# Patient Record
Sex: MALE | Race: WHITE | Employment: OTHER | URBAN - METROPOLITAN AREA
[De-identification: names, ages, dates, MRNs, and addresses within clinical notes are randomized per-mention and may not be internally consistent; named-entity substitution may affect disease eponyms.]

---

## 2021-01-02 ENCOUNTER — HOSPITAL ENCOUNTER (OUTPATIENT)
Dept: LAB | Age: 86
Discharge: HOME | End: 2021-01-02
Payer: MEDICARE

## 2021-01-02 LAB
ALBUMIN SERPL-MCNC: 4 G/DL (ref 3.5–4.6)
ALP BLD-CCNC: 104 U/L (ref 35–104)
ALT SERPL-CCNC: 11 U/L (ref 0–41)
ANION GAP SERPL CALCULATED.3IONS-SCNC: 10 MEQ/L (ref 9–15)
AST SERPL-CCNC: 19 U/L (ref 0–40)
BILIRUB SERPL-MCNC: 0.5 MG/DL (ref 0.2–0.7)
BUN BLDV-MCNC: 36 MG/DL (ref 8–23)
CALCIUM SERPL-MCNC: 9.5 MG/DL (ref 8.5–9.9)
CHLORIDE BLD-SCNC: 101 MEQ/L (ref 95–107)
CO2: 29 MEQ/L (ref 20–31)
CREAT SERPL-MCNC: 1.62 MG/DL (ref 0.7–1.2)
GFR AFRICAN AMERICAN: 48.4
GFR NON-AFRICAN AMERICAN: 40
GLOBULIN: 3.3 G/DL (ref 2.3–3.5)
GLUCOSE BLD-MCNC: 115 MG/DL (ref 70–99)
HCT VFR BLD CALC: 36.6 % (ref 42–52)
HEMOGLOBIN: 12.4 G/DL (ref 14–18)
MCH RBC QN AUTO: 33.7 PG (ref 27–31.3)
MCHC RBC AUTO-ENTMCNC: 33.9 % (ref 33–37)
MCV RBC AUTO: 99.4 FL (ref 80–100)
PDW BLD-RTO: 13 % (ref 11.5–14.5)
PLATELET # BLD: 281 K/UL (ref 130–400)
POTASSIUM SERPL-SCNC: 4.2 MEQ/L (ref 3.4–4.9)
RBC # BLD: 3.68 M/UL (ref 4.7–6.1)
SODIUM BLD-SCNC: 140 MEQ/L (ref 135–144)
TOTAL PROTEIN: 7.3 G/DL (ref 6.3–8)
WBC # BLD: 6.9 K/UL (ref 4.8–10.8)

## 2021-01-02 PROCEDURE — 85027 COMPLETE CBC AUTOMATED: CPT

## 2021-01-02 PROCEDURE — 36415 COLL VENOUS BLD VENIPUNCTURE: CPT

## 2021-01-02 PROCEDURE — 80053 COMPREHEN METABOLIC PANEL: CPT

## 2021-01-12 ENCOUNTER — HOSPITAL ENCOUNTER (OUTPATIENT)
Dept: PHYSICAL THERAPY | Age: 86
Setting detail: THERAPIES SERIES
Discharge: HOME | End: 2021-01-12
Payer: MEDICARE

## 2021-01-12 PROCEDURE — 97530 THERAPEUTIC ACTIVITIES: CPT

## 2021-01-12 PROCEDURE — 97110 THERAPEUTIC EXERCISES: CPT

## 2021-01-12 PROCEDURE — 97162 PT EVAL MOD COMPLEX 30 MIN: CPT

## 2021-01-12 ASSESSMENT — PAIN DESCRIPTION - FREQUENCY: FREQUENCY: CONTINUOUS

## 2021-01-12 ASSESSMENT — PAIN DESCRIPTION - PAIN TYPE: TYPE: ACUTE PAIN

## 2021-01-12 NOTE — PROGRESS NOTES
Physical Therapy  Initial Assessment  Date: 2021  Patient Name: Tmomy Quan  MRN: [id number]  : 1928     Treatment Diagnosis: Chronic low back pain/debility and unsteady gait/falls         Subjective   General  Chart Reviewed: Yes  Patient assessed for rehabilitation services?: Yes  Additional Pertinent Hx: Pt reports had 3 falls and last one being Oct 2020; Pt reports that his back pain increased after his falls. Pt did not  have any xrays taken.   Family / Caregiver Present: Yes(wife and daughter in law)  Referring Practitioner: Mona Contreras  Referral Date : 21  Diagnosis: Chronic back pain and unstable gait  General Comment  Comments: Pts daughter in law and wife present for eval  PT Visit Information  Onset Date: 10/12/20  Total # of Visits Approved: 12(1-2x per week for 4-6 weeks=12)  Total # of Visits to Date: 1  Plan of Care/Certification Expiration Date: 21  No Show: 0  Canceled Appointment: 0  Subjective  Subjective: Pt reports left sided low back pain as 8-9/10 \"pointed pain\"  Pain Screening  Patient Currently in Pain: Yes  Pain Assessment  Pain Assessment: 0-10  Pain Level: 9  Pain Type: Acute pain  Pain Location: Back  Pain Orientation: Left  Pain Descriptors: (\"Pointed pain\")  Pain Frequency: Continuous  Vital Signs  Patient Currently in Pain: Yes         Objective     Observation/Palpation  Palpation: Pt very tender at his left SI and superior to left SI; Pt had no reports of symptoms down his left leg  Observation: Pt stood with a flexed forward posture and with left shoulder and left scapula lower that the right side    AROM RLE (degrees)  RLE AROM: WNL  AROM LLE (degrees)  LLE AROM : WNL  AROM RUE (degrees)  RUE AROM : WNL  AROM LUE (degrees)  LUE AROM : WNL  Spine  Lumbar: Lumbar flexion=25%  Lumbar ext= <25%   SB R=50%    SB L=50%    Rot R=50%    Rot L=50%    Strength RLE  R Hip Flexion: 4/5  R Hip ABduction: 4/5  R Hip ADduction: 4/5  R Knee Flexion: 4/5  R Knee Extension: 4/5  R Ankle Dorsiflexion: 4/5  R Ankle Plantar flexion: 4/5  Strength LLE  L Hip Flexion: 4/5  L Hip ABduction: 4/5  L Hip ADduction: 4/5  L Knee Flexion: 4/5  L Knee Extension: 4/5  L Ankle Dorsiflexion: 4/5  L Ankle Plantar Flexion: 4/5  Strength RUE  Comment: Grossly 4/5  Strength LUE  Comment: Grossly 4/5  Strength Other  Other: Core balance 4/5           Bed mobility  Comment: PT demostrated how to perform a log roll for in/out of bed          Ambulation  Ambulation?: Yes  Ambulation 1  Surface: level tile;carpet  Device: Single point cane  Assistance: Stand by assistance  Quality of Gait: Pt ambulated with a flexed forward posture and decreased step length B LE and a shuffling type gait at time with slow speed. Gait Deviations: Slow Gaby; Shuffles;Decreased step length  Distance: 40' x 1     Exercises  Exercise 1: *Seated low back stretch x 3 H20  Exercise 2: *Seated HS stretch x 3 H20                      Assessment   Conditions Requiring Skilled Therapeutic Intervention  Body structures, Functions, Activity limitations: Decreased functional mobility ; Increased pain;Decreased balance;Decreased strength;Decreased endurance  Assessment: Pt is a 79 yo male who had been dealing with chronic low back pain \"for years\" but pt has had 3 falls in the recent past with the last one occurring in Oct 2020. Pt now has increased left LB pain since his las fall. Pts wife and daughter in law were also present during the eval. PT completed evaluation and found pt to demonstrate increased tenderness at his left SI joint and superior to left SI. Pt has no reports of S/S down his left leg. Pt and family was instructed in seated ther ex and given copy of HEP. PT also reviewed how to perform a log roll for in/out of bed as pt reports increased back pain with this transfer. Pts family also video taping PT performing log roll with PT permission so that they can practice with pt at home.  Disucssed POC and family has concerns regarding Minutes  60 min                  Tawnya Red, TF#2020

## 2021-01-25 NOTE — PROGRESS NOTES
Physical Therapy  Daily Treatment Note  Date: 2021  Patient Name: Mariana Freeman  MRN: [id number]     :   1928    Subjective:   General  Chart Reviewed: Yes  Additional Pertinent Hx: Pt reports had 3 falls and last one being Oct 2020; Pt reports that his back pain increased after his falls. Pt did not  have any xrays taken. Family / Caregiver Present: No  Referring Practitioner: Yordan Iqbal  PT Visit Information  Onset Date: 10/12/20  Total # of Visits Approved: 12  Total # of Visits to Date: 1  Plan of Care/Certification Expiration Date: 21  No Show: 0  Canceled Appointment: 1  Subjective  Subjective: Pt telephoned 21 to cx 21 appt. Pt reports that he is feeling better and would like to check back with therapy in one month. Pt request next PT visit be on 21.   General Comment  Comments: PT manager agreed to hold PT for one month and have a PT recheck per pt request.        Therapy Time   Individual Concurrent Group Co-treatment   Time In           Time Out           Minutes [address] [facility] [place] [facility] [place], [address]

## 2021-01-26 ENCOUNTER — HOSPITAL ENCOUNTER (OUTPATIENT)
Dept: PHYSICAL THERAPY | Age: 86
Setting detail: THERAPIES SERIES
Discharge: HOME | End: 2021-01-26
Payer: MEDICARE

## 2021-02-19 ENCOUNTER — HOSPITAL ENCOUNTER (EMERGENCY)
Age: 86
Discharge: ANOTHER HOSPITAL | End: 2021-02-20
Attending: EMERGENCY MEDICINE
Payer: MEDICARE

## 2021-02-19 ENCOUNTER — APPOINTMENT (OUTPATIENT)
Dept: GENERAL RADIOLOGY | Age: 86
End: 2021-02-19
Payer: MEDICARE

## 2021-02-19 ENCOUNTER — APPOINTMENT (OUTPATIENT)
Dept: CT IMAGING | Age: 86
End: 2021-02-19
Payer: MEDICARE
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
## 2021-02-19 DIAGNOSIS — R41.82 ALTERED MENTAL STATUS, UNSPECIFIED ALTERED MENTAL STATUS TYPE: Primary | ICD-10-CM

## 2021-02-19 LAB
ALBUMIN SERPL-MCNC: 4.1 G/DL (ref 3.5–4.6)
ALP BLD-CCNC: 100 U/L (ref 35–104)
ALT SERPL-CCNC: 19 U/L (ref 0–41)
ANION GAP SERPL CALCULATED.3IONS-SCNC: 10 MEQ/L (ref 9–15)
AST SERPL-CCNC: 29 U/L (ref 0–40)
BACTERIA: NEGATIVE /HPF
BASOPHILS ABSOLUTE: 0 K/UL (ref 0–0.2)
BASOPHILS RELATIVE PERCENT: 0.3 %
BILIRUB SERPL-MCNC: 0.7 MG/DL (ref 0.2–0.7)
BILIRUBIN URINE: NEGATIVE
BLOOD, URINE: ABNORMAL
BUN BLDV-MCNC: 22 MG/DL (ref 8–23)
CALCIUM SERPL-MCNC: 9.6 MG/DL (ref 8.5–9.9)
CHLORIDE BLD-SCNC: 102 MEQ/L (ref 95–107)
CLARITY: CLEAR
CO2: 27 MEQ/L (ref 20–31)
COLOR: YELLOW
CREAT SERPL-MCNC: 1.31 MG/DL (ref 0.7–1.2)
EKG ATRIAL RATE: 108 BPM
EKG P AXIS: 48 DEGREES
EKG P-R INTERVAL: 202 MS
EKG Q-T INTERVAL: 330 MS
EKG QRS DURATION: 90 MS
EKG QTC CALCULATION (BAZETT): 442 MS
EKG R AXIS: -43 DEGREES
EKG T AXIS: 74 DEGREES
EKG VENTRICULAR RATE: 108 BPM
EOSINOPHILS ABSOLUTE: 0 K/UL (ref 0–0.7)
EOSINOPHILS RELATIVE PERCENT: 0.1 %
EPITHELIAL CELLS, UA: ABNORMAL /HPF
GFR AFRICAN AMERICAN: >60
GFR NON-AFRICAN AMERICAN: 51.1
GLOBULIN: 2.8 G/DL (ref 2.3–3.5)
GLUCOSE BLD-MCNC: 122 MG/DL (ref 70–99)
GLUCOSE URINE: NEGATIVE MG/DL
HCT VFR BLD CALC: 35.6 % (ref 42–52)
HEMOGLOBIN: 12.3 G/DL (ref 14–18)
KETONES, URINE: NEGATIVE MG/DL
LEUKOCYTE ESTERASE, URINE: NEGATIVE
LYMPHOCYTES ABSOLUTE: 0.9 K/UL (ref 1–4.8)
LYMPHOCYTES RELATIVE PERCENT: 6.7 %
MAGNESIUM: 1.9 MG/DL (ref 1.7–2.4)
MCH RBC QN AUTO: 33.6 PG (ref 27–31.3)
MCHC RBC AUTO-ENTMCNC: 34.6 % (ref 33–37)
MCV RBC AUTO: 97.2 FL (ref 80–100)
MONOCYTES ABSOLUTE: 1 K/UL (ref 0.2–0.8)
MONOCYTES RELATIVE PERCENT: 7.3 %
NEUTROPHILS ABSOLUTE: 11.3 K/UL (ref 1.4–6.5)
NEUTROPHILS RELATIVE PERCENT: 85.6 %
NITRITE, URINE: NEGATIVE
PDW BLD-RTO: 13.4 % (ref 11.5–14.5)
PH UA: 7 (ref 5–9)
PLATELET # BLD: 307 K/UL (ref 130–400)
POTASSIUM SERPL-SCNC: 3.7 MEQ/L (ref 3.4–4.9)
PROTEIN UA: 30 MG/DL
RBC # BLD: 3.66 M/UL (ref 4.7–6.1)
RBC UA: ABNORMAL /HPF (ref 0–2)
SARS-COV-2, PCR: NOT DETECTED
SODIUM BLD-SCNC: 139 MEQ/L (ref 135–144)
SPECIFIC GRAVITY UA: 1.02 (ref 1–1.03)
TOTAL CK: 562 U/L (ref 0–190)
TOTAL PROTEIN: 6.9 G/DL (ref 6.3–8)
URINE REFLEX TO CULTURE: ABNORMAL
UROBILINOGEN, URINE: 0.2 E.U./DL
WBC # BLD: 13.2 K/UL (ref 4.8–10.8)
WBC UA: ABNORMAL /HPF (ref 0–5)

## 2021-02-19 PROCEDURE — 96374 THER/PROPH/DIAG INJ IV PUSH: CPT

## 2021-02-19 PROCEDURE — 83735 ASSAY OF MAGNESIUM: CPT

## 2021-02-19 PROCEDURE — 36415 COLL VENOUS BLD VENIPUNCTURE: CPT

## 2021-02-19 PROCEDURE — 71045 X-RAY EXAM CHEST 1 VIEW: CPT

## 2021-02-19 PROCEDURE — 93005 ELECTROCARDIOGRAM TRACING: CPT

## 2021-02-19 PROCEDURE — 6370000000 HC RX 637 (ALT 250 FOR IP): Performed by: EMERGENCY MEDICINE

## 2021-02-19 PROCEDURE — 87635 SARS-COV-2 COVID-19 AMP PRB: CPT

## 2021-02-19 PROCEDURE — 82550 ASSAY OF CK (CPK): CPT

## 2021-02-19 PROCEDURE — 80053 COMPREHEN METABOLIC PANEL: CPT

## 2021-02-19 PROCEDURE — 6360000002 HC RX W HCPCS: Performed by: EMERGENCY MEDICINE

## 2021-02-19 PROCEDURE — 85025 COMPLETE CBC W/AUTO DIFF WBC: CPT

## 2021-02-19 PROCEDURE — 81001 URINALYSIS AUTO W/SCOPE: CPT

## 2021-02-19 PROCEDURE — 2580000003 HC RX 258: Performed by: EMERGENCY MEDICINE

## 2021-02-19 PROCEDURE — 70450 CT HEAD/BRAIN W/O DYE: CPT

## 2021-02-19 PROCEDURE — 99284 EMERGENCY DEPT VISIT MOD MDM: CPT

## 2021-02-19 RX ORDER — 0.9 % SODIUM CHLORIDE 0.9 %
500 INTRAVENOUS SOLUTION INTRAVENOUS ONCE
Status: COMPLETED | OUTPATIENT
Start: 2021-02-19 | End: 2021-02-19

## 2021-02-19 RX ORDER — CYCLOBENZAPRINE HCL 10 MG
10 TABLET ORAL EVERY EVENING
COMMUNITY

## 2021-02-19 RX ORDER — ASPIRIN 81 MG/1
324 TABLET, CHEWABLE ORAL ONCE
Status: COMPLETED | OUTPATIENT
Start: 2021-02-19 | End: 2021-02-19

## 2021-02-19 RX ORDER — DUTASTERIDE 0.5 MG/1
0.5 CAPSULE, LIQUID FILLED ORAL DAILY
Status: ON HOLD | COMMUNITY
End: 2021-02-23 | Stop reason: HOSPADM

## 2021-02-19 RX ORDER — LORAZEPAM 2 MG/ML
1 INJECTION INTRAMUSCULAR ONCE
Status: COMPLETED | OUTPATIENT
Start: 2021-02-19 | End: 2021-02-19

## 2021-02-19 RX ORDER — GABAPENTIN 600 MG/1
300 TABLET ORAL NIGHTLY
COMMUNITY

## 2021-02-19 RX ADMIN — SODIUM CHLORIDE 500 ML: 9 INJECTION, SOLUTION INTRAVENOUS at 23:28

## 2021-02-19 RX ADMIN — ASPIRIN 324 MG: 81 TABLET, CHEWABLE ORAL at 23:29

## 2021-02-19 RX ADMIN — LORAZEPAM 1 MG: 2 INJECTION INTRAMUSCULAR; INTRAVENOUS at 23:02

## 2021-02-20 ENCOUNTER — APPOINTMENT (OUTPATIENT)
Dept: GENERAL RADIOLOGY | Age: 86
DRG: 088 | End: 2021-02-20
Attending: INTERNAL MEDICINE
Payer: MEDICARE

## 2021-02-20 ENCOUNTER — HOSPITAL ENCOUNTER (INPATIENT)
Age: 86
LOS: 2 days | Discharge: ANOTHER HOSPITAL | DRG: 088 | End: 2021-02-23
Attending: INTERNAL MEDICINE | Admitting: INTERNAL MEDICINE
Payer: MEDICARE

## 2021-02-20 ENCOUNTER — APPOINTMENT (OUTPATIENT)
Dept: MRI IMAGING | Age: 86
DRG: 088 | End: 2021-02-20
Attending: INTERNAL MEDICINE
Payer: MEDICARE

## 2021-02-20 ENCOUNTER — APPOINTMENT (OUTPATIENT)
Dept: ULTRASOUND IMAGING | Age: 86
DRG: 088 | End: 2021-02-20
Attending: INTERNAL MEDICINE
Payer: MEDICARE

## 2021-02-20 VITALS
TEMPERATURE: 97.6 F | RESPIRATION RATE: 18 BRPM | HEART RATE: 110 BPM | OXYGEN SATURATION: 98 % | SYSTOLIC BLOOD PRESSURE: 133 MMHG | DIASTOLIC BLOOD PRESSURE: 80 MMHG
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
## 2021-02-20 PROBLEM — R41.82 AMS (ALTERED MENTAL STATUS): Status: ACTIVE | Noted: 2021-02-20

## 2021-02-20 PROBLEM — N18.9 CKD (CHRONIC KIDNEY DISEASE): Status: ACTIVE | Noted: 2021-02-20

## 2021-02-20 PROBLEM — N40.0 BPH (BENIGN PROSTATIC HYPERPLASIA): Status: ACTIVE | Noted: 2021-02-20

## 2021-02-20 PROBLEM — D72.829 LEUKOCYTOSIS: Status: ACTIVE | Noted: 2021-02-20

## 2021-02-20 LAB
AMMONIA: 23 UMOL/L (ref 16–60)
AMPHETAMINE SCREEN, URINE: NORMAL
ANION GAP SERPL CALCULATED.3IONS-SCNC: 10 MEQ/L (ref 9–15)
BARBITURATE SCREEN URINE: NORMAL
BASE EXCESS VENOUS: 4 (ref -3–3)
BENZODIAZEPINE SCREEN, URINE: NORMAL
BUN BLDV-MCNC: 20 MG/DL (ref 8–23)
CALCIUM SERPL-MCNC: 9.4 MG/DL (ref 8.5–9.9)
CANNABINOID SCREEN URINE: NORMAL
CHLORIDE BLD-SCNC: 105 MEQ/L (ref 95–107)
CO2: 26 MEQ/L (ref 20–31)
COCAINE METABOLITE SCREEN URINE: NORMAL
CREAT SERPL-MCNC: 1.21 MG/DL (ref 0.7–1.2)
GFR AFRICAN AMERICAN: >60
GFR NON-AFRICAN AMERICAN: 56
GLUCOSE BLD-MCNC: 104 MG/DL (ref 70–99)
HCO3 VENOUS: 27.8 MMOL/L (ref 23–29)
Lab: NORMAL
O2 SAT, VEN: 26 %
OPIATE SCREEN URINE: NORMAL
PCO2, VEN: 40.4 MM HG (ref 40–50)
PERFORMED ON: ABNORMAL
PH VENOUS: 7.45 (ref 7.35–7.45)
PHENCYCLIDINE SCREEN URINE: NORMAL
PO2, VEN: 17 MM HG
POC FIO2: 21
POC SAMPLE TYPE: ABNORMAL
POTASSIUM REFLEX MAGNESIUM: 3.9 MEQ/L (ref 3.4–4.9)
SODIUM BLD-SCNC: 141 MEQ/L (ref 135–144)
TCO2 CALC VENOUS: 29 MMOL/L
TROPONIN: 0.04 NG/ML (ref 0–0.01)
TROPONIN: 0.04 NG/ML (ref 0–0.01)

## 2021-02-20 PROCEDURE — 6370000000 HC RX 637 (ALT 250 FOR IP): Performed by: INTERNAL MEDICINE

## 2021-02-20 PROCEDURE — 96375 TX/PRO/DX INJ NEW DRUG ADDON: CPT

## 2021-02-20 PROCEDURE — 51702 INSERT TEMP BLADDER CATH: CPT

## 2021-02-20 PROCEDURE — 92610 EVALUATE SWALLOWING FUNCTION: CPT

## 2021-02-20 PROCEDURE — 82803 BLOOD GASES ANY COMBINATION: CPT

## 2021-02-20 PROCEDURE — 2580000003 HC RX 258: Performed by: INTERNAL MEDICINE

## 2021-02-20 PROCEDURE — 6360000002 HC RX W HCPCS: Performed by: INTERNAL MEDICINE

## 2021-02-20 PROCEDURE — 70551 MRI BRAIN STEM W/O DYE: CPT

## 2021-02-20 PROCEDURE — 6360000002 HC RX W HCPCS: Performed by: EMERGENCY MEDICINE

## 2021-02-20 PROCEDURE — G0379 DIRECT REFER HOSPITAL OBSERV: HCPCS

## 2021-02-20 PROCEDURE — 73502 X-RAY EXAM HIP UNI 2-3 VIEWS: CPT

## 2021-02-20 PROCEDURE — 82140 ASSAY OF AMMONIA: CPT

## 2021-02-20 PROCEDURE — G0378 HOSPITAL OBSERVATION PER HR: HCPCS

## 2021-02-20 PROCEDURE — 96374 THER/PROPH/DIAG INJ IV PUSH: CPT

## 2021-02-20 PROCEDURE — 99222 1ST HOSP IP/OBS MODERATE 55: CPT | Performed by: PSYCHIATRY & NEUROLOGY

## 2021-02-20 PROCEDURE — 96372 THER/PROPH/DIAG INJ SC/IM: CPT

## 2021-02-20 PROCEDURE — 84484 ASSAY OF TROPONIN QUANT: CPT

## 2021-02-20 PROCEDURE — 36415 COLL VENOUS BLD VENIPUNCTURE: CPT

## 2021-02-20 PROCEDURE — 36600 WITHDRAWAL OF ARTERIAL BLOOD: CPT

## 2021-02-20 PROCEDURE — 93880 EXTRACRANIAL BILAT STUDY: CPT

## 2021-02-20 PROCEDURE — 6370000000 HC RX 637 (ALT 250 FOR IP): Performed by: NURSE PRACTITIONER

## 2021-02-20 PROCEDURE — 80306 DRUG TEST PRSMV INSTRMNT: CPT

## 2021-02-20 PROCEDURE — 80048 BASIC METABOLIC PNL TOTAL CA: CPT

## 2021-02-20 RX ORDER — ROSUVASTATIN CALCIUM 40 MG/1
40 TABLET, COATED ORAL NIGHTLY
Status: DISCONTINUED | OUTPATIENT
Start: 2021-02-20 | End: 2021-02-23 | Stop reason: HOSPADM

## 2021-02-20 RX ORDER — FINASTERIDE 5 MG/1
5 TABLET, FILM COATED ORAL DAILY
Status: DISCONTINUED | OUTPATIENT
Start: 2021-02-20 | End: 2021-02-23 | Stop reason: HOSPADM

## 2021-02-20 RX ORDER — ONDANSETRON 2 MG/ML
4 INJECTION INTRAMUSCULAR; INTRAVENOUS EVERY 6 HOURS PRN
Status: CANCELLED | OUTPATIENT
Start: 2021-02-20

## 2021-02-20 RX ORDER — SODIUM CHLORIDE 0.9 % (FLUSH) 0.9 %
10 SYRINGE (ML) INJECTION EVERY 12 HOURS SCHEDULED
Status: DISCONTINUED | OUTPATIENT
Start: 2021-02-20 | End: 2021-02-23 | Stop reason: HOSPADM

## 2021-02-20 RX ORDER — ASPIRIN 300 MG/1
300 SUPPOSITORY RECTAL DAILY
Status: CANCELLED | OUTPATIENT
Start: 2021-02-20

## 2021-02-20 RX ORDER — ACETAMINOPHEN 325 MG/1
650 TABLET ORAL EVERY 6 HOURS PRN
Status: ON HOLD | COMMUNITY
End: 2021-03-18

## 2021-02-20 RX ORDER — LORAZEPAM 2 MG/ML
0.5 INJECTION INTRAMUSCULAR ONCE
Status: COMPLETED | OUTPATIENT
Start: 2021-02-20 | End: 2021-02-20

## 2021-02-20 RX ORDER — SODIUM CHLORIDE 0.9 % (FLUSH) 0.9 %
10 SYRINGE (ML) INJECTION PRN
Status: CANCELLED | OUTPATIENT
Start: 2021-02-20

## 2021-02-20 RX ORDER — LABETALOL HYDROCHLORIDE 5 MG/ML
10 INJECTION, SOLUTION INTRAVENOUS EVERY 10 MIN PRN
Status: CANCELLED | OUTPATIENT
Start: 2021-02-20

## 2021-02-20 RX ORDER — PROMETHAZINE HYDROCHLORIDE 25 MG/1
12.5 TABLET ORAL EVERY 6 HOURS PRN
Status: CANCELLED | OUTPATIENT
Start: 2021-02-20

## 2021-02-20 RX ORDER — ACETAMINOPHEN 325 MG/1
650 TABLET ORAL EVERY 8 HOURS PRN
Status: DISCONTINUED | OUTPATIENT
Start: 2021-02-20 | End: 2021-02-23 | Stop reason: HOSPADM

## 2021-02-20 RX ORDER — SODIUM CHLORIDE 0.9 % (FLUSH) 0.9 %
10 SYRINGE (ML) INJECTION PRN
Status: DISCONTINUED | OUTPATIENT
Start: 2021-02-20 | End: 2021-02-23 | Stop reason: HOSPADM

## 2021-02-20 RX ORDER — TAMSULOSIN HYDROCHLORIDE 0.4 MG/1
0.4 CAPSULE ORAL DAILY
Status: ON HOLD | COMMUNITY
End: 2021-02-23 | Stop reason: HOSPADM

## 2021-02-20 RX ORDER — DIPHENHYDRAMINE HYDROCHLORIDE 50 MG/ML
25 INJECTION INTRAMUSCULAR; INTRAVENOUS ONCE
Status: COMPLETED | OUTPATIENT
Start: 2021-02-20 | End: 2021-02-20

## 2021-02-20 RX ORDER — ONDANSETRON 2 MG/ML
4 INJECTION INTRAMUSCULAR; INTRAVENOUS EVERY 6 HOURS PRN
Status: DISCONTINUED | OUTPATIENT
Start: 2021-02-20 | End: 2021-02-23 | Stop reason: HOSPADM

## 2021-02-20 RX ORDER — ASPIRIN 300 MG/1
300 SUPPOSITORY RECTAL DAILY
Status: DISCONTINUED | OUTPATIENT
Start: 2021-02-20 | End: 2021-02-21

## 2021-02-20 RX ORDER — ROSUVASTATIN CALCIUM 20 MG/1
40 TABLET, COATED ORAL NIGHTLY
Status: CANCELLED | OUTPATIENT
Start: 2021-02-20

## 2021-02-20 RX ORDER — PROMETHAZINE HYDROCHLORIDE 12.5 MG/1
12.5 TABLET ORAL EVERY 6 HOURS PRN
Status: DISCONTINUED | OUTPATIENT
Start: 2021-02-20 | End: 2021-02-23 | Stop reason: HOSPADM

## 2021-02-20 RX ORDER — TAMSULOSIN HYDROCHLORIDE 0.4 MG/1
0.4 CAPSULE ORAL DAILY
Status: DISCONTINUED | OUTPATIENT
Start: 2021-02-20 | End: 2021-02-23 | Stop reason: HOSPADM

## 2021-02-20 RX ORDER — ASPIRIN 81 MG/1
81 TABLET ORAL DAILY
Status: CANCELLED | OUTPATIENT
Start: 2021-02-20

## 2021-02-20 RX ORDER — MULTIVIT-MIN/IRON/FOLIC ACID/K 18-600-40
CAPSULE ORAL DAILY
COMMUNITY

## 2021-02-20 RX ORDER — CYCLOBENZAPRINE HCL 10 MG
10 TABLET ORAL NIGHTLY PRN
Status: DISCONTINUED | OUTPATIENT
Start: 2021-02-20 | End: 2021-02-23 | Stop reason: HOSPADM

## 2021-02-20 RX ORDER — LABETALOL HYDROCHLORIDE 5 MG/ML
10 INJECTION, SOLUTION INTRAVENOUS EVERY 10 MIN PRN
Status: DISCONTINUED | OUTPATIENT
Start: 2021-02-20 | End: 2021-02-23 | Stop reason: HOSPADM

## 2021-02-20 RX ORDER — SODIUM CHLORIDE 0.9 % (FLUSH) 0.9 %
10 SYRINGE (ML) INJECTION EVERY 12 HOURS SCHEDULED
Status: CANCELLED | OUTPATIENT
Start: 2021-02-20

## 2021-02-20 RX ORDER — ASPIRIN 81 MG/1
81 TABLET ORAL DAILY
Status: DISCONTINUED | OUTPATIENT
Start: 2021-02-20 | End: 2021-02-21

## 2021-02-20 RX ADMIN — ACETAMINOPHEN 650 MG: 325 TABLET ORAL at 08:35

## 2021-02-20 RX ADMIN — SODIUM CHLORIDE, PRESERVATIVE FREE 10 ML: 5 INJECTION INTRAVENOUS at 08:35

## 2021-02-20 RX ADMIN — ACETAMINOPHEN 650 MG: 325 TABLET ORAL at 17:45

## 2021-02-20 RX ADMIN — FINASTERIDE 5 MG: 5 TABLET, FILM COATED ORAL at 08:35

## 2021-02-20 RX ADMIN — TAMSULOSIN HYDROCHLORIDE 0.4 MG: 0.4 CAPSULE ORAL at 17:45

## 2021-02-20 RX ADMIN — ASPIRIN 81 MG: 81 TABLET, COATED ORAL at 08:35

## 2021-02-20 RX ADMIN — ROSUVASTATIN CALCIUM 40 MG: 40 TABLET, FILM COATED ORAL at 21:53

## 2021-02-20 RX ADMIN — SODIUM CHLORIDE, PRESERVATIVE FREE 10 ML: 5 INJECTION INTRAVENOUS at 21:56

## 2021-02-20 RX ADMIN — DIPHENHYDRAMINE HYDROCHLORIDE 25 MG: 50 INJECTION INTRAMUSCULAR; INTRAVENOUS at 00:13

## 2021-02-20 RX ADMIN — ENOXAPARIN SODIUM 30 MG: 30 INJECTION SUBCUTANEOUS at 21:53

## 2021-02-20 RX ADMIN — LORAZEPAM 0.5 MG: 2 INJECTION INTRAMUSCULAR; INTRAVENOUS at 11:30

## 2021-02-20 ASSESSMENT — PAIN SCALES - GENERAL
PAINLEVEL_OUTOF10: 0
PAINLEVEL_OUTOF10: 7
PAINLEVEL_OUTOF10: 7
PAINLEVEL_OUTOF10: 9
PAINLEVEL_OUTOF10: 0
PAINLEVEL_OUTOF10: 7

## 2021-02-20 ASSESSMENT — ENCOUNTER SYMPTOMS
COLOR CHANGE: 0
SHORTNESS OF BREATH: 0
BACK PAIN: 0
NAUSEA: 0
VOMITING: 0
PHOTOPHOBIA: 0
TROUBLE SWALLOWING: 0
CHOKING: 0

## 2021-02-20 ASSESSMENT — PAIN DESCRIPTION - LOCATION: LOCATION: FLANK;RIB CAGE

## 2021-02-20 NOTE — CONSULTS
Subjective:      Patient ID: uLcia Lamb is a 80 y.o. male who presents today for fall at home with encephalopathy. HPI 42-year-old right-hand gentleman who lives at home and had a fall. The exact nature of the fall and what occurred at home is not clear to us that he has a contusion on his left knee and some on his forehead. Patient became quite agitated after this and had mental status changes which are reported to be new I did call the family and he is usually functional reason is today. He does have some memory issues with age-related cognitive changes though he does well at home. Walks with a walker, this morning he awoke and with the nursing staff he actually was able to comprehend and exam.  He is deaf and therefore it takes him a while to process. He still somewhat lethargic and drowsy. On reviewing the history from the patient's family it did appear that he was not talking prior to the fall. This is not documented anywhere. Patient was seen this morning and still appears to be somewhat lethargic. We were able to send him up from the chair and walk him to the bathroom but he requires more help and is leaning to the left side. Review of Systems   Constitutional: Negative for fever. HENT: Negative for ear pain, tinnitus and trouble swallowing. Eyes: Negative for photophobia and visual disturbance. Respiratory: Negative for choking and shortness of breath. Cardiovascular: Negative for chest pain and palpitations. Gastrointestinal: Negative for nausea and vomiting. Musculoskeletal: Positive for gait problem. Negative for back pain, joint swelling, myalgias, neck pain and neck stiffness. Skin: Negative for color change. Allergic/Immunologic: Negative for food allergies. Neurological: Positive for weakness. Negative for dizziness, tremors, seizures, syncope, facial asymmetry, speech difficulty, light-headedness, numbness and headaches.    Psychiatric/Behavioral: Negative for Or    aspirin suppository 300 mg  300 mg Rectal Daily Versjorge Song Sedar, DO        labetalol (NORMODYNE;TRANDATE) injection 10 mg  10 mg Intravenous Q10 Min PRN Merissa Song Sedar, DO        rosuvastatin (CRESTOR) tablet 40 mg  40 mg Oral Nightly Abdullahi D Sedar, DO        promethazine (PHENERGAN) tablet 12.5 mg  12.5 mg Oral Q6H PRN Versjorge Song Sedar, DO        Or    ondansetron (ZOFRAN) injection 4 mg  4 mg Intravenous Q6H PRN Versie Song Sedar, DO        sodium chloride flush 0.9 % injection 10 mL  10 mL Intravenous 2 times per day Gwenette Poll D Sedar, DO   10 mL at 02/20/21 2602    sodium chloride flush 0.9 % injection 10 mL  10 mL Intravenous PRN Merissa Song Sedar, DO        finasteride (PROSCAR) tablet 5 mg  5 mg Oral Daily Antea Antonietta, APRN - CNP   5 mg at 02/20/21 8876    cyclobenzaprine (FLEXERIL) tablet 10 mg  10 mg Oral Nightly PRN Aneta Antonietta, APRN - CNP        acetaminophen (TYLENOL) tablet 650 mg  650 mg Oral Q8H PRN Aneta Antonietta, APRN - CNP   650 mg at 02/20/21 2249        Objective:   BP (!) 135/97   Pulse 70   Temp 97.6 °F (36.4 °C) (Oral)   Resp 18   Ht 4' 7\" (1.397 m)   Wt 168 lb 4.8 oz (76.3 kg)   SpO2 98%   BMI 39.12 kg/m²     Physical Exam  Vitals signs reviewed. Eyes:      Pupils: Pupils are equal, round, and reactive to light. Neck:      Musculoskeletal: Normal range of motion. Cardiovascular:      Rate and Rhythm: Normal rate and regular rhythm. Heart sounds: No murmur. Pulmonary:      Effort: Pulmonary effort is normal.      Breath sounds: Normal breath sounds. Abdominal:      General: Bowel sounds are normal.   Musculoskeletal: Normal range of motion. Skin:     General: Skin is warm. Neurological:      Mental Status: He is alert. He is disoriented. Cranial Nerves: No cranial nerve deficit. Sensory: No sensory deficit. Motor: No abnormal muscle tone.       Coordination: Coordination normal.      Deep Tendon Reflexes: Reflexes are normal and and underlying stroke must be ruled out. Patient is improving though not his baseline as yet. We will await his MRI. Will require rehabilitation from here on depending on what we find. Patient is very hard of hearing and therefore takes time for processing. An initial event of a stroke with a fall is a consideration. These were discussed with the family      David Hirsch MD, [address], American Board of Psychiatry & Neurology  Board Certified in Vascular Neurology  Board Certified in Neuromuscular Medicine  Certified in [facility]:

## 2021-02-20 NOTE — ED NOTES
Dr Mary Lane on phone with Dr Margo Ospina. Silvina Godwin.  [facility]  02/20/21 4063

## 2021-02-20 NOTE — H&P
[facility]  MEDICINE    HISTORY AND PHYSICAL EXAM    PATIENT NAME:  Ruba Farrell    MRN:  [id number]  SERVICE DATE:  2/20/2021   SERVICE TIME:  4:08 AM    Primary Care Physician: Jesus Daniels MD     SUBJECTIVE  CHIEF COMPLAINT:  AMS,. Fall    HPI:  Ruba Farrell is a 80 y.o., , male who  has a past medical history of Back pain, Colitis, ulcerative ([facility] [address] and Hypertension. that is hospitalized for AMS. According to family, he was not himself today - less talking, confused. He then fell when standing from sitting position. No obvious injury - he could not get up on his own. His baseline is usually alert and oriented, ambulatory,   He has some mild renal dysfx,  WBC 13.2, afebrile,   Urine is unremarkable. COVID was negative. He was transferred here from [facility] for further eval/treatment AMS. PAST MEDICAL HISTORY:    Past Medical History:   Diagnosis Date    Back pain     Colitis, ulcerative ([facility] [address]     Hypertension      PAST SURGICAL HISTORY:  No past surgical history on file. FAMILY HISTORY:  No family history on file.   SOCIAL HISTORY:    Social History     Socioeconomic History    Marital status:      Spouse name: Not on file    Number of children: Not on file    Years of education: Not on file    Highest education level: Not on file   Occupational History    Not on file   Social Needs    Financial resource strain: Not on file    Food insecurity     Worry: Not on file     Inability: Not on file    Transportation needs     Medical: Not on file     Non-medical: Not on file   Tobacco Use    Smoking status: Never Smoker    Smokeless tobacco: Never Used   Substance and Sexual Activity    Alcohol use: Not Currently    Drug use: Not Currently    Sexual activity: Not Currently   Lifestyle    Physical activity     Days per week: Not on file     Minutes per session: Not on file    Stress: Not on file   Relationships    Social connections     Talks on phone: Not on file     Gets together: Not on file     Attends Confucianism service: Not on file     Active member of club or organization: Not on file     Attends meetings of clubs or organizations: Not on file     Relationship status: Not on file    Intimate partner violence     Fear of current or ex partner: Not on file     Emotionally abused: Not on file     Physically abused: Not on file     Forced sexual activity: Not on file   Other Topics Concern    Not on file   Social History Narrative    Not on file     MEDICATIONS:   Prior to Admission medications    Medication Sig Start Date End Date Taking? Authorizing Provider   tamsulosin (FLOMAX) 0.4 MG capsule Take 0.4 mg by mouth daily   Yes Historical Provider, MD   acetaminophen (TYLENOL) 325 MG tablet Take 650 mg by mouth every 6 hours as needed for Pain   Yes Historical Provider, MD   Cholecalciferol (VITAMIN D) 50 MCG (2000 UT) CAPS capsule Take by mouth   Yes Historical Provider, MD   dutasteride (AVODART) 0.5 MG capsule Take 0.5 mg by mouth daily   Yes Historical Provider, MD   gabapentin (NEURONTIN) 600 MG tablet Take 600 mg by mouth nightly. Yes Historical Provider, MD   cyclobenzaprine (FLEXERIL) 10 MG tablet Take 10 mg by mouth nightly as needed for Muscle spasms   Yes Historical Provider, MD       ALLERGIES: Sulfa antibiotics    REVIEW OF SYSTEM:   Review of Systems   Unable to perform ROS: Mental status change   Constitutional: Negative for chills and fever. Neurological: Negative for dizziness and seizures. Psychiatric/Behavioral: Positive for agitation and confusion. OBJECTIVE  PHYSICAL EXAM:   Physical Exam  Constitutional:       General: He is not in acute distress. Appearance: He is obese. Comments: Sleepy,  Elderly. Rare one word response to questions   HENT:      Head: Normocephalic. Nose: Nose normal.      Mouth/Throat:      Mouth: Mucous membranes are dry.    Eyes:      Conjunctiva/sclera: Conjunctivae normal. Pupils: Pupils are equal, round, and reactive to light. Neck:      Musculoskeletal: No muscular tenderness. Vascular: No carotid bruit. Cardiovascular:      Rate and Rhythm: Normal rate and regular rhythm. Pulses: Normal pulses. Heart sounds: No murmur. Pulmonary:      Effort: Pulmonary effort is normal.      Breath sounds: No wheezing or rhonchi. Abdominal:      Palpations: Abdomen is soft. Tenderness: There is no abdominal tenderness. Musculoskeletal:      Right lower leg: No edema. Left lower leg: No edema. Lymphadenopathy:      Cervical: No cervical adenopathy. Skin:     General: Skin is warm and dry. Capillary Refill: Capillary refill takes less than 2 seconds. Coloration: Skin is pale. Neurological:      Mental Status: He is disoriented. Comments:  Oriented to self  Mild agitation   Psychiatric:      Comments: asleep          There were no vitals taken for this visit. DATA:     Diagnostic tests reviewed for today's visit:    Most recent labs and imaging results reviewed.      LABS:    Recent Results (from the past 24 hour(s))   EKG 12 Lead - Chest Pain    Collection Time: 02/19/21 10:29 PM   Result Value Ref Range    Ventricular Rate 108 BPM    Atrial Rate 108 BPM    P-R Interval 202 ms    QRS Duration 90 ms    Q-T Interval 330 ms    QTc Calculation (Bazett) 442 ms    P Axis 48 degrees    R Axis -43 degrees    T Axis 74 degrees   Urine Reflex to Culture    Collection Time: 02/19/21 10:41 PM    Specimen: Urine, clean catch   Result Value Ref Range    Color, UA Yellow Straw/Yellow    Clarity, UA Clear Clear    Glucose, Ur Negative Negative mg/dL    Bilirubin Urine Negative Negative    Ketones, Urine Negative Negative mg/dL    Specific Gravity, UA 1.020 1.005 - 1.030    Blood, Urine Trace-lysed Negative    pH, UA 7.0 5.0 - 9.0    Protein, UA 30 (A) Negative mg/dL    Urobilinogen, Urine 0.2 <2.0 E.U./dL    Nitrite, Urine Negative Negative    Leukocyte Esterase, Urine Negative Negative    Urine Reflex to Culture Not Indicated    Microscopic Urinalysis    Collection Time: 02/19/21 10:41 PM   Result Value Ref Range    WBC, UA 0-2 0 - 5 /HPF    RBC, UA 3-5 (A) 0 - 2 /HPF    Epithelial Cells, UA 0-2 /HPF    Bacteria, UA Negative Negative /HPF   Comprehensive Metabolic Panel    Collection Time: 02/19/21 10:42 PM   Result Value Ref Range    Sodium 139 135 - 144 mEq/L    Potassium 3.7 3.4 - 4.9 mEq/L    Chloride 102 95 - 107 mEq/L    CO2 27 20 - 31 mEq/L    Anion Gap 10 9 - 15 mEq/L    Glucose 122 (H) 70 - 99 mg/dL    BUN 22 8 - 23 mg/dL    CREATININE 1.31 (H) 0.70 - 1.20 mg/dL    GFR Non- 51.1 (L) >60    GFR  >60.0 >60    Calcium 9.6 8.5 - 9.9 mg/dL    Total Protein 6.9 6.3 - 8.0 g/dL    Albumin 4.1 3.5 - 4.6 g/dL    Total Bilirubin 0.7 0.2 - 0.7 mg/dL    Alkaline Phosphatase 100 35 - 104 U/L    ALT 19 0 - 41 U/L    AST 29 0 - 40 U/L    Globulin 2.8 2.3 - 3.5 g/dL   CBC Auto Differential    Collection Time: 02/19/21 10:42 PM   Result Value Ref Range    WBC 13.2 (H) 4.8 - 10.8 K/uL    RBC 3.66 (L) 4.70 - 6.10 M/uL    Hemoglobin 12.3 (L) 14.0 - 18.0 g/dL    Hematocrit 35.6 (L) 42.0 - 52.0 %    MCV 97.2 80.0 - 100.0 fL    MCH 33.6 (H) 27.0 - 31.3 pg    MCHC 34.6 33.0 - 37.0 %    RDW 13.4 11.5 - 14.5 %    Platelets 890 346 - 552 K/uL    Neutrophils % 85.6 %    Lymphocytes % 6.7 %    Monocytes % 7.3 %    Eosinophils % 0.1 %    Basophils % 0.3 %    Neutrophils Absolute 11.3 (H) 1.4 - 6.5 K/uL    Lymphocytes Absolute 0.9 (L) 1.0 - 4.8 K/uL    Monocytes Absolute 1.0 (H) 0.2 - 0.8 K/uL    Eosinophils Absolute 0.0 0.0 - 0.7 K/uL    Basophils Absolute 0.0 0.0 - 0.2 K/uL   CK    Collection Time: 02/19/21 10:42 PM   Result Value Ref Range    Total  (H) 0 - 190 U/L   Magnesium    Collection Time: 02/19/21 10:42 PM   Result Value Ref Range    Magnesium 1.9 1.7 - 2.4 mg/dL   COVID-19    Collection Time: 02/19/21 11:00 PM   Result Value Ref Range SARS-CoV-2, PCR Not Detected Not Detected   Urine Drug Screen, Comprehensive    Collection Time: 02/20/21 12:43 AM   Result Value Ref Range    PCP Screen, Urine Neg Negative <25 ng/mL    Benzodiazepine Screen, Urine Neg Negative <150 ng/mL    Cocaine Metabolite Screen, Urine Neg Negative <150 ng/mL    Amphetamine Screen, Urine Neg Negative <500 ng/mL    Cannabinoid Scrn, Ur Neg Negative <50 ng/mL    Opiate Scrn, Ur Neg Negative <100 ng/mL    Barbiturate Screen, Ur Neg Negative <200 ng/mL    Drug Screen Comment: see below    Ammonia    Collection Time: 02/20/21 12:54 AM   Result Value Ref Range    Ammonia 23 16 - 60 umol/L   POCT Venous    Collection Time: 02/20/21  1:00 AM   Result Value Ref Range    pH, [place] 7.446 7.350 - 7.450    pCO2, [place] 40.4 40.0 - 50.0 mm Hg    pO2, [place] 17 Not Established mm Hg    HCO3, Venous 27.8 23.0 - 29.0 mmol/L    Base Excess, [place] 4 (H) -3 - 3    O2 Sat, [place] 26 Not Established %    TC02 (Calc), [place] 29 Not Established mmol/L    FIO2 21.000     Sample Type MINOO     Performed on SEE BELOW        IMAGING:  No results found. VTE Prophylaxis: low molecular weight heparin -  start    ASSESSMENT AND PLAN  Principal Problem:    AMS   Confused  Agitated. Received ativan, benedryl,  Now asleep, will open eyes, answer rarely w/ one word response. Plan: admit   Consult neurology, MRI, carotids. Fall     when going sit to stand,    No obvious injury   Plan: PT  OT. CM for discharge planning, may benefit from rehab placement short term. CKD   Scr 1.6 in early Jan.   Today it is 1.31   He does not appear dehydrated. His BP is good. Plan: monitor labs   Maintain euvolemia. Leukocytosis  WBC 13.02   Afebrile  Urine unremarkable. Awaiting CXR results. No obvious infection. Plan: monitor. BPH   Recently stopped flomax - difficult to assess if symptoms. Plan: avadart to continue.                  SIGNATURE: TAYE Puckett - CNP  DATE: February 20,

## 2021-02-20 NOTE — ED NOTES
Transfer Center called to speak with hospitalist.      Cadence Shahid.  Breanna Martin  02/20/21 0412

## 2021-02-20 NOTE — PROGRESS NOTES
[facility]   Facility/Department: [address] [place] [address] [address]  Speech Language Pathology    NAME:Emerson Salguero  : 1928  ROOM: [id number]      DATE: 2021      This patient is currently in Observation/Outpatient Status. Due to Medicare, other insurance and Hospital Guidelines, a written order with a therapy specific diagnosis (dysphagia, dysarthria, aphasia) must be attached to the order before we can initiate the evaluation. Re-order speech therapy with the appropriate diagnosis, as needed. Discussed with [employer] - RN.     Thank you,  Ирина Rosenberg, CF-SLP, Date: 2021, Time: 8:41 AM

## 2021-02-20 NOTE — ED NOTES
Attempted to take VS. Patient became very agitated. Unable to obtain. Al Tesfaye.  Nupur Art  02/20/21 0101

## 2021-02-20 NOTE — PROGRESS NOTES
[facility] [place]   Facility/Department: [place]  [address]  Speech Language Pathology    Sophia Isbell  5/4/1928  [id number]    Date: 2/20/2021      Speech Therapy attempted to see Sophia Isbell on this date for a/an:    Speech Language Evaluation    Pt was unable to be seen due to: Other: Transport arrived to take pt to MRI. SLP to re-attempt as able.         Electronically signed by CHEYENNE Singh-SLP on 2/20/21 at 11:45 AM EST

## 2021-02-20 NOTE — FLOWSHEET NOTE
of oatmeal, 3/4 of cup of coffee, and 1/2 of milk. Pt ate by himself . putting food in mouth then chewing it before placing any more in mouth. He would drink coffee and juice between every 3 bite. No coughing noted and no choking noted. After eating 3/4 of the tray he said I'm full  And is now resting in recliner with feet up. Pt remains calm. Electronically signed by Jory Wells LPN on 4/32/2428 at 3:80 AM      into see pt but pt was sleeping in the chair. Pt opened his eyes for me but without hearing aides don't feel pt hears other staff. Pt then tried to get up stated he has to pee so assisted pt with walker to bathroom. Does good until its time to turn and does not comprehend what to do. Once on toilet pt voided very small amount and kept holding his head . Returned to chair but did not understand when I was asking him to turn around to sit in chair. Back into chair and Pt son called and pt talked to him butonly answered yes or no and was clud as to what he had for breakfast and how much he ate. Pt told son also that he had a headache in back of his head. Dr. Harper Dials in to see pt talked with son on phone and to pt. Going to order maynor tests and all was explained to son. the patient remains up in chair resting. Electronically signed by Jory Wells LPN on 4/15/5966 at 72:43 AM     Pt going off the floor for MRI of brain,Xray of hip and Ultra sound of ceroids  And is returning to the floor now. Tolerated the procedures fair to poor kept trying to get up. Pt keeps saying he has to pee but only getting 50 to 75cc but continues to say he has to pee. The urine he is expelling is Pink in color. Pt did say he use to be on flomax but had recently stopped taking it. Perfect served Dr. Harper Dials and told him what was going on and said to bladder scan and he is going to start on flomax. Pt back into his room and son Kaitlynn Mayfield called and updated on what was going on.   Asked if its ok for wife to visit but daughter has to bring due to dementia states it would really make there day because its his wife birthday, he said it would be for today. I gave ok ,Supervisior aware and said it was ok also. Electronically signed by Bouchra Sánchez LPN on 1/59/0165 at 1:82 PM     Dr. Elin Tran  Was perfect served with results of bladder scan being 500+ gave order to insert unger. Unger cath inserted and return was 550cc right away. Pt tolerated well did say it hurt but once passed the prostate he said it feels much better. Still remains up in chair relaxing. Electronically signed by Bouchra Sánchez LPN on 0/18/8997 at 5:95 PM     Pt up in the chair wife and daughter in law visiting. (wife can't be left alone due to dementia) Pt sang Happy Mariella Darshana to his wife and smiled at her tell her he loves her. When time to leave he told her to stay warm and lock the doors when she gets home. Pt remains up in the refused dinner states he is full (pt just ate at 3:00 lunch due to Tests). Pt also called and talked to his son on the phone again. Unger ath emptied for 1200cc and the urine has turned red in the tubing. Pt at this time has not touched his unger. Pt was assisted with standing for a few minutes to reposition. Family left at this time brought pt his glasses,shirt,pants,and underwear, and socks,in brown bag in closet. Pt wearing glasses. Electronically signed by Bouchra Sánchez LPN on 9/37/7079 at 8:03 PM  .

## 2021-02-20 NOTE — ED NOTES
Transfer Center called with bed assignment. Patient going to room 292 at [facility] AT [place]. Report number [phone]. Stephanie Schaeffer.  [facility]  02/20/21 9701

## 2021-02-20 NOTE — PROGRESS NOTES
Patient arrived from [facility]. Patient pulled out IV on transport according to [facility]. No IV access at this time. Patient uncooperative and restless at this time. He is not following directions and is uncooperative. 1:1 at bedside.

## 2021-02-20 NOTE — ED NOTES
Dr Marcos Plascencia perfect served in regards to Dr Ochoa Player request to see if patient was acceptable to be kept here at [facility]. Dr Marcos Plascencia declined patient. Transfer center called back to notify Dr Stephane Walden that patient will need to be transferred to [facility]. Cecilia Littlejohn.  Darlina Maldonado, [place]  02/20/21 0902

## 2021-02-20 NOTE — ED TRIAGE NOTES
Pt arrived via squad from home after fall. Pt was sitting on floor and needed to be helped up. Family states he has had some change in mental status. H has abrasions to both knees and 1+ edema to lower ext.

## 2021-02-20 NOTE — PROGRESS NOTES
[facility]   Facility/Department: [address]  Speech Language Pathology  Clinical Bedside Swallow Evaluation    NAME:Emerson Salguero  : 1928 (37 y.o.)   MRN: [id number]  ROOM: [place] [address]  ADMISSION DATE: 2021  PATIENT DIAGNOSIS(ES): altered mental status  No chief complaint on file. Patient Active Problem List    Diagnosis Date Noted    AMS (altered mental status) 2021    Fall 2021    BPH (benign prostatic hyperplasia) 2021    CKD (chronic kidney disease) 2021    Leukocytosis 2021     Past Medical History:   Diagnosis Date    Back pain     Colitis, ulcerative ([address]     Hypertension      No past surgical history on file. Allergies   Allergen Reactions    Sulfa Antibiotics        DATE ONSET: 2021    Date of Evaluation: 2021   Evaluating Therapist: Kev Friday, CF-SLP    Recommended Diet and Intervention  Diet Solids Recommendation: Regular  Liquid Consistency Recommendation: Thin  Recommended Form of Meds: PO  Recommendations: Dysphagia treatment  Therapeutic Interventions: Diet tolerance monitoring    Compensatory Swallowing Strategies  Compensatory Swallowing Strategies: Alternate solids and liquids;Upright as possible for all oral intake;Eat/Feed slowly; Small bites/sips; Supervision with all PO intake    Reason for Referral  Rhonda Sheridan was referred for a bedside swallow evaluation to assess the efficiency of his swallow function, identify signs and symptoms of aspiration and make recommendations regarding safe dietary consistencies, effective compensatory strategies, and safe eating environment. General  Chart Reviewed: Yes  Subjective  Subjective: Pt was alert and cooperative for BSE. Behavior/Cognition: Alert; Cooperative;Lethargic  Respiratory Status: Room air  O2 Device: None (Room air)  Follows Directions: Simple(With cues)  Dentition: Adequate; Some missing teeth  Patient Positioning: Upright in chair  Baseline Vocal Quality:

## 2021-02-20 NOTE — ED PROVIDER NOTES
[facility]  EMERGENCY DEPARTMENT ENCOUNTER      Pt Name: Carina Galaviz  MRN: [id number]  [place] 5/4/1928  Date of evaluation: 2/19/2021  Provider: Shanthi Alfaro MD    [address] [place] [address]       Chief Complaint   Patient presents with   [facility] Altered Mental Status         HISTORY OF PRESENT ILLNESS   (Location/Symptom, Timing/Onset, Context/Setting, Quality, Duration, Modifying Factors, Severity)  Note limiting factors. 20-year-old male presenting after a fall with confusion. Patient is normally ambulatory at home, reads the [place] [employer] daily and is alert and oriented. It is unknown when his change in mental status occurred. Per daughter-in-law, wife reports that patient was Aurther Ow[place] bit off\" this morning, talking less than usual.  While getting up from the couch he fell. He was unable to get up. He was on the ground for about 1.5 hours. Daughter-in-law states that he was more confused to her than he typically is and she felt he needed to be evaluated in the emergency room. No specific pain reported. Recently taken off Flomax. No other meds started. Nursing Notes were reviewed. REVIEW OF SYSTEMS    (2-9 systems for level 4, 10 or more for level 5)     Review of Systems   Unable to perform ROS: Mental status change       Except as noted above the remainder of the review of systems was reviewed and negative. PAST MEDICAL HISTORY     Past Medical History:   Diagnosis Date    [address] [address]     Hypertension          SURGICAL HISTORY     History reviewed. No pertinent surgical history. CURRENT MEDICATIONS       Previous Medications    CYCLOBENZAPRINE (FLEXERIL) 10 MG TABLET    Take 10 mg by mouth nightly as needed for Muscle spasms    DUTASTERIDE (AVODART) 0.5 MG CAPSULE    Take 0.5 mg by mouth daily    GABAPENTIN (NEURONTIN) 600 MG TABLET    Take 600 mg by mouth nightly. ALLERGIES     Sulfa antibiotics    FAMILY HISTORY     History reviewed.  No pertinent family history. SOCIAL HISTORY       Social History     Socioeconomic History    Marital status:      Spouse name: None    Number of children: None    Years of education: None    Highest education level: None   Occupational History    None   Social Needs    Financial resource strain: None    Food insecurity     Worry: None     Inability: None    Transportation needs     Medical: None     Non-medical: None   Tobacco Use    Smoking status: Never Smoker    Smokeless tobacco: Never Used   Substance and Sexual Activity    Alcohol use: Not Currently    Drug use: Not Currently    Sexual activity: Not Currently   Lifestyle    Physical activity     Days per week: None     Minutes per session: None    Stress: None   Relationships    Social connections     Talks on phone: None     Gets together: None     Attends [employer] service: None     Active member of club or organization: None     Attends meetings of clubs or organizations: None     Relationship status: None    Intimate partner violence     Fear of current or ex partner: None     Emotionally abused: None     Physically abused: None     Forced sexual activity: None   Other Topics Concern    None   Social History Narrative    None       SCREENINGS               PHYSICAL EXAM    (up to 7 for level 4, 8 or more for level 5)     ED Triage Vitals [02/19/21 2220]   BP Temp Temp Source Pulse Resp SpO2 Height Weight   (!) 179/104 97.6 °F (36.4 °C) Oral 110 18 96 % -- --       Physical Exam  Vitals signs and nursing note reviewed. Constitutional:       General: He is not in acute distress. Appearance: Normal appearance. He is well-developed. He is not ill-appearing. HENT:      Head: Normocephalic and atraumatic. Right Ear: Tympanic membrane normal.      Left Ear: Tympanic membrane normal.      Mouth/Throat:      Mouth: Mucous membranes are moist.      Pharynx: Oropharynx is clear.    Eyes:      Conjunctiva/sclera: Conjunctivae normal. Neck:      Musculoskeletal: Normal range of motion and neck supple. Cardiovascular:      Rate and Rhythm: Regular rhythm. Tachycardia present. Pulmonary:      Effort: Pulmonary effort is normal.      Breath sounds: Normal breath sounds. Abdominal:      General: Bowel sounds are normal.      Palpations: Abdomen is soft. Musculoskeletal: Normal range of motion. General: No deformity. Skin:     General: Skin is warm and dry. Capillary Refill: Capillary refill takes less than 2 seconds. Neurological:      General: No focal deficit present. Mental Status: He is alert. Cranial Nerves: No cranial nerve deficit. Comments: Moving all 4 extremities; answers most questions appropriately, though slower to respond than normal.  Occasionally pulling at IVs, trying to get out of bed.   Unable to stand without assistance   Psychiatric:      Comments: agitated         DIAGNOSTIC RESULTS     EKG: All EKG's are interpreted by the Emergency Department Physician who either signs or Co-signs this chart in the absence of a cardiologist.    Sinus tachy, rate 108, normal intervals, no ST elevation/ depression    RADIOLOGY:   Non-plain film images such as CT, Ultrasound and MRI are read by the radiologist. Plain radiographic images are visualized and preliminarily interpreted by the emergency physician with the below findings:    CXR- neg acute    Interpretation per the Radiologist below, if available at the time of this note:    CT Head WO Contrast    (Results Pending)   XR CHEST PORTABLE    (Results Pending)       LABS:  Labs Reviewed   COMPREHENSIVE METABOLIC PANEL - Abnormal; Notable for the following components:       Result Value    Glucose 122 (*)     CREATININE 1.31 (*)     GFR Non- 51.1 (*)     All other components within normal limits   CBC WITH AUTO DIFFERENTIAL - Abnormal; Notable for the following components:    WBC 13.2 (*)     RBC 3.66 (*)     Hemoglobin 12.3 (*) Hematocrit 35.6 (*)     MCH 33.6 (*)     Neutrophils Absolute 11.3 (*)     Lymphocytes Absolute 0.9 (*)     Monocytes Absolute 1.0 (*)     All other components within normal limits   URINE RT REFLEX TO CULTURE - Abnormal; Notable for the following components:    Protein, UA 30 (*)     All other components within normal limits   CK - Abnormal; Notable for the following components: Total  (*)     All other components within normal limits   MICROSCOPIC URINALYSIS - Abnormal; Notable for the following components:    RBC, UA 3-5 (*)     All other components within normal limits   POCT VENOUS - Abnormal; Notable for the following components:    Base Excess, Nathen 4 (*)     All other components within normal limits   MAGNESIUM   COVID-19   URINE DRUG SCREEN, COMPREHENSIVE   COVID-19   AMMONIA   POCT EPOC BLOOD GAS, LACTIC ACID, ICA       All other labs were within normal range or not returned as of this dictation. EMERGENCY DEPARTMENT COURSE and DIFFERENTIAL DIAGNOSIS/MDM:   Vitals:    Vitals:    02/19/21 2220   BP: (!) 179/104   Pulse: 110   Resp: 18   Temp: 97.6 °F (36.4 °C)   TempSrc: Oral   SpO2: 96%       MDM  Number of Diagnoses or Management Options  Altered mental status, unspecified altered mental status type  Fall, initial encounter  Diagnosis management comments: 70-year-old male presenting with altered level of mentation and a fall. His work-up identifies no organic cause for the confusion. He is hemodynamically stable here but persistently weak. He was unable to hold still for CT and not directable and required 1 mg of Ativan. This seemed to make his agitation a bit worse. I gave him some Benadryl as well. Spoke to [facility] at [facility] who accepts admission. Unable to take here at [place] as patient requires 1 on 1. Leaves ED in fair condition.     Patient Progress  Patient progress: stable      Procedures    CRITICAL CARE TIME   Total Critical Care time was 40 minutes, excluding separately reportable procedures. There was a high probability of clinically significant/life threatening deterioration in the patient's condition which required my urgent intervention. FINAL IMPRESSION      1. Altered mental status, unspecified altered mental status type    2.  Fall, initial encounter          DISPOSITION/PLAN   DISPOSITION Decision To Transfer 02/20/2021 12:03:24 AM      (Please note that portions of this note were completed with a voice recognition program.  Efforts were made to edit the dictations but occasionally words are mis-transcribed.)    Kenan Mg MD (electronically signed)  Attending Emergency Physician        Kenan Mg MD  02/20/21 0269

## 2021-02-20 NOTE — PROGRESS NOTES
Physician Progress Note    2021   11:07 AM    Name:  Jimmy Michael  MRN:    [id number]     IP Day: 0     Admit Date: 2021  3:46 AM  PCP: Tobin Voss MD    Code Status:  Full Code      Assessment and Plan: Active Problems/ diagnosis:     Acute encephalopathy- unspecified   Fall  CKD  Leukocytosis   BPH    Plan   21  - obtain left hip XR given pain and recent fall   - discussed plan of care with son Leanna Coyle and his wife. Pt get occasional confusion but normally independent and coherent   -discussed with Dr Karri Rome, will obtain an MRI to rule out CVA although less likely   -resume asa, lipitor   -monitor daily labs     DVT PPx     Subjective:     No pain or fever. Has left hip pain. Physical Examination:      Vitals:  BP (!) 135/97   Pulse 70   Temp 97.6 °F (36.4 °C) (Oral)   Resp 18   Ht 4' 7\" (1.397 m)   Wt 168 lb 4.8 oz (76.3 kg)   SpO2 98%   BMI 39.12 kg/m²   Temp (24hrs), Av.6 °F (36.4 °C), Min:97.6 °F (36.4 °C), Max:97.6 °F (36.4 °C)      General appearance: alert, cooperative and no distress  Mental Status: oriented to person, place and time and normal affect  Lungs: clear to auscultation bilaterally, normal effort  Heart: regular rate and rhythm, no murmur  Abdomen: soft, nontender, nondistended, bowel sounds present, no masses  Extremities: left hip TTP.  no edema, redness, tenderness in the calves  Skin: no gross lesions, rashes    Data:     Labs:  Recent Labs     21   WBC 13.2*   HGB 12.3*        Recent Labs     21  0533    141   K 3.7 3.9    105   CO2 27 26   BUN 22 20   CREATININE 1.31* 1.21*   GLUCOSE 122* 104*     Recent Labs     212   AST 29   ALT 19   BILITOT 0.7   ALKPHOS 100       Current Facility-Administered Medications   Medication Dose Route Frequency Provider Last Rate Last Admin    enoxaparin (LOVENOX) injection 30 mg  30 mg Subcutaneous Daily Erma Lobstein Sedar, DO        aspirin EC tablet 81 mg  81 mg Oral Daily [facility] [employer] Sedar, DO   81 mg at 02/20/21 7740    Or    aspirin suppository 300 mg  300 mg Rectal Daily [facility] Sedar, DO        labetalol (NORMODYNE;TRANDATE) injection 10 mg  10 mg Intravenous Q10 Min PRN [employer] [facility] Sedar, DO        rosuvastatin (CRESTOR) tablet 40 mg  40 mg Oral Nightly Abdullahi D Sedar, DO        promethazine (PHENERGAN) tablet 12.5 mg  12.5 mg Oral Q6H PRN [employer] [facility] Sedar, DO        Or    ondansetron (ZOFRAN) injection 4 mg  4 mg Intravenous Q6H PRN [facility] Sedar, DO        sodium chloride flush 0.9 % injection 10 mL  10 mL Intravenous 2 times per day Savannah MEDRANO Sedar, DO   10 mL at 02/20/21 0835    sodium chloride flush 0.9 % injection 10 mL  10 mL Intravenous PRN [employer] [facility] Sedar, DO        finasteride (PROSCAR) tablet 5 mg  5 mg Oral Daily Evangelina Norah, APRN - CNP   5 mg at 02/20/21 1328    cyclobenzaprine (FLEXERIL) tablet 10 mg  10 mg Oral Nightly PRN Evangelina Norah, APRN - CNP        acetaminophen (TYLENOL) tablet 650 mg  650 mg Oral Q8H PRN [facility] Norah, APRN - CNP   650 mg at 02/20/21 8364    LORazepam (ATIVAN) injection 0.5 mg  0.5 mg Intravenous Once Laura Vaz DO           Additional work up or/and treatment plan may be added today or then after based on clinical progression. I am managing a portion of pt care. Some medical issues are handled by other specialists. Additional work up and treatment should be done in out pt setting by pt PCP and other out pt providers. In addition to examining and evaluating pt, I spent additional time explaining care, normaland abnormal findings, and treatment plan. All of pt questions were answered. Counseling, diet and education were provided. Case will be discussed with nursing staff when appropriate. Family will be updated if and when appropriate.        Electronically signed by Laura Vaz DO on 2/20/2021 at 11:07 AM

## 2021-02-20 NOTE — PROGRESS NOTES
Physical Therapy  Facility/Department: [facility] [id number]/[id number]      PT evaluation attempted 2/20/21, at 7:42 AM EST, however this patient status is currently observation. Per facility protocol, PT evaluation and treatment orders for patients in observation status must include a PT specific diagnosis (i.e. \"difficulty ambulating\", \"lack of coordination\", etc.) These order specifications may be added to the \"comments\" section of the PT evaluation and treatment order. Requested updated Physical Therapy orders from referring provider via \"sticky note\". Coordination team notified.      We thank you for your referral!    [address] [facility],  [facility]) Physical Therapy Department    Electronically signed by Jerri Mckinney PT on 2/20/21 at 7:42 AM EST

## 2021-02-20 NOTE — CARE COORDINATION
WIFE AND JESUS MCKINNEY PHONED. HIPAA CODE PROVIDED. WIFE HAS DIFFICULTY OPERATING CELL PHONE SO IT IS PREFERRED THAT YOU CONTACT HOME #/MOBILE #'S LISTED. [facility] EMERGENCY [facility] CENTER AT [place] Case Management Initial Discharge Assessment    Met with Family AND WIFE to discuss discharge plan. PT WAS JUST EVALUATED FOR OP PT BECAUSE HE FELL IN FREQUENTLY IN December. HIS BP MEDS WERE ALSO RECENTLY SWITCHED BC HE WAS HAVING LOW BP TOO OFTEN. PCP: Mat Patrick MD                                Date of Last Visit: \"TUESDAY    If no PCP, list provided? N/A    Discharge Planning    Living Arrangements: independently at home and at home dependent on family care    Who do you live with? WIFE    Who helps you with your care:  self or spouse    If lives at home:     Do you have any barriers navigating in your home? no    Patient can perform ADL? Yes    Current Services (outpatient and in home) :  None    Dialysis: No    Is transportation available to get to your appointments? Yes    DME Equipment:  yes - [facility]    Respiratory equipment: None    Respiratory provider:  no     Pharmacy:  yes - [facility] [place] [facility] with Medication Assistance Program?  No      Patient agreeable to [place] [address]? Yes, Company NEEDS LIST    Patient agreeable to SNF/Rehab? Yes, Company NEEDS LIST    Other discharge needs identified? N/A    Freedom of choice list provided with basic dialogue that supports the patient's individualized plan of care/goals and shares the quality data associated with the providers. Yes    Does Patient Have a High-Risk for Readmission Diagnosis (CHF, PN, MI, COPD)? No    The plan for Transition of Care is related to the following treatment goals:STABLE BP, NO FALLING, INCREASED STRENGTH     Initial Discharge Plan? (Note: please see concurrent daily documentation for any updates after initial note).     AWAITING PT/OT EVALS FOR FURTHER D/C PLANNING    The Patient and/or patient representative: Mary Ellen Martell was provided with choice of any post-acute providers for care and equipment and agrees with discharge plan  Yes    Electronically signed by Lana Jeong RN on 2/20/2021 at 2:49 PM

## 2021-02-20 NOTE — PROGRESS NOTES
Nutrition Education    Recieved MD consult for 'pt diet ed'. Pt is currently disoriented and inappropriate for education.   Will continue to follow per protocol    Electronically signed by Sarai Montes RD, LD on 2/20/21 at 12:55 PM EST

## 2021-02-21 LAB
ANION GAP SERPL CALCULATED.3IONS-SCNC: 8 MEQ/L (ref 9–15)
BASOPHILS ABSOLUTE: 0.1 K/UL (ref 0–0.2)
BASOPHILS RELATIVE PERCENT: 0.9 %
BUN BLDV-MCNC: 19 MG/DL (ref 8–23)
CALCIUM SERPL-MCNC: 8.9 MG/DL (ref 8.5–9.9)
CHLORIDE BLD-SCNC: 104 MEQ/L (ref 95–107)
CO2: 24 MEQ/L (ref 20–31)
CREAT SERPL-MCNC: 1.16 MG/DL (ref 0.7–1.2)
EOSINOPHILS ABSOLUTE: 0.2 K/UL (ref 0–0.7)
EOSINOPHILS RELATIVE PERCENT: 2.9 %
GFR AFRICAN AMERICAN: >60
GFR NON-AFRICAN AMERICAN: 58.8
GLUCOSE BLD-MCNC: 106 MG/DL (ref 70–99)
HCT VFR BLD CALC: 34 % (ref 42–52)
HEMOGLOBIN: 11.6 G/DL (ref 14–18)
LYMPHOCYTES ABSOLUTE: 1.6 K/UL (ref 1–4.8)
LYMPHOCYTES RELATIVE PERCENT: 19.5 %
MAGNESIUM: 1.9 MG/DL (ref 1.7–2.4)
MCH RBC QN AUTO: 33.6 PG (ref 27–31.3)
MCHC RBC AUTO-ENTMCNC: 34.2 % (ref 33–37)
MCV RBC AUTO: 98.1 FL (ref 80–100)
MONOCYTES ABSOLUTE: 0.9 K/UL (ref 0.2–0.8)
MONOCYTES RELATIVE PERCENT: 11.1 %
NEUTROPHILS ABSOLUTE: 5.5 K/UL (ref 1.4–6.5)
NEUTROPHILS RELATIVE PERCENT: 65.6 %
PDW BLD-RTO: 13.7 % (ref 11.5–14.5)
PLATELET # BLD: 257 K/UL (ref 130–400)
POTASSIUM REFLEX MAGNESIUM: 3.5 MEQ/L (ref 3.4–4.9)
RBC # BLD: 3.47 M/UL (ref 4.7–6.1)
SODIUM BLD-SCNC: 136 MEQ/L (ref 135–144)
WBC # BLD: 8.4 K/UL (ref 4.8–10.8)

## 2021-02-21 PROCEDURE — 1210000000 HC MED SURG R&B

## 2021-02-21 PROCEDURE — 85025 COMPLETE CBC W/AUTO DIFF WBC: CPT

## 2021-02-21 PROCEDURE — 83735 ASSAY OF MAGNESIUM: CPT

## 2021-02-21 PROCEDURE — 92523 SPEECH SOUND LANG COMPREHEN: CPT

## 2021-02-21 PROCEDURE — 6370000000 HC RX 637 (ALT 250 FOR IP): Performed by: INTERNAL MEDICINE

## 2021-02-21 PROCEDURE — 97162 PT EVAL MOD COMPLEX 30 MIN: CPT

## 2021-02-21 PROCEDURE — 99233 SBSQ HOSP IP/OBS HIGH 50: CPT | Performed by: PSYCHIATRY & NEUROLOGY

## 2021-02-21 PROCEDURE — 6370000000 HC RX 637 (ALT 250 FOR IP): Performed by: NURSE PRACTITIONER

## 2021-02-21 PROCEDURE — 2580000003 HC RX 258: Performed by: INTERNAL MEDICINE

## 2021-02-21 PROCEDURE — 36415 COLL VENOUS BLD VENIPUNCTURE: CPT

## 2021-02-21 PROCEDURE — 80048 BASIC METABOLIC PNL TOTAL CA: CPT

## 2021-02-21 RX ORDER — AMLODIPINE BESYLATE 5 MG/1
5 TABLET ORAL DAILY
Status: DISCONTINUED | OUTPATIENT
Start: 2021-02-21 | End: 2021-02-23 | Stop reason: HOSPADM

## 2021-02-21 RX ADMIN — ROSUVASTATIN CALCIUM 40 MG: 40 TABLET, FILM COATED ORAL at 21:39

## 2021-02-21 RX ADMIN — FINASTERIDE 5 MG: 5 TABLET, FILM COATED ORAL at 10:05

## 2021-02-21 RX ADMIN — ACETAMINOPHEN 650 MG: 325 TABLET ORAL at 05:27

## 2021-02-21 RX ADMIN — SODIUM CHLORIDE, PRESERVATIVE FREE 10 ML: 5 INJECTION INTRAVENOUS at 21:40

## 2021-02-21 RX ADMIN — ASPIRIN 81 MG: 81 TABLET, COATED ORAL at 10:05

## 2021-02-21 RX ADMIN — TAMSULOSIN HYDROCHLORIDE 0.4 MG: 0.4 CAPSULE ORAL at 10:05

## 2021-02-21 RX ADMIN — AMLODIPINE BESYLATE 5 MG: 5 TABLET ORAL at 10:05

## 2021-02-21 RX ADMIN — ACETAMINOPHEN 650 MG: 325 TABLET ORAL at 21:39

## 2021-02-21 RX ADMIN — CYCLOBENZAPRINE 10 MG: 10 TABLET, FILM COATED ORAL at 21:39

## 2021-02-21 RX ADMIN — SODIUM CHLORIDE, PRESERVATIVE FREE 10 ML: 5 INJECTION INTRAVENOUS at 10:08

## 2021-02-21 ASSESSMENT — ENCOUNTER SYMPTOMS
TROUBLE SWALLOWING: 0
CHOKING: 0
VOMITING: 0
NAUSEA: 0
BACK PAIN: 0
PHOTOPHOBIA: 0
COLOR CHANGE: 0
SHORTNESS OF BREATH: 0

## 2021-02-21 ASSESSMENT — PAIN DESCRIPTION - PAIN TYPE
TYPE: ACUTE PAIN
TYPE: OTHER (COMMENT)

## 2021-02-21 ASSESSMENT — PAIN SCALES - GENERAL
PAINLEVEL_OUTOF10: 5
PAINLEVEL_OUTOF10: 2

## 2021-02-21 ASSESSMENT — PAIN DESCRIPTION - LOCATION: LOCATION: ARM

## 2021-02-21 NOTE — PROGRESS NOTES
Neurology Follow up    SUBJECTIVE:  Patient is sitting in the chair and is not complaining of any symptoms very pleasant and had no recall of the events though much more awake. Current Facility-Administered Medications   Medication Dose Route Frequency Provider Last Rate Last Admin    amLODIPine (NORVASC) tablet 5 mg  5 mg Oral Daily Lynn Bi, DO   5 mg at 02/21/21 1005    [Held by provider] enoxaparin (LOVENOX) injection 30 mg  30 mg Subcutaneous Daily Cleave Fendt Sedar, DO   Stopped at 02/21/21 0741    labetalol (NORMODYNE;TRANDATE) injection 10 mg  10 mg Intravenous Q10 Min PRN Cleave Fendt Sedar, DO        rosuvastatin (CRESTOR) tablet 40 mg  40 mg Oral Nightly Abdullahi D Sedar, DO   40 mg at 02/20/21 2153    promethazine (PHENERGAN) tablet 12.5 mg  12.5 mg Oral Q6H PRN Cleave Fendt Sedar, DO        Or    ondansetron [facility]) injection 4 mg  4 mg Intravenous Q6H PRN Cleave Fendt Sedar, DO        sodium chloride flush 0.9 % injection 10 mL  10 mL Intravenous 2 times per day Orenrique Rubys D Sedar, DO   10 mL at 02/21/21 1008    sodium chloride flush 0.9 % injection 10 mL  10 mL Intravenous PRN Cleave Fendt Sedar, DO        finasteride (PROSCAR) tablet 5 mg  5 mg Oral Daily Milady William, APRN - CNP   5 mg at 02/21/21 1005    cyclobenzaprine (FLEXERIL) tablet 10 mg  10 mg Oral Nightly PRN Milady William, APRN - CNP        acetaminophen (TYLENOL) tablet 650 mg  650 mg Oral Q8H PRN Milady William, APRN - CNP   650 mg at 02/21/21 0527    tamsulosin (FLOMAX) capsule 0.4 mg  0.4 mg Oral Daily Lynn Bi, DO   0.4 mg at 02/21/21 1005       PHYSICAL EXAM:    BP (!) 171/87   Pulse 92   Temp 97.3 °F (36.3 °C) (Oral)   Resp 18   Ht 4' 7\" (1.397 m)   Wt 168 lb 4.8 oz (76.3 kg)   SpO2 95%   BMI 39.12 kg/m²    General Appearance:      Skin:  normal  CVS - Normal sounds, No murmurs , No carotid Bruits  RS -CTA  Abdomen Soft, BS present  Review of Systems   Constitutional: Negative for fever.    HENT: Negative for ear pain, tinnitus and trouble swallowing. Eyes: Negative for photophobia and visual disturbance. Respiratory: Negative for choking and shortness of breath. Cardiovascular: Negative for chest pain and palpitations. Gastrointestinal: Negative for nausea and vomiting. Musculoskeletal: Negative for back pain, gait problem, joint swelling, myalgias, neck pain and neck stiffness. Skin: Negative for color change. Allergic/Immunologic: Negative for food allergies. Neurological: Negative for dizziness, tremors, seizures, syncope, facial asymmetry, speech difficulty, weakness, light-headedness, numbness and headaches. Psychiatric/Behavioral: Negative for behavioral problems, confusion, hallucinations and sleep disturbance.       Mental Status Exam:             Level of Alertness:   awake            Orientation:   person, place, time            Memory:   normal            Fund of Knowledge:  normal            Attention/Concentration:  normal            Language:  normal      Funduscopic Exam:     Cranial Nerves        Cranial nerve II           Visual acuity:  normal           Visual fields:  normal      Cranial nerve III           Pupils:  equal, round, reactive to light      Cranial nerves III, IV, VI           Extraocular Movements: intact      Cranial nerve V           Facial sensation:  intact      Cranial nerve VII           Facial strength: intact      Cranial nerve VIII           Hearing:  intact      Cranial nerve IX           Palate:  intact      Cranial nerve XI         Shoulder shrug:  intact      Cranial nerve XII          Tongue movement:  normal    Motor:    Drift:  absent  Motor exam is symmetrical 5 out of 5 all extremities bilaterally  Tone:  normal  Abnormal Movements:  absent            Sensory:        Pinprick             Right Upper Extremity:  normal             Left Upper Extremity:  normal             Right Lower Extremity:  normal             Left Lower Extremity:  normal Vibration                         Touch            Proprioception                 Coordination:           Finger/Nose   Right:  normal              Left:  normal          Heel-Knee-Shin                Right:  normal              Left:  normal          Rapid Alternating Movements              Right:  normal              Left:  normal          Gait:                       Casual: Patient does have a gait ataxia and drooling to the left                      Romberg:  normal            Reflexes:             Deep Tendon Reflexes:             Reflexes are 2 +             Plantar response:                Right:  downgoing               Left:  downgoing    Vascular:  Cardiac Exam:  normal         Xr Hip Left (2-3 Views)    Result Date: 2/20/2021   EXAMINATION: XR HIP LEFT (2-3 VIEWS) CLINICAL HISTORY: Hip pain COMPARISONS: None available TECHNIQUE: AP film of the pelvis to include both hips and lateral view of the left hip were obtained. FINDINGS: No displaced pelvic fracture identified. No hip dislocation. Mild degenerative changes of both hips. Sacroiliac joints are symmetric and within normal limits. Degenerative changes of the lower lumbar spine. No acute osseous abnormality. Mri Brain Without Contrast    Result Date: 2/20/2021  EXAM: MRI of the brain without contrast History: Metabolic encephalopathy. Stroke. Technique: Multiplanar multisequence MRI of the brain was performed without contrast. Comparison: CT brain from February 19, 2021 Findings: Examination is degraded by motion artifact. Hyperintensity/FLAIR signal within the bilateral supratentorial white matter and patchy hyperintense T2 signal within the [place] are nonspecific findings most compatible with chronic small vessel ischemic changes in a patient of this age. Prominence of the sulci and ventricles compatible with moderate generalized parenchymal volume loss.  No edema, hemorrhage, mass, mass effect, midline shift, or abnormal extra-axial fluid collection. Midline structures are within normal limits. The posterior fossa is within normal limits. There is no diffusion restriction. No susceptibility artifact is identified on the gradient echo sequence. The major intracranial vascular flow voids are maintained. Cranial nerves 7/8 complexes appear grossly unremarkable. The visualized paranasal sinuses and bilateral mastoid air cells are clear. No acute ischemia or acute intracranial process. Generalized parenchymal volume loss and nonspecific white matter findings most compatible with chronic small vessel ischemic changes in a patient of this age. Us Carotid Artery Bilateral    Result Date: 2/20/2021  BILATERAL DUPLEX CAROTID ULTRASOUND CLINICAL INFORMATION:  Carotid stenosis COMPARISON:  None available FINDINGS:  The bilateral carotid duplex ultrasound study demonstrates the following:                                                                  RIGHT            LEFT Proximal common carotid artery           137 cm/s            111 cm/s  Mid common carotid artery                   120 cm/s            109 cm/s  Distal common carotid artery                121 cm/s           116 cm/s Proximal internal carotid artery             149 cm/s            77 cm/s Mid internal carotid artery                      177 cm/s           68 cm/s Distal internal carotid artery                  98 cm/s             158 cm/s External carotid artery                            256 cm/s           97 cm/s    ICA/CCA ratio                                         1.5                0.7   Vertebral arteries antegrade flow         Yes                     Yes      50-69% stenosis of the right internal carotid artery. Less than 50% stenosis of the left internal carotid artery. Antegrade flow both vertebral arteries.  Validated velocity measurements with angiographic measurements, velocity criteria are extrapolated from diameter data as defined by the Society of Radiologist in Ultrasound Consensus Conference Radiology 2003; 626;321-410. Recent Labs     02/19/21 2242 02/21/21  0604   WBC 13.2* 8.4   HGB 12.3* 11.6*    257     Recent Labs     02/19/21 2242 02/20/21  0533 02/21/21  0604    141 136   K 3.7 3.9 3.5    105 104   CO2 27 26 24   BUN 22 20 19   CREATININE 1.31* 1.21* 1.16   GLUCOSE 122* 104* 106*     Recent Labs     02/19/21 2242   BILITOT 0.7   ALKPHOS 100   AST 29   ALT 19     No results found for: PROTIME, INR  No results found for: LITHIUM, DILFRTOT, VALPROATE    ASSESSMENT AND PLAN  Encephalopathy related to closed head injury. Patient has improved considerably. He does have underlying baseline ataxia. His orientation is much better. MRI of the brain was reviewed there is no new stroke. Patient will be reassessed by physical therapy for safety issues and may be discharged home tomorrow with some supervision. Patient appears to be nonfocal and his exam at this time and much more better oriented      David Lozada Asp, MD, [address], American Board of Psychiatry & Neurology  Board Certified in Vascular Neurology  Board Certified in Neuromuscular Medicine  Certified in Neurorehabilitation l

## 2021-02-21 NOTE — PLAN OF CARE
Problem: Falls - Risk of:  Goal: Will remain free from falls  Outcome: Ongoing  Goal: Absence of physical injury  Outcome: Ongoing     Problem: Skin Integrity:  Goal: Will show no infection signs and symptoms  Outcome: Ongoing  Goal: Absence of new skin breakdown  Outcome: Ongoing

## 2021-02-21 NOTE — CARE COORDINATION
PLAN OF CARE DISCUSSED WITH DR. Dalila Faulkner. NO DC TODAY, WILL NEED UROLOGY CONSULT. AWAITING PT/OT EVALS FOR DC PLAN/SAFETY. CM/LSW TO FOLLOW FOR DC NEEDS.

## 2021-02-21 NOTE — PROGRESS NOTES
[facility]   Facility/Department: [facility]  Speech Language Pathology  Initial Speech/Language/Cognitive Assessment    NAME:Emerson Salguero  : 1928 (76 y.o.)   MRN: [id number]  ROOM: [place] [address]  ADMISSION DATE: 2021  PATIENT DIAGNOSIS(ES): Altered mental status [R41.82]  AMS (altered mental status) [R41.82]  No chief complaint on file. Patient Active Problem List    Diagnosis Date Noted    AMS (altered mental status) 2021    Fall 2021    BPH (benign prostatic hyperplasia) 2021    CKD (chronic kidney disease) 2021    Leukocytosis 2021     Past Medical History:   Diagnosis Date    Back pain     [facility] ulcerative [address] [address]     Hypertension      No past surgical history on file. DATE ONSET: 2021    Date of Evaluation: 2021   Evaluating Therapist: Shary Severe, SLP      Assessment:  Cognitive Diagnosis: Moderate cognitive linguistic deficits   Diagnosis: Pt presents with moderate cognitive linguistic deficits as evidenced by informal cognitive linguistic assesment revealing deficits with conversation with noted anomia, impaired insight, decreased reasoning and safety awareness, decreased orientation as he is oriented to person and the fact that this is a hospital. Pt with impaired memory recall for events and people encountered. The pt has decreased auditory comprehension and planning with 2 step commands with pt requiring repetition and ample processing time. Recommendations:  Requires SLP Intervention: Yes  Duration/Frequency of Treatment: 2-3x a week cog, as applicable, during LOS or until goals met  D/C Recommendations: To be determined       Goals:  Short-term Goals  Timeframe for Short-term Goals: 2 weeks  Goal 1: To address pt's cognitive deficits and promote orientation, pt will state name of facility, time within 1 hour, reason in hospital, current month and year with 100% accuracy with assist, with use of external aid.   Goal Orientation  Overall Orientation Status: Impaired  Orientation Level: Oriented to place;Oriented to person  Attention  Attention: Exceptions to [facility]  Alternating Attention: Moderate  Divided Attention: Mild  Memory  Memory: Exceptions to [facility]  People Encountered: Moderate  Prospective Memory: Moderate  Short-term Memory: Moderate  Working Memory: Moderate  Problem Solving  Problem Solving: Exceptions to [facility]  Simple Functional Tasks: Moderate  Verbal Reasoning Skills: Moderate  Numeric Reasoning  Numeric Reasoning: Exceptions to [facility]   Calculations: Mild  Abstract Reasoning  Abstract Reasoning: Exceptions to [facility]  Convergent Thinking: Mild  Divergent Thinking: Mild  Safety/Judgement  Safety/Judgement: Exceptions to [facility]  Complex Functional Tasks: Mild  Unable to Self-monitor and Self-correct Consistently: Mild  Insight: Moderate    Impulsive: Mild  Flexibility of Thought: Moderate    Prognosis:  Individuals consulted  Consulted and agree with results and recommendations: Patient;RN(Deana)    Education:  Patient Education Response: No evidence of learning;Verbalizes understanding  Safety Devices in place: Yes  Type of devices: Bed alarm in place;Call light within reach; Other (comment); Chair alarm in place;Nurse notified    Pain Assessment:  Initial Assessment:  Patient denies pain. Re-assessment:  Patient denies pain.       Therapy Time  SLP Individual Minutes  Time In: 0400  Time Out: 0425  Minutes: 25                 Signature: Electronically signed by VIKA Puri on 2/21/2021 at 4:33 PM

## 2021-02-21 NOTE — PROGRESS NOTES
Pt awoken and was disoriented. He expressed that he wanted to \"go to his apartment\". Pt was unaware of the circumstances that brought him to this hospital. He was somewhat anxious, wanted to know how he got here and was insisting that I call his family. I explained to pt that he was brought to the hospital by ambulance and that his wife and daughter-in law were aware he was here. I reassured him that a phone call would be placed to his son Tabatha Alvarenga sometime today but reoriented him to the time of day it was and suggested we call him later; after 0800.  This seem to calm his anxiety Electronically signed by Aniyah Cochran RN on 2/21/2021 at 5:49 AM

## 2021-02-21 NOTE — PROGRESS NOTES
Physical Therapy Med Surg Initial Assessment  Facility/Department: [facility]  Room: 78/F258-14       NAME: Luigi Garrett  : 1928 (80 y.o.)  MRN: [id number]  CODE STATUS: Full Code    Date of Service: 2021    Patient Diagnosis(es): Altered mental status [R41.82]  AMS (altered mental status) [R41.82]   No chief complaint on file. Patient Active Problem List    Diagnosis Date Noted    AMS (altered mental status) 2021    Fall 2021    BPH (benign prostatic hyperplasia) 2021    CKD (chronic kidney disease) 2021    Leukocytosis 2021        Past Medical History:   Diagnosis Date    Back pain     [facility]ative [facility] [address]     Hypertension      No past surgical history on file. Chart Reviewed: Yes  Patient assessed for rehabilitation services?: Yes  Family / Caregiver Present: No  General Comment  Comments: word finding difficulties; pt pleasant and agreeable to PT eval    Restrictions:  Restrictions/Precautions: Fall Risk(high roberts score)     SUBJECTIVE: Subjective: \"I want to go home tonight\"    Pain  Pre Treatment Pain Screening  Pain at present: 0  Intervention List: Patient able to continue with treatment    Post Treatment Pain Screening:   Pain Assessment  Pain Level: 0    Prior Level of Function:  Social/Functional History  Lives With: Spouse  Type of Home: Apartment(on main level, near the entrance and laundry is next door to apartment)  Bathroom Shower/Tub: Walk-in shower  ADL Assistance: Independent  Homemaking Assistance: Independent(share responsibilites with wife; makes coffee, dry dishes, cook dinner intermittently, takes garbage to [employer])  [employer] Responsibilities: Yes  Ambulation Assistance: Independent(no AD)  Transfer Assistance: Independent  Active :  Yes  Additional Comments: pt is questionable historian    OBJECTIVE:   Vision: Impaired  Vision Exceptions: Wears glasses at all times  Hearing: Exceptions to [address] Exceptions: Hard of hearing/hearing concerns; No hearing aid    Cognition:  Overall Orientation Status: Impaired  Orientation Level: Oriented to person, Oriented to place, Disoriented to time, Disoriented to situation  Follows Commands: Impaired(delayed ability to follow 1 step instructions)    Observation/Palpation  Observation: red output in unger bag- nursing is aware; bruising to L elbow  Edema: B lower legs and feet    ROM:  RLE AROM: WFL  LLE AROM : WFL    Strength:  Strength RLE  Comment: grossly 4/5  Strength LLE  Comment: grossly 4/5    Neuro:  Balance  Posture: Fair(slouched posture)  Sitting - Static: Good  Sitting - Dynamic: Good  Standing - Static: Fair;-  Standing - Dynamic: Fair;-(requires B UE support to maintain steadiness)     Tone RLE  RLE Tone: Normotonic  Tone LLE  LLE Tone: Normotonic  Sensation  Overall Sensation Status: WFL    Bed mobility  Comment: NT pt in bedside chair    Transfers  Sit to Stand: Minimal Assistance  Stand to sit: Minimal Assistance  Comment: [employer]; max cues for safe hand placement; posterior lean initially    Ambulation  Ambulation?: Yes  Ambulation 1  Surface: level tile  Device: Rolling Walker  Assistance: Contact guard assistance  Gait Deviations: Increased WES; Slow Gaby;Decreased step height  Distance: 20 ft    Activity Tolerance  Activity Tolerance: Patient limited by cognitive status    PT Education  PT Education: Goals;PT Role;Transfer Training;Plan of Care;General Safety;Gait Training;Functional Mobility Training    ASSESSMENT:   Body structures, Functions, Activity limitations: Decreased functional mobility ; Decreased safe awareness;Decreased balance;Decreased cognition;Decreased endurance;Decreased strength;Decreased posture  Decision Making: Medium Complexity  History: med  Exam: med  Clinical Presentation: med    Prognosis: Good    DISCHARGE RECOMMENDATIONS:  Discharge Recommendations: Continue to assess pending progress, Patient would benefit from continued therapy after discharge    Assessment: Pt exhibits decreased cognition, decreased B LE strength and decreased balance which has carryover to increased assistance for all functional mobility at this time compared to PLOF. Continued PT is indicated. REQUIRES PT FOLLOW UP: Yes      PLAN OF CARE:  Plan  Times per week: 3-6  Current Treatment Recommendations: Strengthening, Transfer Training, Endurance Training, Neuromuscular Re-education, Cognitive Reorientation, Patient/Caregiver Education & Training, ROM, Wheelchair Mobility Training, Manual Therapy - Soft Tissue Mobilization, Pain Management, Equipment Evaluation, Education, & procurement, Balance Training, Gait Training, Home Exercise Program, Functional Mobility Training, Safety Education & Training, Positioning  Safety Devices  Type of devices: Call light within reach, Chair alarm in place, Left in chair    Goals:  Patient goals : \"go home today\"  Long term goals  Long term goal 1: Pt will demonstrate bed mobility SBA to allow pt to get into and out of bed. Long term goal 2: Pt will demonstrate transfers SBA with safest AD to increase safety with functional mobility at home. Long term goal 3: Pt will demonstrate amb 150ft SBA with safest AD to allow pt to amb inside his home and from car to apartment building. [facility] (6 CLICK) BASIC MOBILITY  AM-PAC Inpatient Mobility Raw Score : 13    Therapy Time:   Individual   Time In 1416   Time Out 1535   Minutes [place] , [place], 02/21/21 at 4:06 PM         Definitions for assistance levels  Independent = pt does not require any physical supervision or assistance from another person for activity completion. Device may be needed.   Stand by assistance = pt requires verbal cues or instructions from another person, close to but not touching, to perform the activity  Minimal assistance= pt performs 75% or more of the activity; assistance is required to complete the activity  Moderate assistance= pt performs 50% of the activity; assistance is required to complete the activity  Maximal assistance = pt performs 25% of the activity; assistance is required to complete the activity  Dependent = pt requires total physical assistance to accomplish the task

## 2021-02-21 NOTE — FLOWSHEET NOTE
Urine in unger bag is red, pt oted to be pulling on catheter during the n ight.  Dr Cordell Champagne notified via [employer]

## 2021-02-21 NOTE — PROGRESS NOTES
Physician Progress Note    2021   11:29 AM    Name:  Lee Rolon  MRN:    [id number]     IP Day: 0     Admit Date: 2021  3:46 AM  PCP: Babita Syed MD    Code Status:  Full Code      Assessment and Plan: Active Problems/ diagnosis:     Acute encephalopathy- unspecified. Stroke ruled out, ?possibly due to fall/ concussion vs mild dehydration vs worsening dementia   Hematuria   Fall  CKD  Leukocytosis   BPH  Left hip pain- XR neg     Plan   21  - obtain left hip XR given pain and recent fall   - discussed plan of care with son Sharmaine Shi and his wife. Pt get occasional confusion but normally independent and coherent   -discussed with Dr Eleazar Melendez, will obtain an MRI to rule out CVA although less likely   -resume asa, lipitor   -monitor daily labs     21  - having hematuria, dc asa as MRI is negative. Hold Lovenox. Urology consulted   - mental status close to baseline   - resume current meds  - PT/OT  - possible dc in am pending neuro and urology     DVT PPx     Subjective:     No pain or fever. Physical Examination:      Vitals:  BP (!) 171/87   Pulse 92   Temp 97.3 °F (36.3 °C) (Oral)   Resp 18   Ht 4' 7\" (1.397 m)   Wt 168 lb 4.8 oz (76.3 kg)   SpO2 95%   BMI 39.12 kg/m²   Temp (24hrs), Av.4 °F (36.3 °C), Min:97.2 °F (36.2 °C), Max:97.5 °F (36.4 °C)      General appearance: alert, cooperative and no distress  Mental Status: oriented to person, place and time and normal affect  Lungs: clear to auscultation bilaterally, normal effort  Heart: regular rate and rhythm, no murmur  Abdomen: soft, nontender, nondistended, bowel sounds present, no masses  Extremities: left hip TTP.  no edema, redness, tenderness in the calves  Skin: no gross lesions, rashes    Data:     Labs:  Recent Labs     21  2242 21  0604   WBC 13.2* 8.4   HGB 12.3* 11.6*    257     Recent Labs     21  0533 21  0604    136   K 3.9 3.5    104   CO2 26 24   BUN 20 19 other out pt providers. In addition to examining and evaluating pt, I spent additional time explaining care, normaland abnormal findings, and treatment plan. All of pt questions were answered. Counseling, diet and education were provided. Case will be discussed with nursing staff when appropriate. Family will be updated if and when appropriate.        Electronically signed by Brandon Whitt DO on 2/21/2021 at 11:29 AM

## 2021-02-22 LAB
ANION GAP SERPL CALCULATED.3IONS-SCNC: 11 MEQ/L (ref 9–15)
BASOPHILS ABSOLUTE: 0.1 K/UL (ref 0–0.2)
BASOPHILS RELATIVE PERCENT: 0.9 %
BUN BLDV-MCNC: 25 MG/DL (ref 8–23)
CALCIUM SERPL-MCNC: 8.7 MG/DL (ref 8.5–9.9)
CHLORIDE BLD-SCNC: 106 MEQ/L (ref 95–107)
CK MB: 4.5 NG/ML (ref 0–6.7)
CO2: 23 MEQ/L (ref 20–31)
CREAT SERPL-MCNC: 1.15 MG/DL (ref 0.7–1.2)
CREATINE KINASE-MB INDEX: 0.7 % (ref 0–3.5)
EOSINOPHILS ABSOLUTE: 0.5 K/UL (ref 0–0.7)
EOSINOPHILS RELATIVE PERCENT: 5.9 %
FOLATE: 14.7 NG/ML (ref 7.3–26.1)
GFR AFRICAN AMERICAN: >60
GFR NON-AFRICAN AMERICAN: 59.4
GLUCOSE BLD-MCNC: 100 MG/DL (ref 70–99)
HCT VFR BLD CALC: 34.1 % (ref 42–52)
HEMOGLOBIN: 12 G/DL (ref 14–18)
LYMPHOCYTES ABSOLUTE: 1.9 K/UL (ref 1–4.8)
LYMPHOCYTES RELATIVE PERCENT: 20.7 %
MCH RBC QN AUTO: 34.5 PG (ref 27–31.3)
MCHC RBC AUTO-ENTMCNC: 35.2 % (ref 33–37)
MCV RBC AUTO: 98 FL (ref 80–100)
MONOCYTES ABSOLUTE: 1.2 K/UL (ref 0.2–0.8)
MONOCYTES RELATIVE PERCENT: 12.7 %
NEUTROPHILS ABSOLUTE: 5.5 K/UL (ref 1.4–6.5)
NEUTROPHILS RELATIVE PERCENT: 59.8 %
PDW BLD-RTO: 13.3 % (ref 11.5–14.5)
PLATELET # BLD: 284 K/UL (ref 130–400)
POTASSIUM REFLEX MAGNESIUM: 4 MEQ/L (ref 3.4–4.9)
RBC # BLD: 3.48 M/UL (ref 4.7–6.1)
SODIUM BLD-SCNC: 140 MEQ/L (ref 135–144)
TOTAL CK: 686 U/L (ref 0–190)
TSH REFLEX: 1.09 UIU/ML (ref 0.44–3.86)
VITAMIN B-12: 290 PG/ML (ref 232–1245)
WBC # BLD: 9.2 K/UL (ref 4.8–10.8)

## 2021-02-22 PROCEDURE — 6370000000 HC RX 637 (ALT 250 FOR IP): Performed by: NURSE PRACTITIONER

## 2021-02-22 PROCEDURE — 6370000000 HC RX 637 (ALT 250 FOR IP): Performed by: INTERNAL MEDICINE

## 2021-02-22 PROCEDURE — 1210000000 HC MED SURG R&B

## 2021-02-22 PROCEDURE — 97535 SELF CARE MNGMENT TRAINING: CPT

## 2021-02-22 PROCEDURE — 84443 ASSAY THYROID STIM HORMONE: CPT

## 2021-02-22 PROCEDURE — 82746 ASSAY OF FOLIC ACID SERUM: CPT

## 2021-02-22 PROCEDURE — 36415 COLL VENOUS BLD VENIPUNCTURE: CPT

## 2021-02-22 PROCEDURE — 82550 ASSAY OF CK (CPK): CPT

## 2021-02-22 PROCEDURE — 82553 CREATINE MB FRACTION: CPT

## 2021-02-22 PROCEDURE — 97129 THER IVNTJ 1ST 15 MIN: CPT

## 2021-02-22 PROCEDURE — 82607 VITAMIN B-12: CPT

## 2021-02-22 PROCEDURE — 97130 THER IVNTJ EA ADDL 15 MIN: CPT

## 2021-02-22 PROCEDURE — 85025 COMPLETE CBC W/AUTO DIFF WBC: CPT

## 2021-02-22 PROCEDURE — 97166 OT EVAL MOD COMPLEX 45 MIN: CPT

## 2021-02-22 PROCEDURE — 2580000003 HC RX 258: Performed by: INTERNAL MEDICINE

## 2021-02-22 PROCEDURE — APPSS30 APP SPLIT SHARED TIME 16-30 MINUTES: Performed by: NURSE PRACTITIONER

## 2021-02-22 PROCEDURE — 97116 GAIT TRAINING THERAPY: CPT

## 2021-02-22 PROCEDURE — 99222 1ST HOSP IP/OBS MODERATE 55: CPT | Performed by: UROLOGY

## 2021-02-22 PROCEDURE — 99233 SBSQ HOSP IP/OBS HIGH 50: CPT | Performed by: PSYCHIATRY & NEUROLOGY

## 2021-02-22 PROCEDURE — 80048 BASIC METABOLIC PNL TOTAL CA: CPT

## 2021-02-22 PROCEDURE — 93010 ELECTROCARDIOGRAM REPORT: CPT | Performed by: INTERNAL MEDICINE

## 2021-02-22 RX ORDER — DOCUSATE SODIUM 100 MG/1
100 CAPSULE, LIQUID FILLED ORAL DAILY
Status: DISCONTINUED | OUTPATIENT
Start: 2021-02-22 | End: 2021-02-23 | Stop reason: HOSPADM

## 2021-02-22 RX ORDER — POLYETHYLENE GLYCOL 3350 17 G/17G
17 POWDER, FOR SOLUTION ORAL DAILY
Status: DISCONTINUED | OUTPATIENT
Start: 2021-02-22 | End: 2021-02-23 | Stop reason: HOSPADM

## 2021-02-22 RX ORDER — SENNA PLUS 8.6 MG/1
1 TABLET ORAL NIGHTLY
Status: DISCONTINUED | OUTPATIENT
Start: 2021-02-22 | End: 2021-02-23 | Stop reason: HOSPADM

## 2021-02-22 RX ADMIN — SODIUM CHLORIDE, PRESERVATIVE FREE 10 ML: 5 INJECTION INTRAVENOUS at 21:58

## 2021-02-22 RX ADMIN — ROSUVASTATIN CALCIUM 40 MG: 40 TABLET, FILM COATED ORAL at 21:56

## 2021-02-22 RX ADMIN — AMLODIPINE BESYLATE 5 MG: 5 TABLET ORAL at 09:20

## 2021-02-22 RX ADMIN — FINASTERIDE 5 MG: 5 TABLET, FILM COATED ORAL at 09:20

## 2021-02-22 RX ADMIN — POLYETHYLENE GLYCOL 3350 17 G: 17 POWDER, FOR SOLUTION ORAL at 15:48

## 2021-02-22 RX ADMIN — DOCUSATE SODIUM 100 MG: 100 CAPSULE, LIQUID FILLED ORAL at 15:48

## 2021-02-22 RX ADMIN — SENNOSIDES 8.6 MG: 8.6 TABLET, FILM COATED ORAL at 21:56

## 2021-02-22 RX ADMIN — SODIUM CHLORIDE, PRESERVATIVE FREE 10 ML: 5 INJECTION INTRAVENOUS at 09:20

## 2021-02-22 RX ADMIN — TAMSULOSIN HYDROCHLORIDE 0.4 MG: 0.4 CAPSULE ORAL at 09:20

## 2021-02-22 ASSESSMENT — ENCOUNTER SYMPTOMS
COLOR CHANGE: 0
TROUBLE SWALLOWING: 0
NAUSEA: 0
CHOKING: 0
VOMITING: 0
SHORTNESS OF BREATH: 0
PHOTOPHOBIA: 0
BACK PAIN: 0

## 2021-02-22 NOTE — PROGRESS NOTES
Physician Progress Note    2/22/2021   2:07 PM    Name:  Katlyn Bear  MRN:    [id number]      Day: 1     Admit Date: 2/20/2021  3:46 AM  PCP: Howie Telles MD    Code Status:  Full Code    Subjective:     Complains of constipation and abdominal fullness-no bowel movement in the last week. He is also having dark blood from his Pittman catheter.     Current Facility-Administered Medications   Medication Dose Route Frequency Provider Last Rate Last Admin    polyethylene glycol (GLYCOLAX) packet 17 g  17 g Oral Daily Pauline Ormond, DO        docusate sodium (COLACE) capsule 100 mg  100 mg Oral Daily Pauline Ormond, DO        senna (SENOKOT) tablet 8.6 mg  1 tablet Oral Nightly Pauline Ormond, DO        amLODIPine (NORVASC) tablet 5 mg  5 mg Oral Daily Dylan Contreras, DO   5 mg at 02/22/21 0920    [Held by provider] enoxaparin (LOVENOX) injection 30 mg  30 mg Subcutaneous Daily Lorraine Hector Sedar, DO   Stopped at 02/21/21 0741    labetalol (NORMODYNE;TRANDATE) injection 10 mg  10 mg Intravenous Q10 Min PRN Lorraine Hector Sedar, DO        rosuvastatin (CRESTOR) tablet 40 mg  40 mg Oral Nightly Abdullahi MEDRANO Sedar, DO   40 mg at 02/21/21 2139    promethazine (PHENERGAN) tablet 12.5 mg  12.5 mg Oral Q6H PRN Lorraine Hector Sedar, DO        Or    ondansetron [facility]) injection 4 mg  4 mg Intravenous Q6H PRN Lorraine Bermudezrigregorio Sedar, DO        sodium chloride flush 0.9 % injection 10 mL  10 mL Intravenous 2 times per day Lawrence MEDRANO Sedar, DO   10 mL at 02/22/21 0920    sodium chloride flush 0.9 % injection 10 mL  10 mL Intravenous PRN Lorraine Hector Sedar, DO        finasteride (PROSCAR) tablet 5 mg  5 mg Oral Daily Villisca [place], APRN - CNP   5 mg at 02/22/21 0920    cyclobenzaprine (FLEXERIL) tablet 10 mg  10 mg Oral Nightly PRN Villisca [place], APRN - CNP   10 mg at 02/21/21 2139    acetaminophen (TYLENOL) tablet 650 mg  650 mg Oral Q8H PRN Villisca [place], APRN - CNP   650 mg at 02/21/21 2139    tamsulosin (FLOMAX) capsule 0.4 mg  0.4 mg Oral Daily Siddhartha Duncan DO   0.4 mg at 21 0920       Physical Examination:      Vitals:  BP (!) 158/80   Pulse 88   Temp 97.2 °F (36.2 °C) (Oral)   Resp 18   Ht 4' 7\" (1.397 m)   Wt 168 lb 4.8 oz (76.3 kg)   SpO2 97%   BMI 39.12 kg/m²   Temp (24hrs), Av.5 °F (36.4 °C), Min:97.2 °F (36.2 °C), Max:97.7 °F (36.5 °C)      General appearance: alert, cooperative and no distress  Mental Status: oriented to person, place and time and normal affect  Lungs: clear to auscultation bilaterally, normal effort  Heart: regular rate and rhythm, no murmur  Abdomen: soft, nontender, nondistended, bowel sounds present, no masses  Extremities: no edema, redness, tenderness in the calves. Cap refill <2s  Skin: no gross lesions, rashes  Pittman catheter with dark blood    Data:     Labs:  Recent Labs     21  0604 21  0507   WBC 8.4 9.2   HGB 11.6* 12.0*    284     Recent Labs     21  0604 21  0507    140   K 3.5 4.0    106   CO2 24 23   BUN 19 25*   CREATININE 1.16 1.15   GLUCOSE 106* 100*     Recent Labs     21  2242   AST 29   ALT 19   BILITOT 0.7   ALKPHOS 100       Assessment and Plan:        80-year-old male with history of hypertension, ulcerative colitis, BPH presented with confusion. 1.  Metabolic encephalopathy secondary to urinary retention in setting of BPH complicated by trauma related hematuria: Encephalopathy improved  -Continue Pittman catheter. Trial void if hematuria clears. Holding antiplatelet/anticoagulation  -TSH pending  -Continue BPH meds    2. Constipation: Add bowel regimen    Hypertension     Diet: DIET GENERAL;  Full Code    Dispo:  Inpatient    >35 minutes in total care time    Electronically signed by Mikaela Fierro DO on 2021 at 2:07 PM

## 2021-02-22 NOTE — PROGRESS NOTES
Pt seen and examined and full consult dictated. Urine has old darker blood noted but is clearing. Continue to hold anticoagulants. If urine clearing in am can remove Pittman for voiding trial and D/C.

## 2021-02-22 NOTE — PROGRESS NOTES
[facility] OCCUPATIONAL THERAPY EVALUATION - ACUTE     NAME: Lee Rolon  : 1928 (80 y.o.)  MRN: [id number]  CODE STATUS: Full Code  Room: [facility]    Date of Service: 2021    Patient Diagnosis(es): Altered mental status [R41.82]  AMS (altered mental status) [R41.82]   No chief complaint on file. Patient Active Problem List    Diagnosis Date Noted    Gross hematuria     AMS (altered mental status) 2021    Fall 2021    BPH (benign prostatic hyperplasia) 2021    CKD (chronic kidney disease) 2021    Leukocytosis 2021        Past Medical History:   Diagnosis Date    Back pain     [facility]     Hypertension      History reviewed. No pertinent surgical history. Restrictions  Restrictions/Precautions: Fall Risk(high roberts score)     Safety Devices: Safety Devices  Safety Devices in place: Yes  Type of devices: All fall risk precautions in place        Subjective  Pre Treatment Pain Screening  Pain at present: 0  Scale Used: Numeric Score  Intervention List: Patient able to continue with treatment    Pain Reassessment:   Pain Assessment  Patient Currently in Pain: No  Pain Assessment: 0-10  Pain Level: 0       Prior Level of Function:  Social/Functional History  Lives With: Spouse  Type of Home: Apartment(on main level, near the entrance and laundry is next door to apartment)  Bathroom Shower/Tub: Walk-in shower  ADL Assistance: Independent  Homemaking Assistance: Independent(share responsibilites with wife; makes coffee, dry dishes, cook dinner intermittently, takes garbage to [employer])  [employer] Responsibilities: Yes  Ambulation Assistance: Independent(no AD)  Transfer Assistance: Independent  Active :  Yes  Additional Comments: pt is questionable historian    OBJECTIVE:     Orientation Status:  Orientation  Overall Orientation Status: Impaired  Orientation Level: Oriented to person, Oriented to place, Oriented to situation, Disoriented to time    Observation:  Observation/Palpation  Posture: Fair  Observation: Alert, attentive, pleasant. On RA. red output in unger bag- nursing is aware; bruising to L elbow  Edema: B lower legs and feet    Cognition Status:  Cognition  Overall Cognitive Status: Exceptions  Arousal/Alertness: Appropriate responses to stimuli  Following Commands: Follows one step commands consistently, Follows multistep commands with increased time  Attention Span: Difficulty dividing attention  Memory: Decreased recall of precautions  Safety Judgement: Decreased awareness of need for safety  Problem Solving: Assistance required to generate solutions, Assistance required to correct errors made, Assistance required to implement solutions, Assistance required to identify errors made  Insights: Decreased awareness of deficits  Initiation: Requires cues for some  Sequencing: Requires cues for some  Cognition Comment: Decreased safety and sequencing    Perception Status:  Perception  Overall Perceptual Status: WFL    Sensation Status:  Sensation  Overall Sensation Status: WFL    Vision and Hearing Status:  Vision  Vision: Impaired  Vision Exceptions: Wears glasses at all times  Hearing  Hearing: Exceptions to [facility]  Hearing Exceptions: Hard of hearing/hearing concerns, No hearing aid     ROM:   LUE AROM (degrees)  LUE AROM : WFL  Left Hand AROM (degrees)  Left Hand AROM: WFL  RUE AROM (degrees)  RUE AROM : WFL  Right Hand AROM (degrees)  Right Hand AROM: WFL    Strength:  LUE Strength  Gross LUE Strength: Exceptions to [facility]  L Hand General: 3+/5  LUE Strength Comment: 3+/5 all planes  RUE Strength  Gross RUE Strength: Exceptions to [facility]  R Hand General: 3+/5  RUE Strength Comment: 3+/5 all planes    Coordination, Tone, Quality of Movement:    Tone RUE  RUE Tone: Normotonic  Tone LUE  LUE Tone: Normotonic  Coordination  Coordination and Movement description: Fine motor impairments, Decreased speed, Decreased accuracy, Right UE, Left UE    Hand Dominance:  Hand Dominance  Hand Dominance: Right    ADL Status:  ADL  Feeding: Setup  Grooming: Setup  UE Bathing: Setup  LE Bathing: Moderate assistance  UE Dressing: Setup  LE Dressing: Moderate assistance  Toileting: Minimal assistance  Additional Comments: Simulated ADLs as above. Pt. demonstrates overall decreased balance, endurance and mobility. Pt. would benefit from continued OT to maxmize independence and safety with ADL tasks.   Toilet Transfers  Toilet - Technique: Ambulating  Equipment Used: Grab bars  Toilet Transfer: Stand by assistance  Toilet Transfers Comments: SBA with verbal cues for hand placement       Therapy key for assistance levels -   Independent = Pt. is able to perform task with no assistance but may require a device   Stand by assistance = Pt. does not perform task at an independent level but does not need physical assistance, requires verbal cues  Minimal, Moderate, Maximal Assistance = Pt. requires physical assistance (25%, 50%, 75% assist from helper) for task but is able to actively participate in task   Dependent = Pt. requires total assistance with task and is not able to actively participate with task completion     Functional Mobility:  Functional Mobility  Functional - Mobility Device: Rolling Walker  Activity: Other  Assist Level: Contact guard assistance  Functional Mobility Comments: Pt. requires increased time, SBA to CGA for mobility, verbal cues for safety  Transfers  Sit to stand: Stand by assistance  Stand to sit: Stand by assistance  Transfer Comments: Increased effort    Bed Mobility  Bed mobility  Sit to Supine: Contact guard assistance  Comment: Up in chair at start of tx, returns to bed for nap at end    Seated and Standing Balance:  Balance  Sitting Balance: Supervision  Standing Balance: Contact guard assistance    Functional Endurance:  Activity Tolerance  Activity Tolerance: Patient Tolerated treatment well    D/C Recommendations:  OT D/C RECOMMENDATIONS  REQUIRES OT FOLLOW UP: Yes    Equipment Recommendations:  OT Equipment Recommendations  Other: continue to assess    OT Education:   OT Education  OT Education: OT Role, Plan of Care  Patient Education: Educated pt. on role of acute care OT  Barriers to Learning: Decreased cognition    OT Follow Up:  OT D/C RECOMMENDATIONS  REQUIRES OT FOLLOW UP: Yes       Assessment/Discharge Disposition:  Assessment: Pt is a 80year old man from home with spouse who presents to [facility] with the above deficits which impact his ability to perform ADLs and IADLs.  Pt. limited d/t fatigue, weakness and  Performance deficits / Impairments: Decreased ADL status, Decreased functional mobility , Decreased safe awareness, Decreased cognition, Decreased endurance, Decreased strength, Decreased fine motor control, Decreased high-level IADLs, Decreased coordination, Decreased balance  Discharge Recommendations: Continue to assess pending progress  Decision Making: Medium Complexity  History: Pt's medical history is moderately complex  Exam: Pt. has 10 performance deficits  Assistance / Modification: Pt. requires min A    Six Click Score   How much help for putting on and taking off regular lower body clothing?: A Little  How much help for Bathing?: A Little  How much help for Toileting?: A Little  How much help for putting on and taking off regular upper body clothing?: A Little  How much help for taking care of personal grooming?: A Little  How much help for eating meals?: A Little  AM-PAC Inpatient Daily Activity Raw Score: 18  AM-PAC Inpatient ADL T-Scale Score : 38.66  ADL Inpatient CMS 0-100% Score: 46.65    Plan:  Plan  Times per week: 1-3x  Plan weeks: Length of acute stay  Current Treatment Recommendations: Balance Training, Functional Mobility Training, Endurance Training, Strengthening, Pain Management, Safety Education & Training, Patient/Caregiver Education & Training, Equipment Evaluation, Education, & procurement, Self-Care / ADL, Home Management Training, Cognitive/Perceptual Training    Goals:   Patient will:    - Improve functional endurance to tolerate/complete 30 mins of ADL's  - Be Mod I in UB ADLs   - Be SBA in LB ADLs  - Be SBA in ADL transfers without LOB  - Be SBA in toileting tasks  - Improve B hand fine motor coordination to [facility] in order to manage clothing fasteners/self-care containers in a timely manner  - Improve B UE strength and endurance to 4-/5 in order to participate in self-care activities as projected. - Access appropriate D/C site with as few architectural barriers as possible. - Sequence self-care tasks with no verbal cues for safety    Patient Goal: Patient goals : \"I want to get home\"     Discussed and agreed upon: Yes Comments:     Therapy Time:   OT Individual Minutes  Time In: 1351  Time Out: 1408  Minutes: 17    Eval: 17 minutes     Electronically signed by:     TESFAYE Hernandez  2/22/2021, 4:17 PM

## 2021-02-22 NOTE — PROGRESS NOTES
Neurology Follow up    SUBJECTIVE: Patient seen and examined for neurology follow-up for encephalopathy with closed head injury status post fall. Patient is currently alert and oriented x2, no acute distress, cooperative. No focal neuro deficits. No seizure activity reported. Encephalopathy improved being. Unclear what patient's baseline is. No tremors noted. No behavioral disturbances.     Mild encephalopathy    Current Facility-Administered Medications   Medication Dose Route Frequency Provider Last Rate Last Admin    amLODIPine (NORVASC) tablet 5 mg  5 mg Oral Daily Loyce Adarsh, DO   5 mg at 02/22/21 0920    [Held by provider] enoxaparin (LOVENOX) injection 30 mg  30 mg Subcutaneous Daily Mere Martinez Sedar, DO   Stopped at 02/21/21 0741    labetalol (NORMODYNE;TRANDATE) injection 10 mg  10 mg Intravenous Q10 Min PRN Mere Martinez Sedar, DO        rosuvastatin (CRESTOR) tablet 40 mg  40 mg Oral Nightly Abdullahi MEDRANO Sedar, DO   40 mg at 02/21/21 2139    promethazine (PHENERGAN) tablet 12.5 mg  12.5 mg Oral Q6H PRN Mere Martinez Sedar, DO        Or    [facility]) injection 4 mg  4 mg Intravenous Q6H PRN Mere Martinez Sedar, DO        sodium chloride flush 0.9 % injection 10 mL  10 mL Intravenous 2 times per day Viet Massey D Sedar, DO   10 mL at 02/22/21 0920    sodium chloride flush 0.9 % injection 10 mL  10 mL Intravenous PRN Mere Martinez Sedar, DO        finasteride (PROSCAR) tablet 5 mg  5 mg Oral Daily Rolando Allis, APRN - CNP   5 mg at 02/22/21 0920    cyclobenzaprine (FLEXERIL) tablet 10 mg  10 mg Oral Nightly PRN Rolando Allis, APRN - CNP   10 mg at 02/21/21 2139    acetaminophen (TYLENOL) tablet 650 mg  650 mg Oral Q8H PRN Rolando Allis, APRN - CNP   650 mg at 02/21/21 2139    tamsulosin (FLOMAX) capsule 0.4 mg  0.4 mg Oral Daily Loyce Adarsh, DO   0.4 mg at 02/22/21 0920       PHYSICAL EXAM:    BP (!) 158/80   Pulse 88   Temp 97.2 °F (36.2 °C) (Oral)   Resp 18   Ht 4' 7\" (1.397 m) Wt 168 lb 4.8 oz (76.3 kg)   SpO2 97%   BMI 39.12 kg/m²    General Appearance:      Skin:  normal  CVS - Normal sounds, No murmurs , No carotid Bruits  RS -CTA  Abdomen Soft, BS present  Review of Systems   Constitutional: Negative for fever. HENT: Negative for ear pain, tinnitus and trouble swallowing. Eyes: Negative for photophobia and visual disturbance. Respiratory: Negative for choking and shortness of breath. Cardiovascular: Negative for chest pain and palpitations. Gastrointestinal: Negative for nausea and vomiting. Musculoskeletal: Negative for back pain, gait problem, joint swelling, myalgias, neck pain and neck stiffness. Skin: Negative for color change. Allergic/Immunologic: Negative for food allergies. Neurological: Negative for dizziness, tremors, seizures, syncope, facial asymmetry, speech difficulty, weakness, light-headedness, numbness and headaches. Psychiatric/Behavioral: Negative for behavioral problems, confusion, hallucinations and sleep disturbance.     no symptoms  Mental Status Exam:             Level of Alertness:   awake            Orientation:   person, time            Memory:   Impaired            Fund of Knowledge:  normal            Attention/Concentration:  normal            Language:  normal      Funduscopic Exam:     Cranial Nerves        Cranial nerve II           Visual acuity:  normal           Visual fields:  normal      Cranial nerve III           Pupils:  equal, round, reactive to light      Cranial nerves III, IV, VI           Extraocular Movements: intact      Cranial nerve V           Facial sensation:  intact      Cranial nerve VII           Facial strength: intact      Cranial nerve VIII           Hearing:  intact      Cranial nerve IX           Palate:  intact      Cranial nerve XI         Shoulder shrug:  intact      Cranial nerve XII          Tongue movement:  normal    Motor:    Drift:  absent  Motor exam is symmetrical 5 out of 5 all extremities bilaterally  Tone:  normal  Abnormal Movements:  absent            Sensory:        Pinprick             Right Upper Extremity:  normal             Left Upper Extremity:  normal             Right Lower Extremity:  normal             Left Lower Extremity:  normal           Vibration                         Touch            Proprioception                 Coordination:           Finger/Nose   Right:  normal              Left:  normal          Heel-Knee-Shin                Right:  normal              Left:  normal          Rapid Alternating Movements              Right:  normal              Left:  normal          Gait:                       Casual: Patient does have a gait ataxia and drooling to the left                      Romberg:  normal            Reflexes:             Deep Tendon Reflexes:             Reflexes are 2 +             Plantar response:                Right:  downgoing               Left:  downgoing    Vascular:  Cardiac Exam:  normal         Xr Hip Left (2-3 Views)    Result Date: 2/20/2021   EXAMINATION: XR HIP LEFT (2-3 VIEWS) CLINICAL HISTORY: Hip pain COMPARISONS: None available TECHNIQUE: AP film of the pelvis to include both hips and lateral view of the left hip were obtained. FINDINGS: No displaced pelvic fracture identified. No hip dislocation. Mild degenerative changes of both hips. Sacroiliac joints are symmetric and within normal limits. Degenerative changes of the lower lumbar spine. No acute osseous abnormality. Mri Brain Without Contrast    Result Date: 2/20/2021  EXAM: MRI of the brain without contrast History: Metabolic encephalopathy. Stroke. Technique: Multiplanar multisequence MRI of the brain was performed without contrast. Comparison: CT brain from February 19, 2021 Findings: Examination is degraded by motion artifact.  Hyperintensity/FLAIR signal within the bilateral supratentorial white matter and patchy hyperintense T2 signal within the [place] are nonspecific findings most compatible with chronic small vessel ischemic changes in a patient of this age. Prominence of the sulci and ventricles compatible with moderate generalized parenchymal volume loss. No edema, hemorrhage, mass, mass effect, midline shift, or abnormal extra-axial fluid collection. Midline structures are within normal limits. The posterior fossa is within normal limits. There is no diffusion restriction. No susceptibility artifact is identified on the gradient echo sequence. The major intracranial vascular flow voids are maintained. Cranial nerves 7/8 complexes appear grossly unremarkable. The visualized paranasal sinuses and bilateral mastoid air cells are clear. No acute ischemia or acute intracranial process. Generalized parenchymal volume loss and nonspecific white matter findings most compatible with chronic small vessel ischemic changes in a patient of this age.     Us Carotid Artery Bilateral    Result Date: 2/20/2021  BILATERAL DUPLEX CAROTID ULTRASOUND CLINICAL INFORMATION:  Carotid stenosis COMPARISON:  None available FINDINGS:  The bilateral carotid duplex ultrasound study demonstrates the following:                                                                  RIGHT            LEFT Proximal common carotid artery           137 cm/s            111 cm/s  Mid common carotid artery                   120 cm/s            109 cm/s  Distal common carotid artery                121 cm/s           116 cm/s Proximal internal carotid artery             149 cm/s            77 cm/s Mid internal carotid artery                      177 cm/s           68 cm/s Distal internal carotid artery                  98 cm/s             158 cm/s External carotid artery                            256 cm/s           97 cm/s    ICA/CCA ratio                                         1.5                0.7   Vertebral arteries antegrade flow         Yes                     Yes      50-69% stenosis of the right internal carotid artery. Less than 50% stenosis of the left internal carotid artery. Antegrade flow both vertebral arteries. Validated velocity measurements with angiographic measurements, velocity criteria are extrapolated from diameter data as defined by the Society of Radiologist in [address] [place] [address] Drive Radiology 2003; [id number]. Recent Labs     02/19/21  2242 02/21/21  0604 02/22/21  0507   WBC 13.2* 8.4 9.2   HGB 12.3* 11.6* 12.0*    257 284     Recent Labs     02/20/21  0533 02/21/21  0604 02/22/21  0507    136 140   K 3.9 3.5 4.0    104 106   CO2 26 24 23   BUN 20 19 25*   CREATININE 1.21* 1.16 1.15   GLUCOSE 104* 106* 100*     Recent Labs     02/19/21  2242   BILITOT 0.7   ALKPHOS 100   AST 29   ALT 19     No results found for: PROTIME, INR  No results found for: LITHIUM, DILFRTOT, VALPROATE    ASSESSMENT AND PLAN  Encephalopathy related to closed head injury. Patient has improved considerably. He does have underlying baseline ataxia. His orientation is much better. MRI of the brain was reviewed there is no new stroke. Patient will be reassessed by physical therapy for safety issues and may be discharged home tomorrow with some supervision. Patient appears to be nonfocal and his exam at this time and much more better oriented    Encephalopathy with closed head injury  Check TSH, B12 folate  We will repeat CK to make sure it is not trending upward which can occur after seizure  Hypertensive at times  We will need to check orthostatic blood pressures  Duplex shows 50 to 69% stenosis of the right internal carotid artery and less than 50% stenosis of the left internal carotid artery. Given patient's risk factors with uncontrolled hypertension and carotid stenosis will aspirin but this has been placed on hold secondary to hematuria.   Will follow  I have personally performed a face to face diagnostic evaluation on this patient, reviewed all data and investigations, and am the sole provider of all clinical decisions on the neurological status of this patient. pt still encephalopathic but stable  May need SNF      David Snider MD, Reji Farris, American Board of Psychiatry & Neurology  Board Certified in Vascular Neurology  Board Certified in Neuromuscular Medicine  Certified in . [employer]

## 2021-02-22 NOTE — CONSULTS
[address]                      [address], [address] [facility]                                  CONSULTATION    PATIENT NAME: Tamara Naqvi                     :        1928  MED REC NO:   [id number]                            ROOM:       [id number]  ACCOUNT NO:   [de-identified]                           ADMIT DATE: 2021  PROVIDER:     Yasmin Richardson MD    CONSULT DATE:  2021    PERSON REQUESTING CONSULT:  Mateo Wilson DO    REASON FOR CONSULTATION:  Hematuria. HISTORY OF PRESENT ILLNESS:  A 80-year-old male with history of chronic  back pain, ulcerative colitis and hypertension who was admitted for  evaluation of acute mental status change. He was apparently confused  and fell from a standing position and was admitted on 2021. His  COVID testing was negative at that time. He had a neurologic evaluation  and medical evaluation and it was unclear as to the etiology of his  mental status change. The patient apparently has a baseline history of  BPH as well for which he takes Avodart and Flomax. According to the  nursing staff, the patient's Flomax was recently stopped for unclear  reasons. On admission, he was having frequent voiding, had a residual  checked and it showed about 500 mL. A Pittman catheter was ordered, he  had a 500 mL residual.  He was restarted on Flomax and Proscar and  yesterday gross hematuria was noted. According to the nursing note and  the review of the chart, the patient was pulling on his Pittman that  evening. He had been on Lovenox and aspirin as well. Urologic  consultation was requested. Since holding the Lovenox and aspirin, the  urine has cleared, the blood appears to be older darker blood currently  in the Pittman bag and tubing within the Pittman catheter itself to be  clear urine. According to the patient, he has no baseline voiding  complaints.   He reports that he has no history of hematuria, urinary tract infections, kidney stones, abdominal or flank pain. He has no  prior urologic surgical history. He denies baseline frequency, urgency  or incontinence. He reports he has a good force to his urinary stream  and feels that mostly he is empty. He has no other current urologic  complaints. He is unsure if he has seen a urologist in the past.  He is  not sure who he is maintaining him on the medications Avodart and Flomax  currently. He is somewhat of a poor historian. He has no other  complaints. PAST MEDICAL HISTORY:  Includes chronic back pain, ulcerative colitis,  hypertension, BPH. PAST SURGICAL HISTORY:  None. FAMILY HISTORY:  There is no history of genitourinary malignancies in  the family. SOCIAL HISTORY:  He denies cigarette smoking. HOME MEDICATIONS:  Include Flomax, Tylenol, vitamin D, Avodart,  Neurontin and Flexeril. ALLERGIES:  He has allergies to SULFA. REVIEW OF SYSTEMS:  Otherwise negative and noncontributory unless as  outlined in the history of present illness. PHYSICAL EXAMINATION:  GENERAL:  He is a frail-appearing male who is sitting up in a chair, in  no obvious acute distress. VITAL SIGNS:  His temperature is 36.2, heart rate 88, respiratory rate  18, blood pressure 158/80. HEENT:  Atraumatic and normocephalic. CHEST:  His lungs were clear anteriorly without rhonchi, wheezing or  rales. CARDIOVASCULAR:  His heart was regular rate and rhythm without any  murmurs. ABDOMEN:  Soft, nondistended and nontender. He had no palpable  organomegaly. He had no palpable abdominal hernias. He had no CVA  tenderness. He had bowel sounds positive. GENITOURINARY:  He had a Pittman catheter emanating from his meatus with  clear urine in the Pittman tubing itself and the drainage tubing had some  darker colored urine. There were no clots. His testes were downgoing  bilaterally. He had no scrotal abnormalities.   He had no penile abnormalities. The penis was circumcised. He had no obvious inguinal  hernias. EXTREMITIES:  Showed no cyanosis or edema. NEUROLOGICAL:  He was alert. PSYCHIATRIC:  He had a normal affect. LABORATORY DATA:  He has a white count of 9.2, hematocrit of 34.1 and a  platelet count of 180 from today. His creatinine is normal at 1.15. He  had a COVID-19 test, which was not detected. His urine culture was  nitrite and leukocyte negative. IMPRESSION:  A 26-year-old male with a history of hypertension,  ulcerative colitis, chronic back pain who apparently lives at home and  has a history of BPH with lower urinary tract symptoms for which he is  supposedly taking Flomax daily and Avodart daily. He is currently on  Proscar and Flomax. He was found to have urinary frequency on admission  and an elevated residual.  Pittman catheter was placed on this admission. His Flomax and Proscar have been resumed. Since admission, he  apparently pulled on the Pittman catheter a couple evenings ago and was on  the Lovenox and aspirin at that time, those have been on hold. The  urine appearance is improved. At this point, what I would do is  continue with the Pittman catheter until tomorrow morning should the urine  continue to clear, and then since he is back on his BPH medications, the  catheter could be removed for a voiding trial and he could be discharged  to home without a Pittman catheter. We will continue to follow the  patient through his stay here. These recommendations were discussed  with the patient and the patient's son and nursing staff today.         Jess Castillo MD    D: 02/22/2021 9:49:09       T: 02/22/2021 9:55:57     /[email or web address]  Job#: [id number]     Doc#: [id number]    CC:  Juan Patterson MD

## 2021-02-22 NOTE — PROGRESS NOTES
[facility]  Facility/Department: [address] [place] [address] [address]  Speech Language Pathology   Treatment Note          Lee Rolon  5/4/1928  [id number]/[id number]    Medical Dx: Altered mental status [R41.82]  AMS (altered mental status) [R41.82]  Speech Dx: Cognitive Linguistic Impairment     2/22/2021    Subjective:  Alert and Cooperative        Interventions used this date:  Cognitive Skill Development    Objective/Assessment:  Patient progressing towards goals:  Goal 1: To address pt's cognitive deficits and promote orientation, pt will state name of facility, time within 1 hour, reason in hospital, current month and year with 100% accuracy with assist, with use of external aid. Pt stated \"hospital\" for place but could not recall city and name of facility. Pt reported later on in session that he is from [facility] and has lived in [facility] for past 2 years. Pt oriented to time of day. Pt not oriented to injury (could not recall), current month (\"January\"), or year (\"2020\"). Goal 2: To increase safety awareness and judgment for safe completion of ADLs secondary to pt's cognitive deficits, pt will sequence common activities of daily living with (verbal/written) steps with 75% accuracy and mod cues. Sequencing of events in story during conversation pt initiated was fair-good. Pt however needed additional time for word retrieval and had frequent pausing and repetition. Goal 3: To address pt's cognitive deficits and promote his/her ability to safely follow directives in a variety of environments, pt will carry out verbal directives of increasing complexity in everyday activities with 75% accuracy and mild cues. Pt followed 2 step directions with min-mod cues for initiation and correct follow through. Repetition and rephrasing needed for each direction. Goal 4:  To increase safety awareness and judgment for safe completion of ADLs secondary to pt's cognitive deficits, pt will complete abstract reasoning tasks (i.e. Word

## 2021-02-22 NOTE — PLAN OF CARE
Problem: Falls - Risk of:  Goal: Will remain free from falls  Outcome: Ongoing  Goal: Absence of physical injury  Outcome: Ongoing     Problem: Skin Integrity:  Goal: Will show no infection signs and symptoms  Outcome: Ongoing  Goal: Absence of new skin breakdown  Outcome: Ongoing     Problem: IP SWALLOWING  Goal: LTG - patient will tolerate the least restrictive diet consistency to allow for safe consumption of daily meals  Outcome: Ongoing     Problem: IP BALANCE  Goal: LTG - patient will maintain standing balance to allow for completion of daily activities  Outcome: Ongoing     Problem: Pain:  Goal: Pain level will decrease  Outcome: Ongoing  Goal: Control of acute pain  Outcome: Ongoing  Goal: Control of chronic pain  Outcome: Ongoing

## 2021-02-22 NOTE — FLOWSHEET NOTE
Spiritual Care Services     Summary of Visit:  Patient was alert and reading a book when I entered the room, pt shared that when he retired he went back college for some higher learning, pt appreciated the visit    Spiritual Assessment/Intervention/Outcomes:    Encounter Summary  Services provided to[de-identified] (P) Patient  Referral/Consult From[de-identified] (P) Rounding  Support System: (P) Children  Continue Visiting: (P) No  Complexity of Encounter: (P) Moderate  Length of Encounter: (P) 15 minutes  Spiritual Assessment Completed: (P) Yes  Routine  Type: (P) Initial  Assessment: (P) Approachable, Coping  Intervention: (P) Sustaining presence/ Ministry of presence, Active listening  Outcome: (P) Comfort, Expressed gratitude, Engaged in conversation, [address] [place] [address] Avenue:        [address] [place] [address]   Electronically signed by Nathanael Bamberger on 2/22/21 at 2:02 PM EST     To reach a  for emotional and spiritual support, place an [facility] [place] consult request.   If a  is needed immediately, dial 0 and ask to page the on-call .

## 2021-02-22 NOTE — FLOWSHEET NOTE
Resume care of pt at 0. Pt sitting up in chair watching tv and talking to his family on the phone. Pt is very alert and orient, talking normal, knows where he is and even remembered my name. Pt states that he has some pain but not bad and also states that if I could  ask the Doctor for something to help him have a BM. Pt states that the BM gets so hard he can't get it out. .Electronically signed by Chirag Deras LPN on 3/29/5764 at 95:36 AM     Assisted pt to his bed at 10:15 and he went right to sleep. Did wake up a few times in middle of night but went right back to sleep. Electronically signed by Chirag Deras LPN on 8/22/7378 at 3:11 AM

## 2021-02-22 NOTE — PROGRESS NOTES
Physical Therapy Med Surg Daily Treatment Note  Fac[facility]Department: [facility]  Room: B094/T797-62       NAME: Mikey Villasenor  : 1928 (80 y.o.)  MRN: [id number]  CODE STATUS: Full Code    Date of Service: 2021    Patient Diagnosis(es): Altered mental status [R41.82]  AMS (altered mental status) [R41.82]   No chief complaint on file. Patient Active Problem List    Diagnosis Date Noted    Gross hematuria     AMS (altered mental status) 2021    Fall 2021    BPH (benign prostatic hyperplasia) 2021    CKD (chronic kidney disease) 2021    Leukocytosis 2021        Past Medical History:   Diagnosis Date    Back pain     Colitis, ulcerative ([address]     Hypertension      No past surgical history on file. Restrictions  Restrictions/Precautions: Fall Risk(high roberts score)    SUBJECTIVE   Subjective  Subjective: I feel bad for my wife. She is home all alone. General Comment  Comments: word finding difficulties; pt pleasant and agreeable to PT eval    Pre-Session Pain Report  Pre Treatment Pain Screening  Pain at present: 0  Intervention List: Patient able to continue with treatment          Post-Session Pain Report  Pain Assessment  Pain Level: 0         OBJECTIVE   Orientation  Overall Orientation Status: Impaired  Orientation Level: Oriented to person;Oriented to place; Disoriented to time;Disoriented to situation    Bed mobility  Comment: NT - Up in chair before and after tx. Transfers  Sit to Stand: Contact guard assistance;Minimal Assistance  Stand to sit: Contact guard assistance;Stand by assistance  Bed to Chair: Contact guard assistance  Comment: vc's for technique and pushing through arms and legs. difficulty rising from a lower surface.     Ambulation 1  Surface: level tile  Device: Rolling Walker  Assistance: Stand by assistance;Contact guard assistance  Quality of Gait: FF posture with downward gaze and slow olivier with flat steps; vc's for posture and increased heel strike  Gait Deviations: Increased WES; Slow Olivier;Decreased step height;Decreased step length  Distance: 35'x2  Comments: SOB after ambulation; increased recovery time with vc's for breathing technique - SAO2 - WNL    Ambulation 2  Surface - 2: level tile  Device 2: No device  Assistance 2: Stand by assistance;Contact guard assistance  Quality of Gait 2: Waddle gait with flat steps; slow olivier, posture improved slightly without device; Pt reaching for rail in mcdonald for steadying  Gait Deviations: Slow Olivier;Decreased step length;Decreased step height; Increased WES  Distance: 35'x2  Comments: Pt performing about the same with and without device. Exercises  Knee Long Arc Quad: x5                    Activity Tolerance  Activity Tolerance: Patient limited by cognitive status; Patient Tolerated treatment well          ASSESSMENT   Assessment: Pt required increased cues for task d/t confusion. Pt ambulated with and without device and was equally safe each way. Discharge Recommendations:  Continue to assess pending progress, Patient would benefit from continued therapy after discharge    Goals  Long term goals  Long term goal 1: Pt will demonstrate bed mobility SBA to allow pt to get into and out of bed. Long term goal 2: Pt will demonstrate transfers SBA with safest AD to increase safety with functional mobility at home. Long term goal 3: Pt will demonstrate amb 150ft SBA with safest AD to allow pt to amb inside his home and from car to apartment building. Patient Goals   Patient goals : \"go home today\"    PLAN    Safety Devices  Type of devices: Call light within reach; Chair alarm in place; Left in chair; All fall risk precautions in place     AMPAC (6 CLICK) BASIC MOBILITY  AM-PAC Inpatient Mobility Raw Score : 15      Therapy Time   Individual   Time In 1118   Time Out 1141   Minutes 23      bm/Trsf - 10 mins  Gait - 13 mins       Rhesa Cushing, PTA, 02/22/21 at 11:51 AM       Definitions for assistance levels  Independent = pt does not require any physical supervision or assistance from another person for activity completion. Device may be needed.   Stand by assistance = pt requires verbal cues or instructions from another person, close to but not touching, to perform the activity  Minimal assistance= pt performs 75% or more of the activity; assistance is required to complete the activity  Moderate assistance= pt performs 50% of the activity; assistance is required to complete the activity  Maximal assistance = pt performs 25% of the activity; assistance is required to complete the activity  Dependent = pt requires total physical assistance to accomplish the task

## 2021-02-22 NOTE — FLOWSHEET NOTE
1200--Pt assessment complete. Alert and oriented to place, time, and self. PERRLA. No SOB/dyspnea, denies any chest pain or discomfort. Lungs are clear with diminished bases. Abdomen is soft, non-tender, active bowels x4. Pittman catheter is draining, dark nataly, blood tinged urine. Lower extremity edema, +1 pitting bilaterally. Denies any flank pain. Abrasions to knees bilateal noted. Pt is asking when he will be able to go home, showed pt how to use his telephone in the room to call his son. Calm and cooperative. Call light within reach.

## 2021-02-23 ENCOUNTER — HOSPITAL ENCOUNTER (INPATIENT)
Age: 86
LOS: 9 days | Discharge: HOME HEALTH | DRG: 072 | End: 2021-03-04
Attending: INTERNAL MEDICINE | Admitting: INTERNAL MEDICINE
Payer: MEDICARE

## 2021-02-23 ENCOUNTER — HOSPITAL ENCOUNTER (OUTPATIENT)
Dept: PHYSICAL THERAPY | Age: 86
Setting detail: THERAPIES SERIES
Discharge: HOME | End: 2021-02-23
Payer: MEDICARE

## 2021-02-23 VITALS
OXYGEN SATURATION: 98 % | SYSTOLIC BLOOD PRESSURE: 179 MMHG | HEIGHT: 67 IN | DIASTOLIC BLOOD PRESSURE: 84 MMHG | TEMPERATURE: 97.3 F | RESPIRATION RATE: 18 BRPM | WEIGHT: 168.3 LBS | BODY MASS INDEX: 26.42 KG/M2 | HEART RATE: 96 BPM

## 2021-02-23 PROBLEM — G93.41 METABOLIC ENCEPHALOPATHY: Status: ACTIVE | Noted: 2021-02-23

## 2021-02-23 PROBLEM — R33.8 URINARY RETENTION DUE TO BENIGN PROSTATIC HYPERPLASIA: Status: ACTIVE | Noted: 2021-02-23

## 2021-02-23 PROBLEM — N40.1 URINARY RETENTION DUE TO BENIGN PROSTATIC HYPERPLASIA: Status: ACTIVE | Noted: 2021-02-23

## 2021-02-23 LAB
ANION GAP SERPL CALCULATED.3IONS-SCNC: 9 MEQ/L (ref 9–15)
BASOPHILS ABSOLUTE: 0.1 K/UL (ref 0–0.2)
BASOPHILS RELATIVE PERCENT: 0.6 %
BUN BLDV-MCNC: 27 MG/DL (ref 8–23)
CALCIUM SERPL-MCNC: 8.9 MG/DL (ref 8.5–9.9)
CHLORIDE BLD-SCNC: 106 MEQ/L (ref 95–107)
CO2: 25 MEQ/L (ref 20–31)
CREAT SERPL-MCNC: 1.13 MG/DL (ref 0.7–1.2)
EOSINOPHILS ABSOLUTE: 0.5 K/UL (ref 0–0.7)
EOSINOPHILS RELATIVE PERCENT: 4.2 %
GFR AFRICAN AMERICAN: >60
GFR NON-AFRICAN AMERICAN: >60
GLUCOSE BLD-MCNC: 99 MG/DL (ref 70–99)
HCT VFR BLD CALC: 33.3 % (ref 42–52)
HEMOGLOBIN: 11.4 G/DL (ref 14–18)
LYMPHOCYTES ABSOLUTE: 1.4 K/UL (ref 1–4.8)
LYMPHOCYTES RELATIVE PERCENT: 12.2 %
MCH RBC QN AUTO: 33.5 PG (ref 27–31.3)
MCHC RBC AUTO-ENTMCNC: 34.1 % (ref 33–37)
MCV RBC AUTO: 98.2 FL (ref 80–100)
MONOCYTES ABSOLUTE: 1.1 K/UL (ref 0.2–0.8)
MONOCYTES RELATIVE PERCENT: 9.3 %
NEUTROPHILS ABSOLUTE: 8.7 K/UL (ref 1.4–6.5)
NEUTROPHILS RELATIVE PERCENT: 73.7 %
PDW BLD-RTO: 13.4 % (ref 11.5–14.5)
PLATELET # BLD: 305 K/UL (ref 130–400)
POTASSIUM REFLEX MAGNESIUM: 4.2 MEQ/L (ref 3.4–4.9)
RBC # BLD: 3.39 M/UL (ref 4.7–6.1)
SARS-COV-2, NAAT: NOT DETECTED
SODIUM BLD-SCNC: 140 MEQ/L (ref 135–144)
WBC # BLD: 11.8 K/UL (ref 4.8–10.8)

## 2021-02-23 PROCEDURE — APPSS15 APP SPLIT SHARED TIME 0-15 MINUTES: Performed by: NURSE PRACTITIONER

## 2021-02-23 PROCEDURE — 87635 SARS-COV-2 COVID-19 AMP PRB: CPT

## 2021-02-23 PROCEDURE — 97116 GAIT TRAINING THERAPY: CPT

## 2021-02-23 PROCEDURE — 6370000000 HC RX 637 (ALT 250 FOR IP): Performed by: INTERNAL MEDICINE

## 2021-02-23 PROCEDURE — 1200000002 HC SEMI PRIVATE SWING BED

## 2021-02-23 PROCEDURE — 6370000000 HC RX 637 (ALT 250 FOR IP): Performed by: NURSE PRACTITIONER

## 2021-02-23 PROCEDURE — 80048 BASIC METABOLIC PNL TOTAL CA: CPT

## 2021-02-23 PROCEDURE — 36415 COLL VENOUS BLD VENIPUNCTURE: CPT

## 2021-02-23 PROCEDURE — 2580000003 HC RX 258: Performed by: INTERNAL MEDICINE

## 2021-02-23 PROCEDURE — 85025 COMPLETE CBC W/AUTO DIFF WBC: CPT

## 2021-02-23 PROCEDURE — 99232 SBSQ HOSP IP/OBS MODERATE 35: CPT | Performed by: PSYCHIATRY & NEUROLOGY

## 2021-02-23 PROCEDURE — 99232 SBSQ HOSP IP/OBS MODERATE 35: CPT | Performed by: UROLOGY

## 2021-02-23 PROCEDURE — 51702 INSERT TEMP BLADDER CATH: CPT

## 2021-02-23 RX ORDER — SODIUM CHLORIDE 0.9 % (FLUSH) 0.9 %
10 SYRINGE (ML) INJECTION PRN
Status: CANCELLED | OUTPATIENT
Start: 2021-02-23

## 2021-02-23 RX ORDER — ACETAMINOPHEN 325 MG/1
650 TABLET ORAL EVERY 8 HOURS PRN
Status: CANCELLED | OUTPATIENT
Start: 2021-02-23

## 2021-02-23 RX ORDER — OXYBUTYNIN CHLORIDE 5 MG/1
2.5 TABLET ORAL 2 TIMES DAILY
Status: DISCONTINUED | OUTPATIENT
Start: 2021-02-23 | End: 2021-02-26

## 2021-02-23 RX ORDER — GABAPENTIN 100 MG/1
100 CAPSULE ORAL NIGHTLY
Status: DISCONTINUED | OUTPATIENT
Start: 2021-02-23 | End: 2021-03-04 | Stop reason: HOSPADM

## 2021-02-23 RX ORDER — FINASTERIDE 5 MG/1
5 TABLET, FILM COATED ORAL DAILY
Status: CANCELLED | OUTPATIENT
Start: 2021-02-24

## 2021-02-23 RX ORDER — ROSUVASTATIN CALCIUM 40 MG/1
40 TABLET, COATED ORAL NIGHTLY
Status: CANCELLED | OUTPATIENT
Start: 2021-02-23

## 2021-02-23 RX ORDER — ACETAMINOPHEN 325 MG/1
650 TABLET ORAL EVERY 8 HOURS PRN
Status: DISCONTINUED | OUTPATIENT
Start: 2021-02-23 | End: 2021-02-23 | Stop reason: SDUPTHER

## 2021-02-23 RX ORDER — TAMSULOSIN HYDROCHLORIDE 0.4 MG/1
0.4 CAPSULE ORAL DAILY
Status: DISCONTINUED | OUTPATIENT
Start: 2021-02-24 | End: 2021-03-04 | Stop reason: HOSPADM

## 2021-02-23 RX ORDER — ACETAMINOPHEN 650 MG/1
650 SUPPOSITORY RECTAL EVERY 6 HOURS PRN
Status: DISCONTINUED | OUTPATIENT
Start: 2021-02-23 | End: 2021-03-04 | Stop reason: HOSPADM

## 2021-02-23 RX ORDER — SODIUM CHLORIDE 0.9 % (FLUSH) 0.9 %
10 SYRINGE (ML) INJECTION EVERY 12 HOURS SCHEDULED
Status: DISCONTINUED | OUTPATIENT
Start: 2021-02-23 | End: 2021-02-23 | Stop reason: ALTCHOICE

## 2021-02-23 RX ORDER — ACETAMINOPHEN 325 MG/1
650 TABLET ORAL EVERY 6 HOURS PRN
Status: DISCONTINUED | OUTPATIENT
Start: 2021-02-23 | End: 2021-03-04 | Stop reason: HOSPADM

## 2021-02-23 RX ORDER — FINASTERIDE 5 MG/1
5 TABLET, FILM COATED ORAL DAILY
Status: DISCONTINUED | OUTPATIENT
Start: 2021-02-24 | End: 2021-03-04 | Stop reason: HOSPADM

## 2021-02-23 RX ORDER — POLYETHYLENE GLYCOL 3350 17 G/17G
17 POWDER, FOR SOLUTION ORAL DAILY PRN
Qty: 527 G | Refills: 1 | Status: SHIPPED | OUTPATIENT
Start: 2021-02-23 | End: 2021-03-25

## 2021-02-23 RX ORDER — SODIUM CHLORIDE 0.9 % (FLUSH) 0.9 %
10 SYRINGE (ML) INJECTION EVERY 12 HOURS SCHEDULED
Status: CANCELLED | OUTPATIENT
Start: 2021-02-23

## 2021-02-23 RX ORDER — SODIUM CHLORIDE 0.9 % (FLUSH) 0.9 %
10 SYRINGE (ML) INJECTION PRN
Status: DISCONTINUED | OUTPATIENT
Start: 2021-02-23 | End: 2021-03-04 | Stop reason: HOSPADM

## 2021-02-23 RX ORDER — FINASTERIDE 5 MG/1
5 TABLET, FILM COATED ORAL DAILY
Qty: 30 TABLET | Refills: 3 | Status: SHIPPED | OUTPATIENT
Start: 2021-02-24

## 2021-02-23 RX ORDER — DOCUSATE SODIUM 100 MG/1
100 CAPSULE, LIQUID FILLED ORAL DAILY
Status: CANCELLED | OUTPATIENT
Start: 2021-02-24

## 2021-02-23 RX ORDER — ROSUVASTATIN CALCIUM 20 MG/1
40 TABLET, COATED ORAL NIGHTLY
Status: DISCONTINUED | OUTPATIENT
Start: 2021-02-23 | End: 2021-03-04 | Stop reason: HOSPADM

## 2021-02-23 RX ORDER — SENNA PLUS 8.6 MG/1
1 TABLET ORAL NIGHTLY
Status: DISCONTINUED | OUTPATIENT
Start: 2021-02-23 | End: 2021-03-04 | Stop reason: HOSPADM

## 2021-02-23 RX ORDER — LABETALOL HYDROCHLORIDE 5 MG/ML
10 INJECTION, SOLUTION INTRAVENOUS EVERY 10 MIN PRN
Status: CANCELLED | OUTPATIENT
Start: 2021-02-23

## 2021-02-23 RX ORDER — OXYBUTYNIN CHLORIDE 5 MG/1
5 TABLET ORAL 2 TIMES DAILY
COMMUNITY
End: 2021-03-10

## 2021-02-23 RX ORDER — LABETALOL HYDROCHLORIDE 5 MG/ML
10 INJECTION, SOLUTION INTRAVENOUS EVERY 10 MIN PRN
Status: DISCONTINUED | OUTPATIENT
Start: 2021-02-23 | End: 2021-03-04 | Stop reason: HOSPADM

## 2021-02-23 RX ORDER — CYCLOBENZAPRINE HCL 10 MG
10 TABLET ORAL NIGHTLY PRN
Status: CANCELLED | OUTPATIENT
Start: 2021-02-23

## 2021-02-23 RX ORDER — POLYETHYLENE GLYCOL 3350 17 G/17G
17 POWDER, FOR SOLUTION ORAL DAILY PRN
Status: DISCONTINUED | OUTPATIENT
Start: 2021-02-23 | End: 2021-03-04 | Stop reason: HOSPADM

## 2021-02-23 RX ORDER — TAMSULOSIN HYDROCHLORIDE 0.4 MG/1
0.4 CAPSULE ORAL DAILY
Qty: 30 CAPSULE | Refills: 3 | Status: SHIPPED | OUTPATIENT
Start: 2021-02-24 | End: 2021-03-10

## 2021-02-23 RX ORDER — AMLODIPINE BESYLATE 10 MG/1
10 TABLET ORAL DAILY
Qty: 30 TABLET | Refills: 0 | Status: ON HOLD | OUTPATIENT
Start: 2021-02-23 | End: 2021-03-04 | Stop reason: SDUPTHER

## 2021-02-23 RX ORDER — PROMETHAZINE HYDROCHLORIDE 12.5 MG/1
12.5 TABLET ORAL EVERY 6 HOURS PRN
Status: DISCONTINUED | OUTPATIENT
Start: 2021-02-23 | End: 2021-03-04 | Stop reason: HOSPADM

## 2021-02-23 RX ORDER — TAMSULOSIN HYDROCHLORIDE 0.4 MG/1
0.4 CAPSULE ORAL DAILY
Status: CANCELLED | OUTPATIENT
Start: 2021-02-24

## 2021-02-23 RX ORDER — AMLODIPINE BESYLATE 10 MG/1
10 TABLET ORAL DAILY
Status: DISCONTINUED | OUTPATIENT
Start: 2021-02-24 | End: 2021-03-04

## 2021-02-23 RX ORDER — AMLODIPINE BESYLATE 10 MG/1
10 TABLET ORAL DAILY
Status: CANCELLED | OUTPATIENT
Start: 2021-02-24

## 2021-02-23 RX ORDER — ONDANSETRON 2 MG/ML
4 INJECTION INTRAMUSCULAR; INTRAVENOUS EVERY 6 HOURS PRN
Status: CANCELLED | OUTPATIENT
Start: 2021-02-23

## 2021-02-23 RX ORDER — POLYETHYLENE GLYCOL 3350 17 G/17G
17 POWDER, FOR SOLUTION ORAL DAILY
Status: CANCELLED | OUTPATIENT
Start: 2021-02-24

## 2021-02-23 RX ORDER — SENNA PLUS 8.6 MG/1
1 TABLET ORAL NIGHTLY
Status: CANCELLED | OUTPATIENT
Start: 2021-02-23

## 2021-02-23 RX ORDER — DOCUSATE SODIUM 100 MG/1
100 CAPSULE, LIQUID FILLED ORAL DAILY
Status: DISCONTINUED | OUTPATIENT
Start: 2021-02-24 | End: 2021-03-04 | Stop reason: HOSPADM

## 2021-02-23 RX ORDER — PROMETHAZINE HYDROCHLORIDE 12.5 MG/1
12.5 TABLET ORAL EVERY 6 HOURS PRN
Status: DISCONTINUED | OUTPATIENT
Start: 2021-02-23 | End: 2021-02-23 | Stop reason: SDUPTHER

## 2021-02-23 RX ORDER — PROMETHAZINE HYDROCHLORIDE 12.5 MG/1
12.5 TABLET ORAL EVERY 6 HOURS PRN
Status: CANCELLED | OUTPATIENT
Start: 2021-02-23

## 2021-02-23 RX ORDER — ONDANSETRON 2 MG/ML
4 INJECTION INTRAMUSCULAR; INTRAVENOUS EVERY 6 HOURS PRN
Status: DISCONTINUED | OUTPATIENT
Start: 2021-02-23 | End: 2021-03-04 | Stop reason: HOSPADM

## 2021-02-23 RX ORDER — CYCLOBENZAPRINE HCL 10 MG
10 TABLET ORAL NIGHTLY PRN
Status: DISCONTINUED | OUTPATIENT
Start: 2021-02-23 | End: 2021-03-04 | Stop reason: HOSPADM

## 2021-02-23 RX ORDER — POLYETHYLENE GLYCOL 3350 17 G/17G
17 POWDER, FOR SOLUTION ORAL DAILY
Status: DISCONTINUED | OUTPATIENT
Start: 2021-02-24 | End: 2021-03-04 | Stop reason: HOSPADM

## 2021-02-23 RX ORDER — ONDANSETRON 2 MG/ML
4 INJECTION INTRAMUSCULAR; INTRAVENOUS EVERY 6 HOURS PRN
Status: DISCONTINUED | OUTPATIENT
Start: 2021-02-23 | End: 2021-02-23 | Stop reason: SDUPTHER

## 2021-02-23 RX ADMIN — DOCUSATE SODIUM 100 MG: 100 CAPSULE, LIQUID FILLED ORAL at 07:43

## 2021-02-23 RX ADMIN — TAMSULOSIN HYDROCHLORIDE 0.4 MG: 0.4 CAPSULE ORAL at 07:43

## 2021-02-23 RX ADMIN — GABAPENTIN 100 MG: 100 CAPSULE ORAL at 21:13

## 2021-02-23 RX ADMIN — AMLODIPINE BESYLATE 5 MG: 5 TABLET ORAL at 07:43

## 2021-02-23 RX ADMIN — ROSUVASTATIN CALCIUM 40 MG: 20 TABLET, FILM COATED ORAL at 21:14

## 2021-02-23 RX ADMIN — POLYETHYLENE GLYCOL 3350 17 G: 17 POWDER, FOR SOLUTION ORAL at 07:43

## 2021-02-23 RX ADMIN — OXYBUTYNIN CHLORIDE 2.5 MG: 5 TABLET ORAL at 21:14

## 2021-02-23 RX ADMIN — FINASTERIDE 5 MG: 5 TABLET, FILM COATED ORAL at 07:43

## 2021-02-23 RX ADMIN — SENNOSIDES 8.6 MG: 8.6 TABLET, FILM COATED ORAL at 21:13

## 2021-02-23 RX ADMIN — SODIUM CHLORIDE, PRESERVATIVE FREE 10 ML: 5 INJECTION INTRAVENOUS at 07:43

## 2021-02-23 ASSESSMENT — PAIN DESCRIPTION - PAIN TYPE: TYPE: ACUTE PAIN

## 2021-02-23 ASSESSMENT — PAIN SCALES - GENERAL: PAINLEVEL_OUTOF10: 4

## 2021-02-23 ASSESSMENT — ENCOUNTER SYMPTOMS
NAUSEA: 0
BACK PAIN: 0
TROUBLE SWALLOWING: 0
ABDOMINAL PAIN: 0
CHOKING: 0
SHORTNESS OF BREATH: 0
COLOR CHANGE: 0
PHOTOPHOBIA: 0
VOMITING: 0

## 2021-02-23 ASSESSMENT — PAIN DESCRIPTION - ORIENTATION: ORIENTATION: LEFT

## 2021-02-23 NOTE — PROGRESS NOTES
Physical Therapy Med Surg Daily Treatment Note  Facility/Department: [facility]  Room: N579/S087-19       NAME: Jamir Colbert  : 1928 (80 y.o.)  MRN: [id number]  CODE STATUS: Full Code    Date of Service: 2021    Patient Diagnosis(es): Altered mental status [R41.82]  AMS (altered mental status) [R41.82]   No chief complaint on file. Patient Active Problem List    Diagnosis Date Noted    Gross hematuria     Encephalopathy acute     Closed head injury     Aphasia     Mild cognitive impairment     AMS (altered mental status) 2021    Fall 2021    BPH (benign prostatic hyperplasia) 2021    CKD (chronic kidney disease) 2021    Leukocytosis 2021        Past Medical History:   Diagnosis Date    Back pain     [address] ulcerative ([facility] [address]     Hypertension      History reviewed. No pertinent surgical history. Restrictions  Restrictions/Precautions: Fall Risk(high roberts score)    SUBJECTIVE   General  Chart Reviewed: Yes  Family / Caregiver Present: No  Subjective  Subjective: \"The doctor said I can go home if I urinate. \"  General Comment  Comments: pt in restroom at start of tx. agreeable to PT.    Pre-Session Pain Report  Pre Treatment Pain Screening  Pain at present: 0  Scale Used: Numeric Score  Intervention List: Patient able to continue with treatment  Pain Screening  Patient Currently in Pain: No       Post-Session Pain Report  Pain Assessment  Pain Assessment: 0-10  Pain Level: 0         OBJECTIVE        Bed mobility  Comment: DNT pt in restroom and then into chair after tx. Transfers  Sit to Stand: Contact guard assistance  Stand to sit: Contact guard assistance  Bed to Chair: Contact guard assistance  Comment: vc's for technique and pushing through arms and legs. CGA for safety.     Ambulation  Ambulation?: Yes  Ambulation 1  Surface: level tile  Device: Rolling Walker  Assistance: Contact guard assistance  Quality of Gait: FF posture with downward gaze and slow olivier with flat steps; vc's for posture and increased heel strike  Distance: 39' x2  Comments: vitals WNL after ambulation, pt tolerates well. Activity Tolerance  Activity Tolerance: Patient Tolerated treatment well          ASSESSMENT   Assessment: pt tolerates ambulation well, requests end to tx so he can call his son. Discharge Recommendations:  Continue to assess pending progress, Patient would benefit from continued therapy after discharge    Goals  Long term goals  Long term goal 1: Pt will demonstrate bed mobility SBA to allow pt to get into and out of bed. Long term goal 2: Pt will demonstrate transfers SBA with safest AD to increase safety with functional mobility at home. Long term goal 3: Pt will demonstrate amb 150ft SBA with safest AD to allow pt to amb inside his home and from car to apartment building. Patient Goals   Patient goals : \"go home today\"    PLAN    Times per week: 3-6  Safety Devices  Type of devices: Call light within reach, Chair alarm in place, Left in chair, All fall risk precautions in place     [facility] (6 CLICK) [address] Raw Score : 17      Therapy Time   Individual   Time In 0908   Time Out 0917   Minutes 9      Gait: 8  TrsF: [address], PTA, 02/23/21 at 9:27 AM         Definitions for assistance levels  Independent = pt does not require any physical supervision or assistance from another person for activity completion. Device may be needed.   Stand by assistance = pt requires verbal cues or instructions from another person, close to but not touching, to perform the activity  Minimal assistance= pt performs 75% or more of the activity; assistance is required to complete the activity  Moderate assistance= pt performs 50% of the activity; assistance is required to complete the activity  Maximal assistance = pt performs 25% of the activity; assistance is required to complete the activity  Dependent = pt requires total physical assistance to accomplish the task

## 2021-02-23 NOTE — PROGRESS NOTES
Neurology Follow up    SUBJECTIVE: Patient seen and examined for neurology follow-up for encephalopathy with closed head injury status post fall. Patient is currently alert and oriented x2, no acute distress, cooperative. No focal neuro deficits. No seizure activity reported. Encephalopathy improved being. Unclear what patient's baseline is. No tremors noted. No behavioral disturbances. Materia has resolved. Aspirin remains on hold.         Current Facility-Administered Medications   Medication Dose Route Frequency Provider Last Rate Last Admin    polyethylene glycol (GLYCOLAX) packet 17 g  17 g Oral Daily Alejandro Franco, DO   17 g at 02/23/21 9599    docusate sodium (COLACE) capsule 100 mg  100 mg Oral Daily Alejandro Franco, DO   100 mg at 02/23/21 4096    senna (SENOKOT) tablet 8.6 mg  1 tablet Oral Nightly Alejandro Franco, DO   8.6 mg at 02/22/21 2156    amLODIPine (NORVASC) tablet 5 mg  5 mg Oral Daily [place] Ambrosia, DO   5 mg at 02/23/21 0768    [Held by provider] enoxaparin (LOVENOX) injection 30 mg  30 mg Subcutaneous Daily Vara [place] Sedar, DO   Stopped at 02/21/21 0741    labetalol (NORMODYNE;TRANDATE) injection 10 mg  10 mg Intravenous Q10 Min PRN Vara [place] Sedar, DO        rosuvastatin (CRESTOR) tablet 40 mg  40 mg Oral Nightly V[place] Sedar, DO   40 mg at 02/22/21 2156    promethazine (PHENERGAN) tablet 12.5 mg  12.5 mg Oral Q6H PRN Vara [place] Sedar, DO        Or    ondansetron [facility]) injection 4 mg  4 mg Intravenous Q6H PRN Vara [place] Sedar, DO        sodium chloride flush 0.9 % injection 10 mL  10 mL Intravenous 2 times per day Carlos MEDRANO Sedar, DO   10 mL at 02/23/21 0743    sodium chloride flush 0.9 % injection 10 mL  10 mL Intravenous PRN Vara [place] Sedar, DO        finasteride (PROSCAR) tablet 5 mg  5 mg Oral Daily Mallissa Snowball, APRN - CNP   5 mg at 02/23/21 5973    cyclobenzaprine (FLEXERIL) tablet 10 mg  10 mg Oral Nightly PRN Mallissa Snowball, APRN - CNP   10 mg at 02/21/21 2139    acetaminophen (TYLENOL) tablet 650 mg  650 mg Oral Q8H PRN TAYE Solitario - CNP   650 mg at 02/21/21 2139    tamsulosin (FLOMAX) capsule 0.4 mg  0.4 mg Oral Daily Laura Vaz DO   0.4 mg at 02/23/21 9379       PHYSICAL EXAM:    BP (!) 179/84   Pulse 96   Temp 97.3 °F (36.3 °C) (Oral)   Resp 18   Ht 4' 7\" (1.397 m)   Wt 168 lb 4.8 oz (76.3 kg)   SpO2 98%   BMI 39.12 kg/m²    General Appearance:      Skin:  normal  CVS - Normal sounds, No murmurs , No carotid Bruits  RS -CTA  Abdomen Soft, BS present  Review of Systems   Constitutional: Negative for fever. HENT: Negative for ear pain, tinnitus and trouble swallowing. Eyes: Negative for photophobia and visual disturbance. Respiratory: Negative for choking and shortness of breath. Cardiovascular: Negative for chest pain and palpitations. Gastrointestinal: Negative for nausea and vomiting. Musculoskeletal: Negative for back pain, gait problem, joint swelling, myalgias, neck pain and neck stiffness. Skin: Negative for color change. Allergic/Immunologic: Negative for food allergies. Neurological: Negative for dizziness, tremors, seizures, syncope, facial asymmetry, speech difficulty, weakness, light-headedness, numbness and headaches. Psychiatric/Behavioral: Negative for behavioral problems, confusion, hallucinations and sleep disturbance.     no symptoms  Mental Status Exam:             Level of Alertness:   awake            Orientation:   person, time            Memory:   Impaired            Fund of Knowledge:  normal            Attention/Concentration:  normal            Language:  normal      Funduscopic Exam:     Cranial Nerves        Cranial nerve II           Visual acuity:  normal           Visual fields:  normal      Cranial nerve III           Pupils:  equal, round, reactive to light      Cranial nerves III, IV, VI           Extraocular Movements: intact      Cranial nerve V           Facial sensation: intact      Cranial nerve VII           Facial strength: intact      Cranial nerve VIII           Hearing:  intact      Cranial nerve IX           Palate:  intact      Cranial nerve XI         Shoulder shrug:  intact      Cranial nerve XII          Tongue movement:  normal    Motor:    Drift:  absent  Motor exam is symmetrical 5 out of 5 all extremities bilaterally  Tone:  normal  Abnormal Movements:  absent            Sensory:        Pinprick             Right Upper Extremity:  normal             Left Upper Extremity:  normal             Right Lower Extremity:  normal             Left Lower Extremity:  normal           Vibration                         Touch            Proprioception                 Coordination:           Finger/Nose   Right:  normal              Left:  normal          Heel-Knee-Shin                Right:  normal              Left:  normal          Rapid Alternating Movements              Right:  normal              Left:  normal          Gait:                       Casual: Patient does have a gait ataxia and drooling to the left                      Romberg:  normal            Reflexes:             Deep Tendon Reflexes:             Reflexes are 2 +             Plantar response:                Right:  downgoing               Left:  downgoing    Vascular:  Cardiac Exam:  normal         Xr Hip Left (2-3 Views)    Result Date: 2/20/2021   EXAMINATION: XR HIP LEFT (2-3 VIEWS) CLINICAL HISTORY: Hip pain COMPARISONS: None available TECHNIQUE: AP film of the pelvis to include both hips and lateral view of the left hip were obtained. FINDINGS: No displaced pelvic fracture identified. No hip dislocation. Mild degenerative changes of both hips. Sacroiliac joints are symmetric and within normal limits. Degenerative changes of the lower lumbar spine. No acute osseous abnormality.     Mri Brain Without Contrast    Result Date: 2/20/2021  EXAM: MRI of the brain without contrast History: Metabolic encephalopathy. Stroke. Technique: Multiplanar multisequence MRI of the brain was performed without contrast. Comparison: CT brain from February 19, 2021 Findings: Examination is degraded by motion artifact. Hyperintensity/FLAIR signal within the bilateral supratentorial white matter and patchy hyperintense T2 signal within the [place] are nonspecific findings most compatible with chronic small vessel ischemic changes in a patient of this age. Prominence of the sulci and ventricles compatible with moderate generalized parenchymal volume loss. No edema, hemorrhage, mass, mass effect, midline shift, or abnormal extra-axial fluid collection. Midline structures are within normal limits. The posterior fossa is within normal limits. There is no diffusion restriction. No susceptibility artifact is identified on the gradient echo sequence. The major intracranial vascular flow voids are maintained. Cranial nerves 7/8 complexes appear grossly unremarkable. The visualized paranasal sinuses and bilateral mastoid air cells are clear. No acute ischemia or acute intracranial process. Generalized parenchymal volume loss and nonspecific white matter findings most compatible with chronic small vessel ischemic changes in a patient of this age.     Us Carotid Artery Bilateral    Result Date: 2/20/2021  BILATERAL DUPLEX CAROTID ULTRASOUND CLINICAL INFORMATION:  Carotid stenosis COMPARISON:  None available FINDINGS:  The bilateral carotid duplex ultrasound study demonstrates the following:                                                                  RIGHT            LEFT Proximal common carotid artery           137 cm/s            111 cm/s  Mid common carotid artery                   120 cm/s            109 cm/s  Distal common carotid artery                121 cm/s           116 cm/s Proximal internal carotid artery             149 cm/s            77 cm/s Mid internal carotid artery                      177 cm/s           68 cm/s Distal internal carotid artery                  98 cm/s             158 cm/s External carotid artery                            256 cm/s           97 cm/s    ICA/CCA ratio                                         1.5                0.7   Vertebral arteries antegrade flow         Yes                     Yes      50-69% stenosis of the right internal carotid artery. Less than 50% stenosis of the left internal carotid artery. Antegrade flow both vertebral arteries. Validated velocity measurements with angiographic measurements, velocity criteria are extrapolated from diameter data as defined by the Society of Radiologist in [address] [place] [address] Drive Radiology 2003; 259;797-207. Recent Labs     02/21/21  0604 02/22/21  0507 02/23/21  0531   WBC 8.4 9.2 11.8*   HGB 11.6* 12.0* 11.4*    284 305     Recent Labs     02/21/21  0604 02/22/21  0507 02/23/21  0531    140 140   K 3.5 4.0 4.2    106 106   CO2 24 23 25   BUN 19 25* 27*   CREATININE 1.16 1.15 1.13   GLUCOSE 106* 100* 99     No results for input(s): BILITOT, ALKPHOS, AST, ALT in the last 72 hours. No results found for: PROTIME, INR  No results found for: LITHIUM, DILFRTOT, VALPROATE    ASSESSMENT AND PLAN  Encephalopathy related to closed head injury. Patient has improved considerably. He does have underlying baseline ataxia. His orientation is much better. MRI of the brain was reviewed there is no new stroke. Patient will be reassessed by physical therapy for safety issues and may be discharged home tomorrow with some supervision.   Patient appears to be nonfocal and his exam at this time and much more better oriented    Encephalopathy with closed head injury  Check TSH, B12 folate  We will repeat CK to make sure it is not trending upward which can occur after seizure  Hypertensive at times  We will need to check orthostatic blood pressures  Duplex shows 50 to 69% stenosis of the right internal carotid artery and less than 50% stenosis of the left internal carotid artery. Given patient's risk factors with uncontrolled hypertension and carotid stenosis will aspirin but this has been placed on hold secondary to hematuria. Will follow    Okay to DC home today from neurology standpoint once medically cleared. Will need to find out from urology when aspirin can be resumed due to patient's carotid stenosis. Follow-up 3 to 4 weeks. I have personally performed a face to face diagnostic evaluation on this patient, reviewed all data and investigations, and am the sole provider of all clinical decisions on the neurological status of this patient. encephalopathy better, await d/c planning      [facility] FRANCOIS Vázquez MD, [id number], American Board of Psychiatry & Neurology  Board Certified in Vascular Neurology  Board Certified in Neuromuscular Medicine  Certified in . [address]

## 2021-02-23 NOTE — PLAN OF CARE
Problem: Falls - Risk of:  Goal: Will remain free from falls  Description: Will remain free from falls  2/23/2021 1646 by Cleda Boas, RN  Outcome: Completed  2/23/2021 0755 by Cleda Boas, RN  Outcome: Ongoing  Goal: Absence of physical injury  Description: Absence of physical injury  2/23/2021 1646 by Cleda Boas, RN  Outcome: Completed  2/23/2021 0755 by Cleda Boas, RN  Outcome: Ongoing     Problem: Skin Integrity:  Goal: Will show no infection signs and symptoms  Description: Will show no infection signs and symptoms  2/23/2021 1646 by Cleda Boas, RN  Outcome: Completed  2/23/2021 0755 by Cleda Boas, RN  Outcome: Ongoing  Goal: Absence of new skin breakdown  Description: Absence of new skin breakdown  2/23/2021 1646 by Cleda Boas, RN  Outcome: Completed  2/23/2021 0755 by Cleda Boas, RN  Outcome: Ongoing     Problem: IP SWALLOWING  Goal: LTG - patient will tolerate the least restrictive diet consistency to allow for safe consumption of daily meals  2/23/2021 1646 by Cleda Boas, RN  Outcome: Completed  2/23/2021 0755 by Cleda Boas, RN  Outcome: Ongoing     Problem: IP BALANCE  Goal: LTG - patient will maintain standing balance to allow for completion of daily activities  2/23/2021 1646 by Cleda Boas, RN  Outcome: Completed  2/23/2021 0755 by Cleda Boas, RN  Outcome: Ongoing     Problem: Pain:  Goal: Pain level will decrease  Description: Pain level will decrease  2/23/2021 1646 by Cleda Boas, RN  Outcome: Completed  2/23/2021 0755 by Cleda Boas, RN  Outcome: Ongoing  Goal: Control of acute pain  Description: Control of acute pain  2/23/2021 1646 by Cleda Boas, RN  Outcome: Completed  2/23/2021 0755 by Cleda Boas, RN  Outcome: Ongoing  Goal: Control of chronic pain  Description: Control of chronic pain  2/23/2021 1646 by Cleda Boas, RN  Outcome: Completed  2/23/2021 0755 by Cleda Boas, RN  Outcome: Ongoing     Problem: IP BALANCE  Goal: LTG - patient will maintain standing balance to allow for completion of daily activities  2/23/2021 1646 by Silvio Sullivan RN  Outcome: Completed  2/23/2021 0755 by Silvio Sullivan RN  Outcome: Ongoing

## 2021-02-23 NOTE — PROGRESS NOTES
Physician Progress Note      PATIENT:               Desiree Parekh  CSN #:                  [id number]  :                       1928  ADMIT DATE:       2021 3:46 AM  DISCH DATE:  RESPONDING  PROVIDER #:        Blanka Christine DO          QUERY TEXT:    Patient admitted with encephalopathy with CHI and urinary retention, noted to   have documentation of CKD by attending  If possible, please document in   progress notes and discharge summary if you are evaluating and/or treating   any of the following: The medical record reflects the following:  Risk Factors: HTN  Clinical Indicators: Scr/GFr  1.31/51.1  1.21/56  1.16/58.8 1.15/59.4    1.13/>60   SCr/GFR 21 1.62/40  Treatment: labs/monitoring    Thank  you, Sushil Tejada RN BSN CDS  [phone]  Options provided:  -- CKD Stage 3a GFR 45-59  -- CKD Stage 3b GFR 30-44  -- CKD Stage 2 GFR 60-90  -- CKD was ruled out after study  -- Other - I will add my own diagnosis  -- Disagree - Not applicable / Not valid  -- Disagree - Clinically unable to determine / Unknown  -- Refer to Clinical Documentation Reviewer    PROVIDER RESPONSE TEXT:    This patient has CKD Stage 3a.     Query created by: Maria De Jesus Mccall on 2021 12:35 PM      Electronically signed by:  Blanka Christine DO 2021 1:30 PM

## 2021-02-23 NOTE — DISCHARGE SUMMARY
[facility] Medicine Discharge Summary    Nolberto Singleton  :  1928  MRN:  [id number]    Admit date:  2021  Discharge date:  2021    Admitting Physician:  Peggy Walker DO  Primary Care Physician:  Yesi Johnson MD    Discharge Diagnoses:    Principal Problem:    AMS (altered mental status)  Active Problems:    Fall    BPH (benign prostatic hyperplasia)    CKD (chronic kidney disease)    Leukocytosis    Gross hematuria    Encephalopathy acute    Closed head injury    Aphasia    Mild cognitive impairment    Metabolic encephalopathy    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  Resolved Problems:    * No resolved hospital problems. *    No chief complaint on file. Condition:  improved  Activity: no restrictions  Diet: regular  Disposition: [facility]  Functional Status: ambulatory with assistance    Significant Findings:     MRI Brain:  No acute ischemia or acute intracranial process.       Generalized parenchymal volume loss and nonspecific white matter findings most compatible with chronic small vessel ischemic changes in a patient of this age. Hospital Course:   24-year-old male with history of hypertension, ulcerative colitis, BPH presented with confusion. He was found to have metabolic encephalopathy secondary to urinary retention in setting of BPH complicated by unger catheter trauma-related hematuria. Neurology felt part of the encephalopathy may have been related to closed head injury/concussion. Ultimately his hematuria subsided and his Unger was removed. He was able to void on his own prior to discharge. BPH medications Flomax and finasteride were started during the hospitalization and will be continued at discharge.     Key Medications / Medications changes:  - Aspirin on hold due to recent hematuria-this may need to be resumed in the future due to his history of carotid stenosis  -Amlodipine 10 mg daily added for hypertension  -Flomax 0.4 mg and finasteride 5 mg daily added for BPH    Addendum: family decided they wanted patient to go to [facility] for rehabilitation prior tot discharge home. Exam on Discharge:   BP (!) 179/84   Pulse 96   Temp 97.3 °F (36.3 °C) (Oral)   Resp 18   Ht 4' 7\" (1.397 m)   Wt 168 lb 4.8 oz (76.3 kg)   SpO2 98%   BMI 39.12 kg/m²   General appearance: alert, cooperative and no distress  Mental Status: oriented to person, place and time and normal affect  Lungs: clear to auscultation bilaterally, normal effort  Heart: regular rate and rhythm, no murmur  Abdomen: soft, nontender, nondistended, bowel sounds present, no masses  Extremities: no edema, redness, tenderness in the calves  Skin: no gross lesions, rashes    Discharge Medication List:   [address] [place] [address] Medication Instructions MOD:[id number]    Printed on:02/23/21 1410   Medication Information                      acetaminophen (TYLENOL) 325 MG tablet  Take 650 mg by mouth every 6 hours as needed for Pain             amLODIPine (NORVASC) 10 MG tablet  Take 1 tablet by mouth daily             Cholecalciferol (VITAMIN D) 50 MCG (2000 UT) CAPS capsule  Take by mouth             cyclobenzaprine (FLEXERIL) 10 MG tablet  Take 10 mg by mouth nightly as needed for Muscle spasms             finasteride (PROSCAR) 5 MG tablet  Take 1 tablet by mouth daily             gabapentin (NEURONTIN) 600 MG tablet  Take 600 mg by mouth nightly.              polyethylene glycol (GLYCOLAX) 17 g packet  Take 17 g by mouth daily as needed for Constipation             tamsulosin (FLOMAX) 0.4 MG capsule  Take 1 capsule by mouth daily                 DC time 37 minutes    Signed:  Vladimir Brunner  2/23/2021, 2:10 PM

## 2021-02-23 NOTE — FLOWSHEET NOTE
7923: This RN assumed care of the pt. Pt was assisted to the chair. Pt ambulated well with a walker. Pt has no c/o pain or discomfort. 5322: Assessment: pt is A&OX4. PERRLA. Calm and cooperative. Follows commands appropriately. Lung sounds are clear bilaterally. S1, S2 noted. Bowel sounds are active X4. PERRLA. Pulses palpable. Non-pitting edema noted to BLE. Wound gel applied to the abrasions on pt's knees. 6401: Dr. Vee Postin rounded and irrigated pt's unger then removed it. He stated that if pt could void on his own he could go home today. 1000: pt walked to the restroom and was able to void without difficulty. 1146: rashad viveros sent to Dr. Kimmie Garza regarding discharge  [id number] 7503: The family has decided that they would like the pt to go to rehab. They spoke with Elise Adams the [place]. The pt has been approved. Rashad viveros sent to Dr. Kimmie Garza for orders. 1706: Report called to Frankie Cameron at [facility]. Awaiting covid results. Pt plans to have his family drive him.

## 2021-02-23 NOTE — PLAN OF CARE
Problem: Falls - Risk of:  Goal: Will remain free from falls  Description: Will remain free from falls  Outcome: Ongoing  Goal: Absence of physical injury  Description: Absence of physical injury  Outcome: Ongoing     Problem: Skin Integrity:  Goal: Will show no infection signs and symptoms  Description: Will show no infection signs and symptoms  Outcome: Ongoing  Goal: Absence of new skin breakdown  Description: Absence of new skin breakdown  Outcome: Ongoing     Problem: IP SWALLOWING  Goal: LTG - patient will tolerate the least restrictive diet consistency to allow for safe consumption of daily meals  Outcome: Ongoing     Problem: IP BALANCE  Goal: LTG - patient will maintain standing balance to allow for completion of daily activities  Outcome: Ongoing     Problem: Pain:  Goal: Pain level will decrease  Description: Pain level will decrease  Outcome: Ongoing  Goal: Control of acute pain  Description: Control of acute pain  Outcome: Ongoing  Goal: Control of chronic pain  Description: Control of chronic pain  Outcome: Ongoing     Problem: IP BALANCE  Goal: LTG - patient will maintain standing balance to allow for completion of daily activities  Outcome: Ongoing

## 2021-02-23 NOTE — PROGRESS NOTES
Physical Therapy  Cancelled Visit Note  Date: 2021  Patient Name: Ragini Syed  MRN: [id number]     :   1928    Subjective:   General  Chart Reviewed: Yes  Family / Caregiver Present: No  Referring Practitioner: Corinna White  PT Visit Information  Onset Date: 10/12/20  Total # of Visits Approved: 12  Total # of Visits to Date: 1  Plan of Care/Certification Expiration Date: 21  No Show: 0  Canceled Appointment: 1  Subjective  Subjective: Cancelled visit due to pt being hospitalized.            Treatment Activities:                                                                          Assessment:   Conditions Requiring Skilled Therapeutic Intervention  REQUIRES PT FOLLOW UP: Yes      G-Code:     OutComes Score                                                     Goals:       Plan:             Therapy Time   Individual Concurrent Group Co-treatment   Time In           Time Out           Minutes                   Tuan Tate PT  License and Documentation Cosign  Therapy License Number: [id number]

## 2021-02-23 NOTE — PROGRESS NOTES
Progress Note    2/23/2021   11:26 AM    Name:  Toan Jenkins  MRN:    [id number]     Acct:     [de-identified]   Room:  [address] Day: 2     Admit Date: 2/20/2021  3:46 AM  PCP: Travis Nesbitt MD    Subjective:     C/C: No chief complaint on file. Interval History: Status: This is a 80-year-old male with history of chronic back pain, ulcerative colitis and hypertension, BPH and who was admitted for evaluation of acute mental status change. He had a Pittman catheter placed for frequency and possible retention. Per nursing notes, he was pulling on the catheter a few nights ago and then developed some gross hematuria. Anti-coagulation was then stopped and the urine has cleared some. He has been on Proscar and Flomax for a few days. He has no abd or flank pain. The urine today is pink with some small clots. He denies prior voiding complaints or hematuria. Past Medical History:   Diagnosis Date    Back pain     Colitis, ulcerative ([address]     Hypertension        ROS:  Review of Systems   Gastrointestinal: Negative for abdominal pain. Genitourinary: Negative for flank pain. Medications: Allergies:    Allergies   Allergen Reactions    Sulfa Antibiotics        Current Meds:     polyethylene glycol (GLYCOLAX) packet 17 g, Daily      docusate sodium (COLACE) capsule 100 mg, Daily      senna (SENOKOT) tablet 8.6 mg, Nightly      amLODIPine (NORVASC) tablet 5 mg, Daily      [Held by provider] enoxaparin (LOVENOX) injection 30 mg, Daily      labetalol (NORMODYNE;TRANDATE) injection 10 mg, Q10 Min PRN      rosuvastatin (CRESTOR) tablet 40 mg, Nightly      promethazine (PHENERGAN) tablet 12.5 mg, Q6H PRN    Or      ondansetron (ZOFRAN) injection 4 mg, Q6H PRN      sodium chloride flush 0.9 % injection 10 mL, 2 times per day      sodium chloride flush 0.9 % injection 10 mL, PRN      finasteride (PROSCAR) tablet 5 mg, Daily      cyclobenzaprine (FLEXERIL) tablet 10 mg, Nightly PRN     acetaminophen (TYLENOL) tablet 650 mg, Q8H PRN      tamsulosin (FLOMAX) capsule 0.4 mg, Daily        Data:     Code Status:  Full Code    History reviewed. No pertinent family history. Social History     Socioeconomic History    Marital status:      Spouse name: Not on file    Number of children: Not on file    Years of education: Not on file    Highest education level: Not on file   Occupational History    Not on file   Social Needs    Financial resource strain: Not on file    Food insecurity     Worry: Not on file     Inability: Not on file    Transportation needs     Medical: Not on file     Non-medical: Not on file   Tobacco Use    Smoking status: Never Smoker    Smokeless tobacco: Never Used   Substance and Sexual Activity    Alcohol use: Not Currently    Drug use: Not Currently    Sexual activity: Not Currently   Lifestyle    Physical activity     Days per week: Not on file     Minutes per session: Not on file    Stress: Not on file   Relationships    Social connections     Talks on phone: Not on file     Gets together: Not on file     Attends [employer] service: Not on file     Active member of club or organization: Not on file     Attends meetings of clubs or organizations: Not on file     Relationship status: Not on file    Intimate partner violence     Fear of current or ex partner: Not on file     Emotionally abused: Not on file     Physically abused: Not on file     Forced sexual activity: Not on file   Other Topics Concern    Not on file   Social History Narrative    Not on file       Vitals:  BP (!) 179/84   Pulse 96   Temp 97.3 °F (36.3 °C) (Oral)   Resp 18   Ht 4' 7\" (1.397 m)   Wt 168 lb 4.8 oz (76.3 kg)   SpO2 98%   BMI 39.12 kg/m²   Temp (24hrs), Av.3 °F (36.3 °C), Min:97.3 °F (36.3 °C), Max:97.3 °F (36.3 °C)    No results for input(s): POCGLU in the last 72 hours. I/O(24Hr):     Intake/Output Summary (Last 24 hours) at 2021 1126  Last data filed at 2/23/2021 0833  Gross per 24 hour   Intake 1210 ml   Output 2245 ml   Net -1035 ml       Labs:  Hematology:  Recent Labs     02/23/21  0531   WBC 11.8*   HGB 11.4*   HCT 33.3*        Chemistry:  Recent Labs     02/21/21  0604 02/21/21  0604 02/23/21  0531      < > 140   K 3.5   < > 4.2      < > 106   CO2 24   < > 25   GLUCOSE 106*   < > 99   BUN 19   < > 27*   CREATININE 1.16   < > 1.13   MG 1.9  --   --    ANIONGAP 8*   < > 9   LABGLOM 58.8*   < > >60.0   GFRAA >60.0   < > >60.0   CALCIUM 8.9   < > 8.9    < > = values in this interval not displayed. Urine CultureNo results found for: LABURIN      Physical Examination:        Physical Exam  Constitutional:       Appearance: Normal appearance. Abdominal:      General: There is no distension. Palpations: Abdomen is soft. Genitourinary:     Penis: Normal.    Neurological:      Mental Status: He is alert. Procedure: under sterile conditions the Pittman was irrigated with 30- 60 cc of sterile water and there were no clots and the urine cleared. He was getting some mild spasms and could not tolerate more than 60 cc in the bladder. The catheter was removed for a voiding tria. Assessment:        Primary Problem    This is a 27-year-old male with history of chronic back pain, ulcerative colitis and hypertension, BPH and who was admitted for evaluation of acute mental status change which has resolved. He had some mild residual hematuria due to Pittman trauma and anti-coagulation which has been held. The catheter was removed for a voiding trial and once he voids can be discharged form Gu standpoint. AMS (altered mental status)     Active Hospital Problems    Diagnosis Date Noted    Gross hematuria [R31.0]     Encephalopathy acute [G93.40]     Closed head injury [S09.90XA]     Aphasia [R47.01]     Mild cognitive impairment [G31.84]     AMS (altered mental status) [R41.82] 02/20/2021    Fall [W19. XXXA] 02/20/2021    BPH (benign prostatic hyperplasia) [N40.0] 02/20/2021    CKD (chronic kidney disease) [N18.9] 02/20/2021    Leukocytosis [D72.829] 02/20/2021         Plan:        1.  D/C to home on Flomax and Proscar daily      Electronically signed by Merry Garcia MD on 2/23/2021 at 11:26 AM

## 2021-02-23 NOTE — DISCHARGE INSTR - COC
Continuity of Care Form    Patient Name: Mariana Freeman   :  1928  MRN:  [id number]    [facility] date:  2021  Discharge date:  2021    Code Status Order: Full Code   Advance Directives:      Admitting Physician:  Gabe Chavarria DO  PCP: Erick Fleming MD    Discharging Nurse: [facility] Unit/Room#: G548/H916-10  Discharging Unit Phone Number: ***    Emergency Contact:   Extended Emergency Contact Information  Primary Emergency Contact: [address] [place] [address] Phone: [phone]  Mobile Phone: [phone]  Relation: Child  Secondary Emergency Contact: Chandan Leon  Mobile Phone: [phone]  Relation: Child    Past Surgical History:  History reviewed. No pertinent surgical history. Immunization History: There is no immunization history on file for this patient. Active Problems:  Patient Active Problem List   Diagnosis Code    AMS (altered mental status) R41.82    Fall W19. Terrell Miguelangel    BPH (benign prostatic hyperplasia) N40.0    CKD (chronic kidney disease) N18.9    Leukocytosis D72.829    Gross hematuria R31.0    Encephalopathy acute G93.40    Closed head injury S09.90XA    Aphasia R47.01    Mild cognitive impairment Q19.30    Metabolic encephalopathy T75.94    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 N40.1, R33.8       Isolation/Infection:   Isolation            No Isolation          Patient Infection Status       Infection Onset Added Last Indicated Last Indicated By Review Planned Expiration Resolved Resolved By    None active    Resolved    COVID-19 Rule Out 21 COVID-19 (Ordered)   21 Rule-Out Test Resulted            Nurse Assessment:  Last Vital Signs: BP (!) 179/84   Pulse 96   Temp 97.3 °F (36.3 °C) (Oral)   Resp 18   Ht 4' 7\" (1.397 m)   Wt 168 lb 4.8 oz (76.3 kg)   SpO2 98%   BMI 39.12 kg/m²     Last documented pain score (0-10 scale): Pain Level: 0  Last Weight:   Wt Readings from Last 1 Encounters:   21 168 weight bearing restirctions  Other Medical Equipment (for information only, NOT a DME order):  walker, bedside commode and hospital bed  Other Treatments: ***    Patient's personal belongings (please select all that are sent with patient):  Glasses, clothing, books    RN SIGNATURE:  electronically signed by Janette Suarez RN on 2/23/2021 at 4:44 PM      CASE MANAGEMENT/SOCIAL WORK SECTION    Inpatient Status Date: 02/21/21    Readmission Risk Assessment Score:  Readmission Risk              Risk of Unplanned Readmission:        12           Discharging to Facility/ Agency   · Name: [facility]   · Address:  · Phone:  · Fax:    Dialysis Facility (if applicable)   · Name:  · Address:  · Dialysis Schedule:  · Phone:  · Fax:    / signature: Electronically signed by JULIANO Watson on 2/23/21 at 3:09 PM EST    PHYSICIAN SECTION    Prognosis: Good    Condition at Discharge: Stable    Rehab Potential (if transferring to Rehab): Good    Recommended Labs or Other Treatments After Discharge: n/a    Physician Certification: I certify the above information and transfer of Ragini Syed  is necessary for the continuing treatment of the diagnosis listed and that he requires [facility] for less 30 days.      Update Admission H&P: No change in H&P    PHYSICIAN SIGNATURE:  Electronically signed by Fawn Miller DO on 2/23/21 at 4:49 PM EST

## 2021-02-24 LAB
ANION GAP SERPL CALCULATED.3IONS-SCNC: 10 MEQ/L (ref 9–15)
BUN BLDV-MCNC: 30 MG/DL (ref 8–23)
CALCIUM SERPL-MCNC: 9.4 MG/DL (ref 8.5–9.9)
CHLORIDE BLD-SCNC: 103 MEQ/L (ref 95–107)
CO2: 26 MEQ/L (ref 20–31)
CREAT SERPL-MCNC: 1.34 MG/DL (ref 0.7–1.2)
GFR AFRICAN AMERICAN: >60
GFR NON-AFRICAN AMERICAN: 49.8
GLUCOSE BLD-MCNC: 101 MG/DL (ref 70–99)
HCT VFR BLD CALC: 36.6 % (ref 42–52)
HEMOGLOBIN: 12.2 G/DL (ref 14–18)
MCH RBC QN AUTO: 33 PG (ref 27–31.3)
MCHC RBC AUTO-ENTMCNC: 33.3 % (ref 33–37)
MCV RBC AUTO: 99 FL (ref 80–100)
PDW BLD-RTO: 13.6 % (ref 11.5–14.5)
PLATELET # BLD: 358 K/UL (ref 130–400)
POTASSIUM REFLEX MAGNESIUM: 4.2 MEQ/L (ref 3.4–4.9)
RBC # BLD: 3.7 M/UL (ref 4.7–6.1)
SODIUM BLD-SCNC: 139 MEQ/L (ref 135–144)
WBC # BLD: 10.5 K/UL (ref 4.8–10.8)

## 2021-02-24 PROCEDURE — 1200000002 HC SEMI PRIVATE SWING BED

## 2021-02-24 PROCEDURE — 6370000000 HC RX 637 (ALT 250 FOR IP): Performed by: INTERNAL MEDICINE

## 2021-02-24 PROCEDURE — 97162 PT EVAL MOD COMPLEX 30 MIN: CPT

## 2021-02-24 PROCEDURE — 97535 SELF CARE MNGMENT TRAINING: CPT

## 2021-02-24 PROCEDURE — 97110 THERAPEUTIC EXERCISES: CPT

## 2021-02-24 PROCEDURE — 97530 THERAPEUTIC ACTIVITIES: CPT

## 2021-02-24 PROCEDURE — 97116 GAIT TRAINING THERAPY: CPT

## 2021-02-24 PROCEDURE — 36415 COLL VENOUS BLD VENIPUNCTURE: CPT

## 2021-02-24 PROCEDURE — 85027 COMPLETE CBC AUTOMATED: CPT

## 2021-02-24 PROCEDURE — 97166 OT EVAL MOD COMPLEX 45 MIN: CPT

## 2021-02-24 PROCEDURE — 80048 BASIC METABOLIC PNL TOTAL CA: CPT

## 2021-02-24 RX ORDER — ZOLPIDEM TARTRATE 5 MG/1
5 TABLET ORAL ONCE
Status: COMPLETED | OUTPATIENT
Start: 2021-02-24 | End: 2021-02-24

## 2021-02-24 RX ADMIN — ROSUVASTATIN CALCIUM 40 MG: 20 TABLET, FILM COATED ORAL at 20:29

## 2021-02-24 RX ADMIN — FINASTERIDE 5 MG: 5 TABLET, FILM COATED ORAL at 08:42

## 2021-02-24 RX ADMIN — ZOLPIDEM TARTRATE 5 MG: 5 TABLET ORAL at 22:58

## 2021-02-24 RX ADMIN — TAMSULOSIN HYDROCHLORIDE 0.4 MG: 0.4 CAPSULE ORAL at 08:42

## 2021-02-24 RX ADMIN — GABAPENTIN 100 MG: 100 CAPSULE ORAL at 20:29

## 2021-02-24 RX ADMIN — ACETAMINOPHEN 650 MG: 325 TABLET ORAL at 22:39

## 2021-02-24 RX ADMIN — AMLODIPINE BESYLATE 10 MG: 10 TABLET ORAL at 08:43

## 2021-02-24 RX ADMIN — POLYETHYLENE GLYCOL 3350 17 G: 17 POWDER, FOR SOLUTION ORAL at 08:43

## 2021-02-24 RX ADMIN — SENNOSIDES 8.6 MG: 8.6 TABLET, FILM COATED ORAL at 20:29

## 2021-02-24 RX ADMIN — DOCUSATE SODIUM 100 MG: 100 CAPSULE ORAL at 08:42

## 2021-02-24 ASSESSMENT — PAIN DESCRIPTION - DESCRIPTORS: DESCRIPTORS: SHARP

## 2021-02-24 ASSESSMENT — PAIN SCALES - GENERAL
PAINLEVEL_OUTOF10: 5
PAINLEVEL_OUTOF10: 8
PAINLEVEL_OUTOF10: 0
PAINLEVEL_OUTOF10: 8

## 2021-02-24 ASSESSMENT — PAIN DESCRIPTION - PAIN TYPE: TYPE: ACUTE PAIN

## 2021-02-24 ASSESSMENT — PAIN DESCRIPTION - PROGRESSION: CLINICAL_PROGRESSION: NOT CHANGED

## 2021-02-24 ASSESSMENT — PAIN DESCRIPTION - LOCATION: LOCATION: ARM;SHOULDER

## 2021-02-24 ASSESSMENT — PAIN DESCRIPTION - ONSET: ONSET: ON-GOING

## 2021-02-24 NOTE — PROGRESS NOTES
Comprehensive Nutrition Assessment    Type and Reason for Visit:  Initial(Subacute admit due to weakness and altered mental status.)    Nutrition Recommendations/Plan: Continue Current Diet    Nutrition Assessment:  Nutrition status appears adequate at this time. No need for intervention at this time. Follow for LOS. Malnutrition Assessment:  Malnutrition Status: At risk for malnutrition (Comment)(Monitor intake to better assess if adequate)    Context:  Chronic Illness     Findings of the 6 clinical characteristics of malnutrition:  Energy Intake:  Unable to assess  Weight Loss:  No significant weight loss     Body Fat Loss:  No significant body fat loss     Muscle Mass Loss:  No significant muscle mass loss    Fluid Accumulation:  1 - Mild     Strength:  Not Performed    Estimated Daily Nutrient Needs:  Energy (kcal):  6368-4932 @ 21-23 kcal/kg; Weight Used for Energy Requirements:  Current     Protein (g):  81-87 @ 1.2-1.3 gr/kg; Weight Used for Protein Requirements:  Ideal        Fluid (ml/day):  1628 ml/d; Method Used for Fluid Requirements:  ml/Kg      Nutrition Related Findings:  +2 pitting BLE edema. Last BM PTA. PMH: HTN, CKD. Wounds:  None       Current Nutrition Therapies:    DIET GENERAL; Anthropometric Measures:  · Height: 5' 7\" (170.2 cm)  · Current Body Weight: 186 lb 8.2 oz (84.6 kg)   · Admission Body Weight: 186 lb 8.2 oz (84.6 kg)    · Usual Body Weight: 188 lb 0.8 oz (85.3 kg)(10/19 [facility] chart)     · Ideal Body Weight: 148 lbs; % Ideal Body Weight 126 %   · BMI: 29.2  · BMI Categories: Overweight (BMI 25.0-29. 9)       Nutrition Diagnosis:   No nutrition diagnosis at this time     Nutrition Interventions:   Food and/or Nutrient Delivery:  Continue Current Diet  Nutrition Education/Counseling:  Education not indicated   Coordination of Nutrition Care:  Continue to monitor while inpatient, Interdisciplinary Rounds    Goals:  Intake >75% of meals.        Nutrition Monitoring and Evaluation:   Behavioral-Environmental Outcomes:      Food/Nutrient Intake Outcomes:     Physical Signs/Symptoms Outcomes:  Biochemical Data, Chewing or Swallowing, Meal Time Behavior, Skin, Weight, Constipation     Discharge Planning:     Too soon to determine     Electronically signed by Devyn Ronquillo RD, LD on 2/24/21 at 2:59 PM EST

## 2021-02-24 NOTE — PROGRESS NOTES
Physical Therapy    Facility/Department: [facility] UNIT  Initial Assessment    NAME: Jose Maria Jeronimo  : 1928  MRN: [id number]    Date of Service: 2021    Discharge Recommendations:  Continue to assess pending progress, Home with Home health PT(Cont to assess whether 24 hr supervision is required pending cognitive status)      Assessment   Body structures, Functions, Activity limitations: Decreased functional mobility ; Decreased strength;Decreased safe awareness;Decreased vision/visual deficit  Assessment: 80 yr old male adm to [facility] following a fall and hospitalization currently requiring assist for transfers and amb demonstrating mild confusion and would benefit from PT to address impairments and improve overall functional status to allow safe  Treatment Diagnosis: weakness, decreased balance following a fall at home  Prognosis: Good  PT Education: Goals;PT Role;Plan of Care;Transfer Training;Family Education;Home Exercise Program;General Safety  REQUIRES PT FOLLOW UP: Yes       Patient Diagnosis(es): There were no encounter diagnoses. has a past medical history of Back pain, Colitis, ulcerative ([address] and Hypertension. has no past surgical history on file.     Restrictions  Restrictions/Precautions  Restrictions/Precautions: Fall Risk  Vision/Hearing        Subjective  General  Chart Reviewed: Yes  Patient assessed for rehabilitation services?: Yes  Additional Pertinent Hx: Pt reports he was told he fell and sustained a concussion however does not remember falling  General Comment  Comments: Pt reports he was told he fell and sustained a concussion however does not remember falling  Pain Screening  Patient Currently in Pain: Yes  Pain Assessment  Pain Level: (unable to rate pain on a 0-10 scale; L shoulder  pain with movement)  Pain Type: Acute pain  Pain Location: Shoulder  Pain Orientation: Left  Vital Signs  Patient Currently in Pain: Yes       Orientation  Orientation  Orientation Level: Oriented to person;Disoriented to place;Oriented to situation;Disoriented to time  Social/Functional History  Social/Functional History  Lives With: Spouse  Type of Home: Apartment(on main level, near the entrance and laundry is next door to apartment)  Home Layout: One level  Home Access: Level entry  Bathroom Shower/Tub: Walk-in shower  Bathroom Toilet: Standard  Bathroom Equipment: Grab bars in shower, Grab bars around toilet, Hand-held shower  Bathroom Accessibility: (unsure if bathroom is accessible)  Home Equipment: Cane, Rolling walker  ADL Assistance: [address]: Independent(share responsibilites with wife; makes coffee, dry dishes, cook dinner intermittently, takes garbage to [employer])  [employer] Responsibilities: Yes  Ambulation Assistance: Independent(no AD)  Transfer Assistance: Independent  Active : Yes  Mode of Transportation: Car  Occupation: Retired  Leisure & Hobbies: avid reader, has a huge [employer]  Additional Comments: pt is questionable historian  Cognition        Objective     Observation/Palpation  Observation: abrasions and scabs bilat infrapatellar region; and mild abrasion L lower lat leg(Simultaneous filing. User may not have seen previous data.)    AROM RLE (degrees)  RLE AROM: WFL  AROM LLE (degrees)  LLE AROM : WFL  Strength RLE  Comment: hip flex 4-, knee strength grossly 4- to 4/5, ankle df 4/5  Strength LLE  Comment: hip flex 4-, knee strength grossly 4- to 4/5, ankle df 4/5        Bed mobility  Supine to Sit: Stand by assistance(HOB elevated and use of bedrail)  Transfers  Sit to Stand: Contact guard assistance  Stand to sit: Contact guard assistance  Bed to Chair: Contact guard assistance  Comment: vcs for hand placement  Ambulation  Ambulation?: Yes  Ambulation 1  Surface: level tile  Device: Rolling Walker  Assistance: Contact guard assistance  Quality of Gait: slow olivier, flexed at trunk and hips, downward gaze  Gait Deviations: Slow Olivier; Increased WES;Decreased step length;Decreased step height  Distance: 70 feet x 2   Verbal cues for forward gaze, upright posture  Balance  Sitting - Static: Good  Sitting - Dynamic: Good  Standing - Static: Fair  Standing - Dynamic: Fair;-      Plan   Plan  Times per week: 5-7  Times per day: (1-2)  Plan weeks: 7-10 days  Current Treatment Recommendations: Strengthening, Balance Training, Functional Mobility Training, Transfer Training, Gait Training, Home Exercise Program, Patient/Caregiver Education & Training, Endurance Training  Safety Devices  Type of devices: Chair alarm in place, Call light within reach, Left in chair, Patient at risk for falls         Goals  Short term goals  Time Frame for Short term goals: 3-4 days  Short term goal 1: Transfers with SBA  Short term goal 2: Pt amb 100 feet with ww SBA  Short term goal 3: Pt perform 15-20 reps LE exs to improve LE strength and endurance  Long term goals  Time Frame for Long term goals : 7-10 days  Long term goal 1: I bed mobility  Long term goal 2: Pt transfer mod I to allow safe transfers at home  Long term goal 3: Pt amb 150 feet with appropriate AD to allow safe I amb with appropriate AD  Long term goal 4: Pt and family I with HEP to further improve LE strength  Patient Goals   Patient goals : get stronger and go home       Therapy Time   Individual Concurrent Group Co-treatment   Time In  855         Time Out  955         Minutes  [address], [address] [facility], Odin Saez

## 2021-02-24 NOTE — PROGRESS NOTES
Occupational Therapy   Occupational Therapy Initial Assessment- SIOBHAN  Date: 2021   Patient Name: Mikey Villasenor  MRN: [id number]     : 1928    Date of Service: 2021    Discharge Recommendations:  Continue to assess pending progress(May need 24hr care/spv pending cog status)  OT Equipment Recommendations  Equipment Needed: Yes(recommending shower chair)    Assessment   Performance deficits / Impairments: Decreased functional mobility ; Decreased ADL status; Decreased ROM; Decreased strength;Decreased safe awareness;Decreased cognition;Decreased endurance;Decreased balance;Decreased high-level IADLs;Decreased coordination  Assessment: Pt is a 81y/o male PLOF lives with wife, was I/MI with ADL whom [facility] [place] [facility] Fall, weakness, AMS. Pt demo's the above resulting perf def/impair and would benefit from OT skilled services to maximize strength/end and safety/I with ADL for safe d/c transition. Treatment Diagnosis: Fall, weakness, AMS  Prognosis: Good  History: multi comorb  Exam: 10 perf def/impair  OT Education: OT Role;Plan of Care;Transfer Training  REQUIRES OT FOLLOW UP: Yes  Safety Devices  Safety Devices in place: Yes  Type of devices: All fall risk precautions in place           Patient Diagnosis(es): There were no encounter diagnoses. has a past medical history of Back pain, Colitis, ulcerative ([address] and Hypertension. has no past surgical history on file.     Treatment Diagnosis: Fall, weakness, AMS      Restrictions  Restrictions/Precautions  Restrictions/Precautions: Fall Risk    Subjective   General  Chart Reviewed: Yes  Patient assessed for rehabilitation services?: Yes  Family / Caregiver Present: No  Referring Practitioner: Dr. Rudolph Clifford  Diagnosis: Fall, weakness, AMS  Subjective  Subjective: Pt agreeable to OT eval/tx  Patient Currently in Pain: Yes  Pain Assessment  Pain Level: (unable to rate pain on a 0-10 scale; L shoulder  pain with movement)  Pain Type: Acute pain  Pain Location: Shoulder  Pain Orientation: Left  Vital Signs  Patient Currently in Pain: Yes     Social/Functional History  Social/Functional History  Lives With: Spouse  Type of Home: Apartment(on main level, near the entrance and laundry is next door to apartment)  Home Layout: One level  Home Access: Level entry  Bathroom Shower/Tub: Walk-in shower  Bathroom Toilet: Standard  Bathroom Equipment: Grab bars in shower, Grab bars around toilet, Hand-held shower  Bathroom Accessibility: (unsure if bathroom is accessible)  Home Equipment: Cane, Rolling walker  ADL Assistance: Independent  Homemaking Assistance: Independent(share responsibilites with wife; makes coffee, dry dishes, cook dinner intermittently, takes garbage to [employer])  [employer] Responsibilities: Yes  Ambulation Assistance: Independent(no AD)  Transfer Assistance: Independent  Active : Yes  Mode of Transportation: Car  Occupation: Retired  Leisure & Hobbies: avid reader, has a huge [employer]  Additional Comments: pt is questionable historian       Objective   Vision: Impaired  Vision Exceptions: Wears glasses at all times  Hearing: Exceptions to [facility]  Hearing Exceptions: Hard of hearing/hearing concerns; No hearing aid    Orientation  Overall Orientation Status: Impaired  Orientation Level: Oriented to situation;Oriented to person;Disoriented to place; Disoriented to time  Observation/Palpation  Observation: Abrasions bilat knees and L calf, Glasses  Edema: Bilat lower legs and feet  Functional Mobility  Functional - Mobility Device: Rolling Walker  Activity: Other(level tile surface)  Assist Level: Contact guard assistance  ADL  Feeding: Modified independent   Grooming: Modified independent   UE Bathing: Setup;Stand by assistance  LE Bathing: Minimal assistance; Moderate assistance  UE Dressing: Setup;Supervision  LE Dressing:  Moderate assistance  Toileting: Maximum assistance(significant incontinence urine in brief, upon changing pt's brief, pt went on the floor again- informed nsg)  Tone RUE  RUE Tone: Normotonic  Tone LUE  LUE Tone: Normotonic  Coordination  Movements Are Fluid And Coordinated: No  Coordination and Movement description: Gross motor impairments;Decreased speed;Decreased accuracy; Right UE;Left UE(L > R)     Bed mobility  Supine to Sit: Stand by assistance(HOB elevated, use of BR)  Transfers  Stand Step Transfers: Contact guard assistance(with RW)  Sit to stand: Contact guard assistance  Stand to sit: Contact guard assistance  Transfer Comments: requires vcs for safety with hand placement                       LUE AROM (degrees)  LUE AROM : WFL  LUE General AROM: L shoulder sore after fall per pt- has [facility] but needs Marge to reach his WFL ROM  Left Hand AROM (degrees)  Left Hand AROM: WFL  RUE AROM (degrees)  RUE AROM : WFL  Right Hand AROM (degrees)  Right Hand AROM: WFL  LUE Strength  Gross LUE Strength: Exceptions to [facility]  L Shoulder Flex: 3/5  LUE Strength Comment: 3+/5 all planes  RUE Strength  Gross RUE Strength: Exceptions to [facility]  R Shoulder Flex: 4-/5  RUE Strength Comment: 3+/5 all planes     Hand Dominance  Hand Dominance: Right             Plan   Plan  Times per week: 3-6x/wk  Times per day: Daily  Plan weeks: 7-10 days  Current Treatment Recommendations: Strengthening, ROM, Balance Training, Functional Mobility Training, Endurance Training, Cognitive Reorientation, Pain Management, Safety Education & Training, Patient/Caregiver Education & Training, Equipment Evaluation, Education, & procurement, Self-Care / ADL, Home Management Training         Goals  Short term goals  Time Frame for Short term goals: 3-5 days  Short term goal 1: Tolerate 25-30min BUE ther ex/act to inc strength/end for ADL  Short term goal 2: CGA/Marge LB dressing and bathing  Short term goal 3: CGA/Marge toileting  Long term goals  Time Frame for Long term goals : 7-10 days  Long term goal 1: I BUE HEP  Long term goal 2: MI toileting  Long term goal 3: MI LB dressing and bathing  Long term goal 4: Inc to 8-10min stand deanna/end for inc'd safety and I with ADL  Patient Goals   Patient goals :  To return home       Therapy Time   Individual Concurrent Group Co-treatment   Time In  9:00         Time Out  9:50         Minutes  Julianna Mcginnis

## 2021-02-24 NOTE — H&P
Hospital Medicine  History and Physical    Patient:  Anupama Key  MRN: [id number]    CHIEF COMPLAINT:  No chief complaint on file. History Obtained From:  Patient, EMR  Primary Care Physician: Keila Dinero MD    HISTORY OF PRESENT ILLNESS:   The patient is a 80 y.o. male who was admitted to acute hospital with change in mental status. He was found to have acute encephalopathy. He was found to have a urinary retention. He was seen by neurology and urology. The please refer to their notes for details. Patient had MRI which came back negative for acute CMS issues. Patient cleared by both specialists to be discharged and admitted to the skilled unit for inpatient physical and Occupational Therapy before he can go back home. Patient reports that he feels better. No chest pain or palpitation. No abdominal pain, nausea or vomiting. He does have a degree of cognitive loss. He is a poor historian. Wava Prim He is able to stand up. He does have gait abnormality. No focal weakness or numbness. Past Medical History:      Diagnosis Date    Back pain     Colitis, ulcerative ([facility] [address]     Hypertension        Past Surgical History:  History reviewed. No pertinent surgical history. Medications Prior to Admission:    Prior to Admission medications    Medication Sig Start Date End Date Taking?  Authorizing Provider   polyethylene glycol (GLYCOLAX) 17 g packet Take 17 g by mouth daily as needed for Constipation 2/23/21 3/25/21 Yes Patra Dakins, DO   finasteride (PROSCAR) 5 MG tablet Take 1 tablet by mouth daily 2/24/21  Yes Patra Dakins, DO   tamsulosin (FLOMAX) 0.4 MG capsule Take 1 capsule by mouth daily 2/24/21  Yes Patra Dakins, DO   amLODIPine (NORVASC) 10 MG tablet Take 1 tablet by mouth daily 2/23/21  Yes Patra Dakins, DO   acetaminophen (TYLENOL) 325 MG tablet Take 650 mg by mouth every 6 hours as needed for Pain   Yes Historical Provider, MD   Cholecalciferol (VITAMIN D) 50 MCG (2000 UT) CAPS capsule Take by mouth   Yes Historical Provider, MD   gabapentin (NEURONTIN) 600 MG tablet Take 300 mg by mouth nightly. Yes Historical Provider, MD   cyclobenzaprine (FLEXERIL) 10 MG tablet Take 10 mg by mouth nightly as needed for Muscle spasms   Yes Historical Provider, MD   oxybutynin (DITROPAN) 5 MG tablet Take 5 mg by mouth 2 times daily    Historical Provider, MD       Allergies:  Sulfa antibiotics    Social History:   TOBACCO:   reports that he has never smoked. He has never used smokeless tobacco.  ETOH:   reports previous alcohol use. Family History:   History reviewed. No pertinent family history. REVIEW OF SYSTEMS:  Ten systems reviewed and negative except for stated in HPI    Physical Exam:    Vitals: BP (!) 173/87   Pulse 84   Temp 97.7 °F (36.5 °C) (Oral)   Resp 18   Ht 5' 7\" (1.702 m)   Wt 186 lb 6.4 oz (84.6 kg)   SpO2 98%   BMI 29.19 kg/m²   General appearance: alert, appears stated age and cooperative, no apparent distress. Patient is cachectic and frail in appearance. Pale skin, conjunctiva and buccal mucosa. Skin: Skin color, texture, turgor normal. No rashes or lesions  HEENT: Head: Normocephalic, no lesions, without obvious abnormality. bitemporal muscle wasting. Neck: no adenopathy, no carotid bruit, no JVD, supple, symmetrical, trachea midline and thyroid not enlarged, symmetric, no tenderness/mass/nodules  Lungs: clear to auscultation bilaterally, kyphotic chest.  Heart: regular rate and rhythm, S1, S2 normal, no murmur, click, rub or gallop  Abdomen: soft, non-tender; bowel sounds normal; no masses,  no organomegaly  Extremities: extremities normal, atraumatic, no cyanosis or edema, osteoarthritis deformities. Neurologic: Mental status: Poor historian. Moderate degree of cognitive loss. Moderate degree of functional loss. Patient is able to stand up and ambulate. He is unsteady. He needs to touch different objects, doors and walls for support.   Please refer to the inpatient physical and Occupational Therapy team note for details on his functional status and the treatment program.    Recent Labs     02/22/21  0507 02/23/21  0531 02/24/21  0451   WBC 9.2 11.8* 10.5   HGB 12.0* 11.4* 12.2*    305 358     Recent Labs     02/22/21  0507 02/23/21  0531 02/24/21  0451    140 139   K 4.0 4.2 4.2    106 103   CO2 23 25 26   BUN 25* 27* 30*   CREATININE 1.15 1.13 1.34*   GLUCOSE 100* 99 101*     Troponin T: No results for input(s): TROPONINI in the last 72 hours. ABGs: No results found for: PHART, PO2ART, XEM7WAM  INR: No results for input(s): INR in the last 72 hours. URINALYSIS:No results for input(s): NITRITE, COLORU, PHUR, LABCAST, WBCUA, RBCUA, MUCUS, TRICHOMONAS, YEAST, BACTERIA, CLARITYU, SPECGRAV, LEUKOCYTESUR, UROBILINOGEN, BILIRUBINUR, BLOODU, GLUCOSEU, AMORPHOUS in the last 72 hours. Invalid input(s): Thalia Libman  -----------------------------------------------------------------   No results found. Assessment and Plan     *Functional impairment, moderate degree, gait ataxia. The patient was seen by a neurologist at [facility] [place] who recommended him to have inpatient physical and occupational therapy and plan I had accepted the. He did not recommend any additional neurological work-up and/or investigation at this time. *Encephalopathy, improving. *Urinary retention and the hematuria, improving. Neurologist cleared the patient to be discharged. He did not recommend any additional inpatient work-up and/or investigation. *Hypertension. *Cognitive impairment. I believe that the patient has an element of dementia. 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 *Hyperlipidemia. *Anemia, no evidence of acute blood loss. *Stage I/II kidney failure. Plan:  I had accepted to admit the patient to the skilled unit for inpatient physical and Occupational Therapy. Continue his medications as recommended by [facility] providers.   I really expect she will ultimately better control. Follow-up precautions. Patient is to follow-up with the PCP, neurology and urology post discharge. I may not have addressed all of the patient's medical issues and abnormalities as seen on labs and imaging. Patient may need to have additional work-up, investigation and therapeutic intervention but could be done in the outpatient setting. Patient Active Problem List   Diagnosis Code    AMS (altered mental status) R41.82    Fall W19. Garcia Tiff BPH (benign prostatic hyperplasia) N40.0    CKD (chronic kidney disease) N18.9    Leukocytosis D72.829    Gross hematuria R31.0    Encephalopathy acute G93.40    Closed head injury S09.90XA    Aphasia R47.01    Mild cognitive impairment C05.70    Metabolic encephalopathy Y32.26    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 N40.1, R33.8       Abiodun Hendrickson MD  Admitting Hospitalist    TTS: 85mins where I focused more than 75% of my attention on rendering care, and planning treatment course for this patient, in addition to talking to RN team, mid levels, consulting with other physicians and following up on labs and imaging. High Risk Readmission Screening Tool Score Noted.      Emergency Contact:

## 2021-02-24 NOTE — PROGRESS NOTES
Psychosocial Assessment     Physical Appearance: Overweight    Dress: Hospital attire    Hygiene: Good    Speech: Coherent    Affect/Mood: Appropriate, Calm and Cooperative    Alert and Orientated: Person, Place, Month and Year    Thought Process: Confused- Patient as unable to recall why patient is in the hospital.  Patient's spouse provides patient's background information. Spouse reports that patient has been demonstrating confusion \"ever since his fall. \"  Patient was reported to have fallen at home \"last Friday\" 2/19 at patient's home. Patient has Avasyst in room for safety precautions due to confusion    Behavior: Cooperative    Resides:Patient reports residing at home with spouse     DME: [employer]  and [employer]    Support System: spouse / significant other    History of Mental Health: Patient reports no history or current mental health issues     Suicidal/ Homicidal:  No    Food:Patient and spouse report that since COVID daughter and law are reported to assist with grocery shopping     Medication: [facility] mail in or [facility] in [place]     Transportation:Yes      Help at home needs: none reported at this time     DC Recommendations:  SS to follow and monitor patient's progress for DC needs    Plan for transition of care is related to the following treatment goals: \" I'd like to be cured of my condition and go home. \"  Spouse reports that goal is for patient to get stronger and be able to walk better     The patient and/or patient representative was provided with choice of provider, and agrees with the discharge plan: on-going     The patient and/or patient representative was provided with choice of all post acute providers and equipment and agrees with the discharge plan: on-going     Electronically signed by JULIANO Subramanian on 2/24/2021 at 4:52 PM

## 2021-02-24 NOTE — PROGRESS NOTES
Physical Therapy  Facility/Department: [facility] MED SURG UNIT  Daily Treatment Note  NAME: Carina Galaviz  : 1928  MRN: [id number]    Date of Service: 2021    Discharge Recommendations:  (P) Continue to assess pending progress, Home with Home health PT        Assessment   Body structures, Functions, Activity limitations: (P) Decreased functional mobility ; Decreased strength;Decreased safe awareness;Decreased vision/visual deficit  Assessment: (P) Pt. tolerated good with gait training, ther ex , and tsf training. Pt. demo's some confusion and decrease functional coordination with ther ex. visual and vc's used to try and correct form to benefit with ROM and strength. Max vc's and tactile cues needed at this date to assist with improving quality and safety of tsf and gait skills. Contact guard assist used for safety. Pt. demo's limitation with ROM due to c/o back pain and L shoulder pain. However, pt. able to tolerate. Pt. reports L arm is a little easier to move post functional activity. Treatment Diagnosis: (P) weakness, decreased balance following a fall at home  Prognosis: (P) Good  REQUIRES PT FOLLOW UP: (P) Yes  Activity Tolerance  Activity Tolerance: (P) Patient Tolerated treatment well     Patient Diagnosis(es): There were no encounter diagnoses. has a past medical history of Back pain, Colitis, ulcerative ([address] and Hypertension. has no past surgical history on file. Restrictions  Restrictions/Precautions  Restrictions/Precautions: Fall Risk  Subjective   General  Chart Reviewed: Yes  Additional Pertinent Hx: Pt reports he was told he fell and sustained a concussion however does not remember falling  General Comment  Comments: (P) Pt. c/o L shoulder pain 8/10 when moving it.  No pain at rest.   Pain Screening  Patient Currently in Pain: Yes  Pain Assessment  Pain Level: (P) 8  Vital Signs  Patient Currently in Pain: Yes       Orientation     Cognition      Objective    Bed Mobility:   Supine <-> sit: stand by assistance with fair minus technique. VC's used to assist and correct quality of bed mobility and safety awareness with completion and maintaining center of bed. Rolling: B sidelying, stand by assistance with tactile and vc's for completion of proper task. Pt. Appears confused. Transfers  Sit to Stand: (P) Contact guard assistance  Stand to sit: (P) Contact guard assistance  Comment: (P) Max tactile and vcs for hand placement and tsf sequence with use of FWW. Pt. tends to leave FWW to sit. Ambulation  Ambulation?: (P) Yes  Ambulation 1  Surface: (P) level tile  Device: (P) Rolling Walker  Assistance: (P) Contact guard assistance  Quality of Gait: (P) slow olivier, flexed at trunk and hips, downward gaze  Distance: (P) 70 feet x 2  Comments: (P) Pt. tolerated well. Fair to fair minus balance. Decrease safe distance from FWW. Vc's to keep UE support on FWW during all ambulation before tsf's to increase safety. Balance  Sitting - Static: (P) Good  Sitting - Dynamic: (P) Good  Standing - Static: (P) Fair  Standing - Dynamic: (P) Fair;-  Exercises  Hip Flexion: (P) x10' seated  Hip Abduction: (P) x10' seated  Knee Long Arc Quad: (P) x10' seated.    Ankle Pumps: (P) x10' seated  Core Strengthening: (P) seated trunk flexion/ extension x10', B lateral trunk flexion x10'                         G-Code     OutComes Score                                                     AM-PAC Score             Goals  Short term goals  Time Frame for Short term goals: 3-4 days  Short term goal 1: Transfers with SBA  Short term goal 2: Pt amb 100 feet with ww SBA  Short term goal 3: Pt perform 15-20 reps LE exs to improve LE strength and endurance  Long term goals  Time Frame for Long term goals : 7-10 days  Long term goal 1: I bed mobility  Long term goal 2: Pt transfer mod I to allow safe transfers at home  Long term goal 3: Pt amb 150 feet with appropriate AD to allow safe I amb with appropriate AD  Long term goal 4: Pt and family I with HEP to further improve LE strength  Patient Goals   Patient goals : get stronger and go home    Plan    Plan  Times per week: (P) 5-7  Times per day: (P) Daily(1-2)  Plan weeks: (P) 7-10 days  Current Treatment Recommendations: (P) Strengthening, Balance Training, Functional Mobility Training, Transfer Training, Gait Training, Home Exercise Program, Patient/Caregiver Education & Training, Endurance Training  Safety Devices  Type of devices: (P) All fall risk precautions in place, Call light within reach, Bed alarm in place, Left in bed  Restraints  Initially in place: (P) Yes     Therapy Time   Individual Concurrent Group Co-treatment   Time In  155         Time Out  240         Minutes  Ctrfelix Fernandez 79, PTA  License and [address] Number: ([phone]

## 2021-02-24 NOTE — PLAN OF CARE
Nutrition Problem #1: No nutrition diagnosis at this time  Intervention: Food and/or Nutrient Delivery: Continue Current Diet  Nutritional Goals: Intake >75% of meals.

## 2021-02-24 NOTE — PLAN OF CARE
Problem: Falls - Risk of:  Goal: Will remain free from falls  Description: Will remain free from falls  Outcome: Ongoing  Goal: Absence of physical injury  Description: Absence of physical injury  Outcome: Ongoing     Problem: Pain:  Description: Pain management should include both nonpharmacologic and pharmacologic interventions. Goal: Pain level will decrease  Description: Pain level will decrease  Outcome: Ongoing  Goal: Control of acute pain  Description: Control of acute pain  Outcome: Ongoing  Goal: Control of chronic pain  Description: Control of chronic pain  Outcome: Ongoing     Problem: SAFETY  Goal: Free from accidental physical injury  Outcome: Ongoing  Goal: Free from intentional harm  Outcome: Ongoing     Problem: DAILY CARE  Goal: Daily care needs are met  Outcome: Ongoing     Problem: SKIN INTEGRITY  Goal: Skin integrity is maintained or improved  Outcome: Ongoing     Problem: KNOWLEDGE DEFICIT  Goal: Patient/S.O. demonstrates understanding of disease process, treatment plan, medications, and discharge instructions.   Outcome: Ongoing     Problem: DISCHARGE BARRIERS  Goal: Patient's continuum of care needs are met  Outcome: Ongoing

## 2021-02-24 NOTE — PROGRESS NOTES
Patient arrived from [facility] at 1900 accompanied by family who transported patient here. Comes to floor via W/C. Transfers with standby assist to chair at bedside where he remained until 2140. In bed at this time. Alert, oriented to self, place and year. Unable to recall month or season. Respirations even and unlabored. Slightly unsteady on feet. Ambulates with minimal assist to steady self. C/O slightly pain in left arm/shoulder secondary to fall at home. Took meds without difficulty. Please see flowsheets for complete assessment.

## 2021-02-25 LAB
ANION GAP SERPL CALCULATED.3IONS-SCNC: 11 MEQ/L (ref 9–15)
BUN BLDV-MCNC: 28 MG/DL (ref 8–23)
CALCIUM SERPL-MCNC: 9.6 MG/DL (ref 8.5–9.9)
CHLORIDE BLD-SCNC: 102 MEQ/L (ref 95–107)
CO2: 26 MEQ/L (ref 20–31)
CREAT SERPL-MCNC: 1.1 MG/DL (ref 0.7–1.2)
GFR AFRICAN AMERICAN: >60
GFR NON-AFRICAN AMERICAN: >60
GLUCOSE BLD-MCNC: 105 MG/DL (ref 70–99)
HCT VFR BLD CALC: 37.6 % (ref 42–52)
HEMOGLOBIN: 12.6 G/DL (ref 14–18)
MCH RBC QN AUTO: 33.1 PG (ref 27–31.3)
MCHC RBC AUTO-ENTMCNC: 33.6 % (ref 33–37)
MCV RBC AUTO: 98.4 FL (ref 80–100)
PDW BLD-RTO: 13.6 % (ref 11.5–14.5)
PLATELET # BLD: 369 K/UL (ref 130–400)
PLATELET SLIDE REVIEW: ADEQUATE
POTASSIUM REFLEX MAGNESIUM: 4.1 MEQ/L (ref 3.4–4.9)
RBC # BLD: 3.82 M/UL (ref 4.7–6.1)
SLIDE REVIEW: ABNORMAL
SODIUM BLD-SCNC: 139 MEQ/L (ref 135–144)
WBC # BLD: 10.6 K/UL (ref 4.8–10.8)

## 2021-02-25 PROCEDURE — 97116 GAIT TRAINING THERAPY: CPT

## 2021-02-25 PROCEDURE — 85027 COMPLETE CBC AUTOMATED: CPT

## 2021-02-25 PROCEDURE — 97530 THERAPEUTIC ACTIVITIES: CPT

## 2021-02-25 PROCEDURE — 80048 BASIC METABOLIC PNL TOTAL CA: CPT

## 2021-02-25 PROCEDURE — 1200000002 HC SEMI PRIVATE SWING BED

## 2021-02-25 PROCEDURE — 36415 COLL VENOUS BLD VENIPUNCTURE: CPT

## 2021-02-25 PROCEDURE — 6370000000 HC RX 637 (ALT 250 FOR IP): Performed by: INTERNAL MEDICINE

## 2021-02-25 PROCEDURE — 97110 THERAPEUTIC EXERCISES: CPT

## 2021-02-25 PROCEDURE — 96105 ASSESSMENT OF APHASIA: CPT

## 2021-02-25 RX ADMIN — TAMSULOSIN HYDROCHLORIDE 0.4 MG: 0.4 CAPSULE ORAL at 08:26

## 2021-02-25 RX ADMIN — FINASTERIDE 5 MG: 5 TABLET, FILM COATED ORAL at 08:26

## 2021-02-25 RX ADMIN — SENNOSIDES 8.6 MG: 8.6 TABLET, FILM COATED ORAL at 20:54

## 2021-02-25 RX ADMIN — CYCLOBENZAPRINE 10 MG: 10 TABLET, FILM COATED ORAL at 20:54

## 2021-02-25 RX ADMIN — ROSUVASTATIN CALCIUM 40 MG: 20 TABLET, FILM COATED ORAL at 20:54

## 2021-02-25 RX ADMIN — DOCUSATE SODIUM 100 MG: 100 CAPSULE ORAL at 08:26

## 2021-02-25 RX ADMIN — GABAPENTIN 100 MG: 100 CAPSULE ORAL at 20:54

## 2021-02-25 RX ADMIN — POLYETHYLENE GLYCOL 3350 17 G: 17 POWDER, FOR SOLUTION ORAL at 08:26

## 2021-02-25 RX ADMIN — ACETAMINOPHEN 650 MG: 325 TABLET ORAL at 20:53

## 2021-02-25 RX ADMIN — AMLODIPINE BESYLATE 10 MG: 10 TABLET ORAL at 08:26

## 2021-02-25 ASSESSMENT — PAIN DESCRIPTION - PROGRESSION: CLINICAL_PROGRESSION: NOT CHANGED

## 2021-02-25 NOTE — PROGRESS NOTES
Physical Therapy  Facility/Department: [facility] MED SURG UNIT  Daily Treatment Note  NAME: Katlyn Bear  : 1928  MRN: [id number]    Date of Service: 2021    Discharge Recommendations:  (P) Home with assist PRN, Home with Home health PT        Assessment  Pt is pleasant, cooperative with all interventions- very talkative; although forgetful. Pt is able to follow cues/instruction, easily distracted with conversation, requiring cues for redirection throughout tx session. No LOB noted with standing activity, gait. Pt returned to recliner at end of session; declined elevation of LEs, no pain noted. Body structures, Functions, Activity limitations: (P) Decreased functional mobility ; Decreased strength;Decreased safe awareness;Decreased vision/visual deficit  Treatment Diagnosis: (P) weakness, decreased balance following a fall at home  Prognosis: (P) Good  Decision Making: (P) Medium Complexity  History: (P) med  Exam: (P) med  Clinical Presentation: (P) med  REQUIRES PT FOLLOW UP: (P) Yes  Activity Tolerance  Activity Tolerance: (P) Patient Tolerated treatment well     Patient Diagnosis(es): There were no encounter diagnoses. has a past medical history of Back pain, Colitis, ulcerative ([facility] [address] and Hypertension. has no past surgical history on file. Restrictions  Restrictions/Precautions  Restrictions/Precautions: (P) Fall Risk  Subjective   General  Chart Reviewed: (P) Yes  Additional Pertinent Hx: (P) Pt reports he was told he fell and sustained a concussion however does not remember falling  Family / Caregiver Present: (P) No  Referring Practitioner: (P) Dane  Subjective: (P) Pt sitting up in recliner; talking on phone. Comments: (P) Pt agreeable to interventions. \"My back is for of sore, but I really can't put a number on it\".   Intervention List: (P) Patient able to continue with treatment    Orientation  Orientation  Overall Orientation Status: (P) Impaired  Orientation Level: (P) Oriented to person;Disoriented to place;Oriented to situation;Disoriented to time  Cognition      Objective      Transfers  Sit to Stand: (P) Contact guard assistance  Stand to sit: (P) Contact guard assistance  Bed to Chair: (P) Contact guard assistance;Stand by assistance  Comment: (P) cues for hand placement; sequencing.    Ambulation  Ambulation?: (P) Yes  Ambulation 1  Surface: (P) level tile  Device: (P) Rolling Walker  Assistance: (P) Contact guard assistance;Stand by assistance  Quality of Gait: (P) steady, continuous pace, fwd trunk/head, rounded shoulders  Gait Deviations: (P) Decreased step length;Decreased step height;Decreased head and trunk rotation  Distance: (P) 75', 100', x 3  Comments: (P) seated rest breaks in between; cues for direction  Stairs/Curb  Stairs?: (P) Yes  Stairs  # Steps : (P) 3  Stairs Height: (P) 4\"  Rails: (P) Bilateral  Assistance: (P) Contact guard assistance  Comment: (P) slow, steady pace, non reciprocal     Balance  Posture: Good; Fair sitting (slouched)  Sitting - Static: (P) Good  Sitting - Dynamic: (P) Good  Standing - Static: (P) Fair;+  Standing - Dynamic: (P) Fair  Comments: (P) SBA static unsupported standing with toileting, handwashing, oral care at sink  Exercises  Ankle Pumps: (P) heel toe raises x 15           Side stepping; approx 10', along length of // bars x 3 trials in each direction    Fwd/backwards gait x 10', // bars- x 3 trials in each direction  Comments: (P) completed in // bars, SBA for safety- visual demo with v/c's for correct form/technique, occasional redirection towards task, seated rest breaks intermittently      Other Activities: (P) Other (see comment)  Comment: (P) Assisted pt with toileting; SBA- standing to urinate, Min A for pants posteriorly   cues for safe negotiation/positioning of AD when in bathroom           G-Code     OutComes Score                                                     AM-PAC Score             Goals  Short term goals  Time Frame for Short term goals: (P) 3-4 days  Short term goal 1: (P) Transfers with SBA  Short term goal 2: (P) Pt amb 100 feet with ww SBA  Short term goal 3: (P) Pt perform 15-20 reps LE exs to improve LE strength and endurance  Long term goals  Time Frame for Long term goals : (P) 7-10 days  Long term goal 1: (P) I bed mobility  Long term goal 2: (P) Pt transfer mod I to allow safe transfers at home  Long term goal 3: (P) Pt amb 150 feet with appropriate AD to allow safe I amb with appropriate AD  Long term goal 4: (P) Pt and family I with HEP to further improve LE strength  Patient Goals   Patient goals : (P) get stronger and go home    Plan    Plan  Times per week: (P) 5-7  Times per day: (P) Daily(1-2)  Plan weeks: (P) 7-10 days  Current Treatment Recommendations: (P) Strengthening, Balance Training, Functional Mobility Training, Transfer Training, Gait Training, Home Exercise Program, Patient/Caregiver Education & Training, Endurance Training  Safety Devices  Type of devices: (P) All fall risk precautions in place, Chair alarm in place, Call light within reach  Restraints  Initially in place: (P) Yes     Therapy Time   Individual Concurrent Group Co-treatment   Time In  10:00 am         Time Out  11:00 am         Minutes  61                 Tiera Crawford PTA  License and Documentation Cos[employer]  Therapy License Number: [id number]

## 2021-02-25 NOTE — PROGRESS NOTES
Occupational Therapy  Facility/Department: [facility] MED SURG UNIT  Daily Treatment Note  NAME: Ruba Farrell  : 1928  MRN: [id number]    Date of Service: 2021    Discharge Recommendations:  Continue to assess pending progress(May need 24hr care/spv pending cog status)       Assessment   Performance deficits / Impairments: Decreased functional mobility ; Decreased ADL status; Decreased ROM; Decreased strength;Decreased safe awareness;Decreased cognition;Decreased endurance;Decreased balance;Decreased high-level IADLs;Decreased coordination  Assessment: AROM WFL except shoulder at 90* due to previous injury. 2# weighted bar ther ex completed to promote improved activity tolerance and strength 20 reps all planes wtih RUE assisting to promote imrpoved function for ADl. Pt. reports mild pain at left shoulder with ther ex. Faciliated 15 minute stand through static standing at table and functional mobility with FWW. CGA toileting with verbal cues for safe walker placement    Treatment Diagnosis: Fall, weakness, AMS  Prognosis: Good  REQUIRES OT FOLLOW UP: Yes         Patient Diagnosis(es): There were no encounter diagnoses. has a past medical history of Back pain, Colitis, ulcerative ([address] and Hypertension. has no past surgical history on file.     Restrictions  Restrictions/Precautions  Restrictions/Precautions: Fall Risk  Subjective   General  Chart Reviewed: Yes  Patient assessed for rehabilitation services?: Yes  Family / Caregiver Present: No  Referring Practitioner: Dr. Jose Francisco Seymour  Diagnosis: Fall, weakness, AMS  Subjective  Subjective: Pt agreeable to OT eval/tx      Orientation     Objective    ADL  Toileting: Contact guard assistance(standing to urinate)        Balance  Standing Balance: Contact guard assistance  Standing Balance  Time: 15 minutes wtih combination of static standing and walking  Functional Mobility  Functional - Mobility Device: Rolling Walker  Functional Mobility Comments: SBA bed mobility with verbal cue to use bedrail to assist.  CGA transfers with verbal cues for safe hand placement and to not pull on walker. Exercises  Other: AROM WFL except shoulder at 90* due to previous injury. 2# weighted bar ther ex completed all planes to promote improved activity tolerance and strength 20 reps all planes wtih RUE assisting to promote imrpoved function for ADL. Plan   Plan  Times per week: 3-6x/wk  Times per day: Daily  Plan weeks: 7-10 days  Current Treatment Recommendations: Strengthening, ROM, Balance Training, Functional Mobility Training, Endurance Training, Cognitive Reorientation, Pain Management, Safety Education & Training, Patient/Caregiver Education & Training, Equipment Evaluation, Education, & procurement, Self-Care / ADL, Home Management Training  G-Code     OutComes Score                                                  AM-PAC Score             Goals  Short term goals  Time Frame for Short term goals: 3-5 days  Short term goal 1: Tolerate 25-30min BUE ther ex/act to inc strength/end for ADL  Short term goal 2: CGA/Marge LB dressing and bathing  Short term goal 3: CGA/Marge toileting  Long term goals  Time Frame for Long term goals : 7-10 days  Long term goal 1: I BUE HEP  Long term goal 2: MI toileting  Long term goal 3: MI LB dressing and bathing  Long term goal 4: Inc to 8-10min stand deanna/end for inc'd safety and I with ADL  Patient Goals   Patient goals :  To return home       Therapy Time   Individual Concurrent Group Co-treatment   Time In 1340         Time Out 1415         Minutes [facility] [address] Cheryle Mascorro

## 2021-02-25 NOTE — PROGRESS NOTES
goals : (P) 7-10 days  Long term goal 1: (P) I bed mobility  Long term goal 2: (P) Pt transfer mod I to allow safe transfers at home  Long term goal 3: (P) Pt amb 150 feet with appropriate AD to allow safe I amb with appropriate AD  Long term goal 4: (P) Pt and family I with HEP to further improve LE strength  Patient Goals   Patient goals : (P) get stronger and go home    Plan    Plan  Times per week: (P) 5-7  Times per day: (P) Daily(1-2)  Plan weeks: (P) 7-10 days  Current Treatment Recommendations: (P) Strengthening, Balance Training, Functional Mobility Training, Transfer Training, Gait Training, Home Exercise Program, Patient/Caregiver Education & Training, Endurance Training  Safety Devices  Type of devices: (P) All fall risk precautions in place, Bed alarm in place, Call light within reach  Restraints  Initially in place: (P) Yes     Therapy Time   Individual Concurrent Group Co-treatment   Time In  1:10 pm         Time Out  1:15 pm         Minutes  Marisol Anguiano2, PTA  License and Documentation Cosign  Therapy License Number: [id number]

## 2021-02-25 NOTE — PROGRESS NOTES
Physical Therapy  [facility] R689/E468-02  Physical Therapy Discharge      NAME: Toan Jenkins    : 1928 (80 y.o.)  MRN: [id number]    Account: [de-identified]  Gender: male      Patient has been discharged from acute care hospital. DC patient from current PT program.      Electronically signed by Isabel Love PT on 21 at 5:09 PM EST

## 2021-02-25 NOTE — PROGRESS NOTES
Shift assessment complete. Pt cooperative sitting in chair eating breakfast. He is conversive and able to answer questions appropriatley. Medicatiions adminsitered per MAR. Avasys in place for safety d/t impulsiveness. Call light within reach.

## 2021-02-25 NOTE — PROGRESS NOTES
Assumed care of patient who is up in chair at bedside. Accurately states month, year, place, and reason he is here. Respirations even and unlabored. Voiding in urinal. Agrees to call for assist before getting out of chair. Avasys in place for safety.

## 2021-02-26 PROCEDURE — 97530 THERAPEUTIC ACTIVITIES: CPT

## 2021-02-26 PROCEDURE — 6370000000 HC RX 637 (ALT 250 FOR IP): Performed by: INTERNAL MEDICINE

## 2021-02-26 PROCEDURE — 97110 THERAPEUTIC EXERCISES: CPT

## 2021-02-26 PROCEDURE — 97116 GAIT TRAINING THERAPY: CPT

## 2021-02-26 PROCEDURE — 51702 INSERT TEMP BLADDER CATH: CPT

## 2021-02-26 PROCEDURE — 1200000002 HC SEMI PRIVATE SWING BED

## 2021-02-26 RX ADMIN — POLYETHYLENE GLYCOL 3350 17 G: 17 POWDER, FOR SOLUTION ORAL at 08:11

## 2021-02-26 RX ADMIN — DOCUSATE SODIUM 100 MG: 100 CAPSULE ORAL at 08:11

## 2021-02-26 RX ADMIN — AMLODIPINE BESYLATE 10 MG: 10 TABLET ORAL at 08:11

## 2021-02-26 RX ADMIN — GABAPENTIN 100 MG: 100 CAPSULE ORAL at 20:43

## 2021-02-26 RX ADMIN — ROSUVASTATIN CALCIUM 40 MG: 20 TABLET, FILM COATED ORAL at 20:43

## 2021-02-26 RX ADMIN — SENNOSIDES 8.6 MG: 8.6 TABLET, FILM COATED ORAL at 20:43

## 2021-02-26 RX ADMIN — TAMSULOSIN HYDROCHLORIDE 0.4 MG: 0.4 CAPSULE ORAL at 08:11

## 2021-02-26 RX ADMIN — FINASTERIDE 5 MG: 5 TABLET, FILM COATED ORAL at 08:11

## 2021-02-26 ASSESSMENT — PAIN SCALES - GENERAL: PAINLEVEL_OUTOF10: 0

## 2021-02-26 NOTE — PROGRESS NOTES
Physical Therapy  Facility/Department: [facility] MED SURG UNIT  Daily Treatment Note  NAME: Celi Chao  : 1928  MRN: [id number]    Date of Service: 2021    Discharge Recommendations:  (P) Home with assist PRN, Home with Home health PT        Assessment   Body structures, Functions, Activity limitations: Decreased functional mobility ; Decreased strength;Decreased safe awareness;Decreased vision/visual deficit  Assessment: (P) Pt is cooperative with all interventions, very talkative- easily distracted. Pt moves slowy; steady, however consistently requires cues for safe management/positioning of AD d/t cognitive/memory deficits. Pt returned to recliner at end of session; declines elevating legs- no pain noted. Treatment Diagnosis: weakness, decreased balance following a fall at home  Prognosis: Good  Decision Making: Medium Complexity  History: med  Exam: med  Clinical Presentation: med  REQUIRES PT FOLLOW UP: (P) Yes  Activity Tolerance  Activity Tolerance: (P) Patient Tolerated treatment well     Patient Diagnosis(es): There were no encounter diagnoses. has a past medical history of Back pain, Colitis, ulcerative ([address] and Hypertension. has no past surgical history on file. Restrictions  Restrictions/Precautions  Restrictions/Precautions: (P) Fall Risk  Subjective   General  Chart Reviewed: (P) Yes  Additional Pertinent Hx: (P) Pt reports he was told he fell and sustained a concussion however does not remember falling  Family / Caregiver Present: (P) No  Referring Practitioner: (P) Dane  Subjective: (P) Pt sitting up in recliner; reading- agreeable to therapy.    Pain Screening  Patient Currently in Pain: (P) Denies    Pre Treatment Pain Screening  Pain at present: (P) 0  Intervention List: (P) Patient able to continue with treatment    Orientation     Cognition      Objective      Transfers  Sit to Stand: (P) Contact guard assistance;Stand by assistance  Stand to sit: (P) Contact guard assistance;Stand by assistance  Bed to Chair: Contact guard assistance;Stand by assistance  Stand Pivot Transfers: (P) Contact guard assistance  Comment: (P) cues for safety; sequencing with AD when turning/backing up- assist for safe management of unger  Ambulation  Ambulation?: (P) Yes  Ambulation 1  Surface: (P) level tile  Device: (P) Rolling Walker  Assistance: (P) Contact guard assistance;Stand by assistance  Quality of Gait: (P) steady, continuous pace, fwd trunk/head, rounded shoulders  Gait Deviations: (P) Decreased step length;Decreased step height;Decreased head and trunk rotation  Distance: (P) 85', 100' x 3  Comments: (P) seated rest breaks in between, mild SOB  Stairs/Curb  Stairs?: (P) No     Balance  Posture: (P) Fair  Sitting - Static: (P) Good  Sitting - Dynamic: (P) Good  Standing - Static: (P) Fair;+  Standing - Dynamic: (P) Fair  Exercises  Hip Flexion: (P) 2 x 10  Hip Abduction: (P) 2 x 10  Knee Long Arc Quad: (P) 2 x 10  Ankle Pumps: (P) x 20  Comments: (P) ther ex performed while seated in recliner; visual demo with v/c's provided for correct form/technique.           Other Activities: (P) Other (see comment)  Comment: (P) Focus on safe mobility in room; transfers with AD              G-Code     OutComes Score                                                     AM-PAC Score             Goals  Short term goals  Time Frame for Short term goals: (P) 3-4 days  Short term goal 1: (P) Transfers with SBA  Short term goal 2: (P) Pt amb 100 feet with ww SBA  Short term goal 3: (P) Pt perform 15-20 reps LE exs to improve LE strength and endurance  Long term goals  Time Frame for Long term goals : (P) 7-10 days  Long term goal 1: (P) I bed mobility  Long term goal 2: (P) Pt transfer mod I to allow safe transfers at home  Long term goal 3: (P) Pt amb 150 feet with appropriate AD to allow safe I amb with appropriate AD  Long term goal 4: (P) Pt and family I with HEP to further improve LE strength  Patient Goals   Patient goals : (P) get stronger and go home    Plan    Plan  Times per week: (P) 5-7  Times per day: (P) Daily  Plan weeks: (P) 7-10 days  Current Treatment Recommendations: (P) Strengthening, Balance Training, Functional Mobility Training, Transfer Training, Gait Training, Home Exercise Program, Patient/Caregiver Education & Training, Endurance Training  Safety Devices  Type of devices: (P) All fall risk precautions in place, Chair alarm in place, Call light within reach  Restraints  Initially in place: (P) Yes     Therapy Time   Individual Concurrent Group Co-treatment   Time In  12:40 pm         Time Out  1:25 pm         Minutes  [address] [place], PTA  License and [address] Number: [phone]

## 2021-02-26 NOTE — PROGRESS NOTES
Nursing staff reported that the patient has urinary urgency but unable to void. Post void residual is 300 mL. Patient had the urinary retention at [facility] for which she was seen by urologist and was started on the post care antiemetics. Pittman catheter was removed. Minimal suprapubic discomfort. Chest is clear, heart is regular. Urinary retention. Continue Flomax and Proscar as recommended by urology. I discontinued the Ditropan as may be worsening his retention. Pt came to [facility] on it. I recommended to reinsert Pittman catheter to prevent hydronephrosis and renal failure. Consider trial of Pittman removal or next week. Patient will need outpatient cystoscopy and urodynamic study that could be done post discharge. Encephalopathy, continue to improve. Multiple other medical problems as listed on my previous note. All stable. Continue current medical care. Continue PT OT.

## 2021-02-26 NOTE — PROGRESS NOTES
Physical Therapy  Facility/Department: [facility] MED SURG UNIT  Daily Treatment Note  NAME: Jose Maria Jeronimo  : 1928  MRN: [id number]    Date of Service: 2021    Discharge Recommendations:  (P) Home with assist PRN, Home with Home health PT        Assessment Pt is pleasant; cooperative with all interventions- initially hesitant d/t recent insertion of unger catheter- however able to re assure pt that mobility was encouraged and will not be disruptive to catheter. Pt did report some discomfort in L side of lower back upon standing, unable to rate. Mild SOB with standing exercises; requiring seated rest breaks intermittently- no LOB noted. Pt is forgetful, continues to require cues for safety awareness with use of AD and hand placement during transfers- assist to manage unger. Assisted pt into recliner at end of session; no pain noted, pt does report increased fatigue. Per NSG pt has become \"less anxious\" since insertion of ugner catheter. Body structures, Functions, Activity limitations: (P) Decreased functional mobility ; Decreased strength;Decreased safe awareness;Decreased vision/visual deficit  Treatment Diagnosis: (P) weakness, decreased balance following a fall at home  Prognosis: (P) Good  Decision Making: (P) Medium Complexity  History: (P) med  Exam: (P) med  Clinical Presentation: (P) med  REQUIRES PT FOLLOW UP: (P) Yes  Activity Tolerance  Activity Tolerance: (P) Patient Tolerated treatment well     Patient Diagnosis(es): There were no encounter diagnoses. has a past medical history of Back pain, Colitis, ulcerative ([address] and Hypertension. has no past surgical history on file.     Restrictions  Restrictions/Precautions  Restrictions/Precautions: (P) Fall Risk  Subjective   General  Chart Reviewed: (P) Yes  Additional Pertinent Hx: (P) Pt reports he was told he fell and sustained a concussion however does not remember falling  Family / Caregiver Present: (P) No  Referring Practitioner: (P)

## 2021-02-26 NOTE — PROGRESS NOTES
0815-Pt c/o incomplete bladder emptying. Bladder scan showed 395 ml.  Will notify MD. Pt A&OX4 resting quietly in bed eating breakfast. Medications administered per [facility]  0945- Pittman catheter placed per orders

## 2021-02-26 NOTE — PROGRESS NOTES
Occupational Therapy  Facility/Department: [facility] MED SURG UNIT  Daily Treatment Note  NAME: Mikey Villasenor  : 1928  MRN: [id number]    Date of Service: 2021    Discharge Recommendations:  Continue to assess pending progress(May need 24hr care/spv pending cog status)       Assessment   Performance deficits / Impairments: Decreased functional mobility ; Decreased ADL status; Decreased ROM; Decreased strength;Decreased safe awareness;Decreased cognition;Decreased endurance;Decreased balance;Decreased high-level IADLs;Decreased coordination  Assessment: Pt. cooperative and pleasant with wife present during OT intervention. Assessed SBA stand x 10 minutes for BUE bike with no rest breaks to promote improved stand tolerance and balance and incrase activity tolerance for self care routine. Faciliated 2# free weight all planes 20 reps except L shoulder due to chronic pain and previously doing UE Bike. Treatment Diagnosis: Fall, weakness, AMS  Prognosis: Good  REQUIRES OT FOLLOW UP: Yes         Patient Diagnosis(es): There were no encounter diagnoses. has a past medical history of Back pain, Colitis, ulcerative ([address] and Hypertension. has no past surgical history on file. Restrictions  Restrictions/Precautions  Restrictions/Precautions: Fall Risk  Subjective   General  Chart Reviewed: Yes  Patient assessed for rehabilitation services?: Yes  Family / Caregiver Present: No  Referring Practitioner: Dr. Rudolph Clifford  Diagnosis: Fall, weakness, AMS  Subjective  Subjective: Pt agreeable to OT eval/tx  General Comment  Comments: Patient's wife present for OT treatment session. Vital Signs  Patient Currently in Pain: Denies   Orientation     Objective             Balance  Standing Balance: Stand by assistance  Standing Balance  Time: Pt. tolerates 10 minutes of stand with BUE bike with no rest breaks.                                                                        Plan   Plan  Times per week: 3-6x/wk  Times per day: Daily  Plan weeks: 7-10 days  Current Treatment Recommendations: Strengthening, ROM, Balance Training, Functional Mobility Training, Endurance Training, Cognitive Reorientation, Pain Management, Safety Education & Training, Patient/Caregiver Education & Training, Equipment Evaluation, Education, & procurement, Self-Care / ADL, Home Management Training  G-Code     OutComes Score                                                  AM-PAC Score             Goals  Short term goals  Time Frame for Short term goals: 3-5 days  Short term goal 1: Tolerate 25-30min BUE ther ex/act to inc strength/end for ADL  Short term goal 2: CGA/Marge LB dressing and bathing  Short term goal 3: CGA/Marge toileting  Long term goals  Time Frame for Long term goals : 7-10 days  Long term goal 1: I BUE HEP  Long term goal 2: MI toileting  Long term goal 3: MI LB dressing and bathing  Long term goal 4: Inc to 8-10min stand deanna/end for inc'd safety and I with ADL  Patient Goals   Patient goals :  To return home       Therapy Time   Individual Concurrent Group Co-treatment   Time In 1625         Time Out 1705         Minutes [address] Mirna Mays

## 2021-02-26 NOTE — PLAN OF CARE
Problem: Falls - Risk of:  Goal: Will remain free from falls  Description: Will remain free from falls  Outcome: Met This Shift  Goal: Absence of physical injury  Description: Absence of physical injury  Outcome: Met This Shift     Problem: Pain:  Description: Pain management should include both nonpharmacologic and pharmacologic interventions. Goal: Pain level will decrease  Description: Pain level will decrease  Outcome: Met This Shift  Goal: Control of acute pain  Description: Control of acute pain  Outcome: Met This Shift  Goal: Control of chronic pain  Description: Control of chronic pain  Outcome: Met This Shift     Problem: SAFETY  Goal: Free from accidental physical injury  Outcome: Met This Shift  Goal: Free from intentional harm  Outcome: Met This Shift     Problem: DAILY CARE  Goal: Daily care needs are met  Outcome: Met This Shift     Problem: SKIN INTEGRITY  Goal: Skin integrity is maintained or improved  Outcome: Met This Shift     Problem: KNOWLEDGE DEFICIT  Goal: Patient/S.O. demonstrates understanding of disease process, treatment plan, medications, and discharge instructions.   Outcome: Met This Shift     Problem: DISCHARGE BARRIERS  Goal: Patient's continuum of care needs are met  Outcome: Met This Shift     Problem: Skin Integrity:  Goal: Will show no infection signs and symptoms  Description: Will show no infection signs and symptoms  Outcome: Met This Shift  Goal: Absence of new skin breakdown  Description: Absence of new skin breakdown  Outcome: Met This Shift     Problem: Nutrition  Goal: Optimal nutrition therapy  Outcome: Met This Shift

## 2021-02-27 PROCEDURE — 97116 GAIT TRAINING THERAPY: CPT

## 2021-02-27 PROCEDURE — 6370000000 HC RX 637 (ALT 250 FOR IP): Performed by: INTERNAL MEDICINE

## 2021-02-27 PROCEDURE — 1200000002 HC SEMI PRIVATE SWING BED

## 2021-02-27 PROCEDURE — 97110 THERAPEUTIC EXERCISES: CPT

## 2021-02-27 PROCEDURE — 97535 SELF CARE MNGMENT TRAINING: CPT

## 2021-02-27 RX ORDER — DOXAZOSIN 2 MG/1
2 TABLET ORAL DAILY
Status: DISCONTINUED | OUTPATIENT
Start: 2021-02-27 | End: 2021-03-04 | Stop reason: HOSPADM

## 2021-02-27 RX ADMIN — ACETAMINOPHEN 650 MG: 325 TABLET ORAL at 06:19

## 2021-02-27 RX ADMIN — ACETAMINOPHEN 650 MG: 325 TABLET ORAL at 20:18

## 2021-02-27 RX ADMIN — AMLODIPINE BESYLATE 10 MG: 10 TABLET ORAL at 06:31

## 2021-02-27 RX ADMIN — SENNOSIDES 8.6 MG: 8.6 TABLET, FILM COATED ORAL at 20:13

## 2021-02-27 RX ADMIN — DOCUSATE SODIUM 100 MG: 100 CAPSULE ORAL at 08:43

## 2021-02-27 RX ADMIN — POLYETHYLENE GLYCOL 3350 17 G: 17 POWDER, FOR SOLUTION ORAL at 08:43

## 2021-02-27 RX ADMIN — FINASTERIDE 5 MG: 5 TABLET, FILM COATED ORAL at 08:43

## 2021-02-27 RX ADMIN — ROSUVASTATIN CALCIUM 40 MG: 20 TABLET, FILM COATED ORAL at 20:13

## 2021-02-27 RX ADMIN — DOXAZOSIN 2 MG: 2 TABLET ORAL at 08:55

## 2021-02-27 RX ADMIN — TAMSULOSIN HYDROCHLORIDE 0.4 MG: 0.4 CAPSULE ORAL at 08:43

## 2021-02-27 RX ADMIN — GABAPENTIN 100 MG: 100 CAPSULE ORAL at 20:13

## 2021-02-27 ASSESSMENT — PAIN SCALES - GENERAL
PAINLEVEL_OUTOF10: 0
PAINLEVEL_OUTOF10: 0

## 2021-02-27 NOTE — PROGRESS NOTES
Pt has elevated BP this morning 161/78. BP has been running higher than he normally has at home according to pt. Also c/o HA. Pt medicated with tylenol and 9am dose of norvasc given early. Will pass on to physician for morning rounds.  Electronically signed by Yuli Morley RN on 2/27/2021 at 6:38 AM

## 2021-02-27 NOTE — PROGRESS NOTES
Patient is doing fairly well. Pittman catheter had to be inserted due to retention. Please refer to urology note for details. His blood pressure has been elevated. No chest pain or palpitation. Significant improvement in his mental status. Patient is coherent. ROS: Generalized weakness and fatigue. Ataxic. Otherwise 12 system review otherwise is negative for acute signs or symptoms over the last 24 hrs. General appearance: alert, appears stated age and cooperative, no apparent distress. Patient is cachectic and frail in appearance. Pale skin, conjunctiva and buccal mucosa. Skin: Skin color, texture, turgor normal. No rashes or lesions  HEENT: Head: Normocephalic, no lesions, without obvious abnormality. bitemporal muscle wasting. Neck: no adenopathy, no carotid bruit, no JVD, supple, symmetrical, trachea midline and thyroid not enlarged, symmetric, no tenderness/mass/nodules  Lungs: clear to auscultation bilaterally, kyphotic chest.  Heart: regular rate and rhythm, S1, S2 normal, no murmur, click, rub or gallop  Abdomen: soft, non-tender; bowel sounds normal; no masses,  no organomegaly  Extremities: extremities normal, atraumatic, no cyanosis or edema, osteoarthritis deformities. Neurologic: Mental status: Poor historian. Mild degree of cognitive loss. Much improvement in his comprehension and focus. Moderate degree of functional loss. Patient is able to stand up and ambulate. He is unsteady. He needs to touch different objects, doors and walls for support. Please refer to the inpatient physical and Occupational Therapy team note for details on his functional status and the treatment program.    Assessment and plan:      *Functional impairment, moderate degree, gait ataxia.       *Encephalopathy, improving, resolving     *Urinary retention and the hematuria, improving. Patient continued to have retention. I had to reinsert Pittman catheter. Patient is on Flomax and Proscar.   His blood pressure is elevated therefore I will add alpha-blocker which may help with the retention as well. Follow-up with [address] [place] [address] urology who saw her prior to admission here.     *Hypertension. Poor control. I added alpha-blocker which may help with the retention as listed above.     *Cognitive impairment. I believe that the patient has an element of dementia. 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     *Hyperlipidemia.     *Anemia, no evidence of acute blood loss.     *Stage I/II kidney failure. I may or may not have addressed all of this pt symptoms, medical issues, abnormal labs and findings. Pt will need additional work up, investigation, testing, surveillance, and treatment to be done at a later time and date during this hospitalization or post discharge by PCP and other out pt providers. Portion of patient care has / is managed by other providers. Please refer to their notes for details. Further workup and plan will be determined based on the clinical progression and a follow-up tests result. Night and next week  hospitalist will take care of the patient in my absence.

## 2021-02-27 NOTE — PROGRESS NOTES
Occupational Therapy  Facility/Department: [facility] MED SURG UNIT  Daily Treatment Note  NAME: Jose Maria Jeronimo  : 1928  MRN: [id number]    Date of Service: 2021    Discharge Recommendations:  Continue to assess pending progress(May need 24hr care/spv pending cog status)       Assessment   Performance deficits / Impairments: Decreased strength;Decreased endurance;Decreased balance  Assessment: Pt. cooperative and pleasant with wife present during OT intervention. Assessed SBA stand x 10 minutes for BUE bike with no rest breaks to promote improved stand tolerance and balance and incrase activity tolerance for self care routine. Faciliated 2# free weight all planes 20 reps except L shoulder due to chronic pain and previously doing UE Bike. Treatment Diagnosis: Fall, weakness, AMS  Prognosis: Good           Patient Diagnosis(es): There were no encounter diagnoses. has a past medical history of Back pain, Colitis, ulcerative ([address] and Hypertension. has no past surgical history on file. Restrictions  Restrictions/Precautions  Restrictions/Precautions: Fall Risk  Subjective   General  Chart Reviewed: Yes  Patient assessed for rehabilitation services?: Yes  Family / Caregiver Present: No  Referring Practitioner: Dr. Avni Mendiola  Diagnosis: Fall, weakness, AMS  Subjective  Subjective: \"I am really concerned with getting my blood pressure down. \" Vital measured while supine in bed at 142/65. General Comment  Comments: Pts nurse gave BP meds at 8:57am   Pain Assessment  Pain Assessment: 0-10  Vital Signs  Patient Currently in Pain: No   Orientation  Orientation  Overall Orientation Status: Within Normal Limits  Objective    ADL  Grooming: Stand by assistance;Contact guard assistance  UE Bathing: Contact guard assistance  UE Dressing: Stand by assistance  Additional Comments: Pt completed shaving, UE dressing/sponge bathing while seated at sink w/ decreased endurance observed.  Moderatee amt of rest breaks required Balance  Sitting Balance: Stand by assistance  Standing Balance: Stand by assistance  Bed mobility  Supine to Sit: Contact guard assistance  Transfers  Stand Step Transfers: Stand by assistance  Sit to stand: Stand by assistance   Pt demonstrating good progress towards all OT Short term  and LT goals. Pt demonstrated improved orientation and safety awareness skills this date. Fair level endurance demonstrated w/ multiple seated rest breaks required. Plan   Plan  Times per week: 3-6x/wk  Times per day: Daily  Plan weeks: 7-10 days  Current Treatment Recommendations: Strengthening, ROM, Balance Training, Functional Mobility Training, Endurance Training, Cognitive Reorientation, Pain Management, Safety Education & Training, Patient/Caregiver Education & Training, Equipment Evaluation, Education, & procurement, Self-Care / ADL, Home Management Training  G-Code     OutComes Score                                                  AM-PAC Score             Goals  Short term goals  Time Frame for Short term goals: 3-5 days  Short term goal 1: Tolerate 25-30min BUE ther ex/act to inc strength/end for ADL  Short term goal 2: CGA/Marge LB dressing and bathing  Short term goal 3: CGA/Marge toileting  Long term goals  Time Frame for Long term goals : 7-10 days  Long term goal 1: I BUE HEP  Long term goal 2: MI toileting  Long term goal 3: MI LB dressing and bathing  Long term goal 4: Inc to 8-10min stand deanna/end for inc'd safety and I with ADL  Patient Goals   Patient goals : \"To get to a normal state. \"        Therapy Time   Individual Concurrent Group Co-treatment   Time In  8:45am         Time Out  9:40am         Minutes  [facility]

## 2021-02-27 NOTE — PROGRESS NOTES
Physical Therapy  Facility/Department: [facility] MED SURG UNIT  Daily Treatment Note  NAME: Nate Alvarado  : 1928  MRN: [id number]    Date of Service: 2021    Discharge Recommendations:  Home with assist PRN, Home with Home health PT        Assessment   Body structures, Functions, Activity limitations: Decreased functional mobility ; Decreased strength;Decreased safe awareness;Decreased vision/visual deficit  Assessment: Patient able to walk increased distance and demonstrated good walker use and safety although did get distracted telling story about his fall and lack of recollection of it. Treatment Diagnosis: weakness, decreased balance following a fall at home  Prognosis: Good  Decision Making: Medium Complexity  History: med  Exam: med  Clinical Presentation: med  REQUIRES PT FOLLOW UP: Yes  Activity Tolerance  Activity Tolerance: Patient Tolerated treatment well     Patient Diagnosis(es): There were no encounter diagnoses. has a past medical history of Back pain, Colitis, ulcerative ([address] and Hypertension. has no past surgical history on file. Restrictions  Restrictions/Precautions  Restrictions/Precautions: Fall Risk  Subjective   General  Chart Reviewed: Yes  Additional Pertinent Hx: Pt reports he was told he fell and sustained a concussion however does not remember falling  Family / Caregiver Present: No  Referring Practitioner: Shilo Sharpe  Subjective  Subjective: Patient states that he is OK and getting stronger all the time.       Pre Treatment Pain Screening  Pain at present: 0  Intervention List: Patient able to continue with treatment    Orientation     Cognition      Objective      Transfers  Sit to Stand: Stand by assistance  Stand to sit: Stand by assistance  Bed to Chair: Stand by assistance  Stand Pivot Transfers: Stand by assistance  Comment: Slightly impulsive but safe  Ambulation  Ambulation?: Yes  Ambulation 1  Surface: level tile  Device: Rolling Walker  Assistance: Contact guard assistance  Quality of Gait: Good pace, occasionally gets distracted  Distance: 79' x 2, 100' x 2  Stairs/Curb  Stairs?: No     Balance  Posture: Fair  Sitting - Static: Good  Sitting - Dynamic: Good  Standing - Static: Fair;+  Standing - Dynamic: Fair  Exercises  Hip Flexion: x 10 (seated march)  Hip Abduction: x 10  Knee Long Arc Quad: x 10  Ankle Pumps: x 20 (as warm up)         Other Activities: Other (see comment)              G-Code     OutComes Score                                                     AM-PAC Score             Goals  Short term goals  Time Frame for Short term goals: 3-4 days  Short term goal 1: Transfers with SBA  Short term goal 2: Pt amb 100 feet with ww SBA  Short term goal 3: Pt perform 15-20 reps LE exs to improve LE strength and endurance  Long term goals  Time Frame for Long term goals : 7-10 days  Long term goal 1: I bed mobility  Long term goal 2: Pt transfer mod I to allow safe transfers at home  Long term goal 3: Pt amb 150 feet with appropriate AD to allow safe I amb with appropriate AD  Long term goal 4: Pt and family I with HEP to further improve LE strength  Patient Goals   Patient goals : get stronger and go home    Plan    Plan  Times per week: 5-7  Times per day: Daily  Plan weeks: 7-10 days  Current Treatment Recommendations: Strengthening, Balance Training, Functional Mobility Training, Transfer Training, Gait Training, Home Exercise Program, Patient/Caregiver Education & Training, Endurance Training  Safety Devices  Type of devices:  All fall risk precautions in place, Chair alarm in place, Call light within reach  Restraints  Initially in place: Yes     Therapy Time   Individual Concurrent Group Co-treatment   Time In  1130         Time Out  1200         Minutes  [facility]  License and [address] Number: [id number]

## 2021-02-28 PROCEDURE — 6370000000 HC RX 637 (ALT 250 FOR IP): Performed by: INTERNAL MEDICINE

## 2021-02-28 PROCEDURE — 1200000002 HC SEMI PRIVATE SWING BED

## 2021-02-28 PROCEDURE — 97116 GAIT TRAINING THERAPY: CPT

## 2021-02-28 PROCEDURE — 97110 THERAPEUTIC EXERCISES: CPT

## 2021-02-28 RX ADMIN — SENNOSIDES 8.6 MG: 8.6 TABLET, FILM COATED ORAL at 21:06

## 2021-02-28 RX ADMIN — FINASTERIDE 5 MG: 5 TABLET, FILM COATED ORAL at 08:25

## 2021-02-28 RX ADMIN — DOXAZOSIN 2 MG: 2 TABLET ORAL at 08:24

## 2021-02-28 RX ADMIN — GABAPENTIN 100 MG: 100 CAPSULE ORAL at 21:05

## 2021-02-28 RX ADMIN — DOCUSATE SODIUM 100 MG: 100 CAPSULE ORAL at 08:24

## 2021-02-28 RX ADMIN — TAMSULOSIN HYDROCHLORIDE 0.4 MG: 0.4 CAPSULE ORAL at 08:25

## 2021-02-28 RX ADMIN — ACETAMINOPHEN 650 MG: 325 TABLET ORAL at 21:05

## 2021-02-28 RX ADMIN — POLYETHYLENE GLYCOL 3350 17 G: 17 POWDER, FOR SOLUTION ORAL at 08:24

## 2021-02-28 RX ADMIN — ROSUVASTATIN CALCIUM 40 MG: 20 TABLET, FILM COATED ORAL at 21:05

## 2021-02-28 RX ADMIN — AMLODIPINE BESYLATE 10 MG: 10 TABLET ORAL at 08:25

## 2021-02-28 ASSESSMENT — PAIN SCALES - GENERAL
PAINLEVEL_OUTOF10: 0

## 2021-02-28 NOTE — PROGRESS NOTES
Physical Therapy  Facility/Department: [facility] MED SURG UNIT  Daily Treatment Note  NAME: Jose Maria Jeronimo  : 1928  MRN: [id number]    Date of Service: 2021    Discharge Recommendations:  Home with assist PRN, Home with Home health PT        Assessment   Body structures, Functions, Activity limitations: Decreased functional mobility ; Decreased strength;Decreased safe awareness;Decreased vision/visual deficit  Treatment Diagnosis: weakness, decreased balance following a fall at home  Prognosis: Good  Decision Making: Medium Complexity  History: med  Exam: med  Clinical Presentation: med  REQUIRES PT FOLLOW UP: Yes  Activity Tolerance  Activity Tolerance: Patient Tolerated treatment well     Patient Diagnosis(es): There were no encounter diagnoses. has a past medical history of Back pain, Colitis, ulcerative ([address] and Hypertension. has no past surgical history on file. Restrictions  Restrictions/Precautions  Restrictions/Precautions: Fall Risk  Subjective   General  Chart Reviewed: Yes  Additional Pertinent Hx: Pt reports he was told he fell and sustained a concussion however does not remember falling  Family / Caregiver Present: No  Referring Practitioner: Aidan Francis  Subjective  Subjective: Patient states that he feels fine, just cold like always.    Pain Screening  Patient Currently in Pain: No  Vital Signs  Patient Currently in Pain: No  Pre Treatment Pain Screening  Pain at present: 0  Intervention List: Patient able to continue with treatment    Orientation     Cognition      Objective      Transfers  Sit to Stand: Stand by assistance  Stand to sit: Stand by assistance  Bed to Chair: Stand by assistance  Stand Pivot Transfers: Stand by assistance  Comment: Slightly impulsive; good hand placement  Ambulation  Ambulation?: Yes  Ambulation 1  Surface: level tile  Device: Rolling Walker  Assistance: Contact guard assistance  Quality of Gait: Good pace, occasionally gets distracted  Distance: 100' x 3 (rest breaks in between)  Comments: Good pace, steady     Balance  Posture: Fair  Sitting - Static: Good  Sitting - Dynamic: Good  Standing - Static: Fair;+  Standing - Dynamic: Fair  Exercises  Hip Flexion: x 10 (seated march)  Hip Abduction: x 10  Knee Long Arc Quad: x 10  Ankle Pumps: x 20 (as warm up)                        G-Code     OutComes Score                                                     AM-PAC Score             Goals  Short term goals  Time Frame for Short term goals: 3-4 days  Short term goal 1: Transfers with SBA  Short term goal 2: Pt amb 100 feet with ww SBA  Short term goal 3: Pt perform 15-20 reps LE exs to improve LE strength and endurance  Long term goals  Time Frame for Long term goals : 7-10 days  Long term goal 1: I bed mobility  Long term goal 2: Pt transfer mod I to allow safe transfers at home  Long term goal 3: Pt amb 150 feet with appropriate AD to allow safe I amb with appropriate AD  Long term goal 4: Pt and family I with HEP to further improve LE strength  Patient Goals   Patient goals : get stronger and go home    Plan    Plan  Times per week: 5-7  Times per day: Daily  Plan weeks: 7-10 days  Current Treatment Recommendations: Strengthening, Balance Training, Functional Mobility Training, Transfer Training, Gait Training, Home Exercise Program, Patient/Caregiver Education & Training, Endurance Training  Safety Devices  Type of devices:  All fall risk precautions in place, Chair alarm in place, Call light within reach  Restraints  Initially in place: Yes     Therapy Time   Individual Concurrent Group Co-treatment   Time In  1105         Time Out  1135         Minutes  [facility]  [address] and [address] Number: [id number]

## 2021-03-01 LAB
ANION GAP SERPL CALCULATED.3IONS-SCNC: 11 MEQ/L (ref 9–15)
BUN BLDV-MCNC: 26 MG/DL (ref 8–23)
CALCIUM SERPL-MCNC: 9.1 MG/DL (ref 8.5–9.9)
CHLORIDE BLD-SCNC: 103 MEQ/L (ref 95–107)
CO2: 25 MEQ/L (ref 20–31)
CREAT SERPL-MCNC: 1.24 MG/DL (ref 0.7–1.2)
GFR AFRICAN AMERICAN: >60
GFR NON-AFRICAN AMERICAN: 54.4
GLUCOSE BLD-MCNC: 109 MG/DL (ref 70–99)
HCT VFR BLD CALC: 35.5 % (ref 42–52)
HEMOGLOBIN: 12 G/DL (ref 14–18)
MCH RBC QN AUTO: 33.2 PG (ref 27–31.3)
MCHC RBC AUTO-ENTMCNC: 33.8 % (ref 33–37)
MCV RBC AUTO: 98.1 FL (ref 80–100)
PDW BLD-RTO: 13.3 % (ref 11.5–14.5)
PLATELET # BLD: 364 K/UL (ref 130–400)
POTASSIUM REFLEX MAGNESIUM: 4.2 MEQ/L (ref 3.4–4.9)
RBC # BLD: 3.62 M/UL (ref 4.7–6.1)
SODIUM BLD-SCNC: 139 MEQ/L (ref 135–144)
WBC # BLD: 9.8 K/UL (ref 4.8–10.8)

## 2021-03-01 PROCEDURE — 97110 THERAPEUTIC EXERCISES: CPT

## 2021-03-01 PROCEDURE — 6370000000 HC RX 637 (ALT 250 FOR IP): Performed by: INTERNAL MEDICINE

## 2021-03-01 PROCEDURE — 2580000003 HC RX 258: Performed by: INTERNAL MEDICINE

## 2021-03-01 PROCEDURE — 36415 COLL VENOUS BLD VENIPUNCTURE: CPT

## 2021-03-01 PROCEDURE — 1200000002 HC SEMI PRIVATE SWING BED

## 2021-03-01 PROCEDURE — 97530 THERAPEUTIC ACTIVITIES: CPT

## 2021-03-01 PROCEDURE — 97116 GAIT TRAINING THERAPY: CPT

## 2021-03-01 PROCEDURE — 80048 BASIC METABOLIC PNL TOTAL CA: CPT

## 2021-03-01 PROCEDURE — 85027 COMPLETE CBC AUTOMATED: CPT

## 2021-03-01 PROCEDURE — 6360000002 HC RX W HCPCS: Performed by: INTERNAL MEDICINE

## 2021-03-01 RX ORDER — 0.9 % SODIUM CHLORIDE 0.9 %
250 INTRAVENOUS SOLUTION INTRAVENOUS ONCE
Status: COMPLETED | OUTPATIENT
Start: 2021-03-01 | End: 2021-03-01

## 2021-03-01 RX ORDER — ACETAMINOPHEN 325 MG/1
650 TABLET ORAL EVERY 8 HOURS SCHEDULED
Status: DISCONTINUED | OUTPATIENT
Start: 2021-03-01 | End: 2021-03-04 | Stop reason: HOSPADM

## 2021-03-01 RX ORDER — PHENAZOPYRIDINE HYDROCHLORIDE 100 MG/1
200 TABLET, FILM COATED ORAL
Status: DISCONTINUED | OUTPATIENT
Start: 2021-03-01 | End: 2021-03-04 | Stop reason: HOSPADM

## 2021-03-01 RX ORDER — MIRTAZAPINE 15 MG/1
7.5 TABLET, ORALLY DISINTEGRATING ORAL NIGHTLY
Status: DISCONTINUED | OUTPATIENT
Start: 2021-03-01 | End: 2021-03-04 | Stop reason: HOSPADM

## 2021-03-01 RX ADMIN — ACETAMINOPHEN 650 MG: 325 TABLET ORAL at 08:29

## 2021-03-01 RX ADMIN — MIRTAZAPINE 7.5 MG: 15 TABLET, ORALLY DISINTEGRATING ORAL at 20:41

## 2021-03-01 RX ADMIN — AMLODIPINE BESYLATE 10 MG: 10 TABLET ORAL at 06:30

## 2021-03-01 RX ADMIN — ACETAMINOPHEN 650 MG: 325 TABLET ORAL at 20:42

## 2021-03-01 RX ADMIN — POLYETHYLENE GLYCOL 3350 17 G: 17 POWDER, FOR SOLUTION ORAL at 08:29

## 2021-03-01 RX ADMIN — TAMSULOSIN HYDROCHLORIDE 0.4 MG: 0.4 CAPSULE ORAL at 08:29

## 2021-03-01 RX ADMIN — SENNOSIDES 8.6 MG: 8.6 TABLET, FILM COATED ORAL at 20:41

## 2021-03-01 RX ADMIN — PHENAZOPYRIDINE HYDROCHLORIDE 200 MG: 100 TABLET ORAL at 17:08

## 2021-03-01 RX ADMIN — METOPROLOL TARTRATE 25 MG: 25 TABLET, FILM COATED ORAL at 20:41

## 2021-03-01 RX ADMIN — GABAPENTIN 100 MG: 100 CAPSULE ORAL at 20:41

## 2021-03-01 RX ADMIN — PHENAZOPYRIDINE HYDROCHLORIDE 200 MG: 100 TABLET ORAL at 12:38

## 2021-03-01 RX ADMIN — ACETAMINOPHEN 650 MG: 325 TABLET ORAL at 12:37

## 2021-03-01 RX ADMIN — DOCUSATE SODIUM 100 MG: 100 CAPSULE ORAL at 08:30

## 2021-03-01 RX ADMIN — ENOXAPARIN SODIUM 30 MG: 30 INJECTION SUBCUTANEOUS at 08:28

## 2021-03-01 RX ADMIN — DOXAZOSIN 2 MG: 2 TABLET ORAL at 08:29

## 2021-03-01 RX ADMIN — METOPROLOL TARTRATE 25 MG: 25 TABLET, FILM COATED ORAL at 08:29

## 2021-03-01 RX ADMIN — FINASTERIDE 5 MG: 5 TABLET, FILM COATED ORAL at 08:29

## 2021-03-01 RX ADMIN — ROSUVASTATIN CALCIUM 40 MG: 20 TABLET, FILM COATED ORAL at 20:41

## 2021-03-01 RX ADMIN — SODIUM CHLORIDE 250 ML: 9 INJECTION, SOLUTION INTRAVENOUS at 12:38

## 2021-03-01 RX ADMIN — PHENAZOPYRIDINE HYDROCHLORIDE 200 MG: 100 TABLET ORAL at 08:29

## 2021-03-01 ASSESSMENT — PAIN SCALES - GENERAL
PAINLEVEL_OUTOF10: 0
PAINLEVEL_OUTOF10: 0

## 2021-03-01 NOTE — PROGRESS NOTES
Occupational Therapy  Facility/Department: [facility] MED SURG UNIT  Daily Treatment Note  NAME: Andres Shields  : 1928  MRN: [id number]    Date of Service: 3/1/2021    Discharge Recommendations:  24 hour supervision or assist       Assessment   Performance deficits / Impairments: Decreased strength;Decreased endurance;Decreased balance  Assessment: Pt's nurse came to get this therapist to spot her for xfer pt back to bed. Pt's nurse reported pt's BP was very low and she wanted a 2nd person around for safety should pt have any complications d/t the low BP. Pt Marge sit -> stand from recliner. Pt CGA recliner -> bed with max vcs for safety/sequencing. Pt able to scoot laterally toward [facility] while sitting up EOB. Treatment Diagnosis: Fall, weakness, AMS  Prognosis: Good  REQUIRES OT FOLLOW UP: Yes         Patient Diagnosis(es): There were no encounter diagnoses. has a past medical history of Back pain, Colitis, ulcerative ([address] and Hypertension. has no past surgical history on file.     Restrictions  Restrictions/Precautions  Restrictions/Precautions: Fall Risk  Subjective   General  Chart Reviewed: Yes  Patient assessed for rehabilitation services?: Yes  Family / Caregiver Present: No  Referring Practitioner: Dr. Juan José Figueroa  Diagnosis: Fall, weakness, AMS  Subjective  Subjective: Nsg requesting this therapist's stand by to get pt back to bed  Pain Assessment  Pain Assessment: 0-10  Pain Level: 0  Vital Signs  Level of Consciousness: Alert (0)  Patient Currently in Pain: No      Objective       Bed mobility  Sit to Supine: Minimal assistance(BLEs)  Scooting: Stand by assistance(while seated, scooting laterally up toward [facility])  Transfers  Stand Step Transfers: Contact guard assistance(with RW)  Sit to stand: Minimal assistance                 Plan   Plan  Times per week: 3-6x/wk  Times per day: Daily  Plan weeks: 7-10 days  Current Treatment Recommendations: Strengthening, ROM, Balance Training, Functional Mobility Training, Endurance Training, Cognitive Reorientation, Pain Management, Safety Education & Training, Patient/Caregiver Education & Training, Equipment Evaluation, Education, & procurement, Self-Care / ADL, Home Management Training          Goals  Short term goals  Time Frame for Short term goals: 3-5 days  Short term goal 1: Tolerate 25-30min BUE ther ex/act to inc strength/end for ADL  Short term goal 2: CGA/Marge LB dressing and bathing  Short term goal 3: CGA/Marge toileting  Long term goals  Time Frame for Long term goals : 7-10 days  Long term goal 1: I BUE HEP  Long term goal 2: MI toileting  Long term goal 3: MI LB dressing and bathing  Long term goal 4: Inc to 8-10min stand deanna/end for inc'd safety and I with ADL  Patient Goals   Patient goals : \"To get to a normal state. \"        Therapy Time   Individual Concurrent Group Co-treatment   Time In  2:50         Time Out  3:05         Minutes  [id number] [place], [place]

## 2021-03-01 NOTE — PROGRESS NOTES
Physical Therapy  Facility/Department: [facility] MED SURG UNIT  Daily Treatment Note  NAME: Melissa Dumont  : 1928  MRN: [id number]    Date of Service: 3/1/2021    Discharge Recommendations:  Home with assist PRN, Home with Home health PT        Assessment   Body structures, Functions, Activity limitations: Decreased functional mobility ; Decreased strength;Decreased safe awareness;Decreased vision/visual deficit  Assessment: Pt. appears less confused this date able to hold conversation and expresses c/o pain and depression due to pain in catheder. Pt. stating he wishes there was a magic pill to make it go away. Pt. eudcated on importance of drinking more water throughout his day to help with urinary tract and  inc hydration. Discussed with pt's nurse of situation and c/o pain. Pt. however able to perofrm PT session with no change in pain level. Good response to cues but easily distracted. Treatment Diagnosis: weakness, decreased balance following a fall at home  Prognosis: Good  Decision Making: Medium Complexity  History: med  Exam: med  Clinical Presentation: med  REQUIRES PT FOLLOW UP: Yes  Activity Tolerance  Activity Tolerance: Patient Tolerated treatment well     Patient Diagnosis(es): There were no encounter diagnoses. has a past medical history of Back pain, Colitis, ulcerative ([address] and Hypertension. has no past surgical history on file. Restrictions  Restrictions/Precautions  Restrictions/Precautions: Fall Risk  Subjective   General  Chart Reviewed: Yes  Additional Pertinent Hx: Pt reports he was told he fell and sustained a concussion however does not remember falling  Family / Caregiver Present: No  Referring Practitioner: Frederick Aguila  Subjective  Subjective: Pt. reports he is having pain from catheder area. \"When it's bad it's an 8,9,10.\"  Pt. states \"It has me depressed. \"  Pt. reports Dr. Eliazar Cervantes going to set up with Psyhcologist.  Pt.  states pain not bad now.   Pt. alert and oriented x 4 allow safe I amb with appropriate AD  Long term goal 4: Pt and family I with HEP to further improve LE strength  Patient Goals   Patient goals : get stronger and go home    Plan    Plan  Times per week: 5-7  Times per day: Daily  Plan weeks: 7-10 days  Current Treatment Recommendations: Strengthening, Balance Training, Functional Mobility Training, Transfer Training, Gait Training, Home Exercise Program, Patient/Caregiver Education & Training, Endurance Training  Safety Devices  Type of devices:  All fall risk precautions in place, Chair alarm in place, Call light within reach  Restraints  Initially in place: Yes     Therapy Time   Individual Concurrent Group Co-treatment   Time In  910         Time Out  1010         Minutes  [address]  License and [address] Number: [id number]

## 2021-03-01 NOTE — PROGRESS NOTES
intact without any focal sensory/motor deficits. Cranial nerves: II-XII intact, grossly non-focal.  Psychiatric: Alert and oriented, thought content appropriate, normal insight  Capillary Refill: Brisk,< 3 seconds   Peripheral Pulses: +2 palpable, equal bilaterally       Labs:   Recent Labs     03/01/21  0503   WBC 9.8   HGB 12.0*   HCT 35.5*        Recent Labs     03/01/21  0503      K 4.2      CO2 25   BUN 26*   CREATININE 1.24*   CALCIUM 9.1     No results for input(s): AST, ALT, BILIDIR, BILITOT, ALKPHOS in the last 72 hours. No results for input(s): INR in the last 72 hours. No results for input(s): Chris Powers in the last 72 hours. Urinalysis:      Lab Results   Component Value Date    NITRU Negative 02/19/2021    WBCUA 0-2 02/19/2021    BACTERIA Negative 02/19/2021    RBCUA 3-5 02/19/2021    BLOODU Trace-lysed 02/19/2021    SPECGRAV 1.020 02/19/2021    GLUCOSEU Negative 02/19/2021       Radiology:  No orders to display           Assessment/Plan:    Active Hospital Problems    Diagnosis Date Noted    AMS (altered mental status) [R41.82] 02/20/2021         DVT Prophylaxis: lovenox  Diet: DIET GENERAL;  Code Status: Full Code    PT/OT Eval Status: done    Dispo - BPH- unger in place.  Pain related to unger- add pyridium, schedule tylenol TID, follow up clinically  HTN/tachycardia- add b blocker, follow up clinically  Pt mentioned about depression- add remeron QHS, agreed to see psychology service  Discussed about code status- pt prefer to be full code  Encephalopathy/confusion- back to baseline  Medically stable for skilled status at [facility]       Electronically signed by Alanna Dahl MD on 3/1/2021 at 7:40 AM

## 2021-03-01 NOTE — PROGRESS NOTES
Pt pm meds given. Pt sleeping at this time. Pt alert and oriented. Pt denies any pain or needs at this time. Pt has a unger cath in which is draining urine. Pt bed alarm is on and call light in reach. Will continue to monitor pt.   Electronically signed by Roxana Helton RN on 2/28/2021 at 11:52 PM

## 2021-03-01 NOTE — PROGRESS NOTES
Physical Therapy  Facility/Department: [facility] MED SURG UNIT  Daily Treatment Note  NAME: Tommy Quan  : 1928  MRN: [id number]    Date of Service: 3/1/2021    Discharge Recommendations:  Home with assist PRN, Home with Home health PT        Assessment   Body structures, Functions, Activity limitations: Decreased functional mobility ; Decreased strength;Decreased safe awareness;Decreased vision/visual deficit  Assessment: Pt ambulated inc distances with standing RB's between trials using [employer] maintaining close distance to AD but needs to focus on more upright posture with fwd gaze. Pt sat in chair and performed inc reps of LE ther ex with slight fatigue. Pt left to sit up in recliner with call light and chair alarm in place. Continue to progress as deanna. Treatment Diagnosis: weakness, decreased balance following a fall at home  REQUIRES PT FOLLOW UP: Yes  Activity Tolerance  Activity Tolerance: Patient Tolerated treatment well     Patient Diagnosis(es): There were no encounter diagnoses. has a past medical history of Back pain, Colitis, ulcerative ([address] and Hypertension. has no past surgical history on file. Restrictions  Restrictions/Precautions  Restrictions/Precautions: Fall Risk  Subjective   General  Chart Reviewed: Yes  Additional Pertinent Hx: Pt reports he was told he fell and sustained a concussion however does not remember falling  Family / Caregiver Present: No  Referring Practitioner: Mona Contreras  Subjective  Subjective: Pt lying in bed and agreeable to therapy. Pain Screening  Patient Currently in Pain: No  Vital Signs  Patient Currently in Pain: No       Orientation     Cognition      Objective      Transfers  Sit to Stand: Stand by assistance  Stand to sit: Stand by assistance  Comment: VC's for pushing to stand and reaching back for chair with good follow.    Ambulation  Ambulation?: Yes  Ambulation 1  Surface: level tile  Device: Rolling Walker  Assistance: Contact guard assistance  Quality of Gait: good pace cues for closer proximity to [employer] and upright gaze good follow through   Distance: 100' x 4 with standing RB between trials         Exercises  Gluteal Sets: x 20   Hip Flexion: seated x 20  Hip Abduction: x 20   Knee Long Arc Quad: x 20   Ankle Pumps: x 20  Other exercises  Other exercises?: Yes  Other exercises 5: pillow sq; x 20                         G-Code     OutComes Score                                                     AM-PAC Score             Goals  Short term goals  Time Frame for Short term goals: 3-4 days  Short term goal 1: Transfers with SBA  Short term goal 2: Pt amb 100 feet with ww SBA  Short term goal 3: Pt perform 15-20 reps LE exs to improve LE strength and endurance  Long term goals  Time Frame for Long term goals : 7-10 days  Long term goal 1: I bed mobility  Long term goal 2: Pt transfer mod I to allow safe transfers at home  Long term goal 3: Pt amb 150 feet with appropriate AD to allow safe I amb with appropriate AD  Long term goal 4: Pt and family I with HEP to further improve LE strength  Patient Goals   Patient goals : get stronger and go home    Plan    Plan  Times per week: 5-7  Times per day: Daily  Plan weeks: 7-10 days  Current Treatment Recommendations: Strengthening, Balance Training, Functional Mobility Training, Transfer Training, Gait Training, Home Exercise Program, Patient/Caregiver Education & Training, Endurance Training  Safety Devices  Type of devices:  All fall risk precautions in place, Chair alarm in place, Call light within reach  Restraints  Initially in place: Yes     Therapy Time   Individual Concurrent Group Co-treatment   Time In  1510         Time Out  1600         Minutes  [facility]  License and [address] Number: [id number]

## 2021-03-02 PROCEDURE — 97116 GAIT TRAINING THERAPY: CPT

## 2021-03-02 PROCEDURE — 6370000000 HC RX 637 (ALT 250 FOR IP): Performed by: INTERNAL MEDICINE

## 2021-03-02 PROCEDURE — 97535 SELF CARE MNGMENT TRAINING: CPT

## 2021-03-02 PROCEDURE — 97530 THERAPEUTIC ACTIVITIES: CPT

## 2021-03-02 PROCEDURE — 6360000002 HC RX W HCPCS: Performed by: INTERNAL MEDICINE

## 2021-03-02 PROCEDURE — 1200000002 HC SEMI PRIVATE SWING BED

## 2021-03-02 PROCEDURE — 92507 TX SP LANG VOICE COMM INDIV: CPT

## 2021-03-02 RX ORDER — LORAZEPAM 0.5 MG/1
0.5 TABLET ORAL EVERY 4 HOURS PRN
Status: DISCONTINUED | OUTPATIENT
Start: 2021-03-02 | End: 2021-03-04 | Stop reason: HOSPADM

## 2021-03-02 RX ORDER — TRAMADOL HYDROCHLORIDE 50 MG/1
50 TABLET ORAL EVERY 6 HOURS PRN
Status: DISCONTINUED | OUTPATIENT
Start: 2021-03-02 | End: 2021-03-04 | Stop reason: HOSPADM

## 2021-03-02 RX ADMIN — TRAMADOL HYDROCHLORIDE 50 MG: 50 TABLET, FILM COATED ORAL at 17:34

## 2021-03-02 RX ADMIN — FINASTERIDE 5 MG: 5 TABLET, FILM COATED ORAL at 08:16

## 2021-03-02 RX ADMIN — ACETAMINOPHEN 650 MG: 325 TABLET ORAL at 06:33

## 2021-03-02 RX ADMIN — ACETAMINOPHEN 650 MG: 325 TABLET ORAL at 21:27

## 2021-03-02 RX ADMIN — CYCLOBENZAPRINE 10 MG: 10 TABLET, FILM COATED ORAL at 08:16

## 2021-03-02 RX ADMIN — ROSUVASTATIN CALCIUM 40 MG: 20 TABLET, FILM COATED ORAL at 21:26

## 2021-03-02 RX ADMIN — PHENAZOPYRIDINE HYDROCHLORIDE 200 MG: 100 TABLET ORAL at 08:15

## 2021-03-02 RX ADMIN — METOPROLOL TARTRATE 25 MG: 25 TABLET, FILM COATED ORAL at 21:27

## 2021-03-02 RX ADMIN — DOXAZOSIN 2 MG: 2 TABLET ORAL at 08:15

## 2021-03-02 RX ADMIN — MIRTAZAPINE 7.5 MG: 15 TABLET, ORALLY DISINTEGRATING ORAL at 21:26

## 2021-03-02 RX ADMIN — ENOXAPARIN SODIUM 30 MG: 30 INJECTION SUBCUTANEOUS at 08:15

## 2021-03-02 RX ADMIN — CYCLOBENZAPRINE 10 MG: 10 TABLET, FILM COATED ORAL at 21:28

## 2021-03-02 RX ADMIN — LORAZEPAM 0.5 MG: 0.5 TABLET ORAL at 08:34

## 2021-03-02 RX ADMIN — ACETAMINOPHEN 650 MG: 325 TABLET ORAL at 15:17

## 2021-03-02 RX ADMIN — TRAMADOL HYDROCHLORIDE 50 MG: 50 TABLET, FILM COATED ORAL at 11:52

## 2021-03-02 RX ADMIN — TAMSULOSIN HYDROCHLORIDE 0.4 MG: 0.4 CAPSULE ORAL at 08:15

## 2021-03-02 RX ADMIN — GABAPENTIN 100 MG: 100 CAPSULE ORAL at 21:26

## 2021-03-02 RX ADMIN — PHENAZOPYRIDINE HYDROCHLORIDE 200 MG: 100 TABLET ORAL at 17:34

## 2021-03-02 RX ADMIN — ACETAMINOPHEN 650 MG: 325 TABLET ORAL at 08:15

## 2021-03-02 RX ADMIN — METOPROLOL TARTRATE 25 MG: 25 TABLET, FILM COATED ORAL at 08:16

## 2021-03-02 RX ADMIN — LORAZEPAM 0.5 MG: 0.5 TABLET ORAL at 21:28

## 2021-03-02 RX ADMIN — SENNOSIDES 8.6 MG: 8.6 TABLET, FILM COATED ORAL at 21:26

## 2021-03-02 RX ADMIN — PHENAZOPYRIDINE HYDROCHLORIDE 200 MG: 100 TABLET ORAL at 11:52

## 2021-03-02 ASSESSMENT — PAIN SCALES - GENERAL
PAINLEVEL_OUTOF10: 2
PAINLEVEL_OUTOF10: 1
PAINLEVEL_OUTOF10: 8
PAINLEVEL_OUTOF10: 8
PAINLEVEL_OUTOF10: 10
PAINLEVEL_OUTOF10: 0

## 2021-03-02 NOTE — PROGRESS NOTES
Pt has an avasys in room for pt safety. Pt alert and oriented. Pt bed alarm is on. Pt call light in reach. Pt sleeping at this time. Pt has a unger cath in place for urinary retention. Pt denies any pain or needs at this time. Will continue to monitor pt.   Electronically signed by Zafar Ashraf RN on 3/1/2021 at 11:49 PM

## 2021-03-02 NOTE — PROGRESS NOTES
Physical Therapy  Facility/Department: [facility] MED SURG UNIT  Daily Treatment Note  NAME: Tommy Quan  : 1928  MRN: [id number]    Date of Service: 3/2/2021    Discharge Recommendations:  (P) Home with assist PRN, Home with Home health PT        Assessment   Body structures, Functions, Activity limitations: Decreased functional mobility ; Decreased strength;Decreased safe awareness;Decreased vision/visual deficit  Assessment: (P) Pt was passively agreeable to gait training, continues to report concern in regards to pain from unger catheter. Nurse aware of discoloration of urine, pt is moving slowly/cautiously with transfers d/t pain, demo s/s of increased fatigue. Assisted pt to bed at end of session; no change in pain noted. Treatment Diagnosis: weakness, decreased balance following a fall at home  Prognosis: Good  Decision Making: Medium Complexity  History: med  Exam: med  Clinical Presentation: med  REQUIRES PT FOLLOW UP: (P) Yes  Activity Tolerance  Activity Tolerance: (P) Patient limited by endurance; Patient limited by fatigue;Patient limited by pain     Patient Diagnosis(es): There were no encounter diagnoses. has a past medical history of Back pain, Colitis, ulcerative ([address] and Hypertension. has no past surgical history on file. Restrictions  Restrictions/Precautions  Restrictions/Precautions: (P) Fall Risk  Subjective   General  Chart Reviewed: (P) Yes  Additional Pertinent Hx: (P) Pt reports he was told he fell and sustained a concussion however does not remember falling  Family / Caregiver Present: (P) No  Referring Practitioner: (P) Dane  Subjective: (P) Pt sitting up in recliner following lunch; agreeabel to gait training.    Comments: (P) \"Oh, well- I can try, but I don't know\"  Pain Screening  Patient Currently in Pain: (P) Yes(cont discomfort from unger )    Pre Treatment Pain Screening  Pain at present: (P) 3  Intervention List: (P) Patient able to continue with treatment Orientation     Cognition      Objective    Bed Mobility: CG/SBA sit > supine  Transfers  Sit to Stand: (P) Stand by assistance  Stand to sit: (P) Stand by assistance  Stand Pivot Transfers: (P) Stand by assistance  Comment: (P) consistent cues for hand placement; cues for sequencing/safe management of AD when turning, backing up  Ambulation  Ambulation?: (P) Yes  Ambulation 1  Surface: (P) level tile  Device: (P) Rolling Walker  Assistance: (P) Contact guard assistance  Quality of Gait: (P) slow pace, fwd trunk/head, short steps  Gait Deviations: (P) Decreased step length;Decreased step height;Decreased head and trunk rotation; Shuffles  Distance: (P) 100' x 2  Comments: (P) seated rest break in between with mild SOB; shuffled step pattern at times- able to correct with cues; v/c's for safe negotiation in tight/crowded spaces.   Stairs/Curb  Stairs?: No     Balance  Posture: (P) Good  Sitting - Static: (P) Good  Sitting - Dynamic: (P) Good  Standing - Static: (P) Fair;+  Standing - Dynamic: (P) Fair  Exercises  Comments: (P) Deferred this afternoon per pt request d/t increased fatigue                     G-Code     OutComes Score                                                     AM-PAC Score             Goals  Short term goals  Time Frame for Short term goals: (P) 3-4 days  Short term goal 1: (P) Transfers with SBA  Short term goal 2: (P) Pt amb 100 feet with ww SBA  Short term goal 3: (P) Pt perform 15-20 reps LE exs to improve LE strength and endurance  Long term goals  Time Frame for Long term goals : (P) 7-10 days  Long term goal 1: (P) I bed mobility  Long term goal 2: (P) Pt transfer mod I to allow safe transfers at home  Long term goal 3: (P) Pt amb 150 feet with appropriate AD to allow safe I amb with appropriate AD  Long term goal 4: (P) Pt and family I with HEP to further improve LE strength  Patient Goals   Patient goals : (P) get stronger and go home    Plan    Plan  Times per week: (P) 5-7  Times per day: (P) Daily  Plan weeks: (P) 7-10 days  Current Treatment Recommendations: (P) Strengthening, Balance Training, Functional Mobility Training, Transfer Training, Gait Training, Home Exercise Program, Patient/Caregiver Education & Training, Endurance Training  Safety Devices  Type of devices: (P) All fall risk precautions in place, Bed alarm in place, Call light within reach  Restraints  Initially in place: (P) Yes     Therapy Time   Individual Concurrent Group Co-treatment   Time In  12:45 pm         Time Out  1:30 pm         Minutes  [address] [facility] Leigh Ann Marsh PTA  License and [address] Number: [phone]

## 2021-03-02 NOTE — PROGRESS NOTES
Physical Therapy  Facility/Department: [facility] MED SURG UNIT  Daily Treatment Note  NAME: Armaan Nix  : 1928  MRN: [id number]    Date of Service: 3/2/2021    Discharge Recommendations:  (P) Home with assist PRN, Home with Home health PT        Assessment  Pt is cooperative with interventions, however moving slowly this date with reports of continues pain/discomfort from catheter; NSG and doctor are aware and addressing symptoms. Per nurse, urine is orange/red d/t passing of medications. Pt demo increased confusion at times; decreased safety awareness. Pt fatigued upon completion of shower, agreeable to sit up in recliner. Body structures, Functions, Activity limitations: (P) Decreased functional mobility ; Decreased strength;Decreased safe awareness;Decreased vision/visual deficit  Treatment Diagnosis: (P) weakness, decreased balance following a fall at home  Prognosis: (P) Good  Decision Making: (P) Medium Complexity  History: (P) med  Exam: (P) med  Clinical Presentation: (P) med  REQUIRES PT FOLLOW UP: (P) Yes  Activity Tolerance: (P) Patient limited by endurance; Patient limited by fatigue;Patient limited by pain     Patient Diagnosis(es): There were no encounter diagnoses. has a past medical history of Back pain, Colitis, ulcerative ([address] and Hypertension. has no past surgical history on file. Restrictions  Restrictions/Precautions  Restrictions/Precautions: (P) Fall Risk  Subjective   General  Chart Reviewed: (P) Yes  Additional Pertinent Hx: (P) Pt reports he was told he fell and sustained a concussion however does not remember falling  Family / Caregiver Present: (P) No  Referring Practitioner: (P) Dane  Subjective: (P) Pt lying in bed, agreeable to therapy.    Comments: (P) \"They've given me something to help with the pain, but it's still there and I cannot go home like this- in pain\"  Patient Currently in Pain: (P) Yes(pt having pain/discomfort from catheter)  Intervention List: (P) Patient able to continue with treatment    Orientation     Cognition      Objective    Bed Mobility: CGA supine > sit, HOB elevated, encouragement to perform as able- otherwise pt reaches hand out for assist.  Transfers  Sit to Stand: (P) Stand by assistance  Stand to sit: (P) Stand by assistance  Stand Pivot Transfers: (P) Stand by assistance  Comment: (P) CG/Min A for safety with shower and toilet transfer, d/t confusion, otherwise SBA with consistent cues for safe hand placement. Ambulation  Ambulation?: (P) Yes  Ambulation 1  Surface: (P) level tile  Device: (P) Rolling Walker  Assistance: (P) Contact guard assistance  Quality of Gait: (P) good pace cues for closer proximity to [employer] and upright gaze good follow through   Gait Deviations: (P) Decreased step length;Decreased step height;Decreased head and trunk rotation  Distance: (P) 10', 8', 20'  Comments: (P) short steps, initially WBOS, gait pattern affected by pain from catheter  Stairs/Curb  Stairs?: (P) No     Balance  Posture: (P) Good  Sitting - Static: (P) Good  Sitting - Dynamic: (P) Good  Standing - Static: (P) Fair;+  Standing - Dynamic: (P) Fair            Other Activities: (P) Other (see comment)   Assisted with toilet transfer; pt able to address hygiene with set up assist- marked time required to complete; pt required cues for redirection/excessively wiping. Assist provided for safety when negotiating in bathroom, in/out of shower, and when standing in shower- frequent cues for hand placement, safety/sequencing.             G-Code     OutComes Score                                                     AM-PAC Score             Goals  Short term goals  Time Frame for Short term goals: (P) 3-4 days  Short term goal 1: (P) Transfers with SBA  Short term goal 2: (P) Pt amb 100 feet with ww SBA  Short term goal 3: (P) Pt perform 15-20 reps LE exs to improve LE strength and endurance  Long term goals  Time Frame for Long term goals : (P) 7-10 days  Long term goal 1: (P) I bed mobility  Long term goal 2: (P) Pt transfer mod I to allow safe transfers at home  Long term goal 3: (P) Pt amb 150 feet with appropriate AD to allow safe I amb with appropriate AD  Long term goal 4: (P) Pt and family I with HEP to further improve LE strength  Patient Goals   Patient goals : (P) get stronger and go home    Plan    Plan  Times per week: (P) 5-7  Times per day: (P) Daily  Plan weeks: (P) 7-10 days  Current Treatment Recommendations: (P) Strengthening, Balance Training, Functional Mobility Training, Transfer Training, Gait Training, Home Exercise Program, Patient/Caregiver Education & Training, Endurance Training  Safety Devices  Type of devices: (P) All fall risk precautions in place, Chair alarm in place, Call light within reach  Restraints  Initially in place: (P) Yes     Therapy Time   Individual Concurrent Group Co-treatment   Time In  09:30 am         Time Out  10:30 am         Minutes  [address], PTA  License and Documentation Cosign  Therapy License Number: [id number]

## 2021-03-02 NOTE — PROGRESS NOTES
SPEECH LANGUAGE PATHOLOGY  Therapy Progress Report    This patient was seen for continued cognitive-linguistic therapy. He was somewhat lethargic at this time,likely from his medicine for pain. He was cooperative and engaged in therapy. The following performances were noted:    Jin & Jin - 15/60  Temporal orientation - 30%  General knowledge/recall - 70%    Delays were noted with all tasks. The patient expressed some frustration with his inability to recall items. Reviewed results with his family. Start time 11:10  Stop Time - 11:45  Minutes - [facility].  Leroy Gann Ph.D.  CCC-SLP/A

## 2021-03-02 NOTE — PROGRESS NOTES
Hospitalist Progress Note      PCP: Omar Moran MD    Date of Admission: 2/23/2021    Chief Complaint: bladder pain    Subjective: pt looks comfortable     Medications:  Reviewed    Infusion Medications   Scheduled Medications    metoprolol tartrate  25 mg Oral BID    phenazopyridine  200 mg Oral TID WC    acetaminophen  650 mg Oral 3 times per day    mirtazapine  7.5 mg Oral Nightly    enoxaparin  30 mg Subcutaneous Daily    doxazosin  2 mg Oral Daily    rosuvastatin  40 mg Oral Nightly    [Held by provider] amLODIPine  10 mg Oral Daily    docusate sodium  100 mg Oral Daily    finasteride  5 mg Oral Daily    polyethylene glycol  17 g Oral Daily    senna  1 tablet Oral Nightly    tamsulosin  0.4 mg Oral Daily    gabapentin  100 mg Oral Nightly     PRN Meds: LORazepam, traMADol, labetalol, sodium chloride flush, cyclobenzaprine, promethazine **OR** ondansetron, polyethylene glycol, acetaminophen **OR** acetaminophen      Intake/Output Summary (Last 24 hours) at 3/2/2021 0921  Last data filed at 3/2/2021 4169  Gross per 24 hour   Intake 120 ml   Output 1100 ml   Net -980 ml       Exam:    /74   Pulse 78   Temp 98.2 °F (36.8 °C) (Oral)   Resp 18   Ht 5' 7\" (1.702 m)   Wt 186 lb 6.4 oz (84.6 kg)   SpO2 97%   BMI 29.19 kg/m²     General appearance: No apparent distress, appears stated age and cooperative. HEENT: Pupils equal, round, and reactive to light. Conjunctivae/corneas clear. Neck: Supple, with full range of motion. No jugular venous distention. Trachea midline. Respiratory:  Normal respiratory effort. Clear to auscultation, bilaterally without Rales/Wheezes/Rhonchi. Cardiovascular: Regular rate and rhythm with normal S1/S2 without murmurs, rubs or gallops. Abdomen: Soft, non-tender, non-distended with normal bowel sounds. Musculoskeletal: No clubbing, cyanosis or edema bilaterally. Skin: Skin color, texture, turgor normal.  No rashes or lesions.   Neurologic: Neurovascularly intact without any focal sensory/motor deficits. Cranial nerves: II-XII intact, grossly non-focal.  Psychiatric: Alert and oriented,   Capillary Refill: Brisk,< 3 seconds   Peripheral Pulses: +2 palpable, equal bilaterally       Labs:   Recent Labs     03/01/21  0503   WBC 9.8   HGB 12.0*   HCT 35.5*        Recent Labs     03/01/21  0503      K 4.2      CO2 25   BUN 26*   CREATININE 1.24*   CALCIUM 9.1     No results for input(s): AST, ALT, BILIDIR, BILITOT, ALKPHOS in the last 72 hours. No results for input(s): INR in the last 72 hours. No results for input(s): Noemi Lacks in the last 72 hours. Urinalysis:      Lab Results   Component Value Date    NITRU Negative 02/19/2021    WBCUA 0-2 02/19/2021    BACTERIA Negative 02/19/2021    RBCUA 3-5 02/19/2021    BLOODU Trace-lysed 02/19/2021    SPECGRAV 1.020 02/19/2021    GLUCOSEU Negative 02/19/2021       Radiology:  No orders to display           Assessment/Plan:    Active Hospital Problems    Diagnosis Date Noted    AMS (altered mental status) [R41.82] 02/20/2021         DVT Prophylaxis: lovenox  Diet: DIET GENERAL;  Code Status: Full Code    PT/OT Eval Status: done    Dispo -  BPH- unger in place.  Pain related to unger- added pyridium, schedule tylenol TID, follow up clinically  HTN/tachycardia- added b blocker, follow up clinically  Pt mentioned about depression- added remeron QHS, agreed to see psychology service  Discussed about code status- pt prefer to be full code  Encephalopathy/confusion- back to baseline  Medically stable for skilled status at [facility]       Electronically signed by Johnathan Ya MD on 3/2/2021 at 9:21 AM

## 2021-03-02 NOTE — PROGRESS NOTES
Occupational Therapy  Facility/Department: [facility] MED SURG UNIT  Daily Treatment Note  NAME: Tommy Quan  : 1928  MRN: [id number]    Date of Service: 3/2/2021    Discharge Recommendations:  24 hour supervision or assist       Assessment   Performance deficits / Impairments: Decreased strength;Decreased endurance;Decreased balance  Assessment: Pt reporting a lot of pain/discomfort from his unger and was perseverating on that during this tx. As a result, pt needed inc'd assist with ADL and appeard more confused needing more cues. Noticed pt's urine in unger was significanlty red- informed nsg. Pt was able to perform own cami care after toileting BM but needed max cues for cleanliness and to discontinue with task- pt was not stopping wiping buttock, getting BM on his hands, on his leg, and on the toilet. Pt assisted further to wash his buttock after getting into the shower. Recommend 24hr care/spv at pt's d/c location d/t dec cog/confusion. As far as OT, pt has had an overall decline in fxn since [facility]. Pt needed cues for sequencing with most tasks during this tx session. Treatment Diagnosis: Fall, weakness, AMS  Prognosis: Good  REQUIRES OT FOLLOW UP: Yes         Patient Diagnosis(es): There were no encounter diagnoses. has a past medical history of Back pain, Colitis, ulcerative ([address] and Hypertension. has no past surgical history on file.     Restrictions  Restrictions/Precautions  Restrictions/Precautions: Fall Risk  Subjective   General  Chart Reviewed: Yes  Patient assessed for rehabilitation services?: Yes  Family / Caregiver Present: No  Referring Practitioner: Dr. Mark Ventura  Diagnosis: Fall, weakness, AMS  Subjective  Subjective: Nsg requesting this therapist's stand by to get pt back to bed  General Comment  Comments: Pts nurse gave BP meds at 8:57am   Pain Assessment  Pain Assessment: 0-10  Pain Level: 8  Vital Signs  Patient Currently in Pain: Yes(cont discomfort from unger )   Orientation

## 2021-03-03 PROCEDURE — 97110 THERAPEUTIC EXERCISES: CPT

## 2021-03-03 PROCEDURE — 97530 THERAPEUTIC ACTIVITIES: CPT

## 2021-03-03 PROCEDURE — 97116 GAIT TRAINING THERAPY: CPT

## 2021-03-03 PROCEDURE — 6370000000 HC RX 637 (ALT 250 FOR IP): Performed by: INTERNAL MEDICINE

## 2021-03-03 PROCEDURE — 6360000002 HC RX W HCPCS: Performed by: INTERNAL MEDICINE

## 2021-03-03 PROCEDURE — 1200000002 HC SEMI PRIVATE SWING BED

## 2021-03-03 RX ADMIN — TAMSULOSIN HYDROCHLORIDE 0.4 MG: 0.4 CAPSULE ORAL at 08:03

## 2021-03-03 RX ADMIN — CYCLOBENZAPRINE 10 MG: 10 TABLET, FILM COATED ORAL at 21:24

## 2021-03-03 RX ADMIN — METOPROLOL TARTRATE 25 MG: 25 TABLET, FILM COATED ORAL at 08:03

## 2021-03-03 RX ADMIN — POLYETHYLENE GLYCOL 3350 17 G: 17 POWDER, FOR SOLUTION ORAL at 08:02

## 2021-03-03 RX ADMIN — PHENAZOPYRIDINE HYDROCHLORIDE 200 MG: 100 TABLET ORAL at 08:03

## 2021-03-03 RX ADMIN — DOXAZOSIN 2 MG: 2 TABLET ORAL at 08:02

## 2021-03-03 RX ADMIN — MIRTAZAPINE 7.5 MG: 15 TABLET, ORALLY DISINTEGRATING ORAL at 21:25

## 2021-03-03 RX ADMIN — ACETAMINOPHEN 650 MG: 325 TABLET ORAL at 21:24

## 2021-03-03 RX ADMIN — PHENAZOPYRIDINE HYDROCHLORIDE 200 MG: 100 TABLET ORAL at 12:27

## 2021-03-03 RX ADMIN — DOCUSATE SODIUM 100 MG: 100 CAPSULE ORAL at 08:03

## 2021-03-03 RX ADMIN — FINASTERIDE 5 MG: 5 TABLET, FILM COATED ORAL at 08:02

## 2021-03-03 RX ADMIN — ENOXAPARIN SODIUM 30 MG: 30 INJECTION SUBCUTANEOUS at 08:02

## 2021-03-03 RX ADMIN — ACETAMINOPHEN 650 MG: 325 TABLET ORAL at 14:24

## 2021-03-03 RX ADMIN — METOPROLOL TARTRATE 25 MG: 25 TABLET, FILM COATED ORAL at 21:25

## 2021-03-03 RX ADMIN — ROSUVASTATIN CALCIUM 40 MG: 20 TABLET, FILM COATED ORAL at 21:34

## 2021-03-03 RX ADMIN — GABAPENTIN 100 MG: 100 CAPSULE ORAL at 21:25

## 2021-03-03 RX ADMIN — ACETAMINOPHEN 650 MG: 325 TABLET ORAL at 05:59

## 2021-03-03 RX ADMIN — PHENAZOPYRIDINE HYDROCHLORIDE 200 MG: 100 TABLET ORAL at 17:00

## 2021-03-03 ASSESSMENT — PAIN SCALES - GENERAL: PAINLEVEL_OUTOF10: 0

## 2021-03-03 NOTE — PROGRESS NOTES
Patient attended and participated well in care conf. Daughter in law and son on phone and wife attended. Informed if urinary retention would occur again, pt would go home with unger catheter & f/u appt with urology. Current cardiac meds reviewed w/ all participating. Plan is DC with [facility].

## 2021-03-03 NOTE — PROGRESS NOTES
Strengthening, ROM, Balance Training, Functional Mobility Training, Endurance Training, Cognitive Reorientation, Pain Management, Safety Education & Training, Patient/Caregiver Education & Training, Equipment Evaluation, Education, & procurement, Self-Care / ADL, Home Management Training          Goals  Short term goals  Time Frame for Short term goals: 3-5 days  Short term goal 1: Tolerate 25-30min BUE ther ex/act to inc strength/end for ADL  Short term goal 2: CGA/Marge LB dressing and bathing  Short term goal 3: CGA/Marge toileting  Long term goals  Time Frame for Long term goals : 7-10 days  Long term goal 1: I BUE HEP  Long term goal 2: MI toileting  Long term goal 3: MI LB dressing and bathing  Long term goal 4: Inc to 8-10min stand deanna/end for inc'd safety and I with ADL  Patient Goals   Patient goals : \"To get to a normal state. \"        Therapy Time   Individual Concurrent Group Co-treatment   Time In  11:35         Time Out  12:00         Minutes  [address], [place]

## 2021-03-03 NOTE — PROGRESS NOTES
Hospitalist Progress Note      PCP: Chely Browne MD    Date of Admission: 2/23/2021    Chief Complaint: weakness    Subjective: pt awake/alert    Medications:  Reviewed    Infusion Medications   Scheduled Medications    metoprolol tartrate  25 mg Oral BID    phenazopyridine  200 mg Oral TID WC    acetaminophen  650 mg Oral 3 times per day    mirtazapine  7.5 mg Oral Nightly    enoxaparin  30 mg Subcutaneous Daily    doxazosin  2 mg Oral Daily    rosuvastatin  40 mg Oral Nightly    [Held by provider] amLODIPine  10 mg Oral Daily    docusate sodium  100 mg Oral Daily    finasteride  5 mg Oral Daily    polyethylene glycol  17 g Oral Daily    senna  1 tablet Oral Nightly    tamsulosin  0.4 mg Oral Daily    gabapentin  100 mg Oral Nightly     PRN Meds: LORazepam, traMADol, labetalol, sodium chloride flush, cyclobenzaprine, promethazine **OR** ondansetron, polyethylene glycol, acetaminophen **OR** acetaminophen      Intake/Output Summary (Last 24 hours) at 3/3/2021 0717  Last data filed at 3/3/2021 0559  Gross per 24 hour   Intake --   Output 1650 ml   Net -1650 ml       Exam:    BP (!) 125/59   Pulse 72   Temp 98.1 °F (36.7 °C) (Oral)   Resp 18   Ht 5' 7\" (1.702 m)   Wt 186 lb 6.4 oz (84.6 kg)   SpO2 97%   BMI 29.19 kg/m²     General appearance: No apparent distress, appears stated age and cooperative. HEENT: Pupils equal, round, and reactive to light. Conjunctivae/corneas clear. Neck: Supple, with full range of motion. No jugular venous distention. Trachea midline. Respiratory:  Normal respiratory effort. Clear to auscultation, bilaterally without Rales/Wheezes/Rhonchi. Cardiovascular: Regular rate and rhythm with normal S1/S2 without murmurs, rubs or gallops. Abdomen: Soft, non-tender, non-distended with normal bowel sounds. Musculoskeletal: No clubbing, cyanosis or edema bilaterally. Skin: Skin color, texture, turgor normal.  No rashes or lesions.   Neurologic:  Neurovascularly intact without any focal sensory/motor deficits. Psychiatric:partially Alert and oriented  Capillary Refill: Brisk,< 3 seconds   Peripheral Pulses: +2 palpable, equal bilaterally       Labs:   Recent Labs     03/01/21  0503   WBC 9.8   HGB 12.0*   HCT 35.5*        Recent Labs     03/01/21  0503      K 4.2      CO2 25   BUN 26*   CREATININE 1.24*   CALCIUM 9.1     No results for input(s): AST, ALT, BILIDIR, BILITOT, ALKPHOS in the last 72 hours. No results for input(s): INR in the last 72 hours. No results for input(s): Mita Joanne in the last 72 hours.     Urinalysis:      Lab Results   Component Value Date    NITRU Negative 02/19/2021    WBCUA 0-2 02/19/2021    BACTERIA Negative 02/19/2021    RBCUA 3-5 02/19/2021    BLOODU Trace-lysed 02/19/2021    SPECGRAV 1.020 02/19/2021    GLUCOSEU Negative 02/19/2021       Radiology:  No orders to display           Assessment/Plan:    Active Hospital Problems    Diagnosis Date Noted    AMS (altered mental status) [R41.82] 02/20/2021         DVT Prophylaxis: lovenox  Diet: DIET GENERAL;  Code Status: Full Code    PT/OT Eval Status: done    Dispo -  BPH- on flomax/proscar, will try to remove unger today, follow up clinically  HTN/tachycardia- wel controlled, follow up clinically  Pt mentioned about depression- added remeron QHS, agreed to see psychology service  Encephalopathy/confusion- back to baseline  Medically stable for skilled status at [employer] signed by Tony Gibbs MD on 3/3/2021 at 7:17 AM

## 2021-03-03 NOTE — FLOWSHEET NOTE
Unger cath removed per order. Patient tolerated well and small amount of bloody drainage and small blood clot noted. Depend changed and patient ambulated to bathroom. BM today. Patient is a 1 assist with walker and needed direction with safety. Patient wished to return back to bed for breakfast. Patient repositioned for breakfast. Call light within reach. Video monitor in process. Bed alarm in place. 1345: Pt. Has urinated twice since unger cath has been removed. Patient denies any discomfort or distended feeling at this time. Patient at this time wishes to lay down because he states he is exhausted patient moved from chair to bed with a  One assist and tolerated well. Call light within reach and Video monitoring continues.  Electronically signed by Merissa Bui RN on 3/3/2021 at 1:47 PM

## 2021-03-03 NOTE — PROGRESS NOTES
Physical Therapy  Facility/Department: [facility] MED SURG UNIT  Daily Treatment Note  NAME: Brianne Martinez  : 1928  MRN: [id number]    Date of Service: 3/3/2021    Discharge Recommendations:  Home with assist PRN, Home with Home health PT        Assessment   Body structures, Functions, Activity limitations: Decreased functional mobility ; Decreased strength;Decreased safe awareness;Decreased vision/visual deficit  Assessment: Pt. with apparent fatigue this morning and inc postural sway with touileting requires CGA this date due to unsteadiness. Checked BP post and WNL. SLower pace noted with gait also needing VC for posture and gaze. Issued HEP and perofrmed seated therex. Wife present during session. Gave HEP to take home. Treatment Diagnosis: weakness, decreased balance following a fall at home  Prognosis: Good  REQUIRES PT FOLLOW UP: Yes  Activity Tolerance  Activity Tolerance: Patient limited by fatigue;Patient limited by endurance     Patient Diagnosis(es): There were no encounter diagnoses. has a past medical history of Back pain, Colitis, ulcerative ([address] and Hypertension. has no past surgical history on file. Restrictions  Restrictions/Precautions  Restrictions/Precautions: Fall Risk  Subjective   General  Chart Reviewed: Yes  Additional Pertinent Hx: Pt reports he was told he fell and sustained a concussion however does not remember falling  Missed reason: Patient declined  Family / Caregiver Present: Yes(wife )  Subjective  Subjective: Pt. lying in bed reports fatigue but willing to participate in PT session. Pt. happy catheder removed and denies pain. General Comment  Comments: /58 mmHg.   Patient Observation  Observations: apparent fatigue, slower pacing this date and inc sway with static standing   Pre Treatment Pain Screening  Pain at present: 0  Intervention List: Patient able to continue with treatment    Orientation     Cognition      Objective      Transfers  Sit to Stand: Stand by assistance  Stand to sit: Stand by assistance  Ambulation  Ambulation?: Yes  Ambulation 1  Surface: level tile  Device: Rolling Walker  Quality of Gait: slow pacing, dec stride, forward posture and downward gaze   Stairs/Curb  Stairs?: No     Balance  Posture: Good  Sitting - Static: Good  Sitting - Dynamic: Good  Standing - Static: Fair;+  Standing - Dynamic: Fair  Comments: CGA static stand for toileting this date inc postural sway. Occ UE support x 1 at grab bar. Exercises  Gluteal Sets: x 20   Hip Flexion: seated x 20 hold 3 sec  Hip Abduction: x20   Knee Long Arc Quad: x20 hold 1 sec   Ankle Pumps: x 20  Other exercises  Other exercises 9: Issued and reviewed seated HEP also verbally reviewed supine HEP. HO given to pt.'s wife to take home. G-Code     OutComes Score                                                     AM-PAC Score             Goals  Short term goals  Time Frame for Short term goals: 3-4 days  Short term goal 1: Transfers with SBA  Short term goal 2: Pt amb 100 feet with ww SBA  Short term goal 3: Pt perform 15-20 reps LE exs to improve LE strength and endurance  Long term goals  Time Frame for Long term goals : 7-10 days  Long term goal 1: I bed mobility  Long term goal 2: Pt transfer mod I to allow safe transfers at home  Long term goal 3: Pt amb 150 feet with appropriate AD to allow safe I amb with appropriate AD  Long term goal 4: Pt and family I with HEP to further improve LE strength  Patient Goals   Patient goals : get stronger and go home    Plan    Plan  Times per week: 5-7  Times per day: Daily  Plan weeks: 7-10 days  Current Treatment Recommendations: Strengthening, Balance Training, Functional Mobility Training, Transfer Training, Gait Training, Home Exercise Program, Patient/Caregiver Education & Training, Endurance Training  Safety Devices  Type of devices:  All fall risk precautions in place, Call light within reach, Chair alarm in place, Gait belt, Left in chair, Telesitter in use  Restraints  Initially in place: Yes     Therapy Time   Individual Concurrent Group Co-treatment   Time In  905         Time Out  1000         Minutes  [address], [facility]  License and [address] Number: [phone]

## 2021-03-03 NOTE — PROGRESS NOTES
Physical Therapy  Facility/Department: [facility] MED SURG UNIT  Daily Treatment Note  NAME: eTrry Aparicio  : 1928  MRN: [id number]    Date of Service: 3/3/2021    Discharge Recommendations:  Home with assist PRN, Home with Home health PT        Assessment   Body structures, Functions, Activity limitations: Decreased functional mobility ; Decreased strength;Decreased safe awareness;Decreased vision/visual deficit  Assessment: Focused session on training with Rollator due to BM had to make second attempt for further traing with gait and transfers. Consistent cues needed for safety with Rollator transfers and gait. Performing ~50% of safe transfer completion. Mupltiple trials bed<>chair with Rollator for inc practice and dec risk for falls using Rollator. Treatment Diagnosis: weakness, decreased balance following a fall at home  Prognosis: Good  REQUIRES PT FOLLOW UP: Yes  Activity Tolerance  Activity Tolerance: Patient limited by fatigue;Patient limited by endurance     Patient Diagnosis(es): There were no encounter diagnoses. has a past medical history of Back pain, Colitis, ulcerative ([address] and Hypertension. has no past surgical history on file. Restrictions  Restrictions/Precautions  Restrictions/Precautions: Fall Risk  Subjective   General  Chart Reviewed: Yes  Additional Pertinent Hx: Pt reports he was told he fell and sustained a concussion however does not remember falling  Missed reason: Patient declined  Family / Caregiver Present: Yes(wife )  Subjective  Subjective: Pt. in RR upon arrival for PT session. Agreeable to try ambulation with Rollator. General Comment  Comments: /58 mmHg.   Patient Observation  Observations: apparent fatigue, slower pacing this date and inc sway with static standing   Pre Treatment Pain Screening  Pain at present: 0  Intervention List: Patient able to continue with treatment    Orientation     Cognition      Objective      Transfers  Sit to Stand: Stand by assistance;Contact guard assistance  Stand to sit: Stand by assistance;Contact guard assistance  Bed to Chair: Stand by assistance;Contact guard assistance(x6 trials 3' ea  focusing on safe transfer techniques )  Comment: Cues for use of Rollator locking and unlocking breaks, also for push off and too reach bck to control descent. Limited carryover ~50 % follow through   Ambulation  Ambulation?: Yes  Ambulation 1  Surface: level tile  Device: Rollator  Assistance: Contact guard assistance  Quality of Gait: slow pace Rollator locked, shuffling feet at times from RR and around obstacles improved pacing and stride in hallway straigh pathway   Distance: 15', 100' x2   Comments: max cues and CGA to  take seated RB on Rollator needing cues for locking brakes and turn to sit/stand with Rollator pushed against wall for inc stability. Stairs/Curb  Stairs?: No     Balance  Posture: Good  Sitting - Static: Good  Sitting - Dynamic: Good  Standing - Static: Fair;+  Standing - Dynamic: Fair  Comments: SBA for posterior [employer] min A provided to ensure cleanliness, no LOB one to two UE support at grab bar, SBA x 1 for hand washing   Exercises  Gluteal Sets: x 20   Hip Flexion: seated x 20 hold 3 sec  Hip Abduction: x20   Knee Long Arc Quad: x20 hold 1 sec   Ankle Pumps: x 20  Other exercises  Other exercises 9: Issued and reviewed seated HEP also verbally reviewed supine HEP. HO given to pt.'s wife to take home.                          G-Code     OutComes Score                                                     AM-PAC Score             Goals  Short term goals  Time Frame for Short term goals: 3-4 days  Short term goal 1: Transfers with SBA  Short term goal 2: Pt amb 100 feet with ww SBA  Short term goal 3: Pt perform 15-20 reps LE exs to improve LE strength and endurance  Long term goals  Time Frame for Long term goals : 7-10 days  Long term goal 1: I bed mobility  Long term goal 2: Pt transfer mod I to allow safe transfers at home  Long term goal 3: Pt amb 150 feet with appropriate AD to allow safe I amb with appropriate AD  Long term goal 4: Pt and family I with HEP to further improve LE strength  Patient Goals   Patient goals : get stronger and go home    Plan    Plan  Times per week: 5-7  Times per day: Daily  Plan weeks: 7-10 days  Current Treatment Recommendations: Strengthening, Balance Training, Functional Mobility Training, Transfer Training, Gait Training, Home Exercise Program, Patient/Caregiver Education & Training, Endurance Training  Safety Devices  Type of devices:  All fall risk precautions in place, Call light within reach, Chair alarm in place, Gait belt, Left in chair, Telesitter in use  Restraints  Initially in place: Yes     Therapy Time   Individual Concurrent Group Co-treatment   Time In  1240, 215         Time Out  100, 300         Minutes  [address], [facility]  License and [address] Number: [id number]

## 2021-03-03 NOTE — PROGRESS NOTES
This  spoke with [address] [place] [address] via phone. Provided Rosa Nash with new [address] referral and patient's DC date of 3/4/21. [facility] will follow patient upon DC from [facility]. SARAH completed. Family to transport patient home via personal car after dinner time on Thursday 3/4/21. SS to continue to follow as needed while patient is at [facility].

## 2021-03-03 NOTE — DISCHARGE INSTR - COC
Continuity of Care Form    Patient Name: Terry Aparicio   :  1928  MRN:  [id number]    Admit date:  2021  Discharge date:  3/4/21    Code Status Order: Full Code   Advance Directives:   [facility] Documentation       Date/Time Healthcare Directive Type of Healthcare Directive Copy in [facility] [address] Agent's Name Healthcare Agent's Phone Number    21  No, patient does not have an advance directive for healthcare treatment -- -- -- -- --            Admitting Physician:  Adair Chu MD  PCP: Katherine Nair MD    Discharging Nurse: [facility] Unit/Room#: 6098/6308-61  Discharging Unit Phone Number: [phone]    Emergency Contact:   Extended Emergency Contact Information  Primary Emergency Contact: [address] [place] [address] Phone: [phone]  Mobile Phone: [phone]  Relation: Child  Secondary Emergency Contact: Polo Rolling  Mobile Phone: [phone]  Relation: Child    Past Surgical History:  History reviewed. No pertinent surgical history. Immunization History: There is no immunization history on file for this patient. Active Problems:  Patient Active Problem List   Diagnosis Code    AMS (altered mental status) R41.82    Fall W19. [employer]    BPH (benign prostatic hyperplasia) N40.0    CKD (chronic kidney disease) N18.9    Leukocytosis D72.829    Gross hematuria R31.0    Encephalopathy acute G93.40    Closed head injury S09.90XA    Aphasia R47.01    Mild cognitive impairment Q60.37    Metabolic encephalopathy S93.21    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 N40.1, R33.8       Isolation/Infection:   Isolation            No Isolation          Patient Infection Status       Infection Onset Added Last Indicated Last Indicated By Review Planned Expiration Resolved Resolved By    None active    Resolved    COVID-19 Rule Out 21 COVID-19, Rapid (Ordered)   21 Rule-Out Test Resulted COVID-19 Rule Out 02/19/21 02/19/21 02/19/21 COVID-19 (Ordered)   02/19/21 Rule-Out Test Resulted            Nurse Assessment:  Last Vital Signs: BP (!) 114/58   Pulse 58   Temp 98.1 °F (36.7 °C) (Oral)   Resp 18   Ht 5' 7\" (1.702 m)   Wt 186 lb 6.4 oz (84.6 kg)   SpO2 97%   BMI 29.19 kg/m²     Last documented pain score (0-10 scale): Pain Level: 0  Last Weight:   Wt Readings from Last 1 Encounters:   02/23/21 186 lb 6.4 oz (84.6 kg)     Mental Status:  Alert and Oriented with confusion at time    IV Access:  - None    Nursing Mobility/ADLs:  Walking   Assisted  Transfer  Assisted  Bathing  Assisted  Dressing  Assisted  Toileting  Assisted  Feeding  Independent  Med Admin  Assisted  Med Delivery   Whole    Wound Care Documentation and Therapy:  Wound 02/20/21 Knee Anterior;Right abrasion (Active)   Wound Etiology Other 03/03/21 0559   Dressing/Treatment Open to air 03/03/21 0758   Wound Assessment Dry;Pink/red 03/03/21 0559   Drainage Amount None 03/03/21 0559   Odor None 02/1934   Rosita-wound Assessment Intact 02/1934   Number of days: 11       Wound 02/20/21 Knee Anterior; Left abrasion (Active)   Wound Etiology Other 03/03/21 0559   Dressing/Treatment Open to air 03/03/21 0758   Wound Assessment Dry;Pink/red 03/03/21 0559   Drainage Amount None 03/03/21 0559   Odor None 02/1934   Rosita-wound Assessment Intact 02/1934   Number of days: 11        Elimination:  Continence:   · Bowel:  Yes  · Bladder: Yes  Urinary Catheter: None   Colostomy/Ileostomy/Ileal Conduit: No       Date of Last BM: 3/4/21    Intake/Output Summary (Last 24 hours) at 3/3/2021 1604  Last data filed at 3/3/2021 0559  Gross per 24 hour   Intake --   Output 1650 ml   Net -1650 ml     I/O last 3 completed shifts:  In: -   Out: 9446 [Urine:1650]    Safety Concerns:     Sundowners [place] and At Risk for Falls    Impairments/Disabilities:      None    Nutrition Therapy:  Current Nutrition Therapy:   - Oral Diet: General    Routes of Feeding: Oral  Liquids: Thin Liquids  Daily Fluid Restriction: no  Last Modified Barium Swallow with Video (Video Swallowing Test): not done    Treatments at the Time of Hospital Discharge:   Respiratory Treatments: NA  Oxygen Therapy:  is not on home oxygen therapy. Ventilator:    - No ventilator support    Rehab Therapies: Physical Therapy and Occupational Therapy  Weight Bearing Status/Restrictions: No weight bearing restirctions  Other Medical Equipment (for information only, NOT a DME order):  walker  Other Treatments: ***    Patient's personal belongings (please select all that are sent with patient):  Glasses, Patient wife to take all belongings    RN SIGNATURE:  Electronically signed by Kishan Hwang RN on 3/4/21 at 12:28 PM EST    CASE MANAGEMENT/SOCIAL WORK SECTION    Inpatient Status Date: 2/23/21    Readmission Risk Assessment Score:  Readmission Risk              Risk of Unplanned Readmission:        17           Discharging to Facility/ Agency   · Name: [facility]   · Address: [facility] [address]  · Phone: [phone]  · Fax: [phone]    / signature: Electronically signed by JULIANO Costa on 3/3/2021 at 4:06 PM      PHYSICIAN SECTION    Prognosis: Good    Condition at Discharge: Stable    Rehab Potential (if transferring to Rehab): Good    Recommended Labs or Other Treatments After Discharge:     Physician Certification: I certify the above information and transfer of Ed Bird  is necessary for the continuing treatment of the diagnosis listed and that he requires [address] for greater 30 days.      Update Admission H&P: No change in H&P    PHYSICIAN SIGNATURE:  Electronically signed by Romie Machuca MD on 3/4/21 at 7:51 AM EST

## 2021-03-04 VITALS
BODY MASS INDEX: 29.26 KG/M2 | OXYGEN SATURATION: 97 % | SYSTOLIC BLOOD PRESSURE: 144 MMHG | HEART RATE: 66 BPM | HEIGHT: 67 IN | TEMPERATURE: 97.3 F | RESPIRATION RATE: 18 BRPM | DIASTOLIC BLOOD PRESSURE: 61 MMHG | WEIGHT: 186.4 LBS

## 2021-03-04 LAB
ANION GAP SERPL CALCULATED.3IONS-SCNC: 9 MEQ/L (ref 9–15)
BUN BLDV-MCNC: 40 MG/DL (ref 8–23)
CALCIUM SERPL-MCNC: 9.5 MG/DL (ref 8.5–9.9)
CHLORIDE BLD-SCNC: 103 MEQ/L (ref 95–107)
CO2: 26 MEQ/L (ref 20–31)
CREAT SERPL-MCNC: 1.67 MG/DL (ref 0.7–1.2)
GFR AFRICAN AMERICAN: 46.7
GFR NON-AFRICAN AMERICAN: 38.6
GLUCOSE BLD-MCNC: 95 MG/DL (ref 70–99)
HCT VFR BLD CALC: 34 % (ref 42–52)
HEMOGLOBIN: 11.3 G/DL (ref 14–18)
MCH RBC QN AUTO: 32.9 PG (ref 27–31.3)
MCHC RBC AUTO-ENTMCNC: 33.3 % (ref 33–37)
MCV RBC AUTO: 98.8 FL (ref 80–100)
PDW BLD-RTO: 13.1 % (ref 11.5–14.5)
PLATELET # BLD: 362 K/UL (ref 130–400)
POTASSIUM REFLEX MAGNESIUM: 4.2 MEQ/L (ref 3.4–4.9)
RBC # BLD: 3.44 M/UL (ref 4.7–6.1)
SODIUM BLD-SCNC: 138 MEQ/L (ref 135–144)
WBC # BLD: 7.7 K/UL (ref 4.8–10.8)

## 2021-03-04 PROCEDURE — 6360000002 HC RX W HCPCS: Performed by: INTERNAL MEDICINE

## 2021-03-04 PROCEDURE — 6370000000 HC RX 637 (ALT 250 FOR IP): Performed by: INTERNAL MEDICINE

## 2021-03-04 PROCEDURE — 97535 SELF CARE MNGMENT TRAINING: CPT

## 2021-03-04 PROCEDURE — 85027 COMPLETE CBC AUTOMATED: CPT

## 2021-03-04 PROCEDURE — 80048 BASIC METABOLIC PNL TOTAL CA: CPT

## 2021-03-04 PROCEDURE — 97116 GAIT TRAINING THERAPY: CPT

## 2021-03-04 PROCEDURE — 97530 THERAPEUTIC ACTIVITIES: CPT

## 2021-03-04 PROCEDURE — 36415 COLL VENOUS BLD VENIPUNCTURE: CPT

## 2021-03-04 RX ORDER — DOXAZOSIN 2 MG/1
2 TABLET ORAL DAILY
Qty: 30 TABLET | Refills: 3 | Status: ON HOLD | OUTPATIENT
Start: 2021-03-04 | End: 2021-03-18

## 2021-03-04 RX ORDER — MIRTAZAPINE 15 MG/1
7.5 TABLET, ORALLY DISINTEGRATING ORAL NIGHTLY
Qty: 30 TABLET | Refills: 3 | Status: SHIPPED | OUTPATIENT
Start: 2021-03-04

## 2021-03-04 RX ORDER — AMLODIPINE BESYLATE 5 MG/1
5 TABLET ORAL DAILY
Status: DISCONTINUED | OUTPATIENT
Start: 2021-03-04 | End: 2021-03-04 | Stop reason: HOSPADM

## 2021-03-04 RX ORDER — AMLODIPINE BESYLATE 5 MG/1
5 TABLET ORAL DAILY
Qty: 30 TABLET | Refills: 0 | Status: SHIPPED | OUTPATIENT
Start: 2021-03-04 | End: 2021-03-09

## 2021-03-04 RX ADMIN — FINASTERIDE 5 MG: 5 TABLET, FILM COATED ORAL at 08:25

## 2021-03-04 RX ADMIN — METOPROLOL TARTRATE 25 MG: 25 TABLET, FILM COATED ORAL at 08:24

## 2021-03-04 RX ADMIN — ENOXAPARIN SODIUM 30 MG: 30 INJECTION SUBCUTANEOUS at 08:25

## 2021-03-04 RX ADMIN — ACETAMINOPHEN 650 MG: 325 TABLET ORAL at 06:42

## 2021-03-04 RX ADMIN — PHENAZOPYRIDINE HYDROCHLORIDE 200 MG: 100 TABLET ORAL at 12:54

## 2021-03-04 RX ADMIN — DOXAZOSIN 2 MG: 2 TABLET ORAL at 08:24

## 2021-03-04 RX ADMIN — TAMSULOSIN HYDROCHLORIDE 0.4 MG: 0.4 CAPSULE ORAL at 08:24

## 2021-03-04 RX ADMIN — ACETAMINOPHEN 650 MG: 325 TABLET ORAL at 12:54

## 2021-03-04 RX ADMIN — PHENAZOPYRIDINE HYDROCHLORIDE 200 MG: 100 TABLET ORAL at 08:24

## 2021-03-04 RX ADMIN — AMLODIPINE BESYLATE 5 MG: 10 TABLET ORAL at 08:25

## 2021-03-04 ASSESSMENT — PAIN SCALES - GENERAL: PAINLEVEL_OUTOF10: 0

## 2021-03-04 NOTE — PROGRESS NOTES
Physical Therapy  Facility/Department: [facility] MED SURG UNIT  Daily Treatment Note  NAME: Diomedes Cr  : 1928  MRN: [id number]    Date of Service: 3/4/2021    Discharge Recommendations:  (P) Home with assist PRN, Home with Home health PT        Assessment  Pt is confused, demo increased fatigue this date, required marked time on commode and to complete cami care- declined additional ambulation/interventions afterwards d/t increased fatigue. Pt reports he was unable to sleep well last night and when awake has frequent urges to urinate, requested to return to bed following activity in restroom, no pain noted. Body structures, Functions, Activity limitations: (P) Decreased functional mobility ; Decreased strength;Decreased safe awareness;Decreased vision/visual deficit  Treatment Diagnosis: (P) weakness, decreased balance following a fall at home  Prognosis: (P) Good  Decision Making: (P) Medium Complexity  History: (P) med  Exam: (P) med  Clinical Presentation: (P) med  REQUIRES PT FOLLOW UP: (P) Yes  Activity Tolerance  Activity Tolerance: (P) Patient limited by fatigue;Patient limited by endurance     Patient Diagnosis(es): There were no encounter diagnoses. has a past medical history of Back pain, Colitis, ulcerative ([address] and Hypertension. has no past surgical history on file. Restrictions  Restrictions/Precautions  Restrictions/Precautions: (P) Fall Risk  Subjective   General  Chart Reviewed: (P) Yes  Additional Pertinent Hx: (P) Pt reports he was told he fell and sustained a concussion however does not remember falling  Family / Caregiver Present: (P) Yes  Referring Practitioner: Robin Vela  Subjective: (P) Pt sleeping in bed; HOB elevated- able to alert pt easily.   Comments: (P) \"I've been having the urgency to urinate\"  Pain Screening  Patient Currently in Pain: (P) Denies  Pre Treatment Pain Screening  Pain at present: (P) 0  Intervention List: (P) Patient able to continue with treatment Orientation     Cognition      Objective   Bed mobility: SUP supine <> sit  Scooting: (P) Modified independent(pt able to scoot laterally towards center of bed with cues)  Transfers  Sit to Stand: (P) Stand by assistance;Supervision  Stand to sit: (P) Stand by assistance;Supervision  Stand Pivot Transfers: (P) Stand by assistance  Comment: (P) cues for hand placement, sequencing; safe use/positioning of AD and appropriate use of brakes  Ambulation  Ambulation?: (P) Yes  Ambulation 1  Surface: (P) level tile  Device: (P) Rollator  Assistance: (P) Stand by assistance  Quality of Gait: (P) rigid trunk/fwd head  Gait Deviations: (P) Slow Gaby;Decreased step length;Decreased head and trunk rotation  Distance: (P) 10', 8' x 2  Comments: (P) cues for direction, safe negotiation in tight spaces, manuvering in bathroom  Stairs/Curb  Stairs?: (P) No     Balance  Posture: (P) Good  Sitting - Static: (P) Good  Sitting - Dynamic: (P) Good  Standing - Static: (P) Fair;+  Standing - Dynamic: (P) Fair            Other Activities: (P) Other (see comment)   Assisted pt with toilet transfer; brief soiled- set up assist for cami care/clean up, gown/brief change. Pt able to transfer with SBA form toilet; cues for safety/positioning of AD when moving over to sink- SBA unsupported standing at sink for 3.5 minutes to complete hand washing, oral hygiene- unsupported, no LOB noted, mild SOB.             G-Code     OutComes Score                                                     AM-PAC Score             Goals  Short term goals  Time Frame for Short term goals: (P) 3-4 days  Short term goal 1: (P) Transfers with SBA  Short term goal 2: (P) Pt amb 100 feet with ww SBA  Short term goal 3: (P) Pt perform 15-20 reps LE exs to improve LE strength and endurance  Long term goals  Time Frame for Long term goals : (P) 7-10 days  Long term goal 1: (P) I bed mobility  Long term goal 2: (P) Pt transfer mod I to allow safe transfers at home  Long term goal 3: (P) Pt amb 150 feet with appropriate AD to allow safe I amb with appropriate AD  Long term goal 4: (P) Pt and family I with HEP to further improve LE strength  Patient Goals   Patient goals : (P) get stronger and go home    Plan    Plan  Times per week: (P) 5-7  Times per day: (P) Daily  Plan weeks: (P) 7-10 days  Current Treatment Recommendations: (P) Strengthening, Balance Training, Functional Mobility Training, Transfer Training, Gait Training, Home Exercise Program, Patient/Caregiver Education & Training, Endurance Training  Safety Devices  Type of devices: (P) All fall risk precautions in place, Bed alarm in place, Call light within reach, Telesitter in use  Restraints  Initially in place: (P) Yes     Therapy Time   Individual Concurrent Group Co-treatment   Time In  11:10 am         Time Out  12:00 pm         Minutes  [address] [facility]  License and [address] Number: [phone]

## 2021-03-04 NOTE — DISCHARGE SUMMARY
capsule  Commonly known as: FLOMAX  Take 1 capsule by mouth daily     vitamin D 50 MCG (2000 UT) Caps capsule           Where to Get Your Medications      These medications were sent to [facility] [phone]  [address]    Phone: [phone]   · amLODIPine 5 MG tablet  · doxazosin 2 MG tablet  · metoprolol tartrate 25 MG tablet  · mirtazapine 15 MG disintegrating tablet         Physical Exam:    Vitals:  Vitals:    03/02/21 0537 03/03/21 0555 03/03/21 0922 03/04/21 0547   BP: 135/74 (!) 125/59 (!) 114/58 (!) 144/61   Pulse: 78 72 58 66   Resp: 18 18  18   Temp: 98.2 °F (36.8 °C) 98.1 °F (36.7 °C)  97.3 °F (36.3 °C)   TempSrc: Oral Oral     SpO2: 97% 97%     Weight:       Height:         Weight: Weight: 186 lb 6.4 oz (84.6 kg)     24 hour intake/output:No intake or output data in the 24 hours ending 03/04/21 0755    General appearance - alert, well appearing, and in no distress  Chest - clear to auscultation, no wheezes, rales or rhonchi, symmetric air entry  Heart - normal rate, regular rhythm, normal S1, S2, no murmurs, rubs, clicks or gallops  Abdomen - soft, nontender, nondistended, no masses or organomegaly  Obese: Yes; Protuberant: No   Neurological - alert, oriented, normal speech, no focal findings or movement disorder noted  Extremities - peripheral pulses normal, no pedal edema, no clubbing or cyanosis  Skin - normal coloration and turgor, no rashes, no suspicious skin lesions noted        Radiology reports as per the Radiologist  Radiology: No results found.      Results for orders placed or performed during the hospital encounter of 02/23/21   CBC   Result Value Ref Range    WBC 10.5 4.8 - 10.8 K/uL    RBC 3.70 (L) 4.70 - 6.10 M/uL    Hemoglobin 12.2 (L) 14.0 - 18.0 g/dL    Hematocrit 36.6 (L) 42.0 - 52.0 %    MCV 99.0 80.0 - 100.0 fL    MCH 33.0 (H) 27.0 - 31.3 pg    MCHC 33.3 33.0 - 37.0 %    RDW 13.6 11.5 - 14.5 %    Platelets 875 247 - 966 K/uL   Basic Metabolic Panel w/ Reflex to MG   Result Value Ref Range    Sodium 139 135 - 144 mEq/L    Potassium reflex Magnesium 4.2 3.4 - 4.9 mEq/L    Chloride 103 95 - 107 mEq/L    CO2 26 20 - 31 mEq/L    Anion Gap 10 9 - 15 mEq/L    Glucose 101 (H) 70 - 99 mg/dL    BUN 30 (H) 8 - 23 mg/dL    CREATININE 1.34 (H) 0.70 - 1.20 mg/dL    GFR Non-African American 49.8 (L) >60    GFR  >60.0 >60    Calcium 9.4 8.5 - 9.9 mg/dL   Basic Metabolic Panel w/ Reflex to MG   Result Value Ref Range    Sodium 139 135 - 144 mEq/L    Potassium reflex Magnesium 4.1 3.4 - 4.9 mEq/L    Chloride 102 95 - 107 mEq/L    CO2 26 20 - 31 mEq/L    Anion Gap 11 9 - 15 mEq/L    Glucose 105 (H) 70 - 99 mg/dL    BUN 28 (H) 8 - 23 mg/dL    CREATININE 1.10 0.70 - 1.20 mg/dL    GFR Non-African American >60.0 >60    GFR  >60.0 >60    Calcium 9.6 8.5 - 9.9 mg/dL   CBC   Result Value Ref Range    WBC 10.6 4.8 - 10.8 K/uL    RBC 3.82 (L) 4.70 - 6.10 M/uL    Hemoglobin 12.6 (L) 14.0 - 18.0 g/dL    Hematocrit 37.6 (L) 42.0 - 52.0 %    MCV 98.4 80.0 - 100.0 fL    MCH 33.1 (H) 27.0 - 31.3 pg    MCHC 33.6 33.0 - 37.0 %    RDW 13.6 11.5 - 14.5 %    Platelets 871 806 - 208 K/uL    PLATELET SLIDE REVIEW Adequate     SLIDE REVIEW see below    Basic Metabolic Panel w/ Reflex to MG   Result Value Ref Range    Sodium 139 135 - 144 mEq/L    Potassium reflex Magnesium 4.2 3.4 - 4.9 mEq/L    Chloride 103 95 - 107 mEq/L    CO2 25 20 - 31 mEq/L    Anion Gap 11 9 - 15 mEq/L    Glucose 109 (H) 70 - 99 mg/dL    BUN 26 (H) 8 - 23 mg/dL    CREATININE 1.24 (H) 0.70 - 1.20 mg/dL    GFR Non-African American 54.4 (L) >60    GFR  >60.0 >60    Calcium 9.1 8.5 - 9.9 mg/dL   CBC   Result Value Ref Range    WBC 9.8 4.8 - 10.8 K/uL    RBC 3.62 (L) 4.70 - 6.10 M/uL    Hemoglobin 12.0 (L) 14.0 - 18.0 g/dL    Hematocrit 35.5 (L) 42.0 - 52.0 %    MCV 98.1 80.0 - 100.0 fL    MCH 33.2 (H) 27.0 - 31.3 pg    MCHC

## 2021-03-04 NOTE — PROGRESS NOTES
Explained AVS to pt, wife, and daughter in [facility]) in detail including medications and follow up appoinments. Family gathered all of pt's belongings. Pt left unit via wheelchair with staff and family.

## 2021-03-04 NOTE — FLOWSHEET NOTE
Patient is provided with DC instructions. All future appointments were reviewed with him and his wife. New, changed, and continued medications were reviewed with patient. All questions were answered.

## 2021-03-04 NOTE — PROGRESS NOTES
Occupational Therapy  Facility/Department: [facility] MED SURG UNIT  Daily Treatment Note  NAME: Mikey Villasenor  : 1928  MRN: [id number]    Date of Service: 3/4/2021    Discharge Recommendations:  24 hour supervision or assist       Assessment   Performance deficits / Impairments: Decreased strength;Decreased endurance;Decreased balance  Assessment: Pt agreeable to therapy this afternoon. Co-tx with PTA d/t schedule timing. Pt agreeable to walk and get dressed so he can be ready to go home. Pt SBA for sit <-> stands and fxl mob with Rollator. Pt does need max cues for how to manage Rollator breaks- recommending pt have SBA for safety when managing the Rollator breaks. Pt did well getting self dressed setup/Spv UB and SBA LB. Treatment Diagnosis: Fall, weakness, AMS  Prognosis: Good  REQUIRES OT FOLLOW UP: Yes         Patient Diagnosis(es): There were no encounter diagnoses. has a past medical history of Back pain, Colitis, ulcerative ([address] and Hypertension. has no past surgical history on file.     Restrictions  Restrictions/Precautions  Restrictions/Precautions: Fall Risk  Subjective   General  Chart Reviewed: Yes  Patient assessed for rehabilitation services?: Yes  Family / Caregiver Present: No  Referring Practitioner: Dr. Rudolph Clifford  Diagnosis: Fall, weakness, AMS  Subjective  Subjective: Pt agreeable to therapy and is set to d/c home today  General Comment  Comments: Pts nurse gave BP meds at 8:57am   Vital Signs  Patient Currently in Pain: Other (comment)(\"Oh don't even talk to me about that\")   Orientation     Objective    ADL  UE Dressing: Setup;Supervision(doff gown, don undershirt and long-sleeve shirt)  LE Dressing: Setup;Stand by assistance(don pants and buckle belt)        Functional Mobility  Functional - Mobility Device: 4-Wheeled Walker  Activity: Other(long distance level tile surface)  Assist Level: Stand by assistance  Bed mobility  Supine to Sit: Modified independent  Transfers  Sit to stand: Stand by assistance  Stand to sit: Stand by assistance                       Plan   Plan  Times per week: 3-6x/wk  Times per day: Daily  Plan weeks: 7-10 days  Current Treatment Recommendations: Strengthening, ROM, Balance Training, Functional Mobility Training, Endurance Training, Cognitive Reorientation, Pain Management, Safety Education & Training, Patient/Caregiver Education & Training, Equipment Evaluation, Education, & procurement, Self-Care / ADL, Home Management Training          Goals  Short term goals  Time Frame for Short term goals: 3-5 days  Short term goal 1: Tolerate 25-30min BUE ther ex/act to inc strength/end for ADL  Short term goal 2: CGA/Marge LB dressing and bathing  Short term goal 3: CGA/Marge toileting  Long term goals  Time Frame for Long term goals : 7-10 days  Long term goal 1: I BUE HEP  Long term goal 2: MI toileting  Long term goal 3: MI LB dressing and bathing  Long term goal 4: Inc to 8-10min stand deanna/end for inc'd safety and I with ADL  Patient Goals   Patient goals : \"To get to a normal state. \"        Therapy Time   Individual Concurrent Group Co-treatment   Time In  12:30         Time Out  1:05         Minutes  [address] [place], [place]

## 2021-03-04 NOTE — PROGRESS NOTES
Physical Therapy  Facility/Department: [facility] MED SURG UNIT  Daily Treatment Note  NAME: Nolberto Singleton  : 1928  MRN: [id number]    Date of Service: 3/4/2021    Discharge Recommendations:  Home with assist PRN, Home with Home health PT        Assessment   Body structures, Functions, Activity limitations: Decreased functional mobility ; Decreased strength;Decreased safe awareness;Decreased vision/visual deficit  Assessment: Partial cotreatment due to scheduling. Pt. cont to need VC for safety management with locking brakes on Rollator. Treatment Diagnosis: weakness, decreased balance following a fall at home  Prognosis: Good  REQUIRES PT FOLLOW UP: Yes  Activity Tolerance  Activity Tolerance: Patient limited by fatigue;Patient limited by endurance     Patient Diagnosis(es): There were no encounter diagnoses. has a past medical history of Back pain, Colitis, ulcerative ([address] and Hypertension. has no past surgical history on file. Restrictions  Restrictions/Precautions  Restrictions/Precautions: Fall Risk  Subjective      General  Chart Reviewed: Yes  Additional Pertinent Hx: Pt reports he was told he fell and sustained a concussion however does not remember falling  Missed reason: Patient declined  Family / Caregiver Present: Yes  Referring Practitioner: Peterson Ye  Subjective  Subjective: Pt. agreeable to PT session. Pt. denies pain and is awaiting to D/C later this afternoon.        Pre Treatment Pain Screening  Pain at present: 0  Intervention List: Patient able to continue with treatment    Orientation     Cognition      Objective    Bed Mobility   Supine to Sit Sup   Transfers  Sit to Stand: Stand by assistance;Supervision  Stand to sit: Stand by assistance;Supervision  Comment: cues for hand placement, sequencing; safe use/positioning of AD and appropriate use of brakes  Ambulation  Ambulation?: Yes  Ambulation 1  Surface: level tile  Device: Rollator  Assistance: Stand by assistance  Quality of Gait:

## 2021-03-09 ENCOUNTER — OFFICE VISIT (OUTPATIENT)
Dept: UROLOGY | Age: 86
End: 2021-03-09
Payer: MEDICARE

## 2021-03-09 VITALS
HEART RATE: 56 BPM | HEIGHT: 67 IN | SYSTOLIC BLOOD PRESSURE: 110 MMHG | DIASTOLIC BLOOD PRESSURE: 64 MMHG | BODY MASS INDEX: 29.19 KG/M2 | WEIGHT: 186 LBS

## 2021-03-09 DIAGNOSIS — R33.9 RETENTION OF URINE: Primary | ICD-10-CM

## 2021-03-09 LAB — POST VOID RESIDUAL (PVR): 405 ML

## 2021-03-09 PROCEDURE — G8427 DOCREV CUR MEDS BY ELIG CLIN: HCPCS | Performed by: UROLOGY

## 2021-03-09 PROCEDURE — 51798 US URINE CAPACITY MEASURE: CPT | Performed by: UROLOGY

## 2021-03-09 PROCEDURE — 1111F DSCHRG MED/CURRENT MED MERGE: CPT | Performed by: UROLOGY

## 2021-03-09 PROCEDURE — 1123F ACP DISCUSS/DSCN MKR DOCD: CPT | Performed by: UROLOGY

## 2021-03-09 PROCEDURE — 4040F PNEUMOC VAC/ADMIN/RCVD: CPT | Performed by: UROLOGY

## 2021-03-09 PROCEDURE — 99203 OFFICE O/P NEW LOW 30 MIN: CPT | Performed by: UROLOGY

## 2021-03-09 PROCEDURE — 1036F TOBACCO NON-USER: CPT | Performed by: UROLOGY

## 2021-03-09 PROCEDURE — G8484 FLU IMMUNIZE NO ADMIN: HCPCS | Performed by: UROLOGY

## 2021-03-09 PROCEDURE — G8417 CALC BMI ABV UP PARAM F/U: HCPCS | Performed by: UROLOGY

## 2021-03-09 RX ORDER — VIT A AND D3 IN COD LIVER OIL 1250-135
CAPSULE ORAL
COMMUNITY

## 2021-03-09 RX ORDER — AMLODIPINE BESYLATE 2.5 MG/1
2.5 TABLET ORAL DAILY
Status: ON HOLD | COMMUNITY
End: 2021-03-18

## 2021-03-09 RX ORDER — TAMSULOSIN HYDROCHLORIDE 0.4 MG/1
0.4 CAPSULE ORAL DAILY
Qty: 90 CAPSULE | Refills: 3 | Status: SHIPPED | OUTPATIENT
Start: 2021-03-09

## 2021-03-09 RX ORDER — FINASTERIDE 5 MG/1
5 TABLET, FILM COATED ORAL DAILY
Qty: 90 TABLET | Refills: 3 | Status: ON HOLD | OUTPATIENT
Start: 2021-03-09 | End: 2021-03-18

## 2021-03-09 NOTE — PROGRESS NOTES
[facility] EVALUATION NOTE                                                 H&P                                                                                                                                                 Reason for Visit  High postvoid residuals    History of Present Illness  59-year-old male recently discharged from [facility]  Had a Pittman catheter placed which was later removed  Patient continued to have high postvoid residuals  However the oxybutynin was never discontinued      Urologic Review of Systems/Symptoms  History of urinary retention    Review of Systems  Hospitalization: Recent admission to rehab  All 14 categories of Review of Systems otherwise reviewed no other findings reported.     Past Medical History:   Diagnosis Date    Back pain     Colitis, ulcerative ([address]     Hypertension      Past Surgical History:   Procedure Laterality Date    TONSILLECTOMY       Social History     Socioeconomic History    Marital status:      Spouse name: None    Number of children: None    Years of education: None    Highest education level: None   Occupational History    None   Social Needs    Financial resource strain: None    Food insecurity     Worry: None     Inability: None    Transportation needs     Medical: None     Non-medical: None   Tobacco Use    Smoking status: Never Smoker    Smokeless tobacco: Never Used   Substance and Sexual Activity    Alcohol use: Not Currently    Drug use: Not Currently    Sexual activity: Not Currently   Lifestyle    Physical activity     Days per week: None     Minutes per session: None    Stress: None   Relationships    Social connections     Talks on phone: None     Gets together: None     Attends Zoroastrian service: None     Active member of club or organization: None     Attends meetings of clubs or organizations: None     Relationship status: None    Intimate partner violence     Fear of current or ex partner: None Emotionally abused: None     Physically abused: None     Forced sexual activity: None   Other Topics Concern    None   Social History Narrative    None     History reviewed. No pertinent family history. Current Outpatient Medications   Medication Sig Dispense Refill    amLODIPine (NORVASC) 2.5 MG tablet Take 2.5 mg by mouth daily      Polyvinyl Alcohol-Povidone (REFRESH OP) Apply 1 drop to eye 4 times daily      Cod Liver Oil (V-R COD LIVER) CAPS Take by mouth      tamsulosin (FLOMAX) 0.4 MG capsule Take 1 capsule by mouth daily 90 capsule 3    finasteride (PROSCAR) 5 MG tablet Take 1 tablet by mouth daily 90 tablet 3    doxazosin (CARDURA) 2 MG tablet Take 1 tablet by mouth daily 30 tablet 3    metoprolol tartrate (LOPRESSOR) 25 MG tablet Take 1 tablet by mouth 2 times daily 60 tablet 3    mirtazapine (REMERON SOL-TAB) 15 MG disintegrating tablet Take 0.5 tablets by mouth nightly 30 tablet 3    polyethylene glycol (GLYCOLAX) 17 g packet Take 17 g by mouth daily as needed for Constipation 527 g 1    finasteride (PROSCAR) 5 MG tablet Take 1 tablet by mouth daily 30 tablet 3    tamsulosin (FLOMAX) 0.4 MG capsule Take 1 capsule by mouth daily 30 capsule 3    oxybutynin (DITROPAN) 5 MG tablet Take 5 mg by mouth 2 times daily      acetaminophen (TYLENOL) 325 MG tablet Take 650 mg by mouth every 6 hours as needed for Pain      Cholecalciferol (VITAMIN D) 50 MCG (2000 UT) CAPS capsule Take by mouth      gabapentin (NEURONTIN) 600 MG tablet Take 300 mg by mouth nightly.  cyclobenzaprine (FLEXERIL) 10 MG tablet Take 10 mg by mouth nightly as needed for Muscle spasms       No current facility-administered medications for this visit.       Sulfa antibiotics  All reviewed and verified by Dr Mallory Vyas on today's visit    No results found for: PSA, PSADIA  Results for POC orders placed in visit on 03/09/21   poct post void residual   Result Value Ref Range    post void residual 405 ml    Narrative    A point of care test   Post Void Residual was completed by performing  ultrasound scan of the bladder and  reviewed by Dr Flynn Lyn       Physical Exam  Vitals:    03/09/21 1340   BP: 110/64   Pulse: 56   Weight: 186 lb (84.4 kg)   Height: 5' 7\" (1.702 m)     Constitutional: Not in distress here with his daughter-in-law. Head and Neck: Normal  Cardiovascular: Normal rate, BP reviewed. Normal  Pulmonary/Chest: Normal respiratory effort not short of breath  Abdominal: Not distended. No suprapubic pain  Urologic Exam  Postvoid residual 405 cc. Psychiatric: Patient is alert and oriented proper in conversation. Assessment/Medical Necessity-Decision Making  History of urinary retention and BPH  Also on oxybutynin for urinary frequency  High residuals may very well be secondary to oxybutynin  Plan  Stop oxybutynin  Long-term prescriptions for tamsulosin and finasteride given  Follow-up of voiding problems continue  Greater than 50% of 30 minutes spent consulting patient face-to-face  Orders Placed This Encounter   Procedures    poct post void residual     Orders Placed This Encounter   Medications    tamsulosin (FLOMAX) 0.4 MG capsule     Sig: Take 1 capsule by mouth daily     Dispense:  90 capsule     Refill:  3    finasteride (PROSCAR) 5 MG tablet     Sig: Take 1 tablet by mouth daily     Dispense:  90 tablet     Refill:  3     Paulina Quispe MD       Please note this report has been partially produced using speech recognition software  And may cause contain errors related to that system including grammar, punctuation and spelling as well as words and phrases that may seem inappropriate. If there are questions or concerns please feel free to contact me to clarify.

## 2021-03-10 ENCOUNTER — TELEPHONE (OUTPATIENT)
Dept: UROLOGY | Age: 86
End: 2021-03-10

## 2021-03-10 NOTE — TELEPHONE ENCOUNTER
----- Message from [place] sent at 3/9/2021  4:22 PM EST -----  Regarding: AVS  Per pt's daughter, pt's AVS doesn't reflect the medication change that was discussed during the appt. She would like to review this with you, so that everything is accurate.      Anabella Hill [phone]

## 2021-03-18 ENCOUNTER — HOSPITAL ENCOUNTER (INPATIENT)
Age: 86
LOS: 3 days | Discharge: HOME HEALTH | DRG: 312 | End: 2021-03-21
Attending: INTERNAL MEDICINE | Admitting: INTERNAL MEDICINE
Payer: MEDICARE

## 2021-03-18 ENCOUNTER — HOSPITAL ENCOUNTER (EMERGENCY)
Age: 86
Discharge: ANOTHER HOSPITAL | End: 2021-03-18
Attending: EMERGENCY MEDICINE
Payer: MEDICARE

## 2021-03-18 ENCOUNTER — APPOINTMENT (OUTPATIENT)
Dept: GENERAL RADIOLOGY | Age: 86
End: 2021-03-18
Payer: MEDICARE

## 2021-03-18 ENCOUNTER — APPOINTMENT (OUTPATIENT)
Dept: CT IMAGING | Age: 86
End: 2021-03-18
Payer: MEDICARE

## 2021-03-18 VITALS
HEART RATE: 84 BPM | BODY MASS INDEX: 28.04 KG/M2 | HEIGHT: 68 IN | TEMPERATURE: 97.9 F | SYSTOLIC BLOOD PRESSURE: 134 MMHG | OXYGEN SATURATION: 97 % | RESPIRATION RATE: 20 BRPM | DIASTOLIC BLOOD PRESSURE: 78 MMHG | WEIGHT: 185 LBS

## 2021-03-18 DIAGNOSIS — N30.00 ACUTE CYSTITIS WITHOUT HEMATURIA: ICD-10-CM

## 2021-03-18 DIAGNOSIS — I95.9 HYPOTENSION, UNSPECIFIED HYPOTENSION TYPE: ICD-10-CM

## 2021-03-18 DIAGNOSIS — R77.8 ELEVATED TROPONIN: ICD-10-CM

## 2021-03-18 DIAGNOSIS — R33.9 URINARY RETENTION: ICD-10-CM

## 2021-03-18 DIAGNOSIS — R55 SYNCOPE AND COLLAPSE: Primary | ICD-10-CM

## 2021-03-18 LAB
ALBUMIN SERPL-MCNC: 4.1 G/DL (ref 3.5–4.6)
ALP BLD-CCNC: 92 U/L (ref 35–104)
ALT SERPL-CCNC: 30 U/L (ref 0–41)
ANION GAP SERPL CALCULATED.3IONS-SCNC: 12 MEQ/L (ref 9–15)
AST SERPL-CCNC: 29 U/L (ref 0–40)
BACTERIA: ABNORMAL /HPF
BASOPHILS ABSOLUTE: 0 K/UL (ref 0–0.2)
BASOPHILS RELATIVE PERCENT: 0.6 %
BILIRUB SERPL-MCNC: 0.4 MG/DL (ref 0.2–0.7)
BILIRUBIN URINE: NEGATIVE
BLOOD, URINE: ABNORMAL
BUN BLDV-MCNC: 25 MG/DL (ref 8–23)
CALCIUM SERPL-MCNC: 9.8 MG/DL (ref 8.5–9.9)
CHLORIDE BLD-SCNC: 98 MEQ/L (ref 95–107)
CLARITY: ABNORMAL
CO2: 25 MEQ/L (ref 20–31)
COLOR: ABNORMAL
CREAT SERPL-MCNC: 1.55 MG/DL (ref 0.7–1.2)
EKG ATRIAL RATE: 90 BPM
EKG P AXIS: 35 DEGREES
EKG P-R INTERVAL: 208 MS
EKG Q-T INTERVAL: 382 MS
EKG QRS DURATION: 102 MS
EKG QTC CALCULATION (BAZETT): 467 MS
EKG R AXIS: -29 DEGREES
EKG T AXIS: 56 DEGREES
EKG VENTRICULAR RATE: 90 BPM
EOSINOPHILS ABSOLUTE: 0.3 K/UL (ref 0–0.7)
EOSINOPHILS RELATIVE PERCENT: 4.7 %
GFR AFRICAN AMERICAN: 50.9
GFR NON-AFRICAN AMERICAN: 42.1
GLOBULIN: 3.1 G/DL (ref 2.3–3.5)
GLUCOSE BLD-MCNC: 173 MG/DL (ref 70–99)
GLUCOSE URINE: NEGATIVE MG/DL
HCT VFR BLD CALC: 33.6 % (ref 42–52)
HEMOGLOBIN: 11.3 G/DL (ref 14–18)
KETONES, URINE: NEGATIVE MG/DL
LACTIC ACID: 2.1 MMOL/L (ref 0.5–2.2)
LEUKOCYTE ESTERASE, URINE: ABNORMAL
LYMPHOCYTES ABSOLUTE: 1.3 K/UL (ref 1–4.8)
LYMPHOCYTES RELATIVE PERCENT: 20.8 %
MAGNESIUM: 1.8 MG/DL (ref 1.7–2.4)
MCH RBC QN AUTO: 33 PG (ref 27–31.3)
MCHC RBC AUTO-ENTMCNC: 33.7 % (ref 33–37)
MCV RBC AUTO: 98 FL (ref 80–100)
MONOCYTES ABSOLUTE: 0.5 K/UL (ref 0.2–0.8)
MONOCYTES RELATIVE PERCENT: 7.4 %
NEUTROPHILS ABSOLUTE: 4.2 K/UL (ref 1.4–6.5)
NEUTROPHILS RELATIVE PERCENT: 66.5 %
NITRITE, URINE: NEGATIVE
PDW BLD-RTO: 13.3 % (ref 11.5–14.5)
PH UA: 7 (ref 5–9)
PLATELET # BLD: 334 K/UL (ref 130–400)
POTASSIUM SERPL-SCNC: 3.9 MEQ/L (ref 3.4–4.9)
PROTEIN UA: 100 MG/DL
RBC # BLD: 3.43 M/UL (ref 4.7–6.1)
SARS-COV-2, NAAT: NOT DETECTED
SODIUM BLD-SCNC: 135 MEQ/L (ref 135–144)
SPECIFIC GRAVITY UA: 1.01 (ref 1–1.03)
TOTAL PROTEIN: 7.2 G/DL (ref 6.3–8)
TROPONIN: 0.01 NG/ML (ref 0–0.01)
TROPONIN: <0.01 NG/ML (ref 0–0.01)
UROBILINOGEN, URINE: 0.2 E.U./DL
WBC # BLD: 6.3 K/UL (ref 4.8–10.8)
WBC UA: >100 /HPF (ref 0–5)

## 2021-03-18 PROCEDURE — 71045 X-RAY EXAM CHEST 1 VIEW: CPT

## 2021-03-18 PROCEDURE — 84484 ASSAY OF TROPONIN QUANT: CPT

## 2021-03-18 PROCEDURE — 6370000000 HC RX 637 (ALT 250 FOR IP): Performed by: INTERNAL MEDICINE

## 2021-03-18 PROCEDURE — 83735 ASSAY OF MAGNESIUM: CPT

## 2021-03-18 PROCEDURE — 70450 CT HEAD/BRAIN W/O DYE: CPT

## 2021-03-18 PROCEDURE — 85025 COMPLETE CBC W/AUTO DIFF WBC: CPT

## 2021-03-18 PROCEDURE — 83605 ASSAY OF LACTIC ACID: CPT

## 2021-03-18 PROCEDURE — 99285 EMERGENCY DEPT VISIT HI MDM: CPT

## 2021-03-18 PROCEDURE — 2060000000 HC ICU INTERMEDIATE R&B

## 2021-03-18 PROCEDURE — 99221 1ST HOSP IP/OBS SF/LOW 40: CPT | Performed by: PSYCHIATRY & NEUROLOGY

## 2021-03-18 PROCEDURE — 87040 BLOOD CULTURE FOR BACTERIA: CPT

## 2021-03-18 PROCEDURE — 80053 COMPREHEN METABOLIC PANEL: CPT

## 2021-03-18 PROCEDURE — 51798 US URINE CAPACITY MEASURE: CPT

## 2021-03-18 PROCEDURE — 36415 COLL VENOUS BLD VENIPUNCTURE: CPT

## 2021-03-18 PROCEDURE — 87086 URINE CULTURE/COLONY COUNT: CPT

## 2021-03-18 PROCEDURE — 87635 SARS-COV-2 COVID-19 AMP PRB: CPT

## 2021-03-18 PROCEDURE — 6360000002 HC RX W HCPCS: Performed by: INTERNAL MEDICINE

## 2021-03-18 PROCEDURE — 6360000002 HC RX W HCPCS: Performed by: EMERGENCY MEDICINE

## 2021-03-18 PROCEDURE — 93005 ELECTROCARDIOGRAM TRACING: CPT

## 2021-03-18 PROCEDURE — 99223 1ST HOSP IP/OBS HIGH 75: CPT | Performed by: INTERNAL MEDICINE

## 2021-03-18 PROCEDURE — 2580000003 HC RX 258: Performed by: EMERGENCY MEDICINE

## 2021-03-18 PROCEDURE — 81001 URINALYSIS AUTO W/SCOPE: CPT

## 2021-03-18 PROCEDURE — 93010 ELECTROCARDIOGRAM REPORT: CPT | Performed by: INTERNAL MEDICINE

## 2021-03-18 PROCEDURE — 96365 THER/PROPH/DIAG IV INF INIT: CPT

## 2021-03-18 RX ORDER — TAMSULOSIN HYDROCHLORIDE 0.4 MG/1
0.4 CAPSULE ORAL DAILY
Status: DISCONTINUED | OUTPATIENT
Start: 2021-03-18 | End: 2021-03-21 | Stop reason: HOSPADM

## 2021-03-18 RX ORDER — FINASTERIDE 5 MG/1
5 TABLET, FILM COATED ORAL DAILY
Status: DISCONTINUED | OUTPATIENT
Start: 2021-03-18 | End: 2021-03-21 | Stop reason: HOSPADM

## 2021-03-18 RX ORDER — HEPARIN SODIUM 5000 [USP'U]/ML
5000 INJECTION, SOLUTION INTRAVENOUS; SUBCUTANEOUS EVERY 8 HOURS SCHEDULED
Status: DISCONTINUED | OUTPATIENT
Start: 2021-03-18 | End: 2021-03-21 | Stop reason: HOSPADM

## 2021-03-18 RX ORDER — CYCLOBENZAPRINE HCL 10 MG
10 TABLET ORAL EVERY EVENING
Status: DISCONTINUED | OUTPATIENT
Start: 2021-03-18 | End: 2021-03-21 | Stop reason: HOSPADM

## 2021-03-18 RX ORDER — MIRTAZAPINE 15 MG/1
7.5 TABLET, ORALLY DISINTEGRATING ORAL NIGHTLY
Status: DISCONTINUED | OUTPATIENT
Start: 2021-03-18 | End: 2021-03-21 | Stop reason: HOSPADM

## 2021-03-18 RX ORDER — GABAPENTIN 300 MG/1
300 CAPSULE ORAL NIGHTLY
Status: DISCONTINUED | OUTPATIENT
Start: 2021-03-18 | End: 2021-03-21 | Stop reason: HOSPADM

## 2021-03-18 RX ORDER — 0.9 % SODIUM CHLORIDE 0.9 %
1000 INTRAVENOUS SOLUTION INTRAVENOUS ONCE
Status: COMPLETED | OUTPATIENT
Start: 2021-03-18 | End: 2021-03-18

## 2021-03-18 RX ADMIN — FINASTERIDE 5 MG: 5 TABLET, FILM COATED ORAL at 17:47

## 2021-03-18 RX ADMIN — HEPARIN SODIUM 5000 UNITS: 5000 INJECTION INTRAVENOUS; SUBCUTANEOUS at 17:47

## 2021-03-18 RX ADMIN — SODIUM CHLORIDE 1000 ML: 9 INJECTION, SOLUTION INTRAVENOUS at 10:50

## 2021-03-18 RX ADMIN — TAMSULOSIN HYDROCHLORIDE 0.4 MG: 0.4 CAPSULE ORAL at 17:46

## 2021-03-18 RX ADMIN — GABAPENTIN 300 MG: 300 CAPSULE ORAL at 20:57

## 2021-03-18 RX ADMIN — CEFTRIAXONE: 1 INJECTION, POWDER, FOR SOLUTION INTRAMUSCULAR; INTRAVENOUS at 13:03

## 2021-03-18 RX ADMIN — MIRTAZAPINE 7.5 MG: 15 TABLET, ORALLY DISINTEGRATING ORAL at 20:58

## 2021-03-18 RX ADMIN — CYCLOBENZAPRINE 10 MG: 10 TABLET, FILM COATED ORAL at 20:57

## 2021-03-18 ASSESSMENT — ENCOUNTER SYMPTOMS
EYES NEGATIVE: 1
COUGH: 0
COUGH: 0
BLOOD IN STOOL: 0
APNEA: 0
SHORTNESS OF BREATH: 0
STRIDOR: 0
CHOKING: 0
SORE THROAT: 0
BACK PAIN: 0
TROUBLE SWALLOWING: 0
RESPIRATORY NEGATIVE: 1
ABDOMINAL DISTENTION: 0
VOMITING: 0
COLOR CHANGE: 0
BACK PAIN: 0
WHEEZING: 0
PHOTOPHOBIA: 0
GASTROINTESTINAL NEGATIVE: 1
EYE DISCHARGE: 0
NAUSEA: 0
SHORTNESS OF BREATH: 0
DIARRHEA: 0
VOMITING: 0
NAUSEA: 0
ABDOMINAL PAIN: 0
CHEST TIGHTNESS: 0

## 2021-03-18 ASSESSMENT — PAIN SCALES - GENERAL: PAINLEVEL_OUTOF10: 0

## 2021-03-18 NOTE — ED PROVIDER NOTES
[address] ED  eMERGENCYdEPARTMENT eNCOUnter      Pt Name: Nolberto Singleton  MRN: [id number]  [place] 5/4/1928  Date of evaluation: 3/18/2021  Gaurav Stafford MD    CHIEF COMPLAINT           HPI  Nolberto Singleton is a 80 y.o. male per chart review has a h/o UC, HTN, Hpl, bph, CKD presents to the ED with syncope. Per wife, pt was sitting down and eating breakfast and became unresponsive for 5-10 min. When EMS got there, pt's bp was 60s and unresponsive. During transport, pt woke up and became axox3. Pt denies fever, n/v, dizziness, sob, palpitations, ab pain, dysuria, diarrhea before or after the fall. Pt's bp upon arrival 100s. ROS  Review of Systems   Constitutional: Negative for activity change, chills and fever. HENT: Negative for ear pain and sore throat. Eyes: Negative for visual disturbance. Respiratory: Negative for cough and shortness of breath. Cardiovascular: Negative for chest pain, palpitations and leg swelling. Gastrointestinal: Negative for abdominal pain, diarrhea, nausea and vomiting. Genitourinary: Negative for dysuria. Musculoskeletal: Negative for back pain. Skin: Negative for rash. Neurological: Positive for syncope. Negative for dizziness and weakness. Except as noted above the remainder of the review of systems was reviewed and negative.        PAST MEDICAL HISTORY     Past Medical History:   Diagnosis Date    Back pain     [address]itis, ulcerative ([facility] [address]     Hypertension          SURGICAL HISTORY       Past Surgical History:   Procedure Laterality Date    TONSILLECTOMY           CURRENTMEDICATIONS       Previous Medications    ACETAMINOPHEN (TYLENOL) 325 MG TABLET    Take 650 mg by mouth every 6 hours as needed for Pain    AMLODIPINE (NORVASC) 2.5 MG TABLET    Take 2.5 mg by mouth daily    CHOLECALCIFEROL (VITAMIN D) 50 MCG (2000 UT) CAPS CAPSULE    Take by mouth    COD LIVER OIL (V-R COD LIVER) CAPS    Take by mouth    CYCLOBENZAPRINE (FLEXERIL) 10 MG partner: None     Emotionally abused: None     Physically abused: None     Forced sexual activity: None   Other Topics Concern    None   Social History Narrative    None         PHYSICAL EXAM       ED Triage Vitals   BP Temp Temp src Pulse Resp SpO2 Height Weight   -- -- -- -- -- -- -- --       Physical Exam  Vitals signs and nursing note reviewed. Constitutional:       Appearance: He is well-developed. HENT:      Head: Normocephalic. Right Ear: External ear normal.      Left Ear: External ear normal.   Eyes:      Conjunctiva/sclera: Conjunctivae normal.      Pupils: Pupils are equal, round, and reactive to light. Neck:      Musculoskeletal: Normal range of motion and neck supple. Cardiovascular:      Rate and Rhythm: Normal rate and regular rhythm. Heart sounds: Normal heart sounds. Pulmonary:      Effort: Pulmonary effort is normal.      Breath sounds: Normal breath sounds. Abdominal:      General: Bowel sounds are normal. There is no distension. Palpations: Abdomen is soft. Tenderness: There is no abdominal tenderness. Musculoskeletal: Normal range of motion. Skin:     General: Skin is warm and dry. Neurological:      Mental Status: He is alert and oriented to person, place, and time. Psychiatric:         Mood and Affect: Mood normal.           MDM  81 yo male presents to the ED with syncope. Pt is afebrile, hemodynamically stable. Pt given 1 L NS in the ED. EKG shows NSR with HR 90, normal axis, normal intervals, no ST changes. Pt recently admitted to the hospital for urinary retention and metabolic encephalopathy. When pt was admitted at [facility], pt had a unger placed but then removed. Pt also had a unger placed and removed when transferred to [facility] for rehab. Labs remarkable for glucose 173, BUN 25, Cr 1.55,   Troponin 0.013, Hb 11.3.  UA shows UTI. Bladder scan revealed 400s. CT head negative. CXR shows LLL atx vs pneumonia.   Pt clinically does not have pneumonia. No fever, cough, sob, cp. Pt given IV rocephin for UTI. Suspect possible vasovagal given urinary retention. Pt followed up with urology during admission and pt was started on flomax. Pittman placed in the ED with return of 800 ccs. Given syncope with hypotension, elevated troponin, UTI, urinary retention, case discussed with Dr. Shannan Del Castillo [place] [id number] [facility] and pt transferred [place] [id number] [facility] in stable condition. Pt and family understand plan. FINAL IMPRESSION      1. Syncope and collapse    2. Hypotension, unspecified hypotension type    3. Urinary retention    4. Acute cystitis without hematuria    5.  Elevated troponin          DISPOSITION/PLAN   DISPOSITION Decision To Transfer 03/18/2021 01:28:58 PM        DISCHARGE MEDICATIONS:  [unfilled]         Mendoza Walden MD(electronically signed)  Attending Emergency Physician            Mendoza Walden MD  03/18/21 7540

## 2021-03-18 NOTE — CONSULTS
connections     Talks on phone: Not on file     Gets together: Not on file     Attends Holiness service: Not on file     Active member of club or organization: Not on file     Attends meetings of clubs or organizations: Not on file     Relationship status: Not on file    Intimate partner violence     Fear of current or ex partner: Not on file     Emotionally abused: Not on file     Physically abused: Not on file     Forced sexual activity: Not on file   Other Topics Concern    Not on file   Social History Narrative    Not on file      [] Unable to obtain due to ventilated and/ or neurologic status      Home Medications:      Medications Prior to Admission: Acetaminophen (TYLENOL ARTHRITIS EXT RELIEF PO), Take 1,300 mg by mouth 2 times daily  Polyvinyl Alcohol-Povidone (REFRESH OP), Apply 1 drop to eye 4 times daily  Cod Liver Oil (V-R COD LIVER) CAPS, Take by mouth  tamsulosin (FLOMAX) 0.4 MG capsule, Take 1 capsule by mouth daily (Patient taking differently: Take 0.4 mg by mouth 2 times daily )  metoprolol tartrate (LOPRESSOR) 25 MG tablet, Take 1 tablet by mouth 2 times daily  mirtazapine (REMERON SOL-TAB) 15 MG disintegrating tablet, Take 0.5 tablets by mouth nightly  polyethylene glycol (GLYCOLAX) 17 g packet, Take 17 g by mouth daily as needed for Constipation  finasteride (PROSCAR) 5 MG tablet, Take 1 tablet by mouth daily  gabapentin (NEURONTIN) 600 MG tablet, Take 300 mg by mouth nightly.    [DISCONTINUED] amLODIPine (NORVASC) 2.5 MG tablet, Take 2.5 mg by mouth daily  [DISCONTINUED] finasteride (PROSCAR) 5 MG tablet, Take 1 tablet by mouth daily  [DISCONTINUED] doxazosin (CARDURA) 2 MG tablet, Take 1 tablet by mouth daily  Cholecalciferol (VITAMIN D) 50 MCG (2000 UT) CAPS capsule, Take by mouth daily   [DISCONTINUED] acetaminophen (TYLENOL) 325 MG tablet, Take 650 mg by mouth every 6 hours as needed for Pain  cyclobenzaprine (FLEXERIL) 10 MG tablet, Take 10 mg by mouth every evening     [facility] Medications:     Scheduled Meds:   heparin (porcine)  5,000 Units Subcutaneous 3 times per day    [START ON 3/19/2021] cefTRIAXone (ROCEPHIN) IV  1,000 mg Intravenous Q24H    tamsulosin  0.4 mg Oral Daily     Continuous Infusions:  PRN Meds:. .       Allergies: Allergies   Allergen Reactions    Sulfa Antibiotics         Review of Systems:       Review of Systems   Constitutional: Negative. Negative for diaphoresis and fatigue. HENT: Negative. Eyes: Negative. Respiratory: Negative. Negative for cough, chest tightness, shortness of breath, wheezing and stridor. Cardiovascular: Negative. Negative for chest pain, palpitations and leg swelling. Gastrointestinal: Negative. Negative for blood in stool and nausea. Genitourinary: Negative. Musculoskeletal: Negative. Skin: Negative. Neurological: Positive for syncope and weakness. Negative for dizziness and light-headedness. Hematological: Negative. Psychiatric/Behavioral: Negative. Objective Findings:     Vitals: There were no vitals taken for this visit. Physical Examination:    Physical Exam   Constitutional: He appears healthy. No distress. HENT:   Normal cephalic and Atraumatic   Eyes: Pupils are equal, round, and reactive to light. Neck: Normal range of motion and thyroid normal. Neck supple. No JVD present. No neck adenopathy. No thyromegaly present. Cardiovascular: Normal rate, regular rhythm, intact distal pulses and normal pulses. Murmur heard. Pulmonary/Chest: Effort normal and breath sounds normal. He has no wheezes. He has no rales. He exhibits no tenderness. Abdominal: Soft. Bowel sounds are normal. There is no abdominal tenderness. Musculoskeletal: Normal range of motion. General: Edema (trace) present. No tenderness. Neurological: He is alert and oriented to person, place, and time. Skin: Skin is warm. No cyanosis. Nails show no clubbing.          Results/ Medications reviewed 3/18/2021, 4:51 PM     Laboratory, Microbiology, Pathology, Radiology, Cardiology, Medications and Transcriptions reviewed  Scheduled Meds:   heparin (porcine)  5,000 Units Subcutaneous 3 times per day    [START ON 3/19/2021] cefTRIAXone (ROCEPHIN) IV  1,000 mg Intravenous Q24H    tamsulosin  0.4 mg Oral Daily     Continuous Infusions:    Recent Labs     03/18/21  1053   WBC 6.3   HGB 11.3*   HCT 33.6*   MCV 98.0        Recent Labs     03/18/21  1116      K 3.9   CL 98   CO2 25   BUN 25*   CREATININE 1.55*     Recent Labs     03/18/21  1116   AST 29   ALT 30   BILITOT 0.4   ALKPHOS 92     No results for input(s): LIPASE, AMYLASE in the last 72 hours. Recent Labs     03/18/21  1116   PROT 7.2     Xr Hip Left (2-3 Views)    Result Date: 2/20/2021   EXAMINATION: XR HIP LEFT (2-3 VIEWS) CLINICAL HISTORY: Hip pain COMPARISONS: None available TECHNIQUE: AP film of the pelvis to include both hips and lateral view of the left hip were obtained. FINDINGS: No displaced pelvic fracture identified. No hip dislocation. Mild degenerative changes of both hips. Sacroiliac joints are symmetric and within normal limits. Degenerative changes of the lower lumbar spine. No acute osseous abnormality. Ct Head Wo Contrast    Result Date: 3/18/2021  CT Brain. Contrast medium:  without contrast.. History:  Syncope. Technical factors: CT imaging of the brain was obtained and formatted as 5 mm contiguous axial images. 2.5 mm contiguous axial images were obtained through the osseous structures. Sagittal and coronal reconstruction obtained during postprocessing. Comparison:  CT brain, February 19, 2021. MRI brain, February 20, 2021. Findings: Extra-axial spaces:  Normal. Intracranial hemorrhage:  None. Ventricular system: Ventricles moderately enlarged and sulci moderately prominent. Basal Cisterns:  Normal. Cerebral Parenchyma: Bilateral symmetric periventricular areas decreased attenuation. Midline Shift:  None. available Findings: The patient is rotated. Suboptimal inspiration. Atherosclerotic calcification of the thoracic aorta. The cardiomediastinal silhouette is within normal limits. No pneumothorax, pleural effusion, or consolidation. No acute osseous abnormality. No radiographic evidence of acute intrathoracic process. Mri Brain Without Contrast    Result Date: 2/20/2021  EXAM: MRI of the brain without contrast History: Metabolic encephalopathy. Stroke. Technique: Multiplanar multisequence MRI of the brain was performed without contrast. Comparison: CT brain from February 19, 2021 Findings: Examination is degraded by motion artifact. Hyperintensity/FLAIR signal within the bilateral supratentorial white matter and patchy hyperintense T2 signal within the [place] are nonspecific findings most compatible with chronic small vessel ischemic changes in a patient of this age. Prominence of the sulci and ventricles compatible with moderate generalized parenchymal volume loss. No edema, hemorrhage, mass, mass effect, midline shift, or abnormal extra-axial fluid collection. Midline structures are within normal limits. The posterior fossa is within normal limits. There is no diffusion restriction. No susceptibility artifact is identified on the gradient echo sequence. The major intracranial vascular flow voids are maintained. Cranial nerves 7/8 complexes appear grossly unremarkable. The visualized paranasal sinuses and bilateral mastoid air cells are clear. No acute ischemia or acute intracranial process. Generalized parenchymal volume loss and nonspecific white matter findings most compatible with chronic small vessel ischemic changes in a patient of this age.     Us Carotid Artery Bilateral    Result Date: 2/20/2021  BILATERAL DUPLEX CAROTID ULTRASOUND CLINICAL INFORMATION:  Carotid stenosis COMPARISON:  None available FINDINGS:  The bilateral carotid duplex ultrasound study demonstrates the following: RIGHT            LEFT Proximal common carotid artery           137 cm/s            111 cm/s  Mid common carotid artery                   120 cm/s            109 cm/s  Distal common carotid artery                121 cm/s           116 cm/s Proximal internal carotid artery             149 cm/s            77 cm/s Mid internal carotid artery                      177 cm/s           68 cm/s Distal internal carotid artery                  98 cm/s             158 cm/s External carotid artery                            256 cm/s           97 cm/s    ICA/CCA ratio                                         1.5                0.7   Vertebral arteries antegrade flow         Yes                     Yes      50-69% stenosis of the right internal carotid artery. Less than 50% stenosis of the left internal carotid artery. Antegrade flow both vertebral arteries. Validated velocity measurements with angiographic measurements, velocity criteria are extrapolated from diameter data as defined by the Society of Radiologist in [address] [place] [address] Drive Radiology 2003; [id number]. Active Hospital Problems    Diagnosis Date Noted    Syncope and collapse [R55] 03/18/2021     Priority: Low         Impression/Plan:   1. Syncope- etiology not clear. Keep on Telemetry. Not related to known mild Carotid plaque. Probable vasodepressor syncope. 2. Carotid- mild  3. HTN- hold Meds due to Hypotension  4. Will review Echo      Thank you for allowing us to participate in the care of this patient. Will continue to follow. Please call if questions or concerns arise.     Electronically signed by Ruth Wagner MD on 3/18/2021 at 4:51 PM

## 2021-03-18 NOTE — CONSULTS
Subjective:      Patient ID: Carina Galaviz is a 80 y.o. male who presents today for syncope. HPI 77-year-old right-handed gentleman admitted with a brief loss of consciousness. Patient was sitting in the dining table and his wife went to get coffee and when she came back he was slumped over. This may be about 5 minutes. Not sustained any other symptoms. He appears to be completely normal at this time. He does have some age-related cognitive changes but no true dementia. He is very active otherwise. He denies any symptoms at this time. Review of Systems   Constitutional: Negative for fever. HENT: Negative for ear pain, tinnitus and trouble swallowing. Eyes: Negative for photophobia and visual disturbance. Respiratory: Negative for choking and shortness of breath. Cardiovascular: Negative for chest pain and palpitations. Gastrointestinal: Negative for nausea and vomiting. Musculoskeletal: Negative for back pain, gait problem, joint swelling, myalgias, neck pain and neck stiffness. Skin: Negative for color change. Allergic/Immunologic: Negative for food allergies. Neurological: Negative for dizziness, tremors, seizures, syncope, facial asymmetry, speech difficulty, weakness, light-headedness, numbness and headaches. Psychiatric/Behavioral: Negative for behavioral problems, confusion, hallucinations and sleep disturbance.        Past Medical History:   Diagnosis Date    Back pain     Colitis, ulcerative ([address]     Hypertension      Past Surgical History:   Procedure Laterality Date    TONSILLECTOMY       Social History     Socioeconomic History    Marital status:      Spouse name: Not on file    Number of children: Not on file    Years of education: Not on file    Highest education level: Not on file   Occupational History    Not on file   Social Needs    Financial resource strain: Not on file    Food insecurity     Worry: Not on file     Inability: Not on file   [facility] Transportation needs     Medical: Not on file     Non-medical: Not on file   Tobacco Use    Smoking status: Never Smoker    Smokeless tobacco: Never Used   Substance and Sexual Activity    Alcohol use: Not Currently    Drug use: Not Currently    Sexual activity: Not Currently     Partners: Female   Lifestyle    Physical activity     Days per week: Not on file     Minutes per session: Not on file    Stress: Not on file   Relationships    Social connections     Talks on phone: Not on file     Gets together: Not on file     Attends Jewish service: Not on file     Active member of club or organization: Not on file     Attends meetings of clubs or organizations: Not on file     Relationship status: Not on file    Intimate partner violence     Fear of current or ex partner: Not on file     Emotionally abused: Not on file     Physically abused: Not on file     Forced sexual activity: Not on file   Other Topics Concern    Not on file   Social History Narrative    Not on file     No family history on file.   Allergies   Allergen Reactions    Sulfa Antibiotics      Current Facility-Administered Medications   Medication Dose Route Frequency Provider Last Rate Last Admin    heparin (porcine) injection 5,000 Units  5,000 Units Subcutaneous 3 times per day Rosangela Winters MD   5,000 Units at 03/18/21 1747    [START ON 3/19/2021] cefTRIAXone (ROCEPHIN) 1000 mg IVPB in 50 mL D5W minibag  1,000 mg Intravenous Q24H Rosangela Winters MD        [facility]) capsule 0.4 mg  0.4 mg Oral Daily Rosangela Winters MD   0.4 mg at 03/18/21 1746    cyclobenzaprine (FLEXERIL) tablet 10 mg  10 mg Oral QPM Rosangela Winters MD        gabapentin (NEURONTIN) capsule 300 mg  300 mg Oral Nightly Rosangela Winters MD        finasteride (PROSCAR) tablet 5 mg  5 mg Oral Daily Rosangela Winters MD   5 mg at 03/18/21 1747    mirtazapine (REMERON SOL-TAB) disintegrating tablet 7.5 mg  7.5 mg Oral Nightly Rosangela Winters MD            Objective:   Pulse 103     Physical Exam  Vitals signs reviewed. Eyes:      Pupils: Pupils are equal, round, and reactive to light. Neck:      Musculoskeletal: Normal range of motion. Cardiovascular:      Rate and Rhythm: Normal rate and regular rhythm. Heart sounds: No murmur. Pulmonary:      Effort: Pulmonary effort is normal.      Breath sounds: Normal breath sounds. Abdominal:      General: Bowel sounds are normal.   Musculoskeletal: Normal range of motion. Skin:     General: Skin is warm. Neurological:      Mental Status: He is alert and oriented to person, place, and time. Cranial Nerves: No cranial nerve deficit. Sensory: No sensory deficit. Motor: No abnormal muscle tone. Coordination: Coordination normal.      Deep Tendon Reflexes: Reflexes are normal and symmetric. Babinski sign absent on the right side. Babinski sign absent on the left side. Psychiatric:         Mood and Affect: Mood normal.         Ct Head Wo Contrast    Result Date: 3/18/2021  CT Brain. Contrast medium:  without contrast.. History:  Syncope. Technical factors: CT imaging of the brain was obtained and formatted as 5 mm contiguous axial images. 2.5 mm contiguous axial images were obtained through the osseous structures. Sagittal and coronal reconstruction obtained during postprocessing. Comparison:  CT brain, February 19, 2021. MRI brain, February 20, 2021. Findings: Extra-axial spaces:  Normal. Intracranial hemorrhage:  None. Ventricular system: Ventricles moderately enlarged and sulci moderately prominent. Basal Cisterns:  Normal. Cerebral Parenchyma: Bilateral symmetric periventricular areas decreased attenuation. Midline Shift:  None. Cerebellum:  Normal. Paranasal sinuses and mastoid air cells:  Normal. Visualized Orbits: Bilateral ocular surgery     Impression: Acute findings. Moderate cerebral atrophy. Chronic ischemic white matter disease.  All CT scans at this facility use dose modulation, iterative reconstruction, and/or weight based dosing when appropriate to reduce radiation dose to as low as reasonably achievable. Xr Chest Portable    Result Date: 3/18/2021  EXAMINATION: XR CHEST PORTABLE CLINICAL HISTORY: SYNCOPE COMPARISONS: FEBRUARY 19, 2021 FINDINGS: Osseous structures intact. Cardiopericardial silhouette normal. Right diaphragm elevated, unchanged. Blunting left costophrenic angle. Ill-defined area increased opacity left lung base. SMALL LEFT EFFUSION WITH LEFT LOWER LUNG ATELECTASIS/PNEUMONIA. Lab Results   Component Value Date    WBC 6.3 03/18/2021    RBC 3.43 03/18/2021    HGB 11.3 03/18/2021    HCT 33.6 03/18/2021    MCV 98.0 03/18/2021    MCH 33.0 03/18/2021    MCHC 33.7 03/18/2021    RDW 13.3 03/18/2021     03/18/2021     Lab Results   Component Value Date     03/18/2021    K 3.9 03/18/2021    K 4.2 03/04/2021    CL 98 03/18/2021    CO2 25 03/18/2021    BUN 25 03/18/2021    CREATININE 1.55 03/18/2021    GFRAA 50.9 03/18/2021    LABGLOM 42.1 03/18/2021    GLUCOSE 173 03/18/2021    PROT 7.2 03/18/2021    LABALBU 4.1 03/18/2021    CALCIUM 9.8 03/18/2021    BILITOT 0.4 03/18/2021    ALKPHOS 92 03/18/2021    AST 29 03/18/2021    ALT 30 03/18/2021     No results found for: PROTIME, INR  Lab Results   Component Value Date    [id number] 290 02/22/2021    FOLATE 14.7 02/22/2021     No results found for: TRIG, HDL, LDLCALC, LDLDIRECT, LABVLDL  Lab Results   Component Value Date    LABAMPH Neg 02/20/2021    BARBSCNU Neg 02/20/2021    LABBENZ Neg 02/20/2021    OPIATESCREENURINE Neg 02/20/2021    PHENCYCLIDINESCREENURINE Neg 02/20/2021     No results found for: LITHIUM, DILFRTOT, VALPROATE    Assessment:   Syncope likely to be vasovagal without any sequelae. Patient does not have any deficits on neurologic examination. Patient has only some age-related cognitive changes with no true dementia is noted. He is otherwise nonfocal and his examination.   His carotid ultrasounds are done in February and shows some degree of right carotid stenosis though her of no clinical significance at least at this time. We will continue keep observation and reevaluate if there is any other symptoms. David Upton MD, Cici Mask, American Board of Psychiatry & Neurology  Board Certified in Vascular Neurology  Board Certified in Neuromuscular Medicine  Certified in [facility]:

## 2021-03-18 NOTE — ED TRIAGE NOTES
Patient presents to ED via EMS with c/o syncopy episode while at home eating breakfast with his wife.

## 2021-03-18 NOTE — H&P
60-year-old male with past medical history of hypertension, CKD BPH on Flomax at home comes with syncopal episode while eating breakfast.  Patient is alert oriented after regaining consciousness. Patient lives with his wife patient denies any fever, nausea vomiting or dizziness. In the ER found to have UTI. Past Medical History:   Diagnosis Date    Back pain     Colitis, ulcerative ([address]     Hypertension      Past Surgical History:   Procedure Laterality Date    TONSILLECTOMY       Social History     Tobacco History     Smoking Status  Never Smoker    Smokeless Tobacco Use  Never Used          Alcohol History     Alcohol Use Status  Not Currently          Drug Use     Drug Use Status  Not Currently          Sexual Activity     Sexually Active  Not Currently Partners  Female              No family history on file. No current facility-administered medications on file prior to encounter. Current Outpatient Medications on File Prior to Encounter   Medication Sig Dispense Refill    Acetaminophen (TYLENOL ARTHRITIS EXT RELIEF PO) Take 1,300 mg by mouth 2 times daily      Polyvinyl Alcohol-Povidone (REFRESH OP) Apply 1 drop to eye 4 times daily      Cod Liver Oil (V-R COD LIVER) CAPS Take by mouth      tamsulosin (FLOMAX) 0.4 MG capsule Take 1 capsule by mouth daily (Patient taking differently: Take 0.4 mg by mouth 2 times daily ) 90 capsule 3    metoprolol tartrate (LOPRESSOR) 25 MG tablet Take 1 tablet by mouth 2 times daily 60 tablet 3    mirtazapine (REMERON SOL-TAB) 15 MG disintegrating tablet Take 0.5 tablets by mouth nightly 30 tablet 3    polyethylene glycol (GLYCOLAX) 17 g packet Take 17 g by mouth daily as needed for Constipation 527 g 1    finasteride (PROSCAR) 5 MG tablet Take 1 tablet by mouth daily 30 tablet 3    gabapentin (NEURONTIN) 600 MG tablet Take 300 mg by mouth nightly.        Cholecalciferol (VITAMIN D) 50 MCG (2000 UT) CAPS capsule Take by mouth daily       cyclobenzaprine (FLEXERIL) 10 MG tablet Take 10 mg by mouth every evening        Lab Results   Component Value Date    WBC 6.3 03/18/2021    HGB 11.3 (L) 03/18/2021    HCT 33.6 (L) 03/18/2021    MCV 98.0 03/18/2021     03/18/2021     Lab Results   Component Value Date     03/18/2021    K 3.9 03/18/2021    K 4.2 03/04/2021    CL 98 03/18/2021    CO2 25 03/18/2021    BUN 25 03/18/2021    CREATININE 1.55 03/18/2021    GLUCOSE 173 03/18/2021    CALCIUM 9.8 03/18/2021      Review of Systems   Constitutional: Positive for activity change. Negative for appetite change. HENT: Negative for congestion and dental problem. Eyes: Negative for discharge. Respiratory: Negative for apnea and chest tightness. Cardiovascular: Negative for chest pain. Gastrointestinal: Negative for abdominal distention. Endocrine: Negative for cold intolerance. Genitourinary: Positive for decreased urine volume. Negative for difficulty urinating. Musculoskeletal: Negative for arthralgias. Allergic/Immunologic: Negative for environmental allergies. Neurological: Negative for dizziness. Hematological: Negative for adenopathy. Psychiatric/Behavioral: Negative for agitation. Physical Exam  Constitutional:       Appearance: Normal appearance. HENT:      Head: Normocephalic and atraumatic. Right Ear: Tympanic membrane normal.      Nose: Nose normal.      Mouth/Throat:      Mouth: Mucous membranes are moist.   Eyes:      Pupils: Pupils are equal, round, and reactive to light. Cardiovascular:      Rate and Rhythm: Normal rate and regular rhythm. Heart sounds: No murmur. No friction rub. Pulmonary:      Effort: No respiratory distress. Breath sounds: No stridor. No wheezing. Abdominal:      General: There is no distension. Palpations: Abdomen is soft. Tenderness: There is no abdominal tenderness. Musculoskeletal:         General: No swelling.    Neurological:      Mental Status: He is alert. Cranial Nerves: No cranial nerve deficit. Lab Results   Component Value Date    WBC 6.3 03/18/2021    HGB 11.3 (L) 03/18/2021    HCT 33.6 (L) 03/18/2021    MCV 98.0 03/18/2021     03/18/2021     Lab Results   Component Value Date     03/18/2021    K 3.9 03/18/2021    K 4.2 03/04/2021    CL 98 03/18/2021    CO2 25 03/18/2021    BUN 25 03/18/2021    CREATININE 1.55 03/18/2021    GLUCOSE 173 03/18/2021    CALCIUM 9.8 03/18/2021        1) Syncope and collapse  2) hypotension  3) acute urinary retention  4) Acute cystitis w/o hematuria  5) elevated trop  Admit to cardiac floor, echo cardiogram.  Telemetry, trend cardiac enzymes. Cardiology evaluation. IV antibiotics with Rocephin. Follow urology for acute urinary retention. Patient had recent carotid ultrasound showing cardiovascular disease will get neurology involvement also.

## 2021-03-18 NOTE — ED NOTES
Called transfer center to speak with hospitalist at [address]       Gerardo Rebolledo  03/18/21 [id number]

## 2021-03-18 NOTE — ED NOTES
Patient bladder scanned and showed 322ml currently in bladder. Reports he does not feel the urge to void and really would not like to be cathed at this time.       Herman Barone RN  03/18/21 1357

## 2021-03-18 NOTE — ED NOTES
Patient report called to [facility] and patient to be transported      Tamie Bryan RN  03/18/21 0756

## 2021-03-19 LAB
ALBUMIN SERPL-MCNC: 3.4 G/DL (ref 3.5–4.6)
ALP BLD-CCNC: 87 U/L (ref 35–104)
ALT SERPL-CCNC: 29 U/L (ref 0–41)
ANION GAP SERPL CALCULATED.3IONS-SCNC: 12 MEQ/L (ref 9–15)
AST SERPL-CCNC: 27 U/L (ref 0–40)
BASOPHILS ABSOLUTE: 0.1 K/UL (ref 0–0.2)
BASOPHILS RELATIVE PERCENT: 1.1 %
BILIRUB SERPL-MCNC: 0.4 MG/DL (ref 0.2–0.7)
BUN BLDV-MCNC: 25 MG/DL (ref 8–23)
CALCIUM SERPL-MCNC: 9.4 MG/DL (ref 8.5–9.9)
CHLORIDE BLD-SCNC: 106 MEQ/L (ref 95–107)
CO2: 23 MEQ/L (ref 20–31)
CREAT SERPL-MCNC: 1.43 MG/DL (ref 0.7–1.2)
EOSINOPHILS ABSOLUTE: 0.5 K/UL (ref 0–0.7)
EOSINOPHILS RELATIVE PERCENT: 5.9 %
GFR AFRICAN AMERICAN: 55.9
GFR NON-AFRICAN AMERICAN: 46.2
GLOBULIN: 3 G/DL (ref 2.3–3.5)
GLUCOSE BLD-MCNC: 88 MG/DL (ref 70–99)
HCT VFR BLD CALC: 32.1 % (ref 42–52)
HEMOGLOBIN: 11.2 G/DL (ref 14–18)
LV EF: 60 %
LVEF MODALITY: NORMAL
LYMPHOCYTES ABSOLUTE: 2 K/UL (ref 1–4.8)
LYMPHOCYTES RELATIVE PERCENT: 23 %
MCH RBC QN AUTO: 33.3 PG (ref 27–31.3)
MCHC RBC AUTO-ENTMCNC: 34.9 % (ref 33–37)
MCV RBC AUTO: 95.6 FL (ref 80–100)
MONOCYTES ABSOLUTE: 1.2 K/UL (ref 0.2–0.8)
MONOCYTES RELATIVE PERCENT: 14 %
NEUTROPHILS ABSOLUTE: 4.9 K/UL (ref 1.4–6.5)
NEUTROPHILS RELATIVE PERCENT: 56 %
PDW BLD-RTO: 13.4 % (ref 11.5–14.5)
PLATELET # BLD: 268 K/UL (ref 130–400)
POTASSIUM SERPL-SCNC: 3.9 MEQ/L (ref 3.4–4.9)
RBC # BLD: 3.36 M/UL (ref 4.7–6.1)
SODIUM BLD-SCNC: 141 MEQ/L (ref 135–144)
TOTAL PROTEIN: 6.4 G/DL (ref 6.3–8)
TROPONIN: 0.01 NG/ML (ref 0–0.01)
WBC # BLD: 8.8 K/UL (ref 4.8–10.8)

## 2021-03-19 PROCEDURE — APPSS30 APP SPLIT SHARED TIME 16-30 MINUTES: Performed by: NURSE PRACTITIONER

## 2021-03-19 PROCEDURE — 99233 SBSQ HOSP IP/OBS HIGH 50: CPT | Performed by: PSYCHIATRY & NEUROLOGY

## 2021-03-19 PROCEDURE — 85025 COMPLETE CBC W/AUTO DIFF WBC: CPT

## 2021-03-19 PROCEDURE — 97161 PT EVAL LOW COMPLEX 20 MIN: CPT

## 2021-03-19 PROCEDURE — 36415 COLL VENOUS BLD VENIPUNCTURE: CPT

## 2021-03-19 PROCEDURE — 99232 SBSQ HOSP IP/OBS MODERATE 35: CPT | Performed by: INTERNAL MEDICINE

## 2021-03-19 PROCEDURE — 99222 1ST HOSP IP/OBS MODERATE 55: CPT | Performed by: UROLOGY

## 2021-03-19 PROCEDURE — 80053 COMPREHEN METABOLIC PANEL: CPT

## 2021-03-19 PROCEDURE — 2580000003 HC RX 258: Performed by: INTERNAL MEDICINE

## 2021-03-19 PROCEDURE — 93306 TTE W/DOPPLER COMPLETE: CPT

## 2021-03-19 PROCEDURE — 97166 OT EVAL MOD COMPLEX 45 MIN: CPT

## 2021-03-19 PROCEDURE — 6360000002 HC RX W HCPCS: Performed by: INTERNAL MEDICINE

## 2021-03-19 PROCEDURE — 6370000000 HC RX 637 (ALT 250 FOR IP): Performed by: INTERNAL MEDICINE

## 2021-03-19 PROCEDURE — 2060000000 HC ICU INTERMEDIATE R&B

## 2021-03-19 PROCEDURE — 84484 ASSAY OF TROPONIN QUANT: CPT

## 2021-03-19 RX ORDER — TRAMADOL HYDROCHLORIDE 50 MG/1
50 TABLET ORAL EVERY 6 HOURS PRN
Status: DISCONTINUED | OUTPATIENT
Start: 2021-03-19 | End: 2021-03-20

## 2021-03-19 RX ADMIN — CYCLOBENZAPRINE 10 MG: 10 TABLET, FILM COATED ORAL at 17:03

## 2021-03-19 RX ADMIN — HEPARIN SODIUM 5000 UNITS: 5000 INJECTION INTRAVENOUS; SUBCUTANEOUS at 09:55

## 2021-03-19 RX ADMIN — HEPARIN SODIUM 5000 UNITS: 5000 INJECTION INTRAVENOUS; SUBCUTANEOUS at 17:03

## 2021-03-19 RX ADMIN — FINASTERIDE 5 MG: 5 TABLET, FILM COATED ORAL at 09:54

## 2021-03-19 RX ADMIN — METOPROLOL TARTRATE 25 MG: 25 TABLET, FILM COATED ORAL at 22:02

## 2021-03-19 RX ADMIN — TRAMADOL HYDROCHLORIDE 50 MG: 50 TABLET, FILM COATED ORAL at 09:54

## 2021-03-19 RX ADMIN — GABAPENTIN 300 MG: 300 CAPSULE ORAL at 22:02

## 2021-03-19 RX ADMIN — HEPARIN SODIUM 5000 UNITS: 5000 INJECTION INTRAVENOUS; SUBCUTANEOUS at 01:45

## 2021-03-19 RX ADMIN — CEFTRIAXONE SODIUM 1000 MG: 1 INJECTION, POWDER, FOR SOLUTION INTRAMUSCULAR; INTRAVENOUS at 14:32

## 2021-03-19 RX ADMIN — TAMSULOSIN HYDROCHLORIDE 0.4 MG: 0.4 CAPSULE ORAL at 09:54

## 2021-03-19 RX ADMIN — METOPROLOL TARTRATE 25 MG: 25 TABLET, FILM COATED ORAL at 09:54

## 2021-03-19 RX ADMIN — MIRTAZAPINE 7.5 MG: 15 TABLET, ORALLY DISINTEGRATING ORAL at 22:02

## 2021-03-19 ASSESSMENT — PAIN SCALES - GENERAL: PAINLEVEL_OUTOF10: 0

## 2021-03-19 ASSESSMENT — ENCOUNTER SYMPTOMS
SHORTNESS OF BREATH: 0
TROUBLE SWALLOWING: 0
STRIDOR: 0
NAUSEA: 0
VOMITING: 0
BLOOD IN STOOL: 0
EYES NEGATIVE: 1
CHEST TIGHTNESS: 0
DIARRHEA: 0
WHEEZING: 0
RESPIRATORY NEGATIVE: 1
COLOR CHANGE: 0
GASTROINTESTINAL NEGATIVE: 1
BACK PAIN: 1
COUGH: 0

## 2021-03-19 ASSESSMENT — PAIN DESCRIPTION - FREQUENCY: FREQUENCY: CONTINUOUS

## 2021-03-19 ASSESSMENT — PAIN DESCRIPTION - PAIN TYPE: TYPE: ACUTE PAIN

## 2021-03-19 NOTE — CONSULTS
[facility] ATTENDING INPATIENT  CONSULTATION NOTE                                                                                                                                                                                                Reason for Consult  High postvoid residuals, urinary retention    History of Present Illness  26-year-old male admitted for syncope  His work-up so far is negative  Patient to be discharged  Patient was noted to have high postvoid residual approximately 300 cc and had Pittman catheter placed  Patient was able to void especially after discontinuing oxybutynin  Patient was voiding well at home while on tamsulosin and finasteride  He does complain of intermittency and frequency      Urologic Review of Systems/Symptoms  Other Urologic: History of BPH with high residuals    Review of Systems    All 14 categories of Review of Systems otherwise reviewed no other findings reported.     Past Medical History:   Diagnosis Date    Back pain     Colitis, ulcerative ([facility] [address]     Hypertension      Past Surgical History:   Procedure Laterality Date    TONSILLECTOMY       Social History     Socioeconomic History    Marital status:      Spouse name: Not on file    Number of children: Not on file    Years of education: Not on file    Highest education level: Not on file   Occupational History    Not on file   Social Needs    Financial resource strain: Not on file    Food insecurity     Worry: Not on file     Inability: Not on file    Transportation needs     Medical: Not on file     Non-medical: Not on file   Tobacco Use    Smoking status: Never Smoker    Smokeless tobacco: Never Used   Substance and Sexual Activity    Alcohol use: Not Currently    Drug use: Not Currently    Sexual activity: Not Currently     Partners: Female   Lifestyle    Physical activity     Days per week: Not on file     Minutes per session: Not on file    Stress: Not on file   Relationships    Social connections     Talks on phone: Not on file     Gets together: Not on file     Attends Scientologist service: Not on file     Active member of club or organization: Not on file     Attends meetings of clubs or organizations: Not on file     Relationship status: Not on file    Intimate partner violence     Fear of current or ex partner: Not on file     Emotionally abused: Not on file     Physically abused: Not on file     Forced sexual activity: Not on file   Other Topics Concern    Not on file   Social History Narrative    Not on file     No family history on file.   Current Facility-Administered Medications   Medication Dose Route Frequency Provider Last Rate Last Admin    traMADol (ULTRAM) tablet 50 mg  50 mg Oral Q6H PRN Zandra Jimenez MD   50 mg at 03/19/21 0954    metoprolol tartrate (LOPRESSOR) tablet 25 mg  25 mg Oral BID Shelly Corley MD   25 mg at 03/19/21 0954    heparin (porcine) injection 5,000 Units  5,000 Units Subcutaneous 3 times per day Zandra Jimenez MD   5,000 Units at 03/19/21 0145    cefTRIAXone (ROCEPHIN) 1000 mg IVPB in 50 mL D5W minibag  1,000 mg Intravenous Q24H Zandra Jimenez MD        tamsulosin [facility]) capsule 0.4 mg  0.4 mg Oral Daily Zandra Jimenez MD   0.4 mg at 03/19/21 0954    cyclobenzaprine (FLEXERIL) tablet 10 mg  10 mg Oral QPM Zandra Jimenez MD   10 mg at 03/18/21 2057    gabapentin (NEURONTIN) capsule 300 mg  300 mg Oral Nightly Zandra Jimenez MD   300 mg at 03/18/21 2057    finasteride (PROSCAR) tablet 5 mg  5 mg Oral Daily Zandra Jimenez MD   5 mg at 03/19/21 0954    mirtazapine (REMERON SOL-TAB) disintegrating tablet 7.5 mg  7.5 mg Oral Nightly Zandra Jimenez MD   7.5 mg at 03/18/21 2058     Sulfa antibiotics  All reviewed and verified by Dr Kevin Treadwell on today's visit    No results found for: PSA, PSADIA  [unfilled]    Physical Exam  Vitals:    03/18/21 1709 03/18/21 1904 03/19/21 0608 03/19/21 1334   BP:  105/75 (!) 144/74 (!) 94/45   Pulse: 103 111 89 64   Resp: 18 16    Temp:  97.2 °F (36.2 °C) 97.5 °F (36.4 °C) 97.3 °F (36.3 °C)   TempSrc:  Oral     SpO2:  100% 94% 97%     Constitutional: Patient not in distress. Cardiovascular: Normal rate, BP reviewed. Pulmonary/Chest: Normal respiratory effort not short of breath  Abdominal: Not distended  Urologic Exam  Pittman catheter draining clear urine. Assessment  History of high postvoid residuals patient however was able to urinate following last admission on finasteride and tamsulosin with discontinuation of the oxybutynin  Plan  Recommend removal of Pittman in the morning  Okay to discharge after Pittman catheter removal  Follow-up with urology if voiding symptoms do not improve  Greater 50% of 50 minutes spent evaluating patient face to face. Zachary Luna MD FACS    Please note this report has been partially produced using speech recognition software  And may cause contain errors related to that system including grammar, punctuation and spelling as well as words and phrases that may seem inappropriate. If there are questions or concerns please feel free to contact me to clarify.

## 2021-03-19 NOTE — PROGRESS NOTES
Progress Note  Date:3/19/2021       EMCZ:[id number]  Patient Name:Emerson Salguero     Date of Birth:[birth year]     Age:89 y.o. Subjective    Subjective:  Symptoms:  He reports malaise and weakness. No shortness of breath, cough, chest pain, headache, chest pressure, anorexia, diarrhea or anxiety. Diet:  No nausea or vomiting. Review of Systems   Respiratory: Negative for cough and shortness of breath. Cardiovascular: Negative for chest pain. Gastrointestinal: Negative for anorexia, diarrhea, nausea and vomiting. Neurological: Positive for weakness. Objective         Vitals Last 24 Hours:  TEMPERATURE:  Temp  Av.4 °F (36.3 °C)  Min: 97.2 °F (36.2 °C)  Max: 97.5 °F (36.4 °C)  RESPIRATIONS RANGE: Resp  Av.5  Min: 16  Max: 20  PULSE OXIMETRY RANGE: SpO2  Av.8 %  Min: 94 %  Max: 100 %  PULSE RANGE: Pulse  Av.8  Min: 82  Max: 111  BLOOD PRESSURE RANGE: Systolic (38SWT), SIF:272 , Min:105 , EDF:108   ; Diastolic (04KUN), UUU:85, Min:74, Max:78    I/O (24Hr): Intake/Output Summary (Last 24 hours) at 3/19/2021 1100  Last data filed at 3/19/2021 0523  Gross per 24 hour   Intake --   Output 400 ml   Net -400 ml     Objective:  General Appearance:  Comfortable, well-appearing and in no acute distress. Vital signs: (most recent): Blood pressure (!) 144/74, pulse 89, temperature 97.5 °F (36.4 °C), resp. rate 16, SpO2 94 %. HEENT: Normal HEENT exam.    Lungs:  Normal effort and normal respiratory rate. Heart: S1 normal and S2 normal.    Abdomen: Abdomen is soft. Bowel sounds are normal.   There is no epigastric area tenderness. Pulses: Distal pulses are intact. Neurological: Patient is alert and oriented to person, place and time. Pupils:  Pupils are equal, round, and reactive to light. Skin:  Warm and dry.       Labs/Imaging/Diagnostics    Labs:  CBC:  Recent Labs     21  1053 21  0649   WBC 6.3 8.8   RBC 3.43* 3.36*   HGB 11.3* 11.2*   HCT 33.6* 32.1*   MCV 98.0 95.6   RDW 13.3 13.4    268     CHEMISTRIES:  Recent Labs     03/18/21  1116 03/19/21  0649    141   K 3.9 3.9   CL 98 106   CO2 25 23   BUN 25* 25*   CREATININE 1.55* 1.43*   GLUCOSE 173* 88   MG 1.8  --      PT/INR:No results for input(s): PROTIME, INR in the last 72 hours. APTT:No results for input(s): APTT in the last 72 hours. LIVER PROFILE:  Recent Labs     03/18/21  1116 03/19/21  0649   AST 29 27   ALT 30 29   BILITOT 0.4 0.4   ALKPHOS 92 87       Imaging Last 24 Hours:  Ct Head Wo Contrast    Result Date: 3/18/2021  CT Brain. Contrast medium:  without contrast.. History:  Syncope. Technical factors: CT imaging of the brain was obtained and formatted as 5 mm contiguous axial images. 2.5 mm contiguous axial images were obtained through the osseous structures. Sagittal and coronal reconstruction obtained during postprocessing. Comparison:  CT brain, February 19, 2021. MRI brain, February 20, 2021. Findings: Extra-axial spaces:  Normal. Intracranial hemorrhage:  None. Ventricular system: Ventricles moderately enlarged and sulci moderately prominent. Basal Cisterns:  Normal. Cerebral Parenchyma: Bilateral symmetric periventricular areas decreased attenuation. Midline Shift:  None. Cerebellum:  Normal. Paranasal sinuses and mastoid air cells:  Normal. Visualized Orbits: Bilateral ocular surgery     Impression: Acute findings. Moderate cerebral atrophy. Chronic ischemic white matter disease. All CT scans at this facility use dose modulation, iterative reconstruction, and/or weight based dosing when appropriate to reduce radiation dose to as low as reasonably achievable. Xr Chest Portable    Result Date: 3/18/2021  EXAMINATION: XR CHEST PORTABLE CLINICAL HISTORY: SYNCOPE COMPARISONS: FEBRUARY 19, 2021 FINDINGS: Osseous structures intact. Cardiopericardial silhouette normal. Right diaphragm elevated, unchanged. Blunting left costophrenic angle.  Ill-defined area increased opacity left lung base. SMALL LEFT EFFUSION WITH LEFT LOWER LUNG ATELECTASIS/PNEUMONIA. Assessment//Plan           Hospital Problems           Last Modified POA    Syncope and collapse 3/18/2021 Yes        Assessment & Plan  1) Syncope and collapse  2) hypotension  3) acute urinary retention  4) Acute cystitis w/o hematuria    Follow-up urology. Follow-up urine culture. Continue with IV antibiotics PT OT as needed. Monitor as needed for pain chronic pain back pain. Hypotension resolved, MIGUE on CKD resolved.   Electronically signed by Manoj Lazo MD on 3/19/21 at 11:00 AM EDT

## 2021-03-19 NOTE — PROGRESS NOTES
[facility] OCCUPATIONAL THERAPY EVALUATION - ACUTE     NAME: Merna Reed  : 1928 (80 y.o.)  MRN: [id number]  CODE STATUS: Full Code  Room: S247/Q164-48    Date of Service: 3/19/2021    Patient Diagnosis(es): Syncope and collapse [R55]   No chief complaint on file. Patient Active Problem List    Diagnosis Date Noted    Syncope and collapse     Metabolic encephalopathy     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    Gross hematuria     Encephalopathy acute     Closed head injury     Aphasia     Mild cognitive impairment     AMS (altered mental status) 2021    Fall 2021    BPH (benign prostatic hyperplasia) 2021    CKD (chronic kidney disease) 2021    Leukocytosis 2021        Past Medical History:   Diagnosis Date    Back pain     Colitis, ulcerative ([facility] [address]     Hypertension      Past Surgical History:   Procedure Laterality Date    TONSILLECTOMY          Restrictions  Restrictions/Precautions: Fall Risk     Safety Devices: Safety Devices  Safety Devices in place: Yes  Type of devices: All fall risk precautions in place        Subjective  Pre Treatment Pain Screening  Pain at present: 6  Scale Used: Numeric Score  Intervention List: Patient able to continue with treatment, Patient declined any intervention  Comments / Details: Pt stated he will be receiving pain medication soon.     Pain Reassessment:   Pain Assessment  Patient Currently in Pain: Yes  Pain Assessment: 0-10  Pain Level: 6  Pain Type: Acute pain  Pain Location: Back  Pain Descriptors: Aching, Sore  Pain Frequency: Continuous       Prior Level of Function:  Social/Functional History  Lives With: Spouse  Type of Home: Apartment  Home Layout: One level  Home Access: Level entry  Bathroom Shower/Tub: Tub/Shower unit  Bathroom Toilet: Standard  Bathroom Equipment: Grab bars in shower(Pt reports he ordered a shower chair and it will be installed very soon.)  Home Equipment: 4 wheeled walker  Receives Help From: Family  ADL Assistance: Independent  Homemaking Assistance: Independent  Homemaking Responsibilities: Yes  Ambulation Assistance: Independent(Independent with 4 wheeled walker at home.)  Transfer Assistance: Independent  Active : No  Occupation: Retired  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 Pt pleasant and agreeable to OT evaluation, no acute distress. Edema: BLE Edema noted, ANDREA hose donned    Cognition Status:  Cognition  Overall Cognitive Status: WFL    Perception Status:  Perception  Overall Perceptual Status: WFL    Sensation Status:  Sensation  Overall Sensation Status: Impaired(Pt reports some numbness/tingling in fingertips)    Vision and Hearing Status:  Vision  Vision: Impaired  Vision Exceptions: Wears glasses at all times  Hearing  Hearing: Within functional limits     ROM:   LUE AROM (degrees)  LUE AROM : WFL  Left Hand AROM (degrees)  Left Hand AROM: WFL  RUE AROM (degrees)  RUE AROM : WFL  Right Hand AROM (degrees)  Right Hand AROM: WFL    Strength:  LUE Strength  Gross LUE Strength: WFL  L Hand General: 4+/5  LUE Strength Comment: 4+/5 all planes  RUE Strength  Gross RUE Strength: WFL  R Hand General: 4+/5  RUE Strength Comment: 4+/5 all planes    Coordination, Tone, Quality of Movement: Tone RUE  RUE Tone: Normotonic  Tone LUE  LUE Tone: Normotonic  Coordination  Movements Are Fluid And Coordinated: Yes    Hand Dominance:  Hand Dominance  Hand Dominance: Right    ADL Status:  ADL  Feeding: Modified independent   Grooming: Setup  UE Bathing: Minimal assistance  LE Bathing: Moderate assistance  UE Dressing: Minimal assistance  LE Dressing:  Moderate assistance  Toileting: Minimal assistance  Additional Comments: ADLs simulated as above  Toilet Transfers  Toilet Transfer: Unable to assess  Toilet Transfers Comments: Pt declined need to use restroom, anticipate min A       Therapy key for assistance levels -   Independent = Pt. is able to perform task with no assistance but may require a device   Stand by assistance = Pt. does not perform task at an independent level but does not need physical assistance, requires verbal cues  Minimal, Moderate, Maximal Assistance = Pt. requires physical assistance (25%, 50%, 75% assist from helper) for task but is able to actively participate in task   Dependent = Pt. requires total assistance with task and is not able to actively participate with task completion     Functional Mobility:  Functional Mobility  Functional - Mobility Device: Rolling Walker  Activity: Other  Assist Level: Contact guard assistance  Transfers  Sit to stand: Stand by assistance  Stand to sit: Stand by assistance    Bed Mobility  Bed mobility  Rolling to Left: Modified independent  Rolling to Right: Modified independent  Supine to Sit: Stand by assistance  Sit to Supine: Stand by assistance  Scooting: Minimal assistance    Seated and Standing Balance:  Balance  Sitting Balance: Modified independent   Standing Balance: Contact guard assistance    Functional Endurance:  Activity Tolerance  Activity Tolerance: Patient Tolerated treatment well    D/C Recommendations:  OT D/C RECOMMENDATIONS  REQUIRES OT FOLLOW UP: Yes    Equipment Recommendations:  OT Equipment Recommendations  Other: Continue to assess    OT Education:   OT Education  OT Education: OT Role, Plan of Care  Barriers to Learning: None    OT Follow Up:  OT D/C RECOMMENDATIONS  REQUIRES OT FOLLOW UP: Yes       Assessment/Discharge Disposition:  Assessment: Pt is a 79 y/o man from home with spouse who presents to [facility] with the above deficits that affect his ability to perform ADL and IADL tasks.  Pt would benefit from continued OT to maximize safety and independence at home. Performance deficits / Impairments: Decreased ADL status, Decreased safe awareness, Decreased endurance, Decreased sensation, Decreased functional mobility , Decreased balance, Decreased high-level IADLs  Prognosis: Good  Discharge Recommendations: Continue to assess pending progress  Decision Making: Medium Complexity  Exam: 7 deficits  Assistance / Modification: Min A    Six Click Score   How much help for putting on and taking off regular lower body clothing?: A Little  How much help for Bathing?: A Little  How much help for Toileting?: A Little  How much help for putting on and taking off regular upper body clothing?: A Little  How much help for taking care of personal grooming?: A Little  How much help for eating meals?: None  AM-[facility] Inpatient Daily Activity Raw Score: 19  AM-PAC Inpatient ADL T-Scale Score : 40.22  ADL Inpatient CMS 0-100% Score: 42.8    Plan:  Plan  Times per week: 1-3x weekly  Plan weeks: Length of acute stay  Current Treatment Recommendations: Strengthening, Functional Mobility Training, Endurance Training, Safety Education & Training, Patient/Caregiver Education & Training, Self-Care / ADL, Pain Management, Equipment Evaluation, Education, & procurement    Goals:   Patient will:    - Improve functional endurance to tolerate/complete 30 mins of ADL's  - Be Mod I in UB ADLs   - Be Mod I in LB ADLs  - Be Ind in ADL transfers without LOB  - Be Ind in toileting tasks  - Improve B hand fine motor coordination to [facility] in order to manage clothing fasteners/self-care containers in a timely manner  - Improve B UE Function (AROM, strength, motor control, tone normalization) to complete ADLs as projected. - Improve B UE strength and endurance to [facility] in order to participate in self-care activities as projected. - Access appropriate D/C site with as few architectural barriers as possible. - Sequence self-care tasks with no verbal cues    Patient Goal: Patient goals :  To return home     Discussed and agreed upon: Yes     Therapy Time:   OT Individual Minutes  Time In: 2712  Time Out: 8021  Minutes: 20    Eval: 20 minutes   Electronically signed by Jeannette Victoria OT on 3/19/2021 at 9:07 AM

## 2021-03-19 NOTE — PROGRESS NOTES
Spiritual Care Services     Summary of Visit:  Pt shared life review, story of michael journey. Pt is Temple, raised Scientologist, now a secular devout, but more devout than in middle life. 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 Spiritual Assessment/Intervention/Outcomes:    Encounter Summary  Services provided to[de-identified] Patient  Referral/Consult From[de-identified] Rounding  Support System: Children, Spouse, Family members  Continue Visiting: No  Complexity of Encounter: Low  Length of Encounter: 15 minutes  Spiritual Assessment Completed: Yes        Spiritual/Mu-ism  Type: Spiritual support  Assessment: Calm, Approachable, Coping  Intervention: Discussed belief system/Mandaen practices/michael, Discussed relationship with God, Discussed meaning/purpose(blessing)  Outcome: Connection/belonging, Expressed feelings of tawanna, peace, and/or awe, Comfort, Expressed gratitude        Advance Directives (For Healthcare)  Pre-existing DNR Comfort Care/DNR Arrest/DNI Order: No  Healthcare Directive: No, patient does not have an advance directive for healthcare treatment  Information on Healthcare Directives Requested: No  Patient Requests Assistance: Other (Comment)  Advance Directives: Pt. not interested at this time           Values / Beliefs  Do you have any ethnic, cultural, sacramental, or spiritual Mandaen needs you would like us to be aware of while you are in the hospital?: No    Care Plan:    Ongoing blessing and emotional support welcomed, but coping very well. Spiritual Care Services   Electronically signed by Nic Taveras on 3/19/21 at 10:31 AM EDT     To reach a  for emotional and spiritual support, place an [facility] hospitals consult request.   If a  is needed immediately, dial 0 and ask to page the on-call .

## 2021-03-19 NOTE — PROGRESS NOTES
Progress Note  Patient: Celi Chao  Unit/Bed: N199/F062-14  YOB: 1928  MRN: [id number]  Acct: [de-identified]   Admitting Diagnosis: Syncope and collapse [R55]  Admit Date:  3/18/2021  Hospital Day: 1    Chief Complaint: Syncope    Histories:  Past Medical History:   Diagnosis Date    Back pain     Colitis, ulcerative ([address]     Hypertension      Past Surgical History:   Procedure Laterality Date    TONSILLECTOMY       No family history on file. Social History     Socioeconomic History    Marital status:      Spouse name: Not on file    Number of children: Not on file    Years of education: Not on file    Highest education level: Not on file   Occupational History    Not on file   Social Needs    Financial resource strain: Not on file    Food insecurity     Worry: Not on file     Inability: Not on file    Transportation needs     Medical: Not on file     Non-medical: Not on file   Tobacco Use    Smoking status: Never Smoker    Smokeless tobacco: Never Used   Substance and Sexual Activity    Alcohol use: Not Currently    Drug use: Not Currently    Sexual activity: Not Currently     Partners: Female   Lifestyle    Physical activity     Days per week: Not on file     Minutes per session: Not on file    Stress: Not on file   Relationships    Social connections     Talks on phone: Not on file     Gets together: Not on file     Attends Mu-ism service: Not on file     Active member of club or organization: Not on file     Attends meetings of clubs or organizations: Not on file     Relationship status: Not on file    Intimate partner violence     Fear of current or ex partner: Not on file     Emotionally abused: Not on file     Physically abused: Not on file     Forced sexual activity: Not on file   Other Topics Concern    Not on file   Social History Narrative    Not on file       Subjective/HPI  Has his daily back aches.  Did not sleep well no cp no sob    EKG: SR 78 no arrhythmia noted        Review of Systems:   Review of Systems   Constitutional: Negative. Negative for diaphoresis and fatigue. HENT: Negative. Eyes: Negative. Respiratory: Negative. Negative for cough, chest tightness, shortness of breath, wheezing and stridor. Cardiovascular: Negative. Negative for chest pain, palpitations and leg swelling. Gastrointestinal: Negative. Negative for blood in stool and nausea. Genitourinary: Negative. Musculoskeletal: Positive for arthralgias and back pain. Skin: Negative. Neurological: Negative. Negative for dizziness, syncope, weakness and light-headedness. Hematological: Negative. Psychiatric/Behavioral: Negative. Physical Examination:    BP (!) 144/74   Pulse 89   Temp 97.5 °F (36.4 °C)   Resp 16   SpO2 94%    Physical Exam   Constitutional: He appears healthy. No distress. HENT:   Normal cephalic and Atraumatic   Eyes: Pupils are equal, round, and reactive to light. Neck: Normal range of motion and thyroid normal. Neck supple. No JVD present. No neck adenopathy. No thyromegaly present. Cardiovascular: Normal rate, regular rhythm, normal heart sounds, intact distal pulses and normal pulses. Pulmonary/Chest: Effort normal and breath sounds normal. He has no wheezes. He has no rales. He exhibits no tenderness. Abdominal: Soft. Bowel sounds are normal. There is no abdominal tenderness. Musculoskeletal: Normal range of motion. General: No tenderness or edema. Neurological: He is alert and oriented to person, place, and time. Skin: Skin is warm. No cyanosis. Nails show no clubbing.        LABS:  CBC:   Lab Results   Component Value Date    WBC 8.8 03/19/2021    RBC 3.36 03/19/2021    HGB 11.2 03/19/2021    HCT 32.1 03/19/2021    MCV 95.6 03/19/2021    MCH 33.3 03/19/2021    MCHC 34.9 03/19/2021    RDW 13.4 03/19/2021     03/19/2021     CBC with Differential:    Lab Results   Component Value Date    WBC 8.8 03/19/2021    RBC 3.36 03/19/2021    HGB 11.2 03/19/2021    HCT 32.1 03/19/2021     03/19/2021    MCV 95.6 03/19/2021    MCH 33.3 03/19/2021    MCHC 34.9 03/19/2021    RDW 13.4 03/19/2021    LYMPHOPCT 23.0 03/19/2021    MONOPCT 14.0 03/19/2021    BASOPCT 1.1 03/19/2021    MONOSABS 1.2 03/19/2021    LYMPHSABS 2.0 03/19/2021    EOSABS 0.5 03/19/2021    BASOSABS 0.1 03/19/2021     CMP:    Lab Results   Component Value Date     03/18/2021    K 3.9 03/18/2021    K 4.2 03/04/2021    CL 98 03/18/2021    CO2 25 03/18/2021    BUN 25 03/18/2021    CREATININE 1.55 03/18/2021    GFRAA 50.9 03/18/2021    LABGLOM 42.1 03/18/2021    GLUCOSE 173 03/18/2021    PROT 7.2 03/18/2021    LABALBU 4.1 03/18/2021    CALCIUM 9.8 03/18/2021    BILITOT 0.4 03/18/2021    ALKPHOS 92 03/18/2021    AST 29 03/18/2021    ALT 30 03/18/2021     BMP:    Lab Results   Component Value Date     03/18/2021    K 3.9 03/18/2021    K 4.2 03/04/2021    CL 98 03/18/2021    CO2 25 03/18/2021    BUN 25 03/18/2021    LABALBU 4.1 03/18/2021    CREATININE 1.55 03/18/2021    CALCIUM 9.8 03/18/2021    GFRAA 50.9 03/18/2021    LABGLOM 42.1 03/18/2021    GLUCOSE 173 03/18/2021     Magnesium:    Lab Results   Component Value Date    MG 1.8 03/18/2021     Troponin:    Lab Results   Component Value Date    TROPONINI <0.010 03/18/2021        Active Hospital Problems    Diagnosis Date Noted    Syncope and collapse [R55] 03/18/2021     Priority: Low        Assessment/Plan:  1. Syncope- etiology not clear. Keep on Telemetry. Not related to known mild Carotid plaque. Probable vasodepressor syncope. Suggest compression stockings. 2. Carotid- mild  3. HTN- can resume low dose BB. Hold Amlodipine.    4. Will review Echo - P       Electronically signed by Mark Mercado MD on 3/19/2021 at 8:13 AM

## 2021-03-19 NOTE — PROGRESS NOTES
Physical Therapy Med Surg Initial Assessment  Facility/Department: [facility]  Room: X03M761-79       NAME: Lee Rolon  : 1928 (80 y.o.)  MRN: [id number]  CODE STATUS: Full Code    Date of Service: 3/19/2021    Patient Diagnosis(es): Syncope and collapse [R55]   No chief complaint on file.     Patient Active Problem List    Diagnosis Date Noted    Syncope and collapse     Metabolic encephalopathy     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    Gross hematuria     Encephalopathy acute     Closed head injury     Aphasia     Mild cognitive impairment     AMS (altered mental status) 2021    Fall 2021    BPH (benign prostatic hyperplasia) 2021    CKD (chronic kidney disease) 2021    Leukocytosis 2021        Past Medical History:   Diagnosis Date    Back pain     [address] [address]     Hypertension      Past Surgical History:   Procedure Laterality Date    TONSILLECTOMY         Chart Reviewed: Yes  Patient assessed for rehabilitation services?: Yes    Restrictions:  Restrictions/Precautions: Fall Risk     SUBJECTIVE:      Pain     Pre Treatment pain noted in knees with standing initially and then being eliminated as time progressed    Post Treatment Pain Screenin/10       Prior Level of Function:  Social/Functional History  Lives With: Spouse  Type of Home: Apartment  Home Layout: One level  Home Access: Level entry  Bathroom Shower/Tub: Tub/Shower unit  Bathroom Toilet: Standard  Bathroom Equipment: Grab bars in shower(Pt reports he ordered a shower chair and it will be installed very soon.)  Home Equipment: (rollator)  Receives Help From: Family  ADL Assistance: Independent  Homemaking Assistance: Independent  Homemaking Responsibilities: Yes  Ambulation Assistance: Independent(Independent with rollator at home.)  Transfer Assistance: Independent  Active : No  Occupation: Retired  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    OBJECTIVE:   Vision: Impaired  Vision Exceptions: Wears glasses at all times  Hearing: Within functional limits    Cognition:  Overall Orientation Status: Impaired  Orientation Level: Oriented to person, Disoriented to time, Disoriented to place, Disoriented to situation  Follows Commands: Impaired         ROM:  RLE PROM: WFL  LLE PROM: WFL    Strength:  Strength RLE  Strength RLE: WFL  Strength LLE  Strength LLE: WFL    Neuro:  Balance  Posture: Fair  Sitting - Static: Good  Sitting - Dynamic: Good  Standing - Static: Poor(post LOB in standing with no UE support - with BUE support pt able to stand with intermittent posterior LOB requiring assistance for recovery.)  Standing - Dynamic: Poor(BUE support required)             Bed mobility  Rolling to Left: Modified independent  Rolling to Right: Modified independent  Supine to Sit: Stand by assistance  Sit to Supine: Stand by assistance  Scooting: Minimal assistance  Comment: concern for safety at edge of bed    Transfers  Sit to Stand: Stand by assistance;Minimal Assistance  Stand to sit: Stand by assistance;Minimal Assistance  Comment: decreased safety with approach to chair requiring cues and assistance to complet    Ambulation  Ambulation?: Yes  Ambulation 1  Surface: level tile  Device: Rolling Walker  Assistance: Stand by assistance;Minimal assistance  Quality of Gait: min assist required for occasional walker management and mild instability(pt normally uses rollator), short step length, flexed trunk  Distance: 100 feet  Comments: decreased safety with environmental  maneuvering with pt running into objects - cues provided with poor carry over of information    Stairs/Curb  Stairs?: No         Activity Tolerance  Activity Tolerance: Patient Tolerated treatment well  Activity Tolerance: nsg informed of pts abilities and of concerns for safety          PT Education  PT Education: Goals;PT Role;Plan of Care    ASSESSMENT:   Body structures, Functions, Activity limitations: Decreased functional mobility ; Decreased safe awareness;Decreased endurance;Decreased posture  Decision Making: Low Complexity  History: med  Exam: med  Clinical Presentation: low    Barriers to Learning: cognition    DISCHARGE RECOMMENDATIONS:  Discharge Recommendations: Continue to assess pending progress    Assessment: Pt demonstrates deficits as listed. He is requiring assistance for mobility and to assist with safe maneuvering. Pt would benefit from PT at this level of care  REQUIRES PT FOLLOW UP: Yes      PLAN OF CARE:  Plan  Times per week: 3-6  Current Treatment Recommendations: Transfer Training, Gait Training, Functional Mobility Training, Safety Education & Training  Safety Devices  Type of devices: Bed alarm in place, Call light within reach, Left in bed    Goals:  Short term goals  Short term goal 1: SBA gait with rollator  Short term goal 2: SBA with transfers and approach to bed    [facility] [address] (6 CLICK) JaredSheri José Miguel Thomas 28 Inpatient Mobility Raw Score : 18     Therapy Time:   Individual   Time In 1300   Time Out 1317   Minutes [address] [place], [address] [place] [address], 03/19/21 at 1:59 PM         Definitions for assistance levels  Independent = pt does not require any physical supervision or assistance from another person for activity completion. Device may be needed.   Stand by assistance = pt requires verbal cues or instructions from another person, close to but not touching, to perform the activity  Minimal assistance= pt performs 75% or more of the activity; assistance is required to complete the activity  Moderate assistance= pt performs 50% of the activity; assistance is required to complete the activity  Maximal assistance = pt performs 25% of the activity; assistance is required to complete the activity  Dependent = pt requires total physical assistance to accomplish the task

## 2021-03-19 NOTE — CARE COORDINATION
[facility] Case Management Initial Discharge Assessment    Per the RN, the patient is confused and has an avasys. The LSW called the patient's son, Raffi Quintana, and he asked that the LSW call his wife, Richard Wan, to discuss the discharge plan. The patient's daughter in law, Richard Wan, would like to talk with the patient's RN regarding obtaining the HIPAA code. The patient's daughter in law would like to talk with the physician regarding the patient's tests, consults, etc. The LSW updated the patient's RN. PCP: Travis Nesbitt MD                                Date of Last Visit: The patient had a tele health appointment 1 week ago. The daughter in law states the patient has an in person appointment Tuesday, March 23rd with the physician. If no PCP, list provided? N/A    Discharge Planning    Living Arrangements: independently at home    Who do you live with? The patient lives with his wife in an apartment. The patient's son and his wife live approximately 1 mile down the road. Who helps you with your care:  Self    If lives at home:     Do you have any barriers navigating in your home? Patient can perform ADL? Yes - per the patient's daughter in law, Richard April. Current Services (outpatient and in home) :  Current with [facility]    Dialysis: No    Is transportation available to get to your appointments? Yes    DME Equipment:  Rollator, OT from [facility] assisting with getting bench chair for the shower; In process of getting a lift chair. Respiratory equipment: None    Respiratory provider:  N/A     Pharmacy:   D[facility] [address] with Medication Assistance Program?  No      Patient agreeable to [facility]? Current with [facility] (Discharge plan - return home with [facility] vs [facility] if needed. Patient agreeable to SNF/Rehab? Possible [place] [facility] (was there 2/23/2021 - 3/4/2021) if needed. The daughter in law states if the patient is in pain they would prefer [facility] as the discharge plan. Other discharge needs identified? Freedom of choice list provided with basic dialogue that supports the patient's individualized plan of care/goals and shares the quality data associated with the providers. N/A     Does Patient Have a High-Risk for Readmission Diagnosis (CHF, PN, MI, COPD)? The plan for Transition of Care is related to the following treatment goals: Await the medical course of treatment. If the patient has pain, the patient's son and daughter in law may want [facility]. Initial Discharge Plan? (Note: please see concurrent daily documentation for any updates after initial note). The patient's daughter in law, Jenn Madden, states they would probably like the patient to return home and resume [facility]. If the patient is in pain or needs nursing care - they would like the patient to return to [facility]. The LSW called the possible referral to the supervisor at [facility]. The Patient and/or patient representative: The patient's daughter in law, Jenn Madden, was provided with choice of any post-acute providers for care and equipment and agrees with discharge plan  N/A (Patient's daughter in law would like to resume [facility] vs return to [facility] if rehab is indicated closer to discharge. DCCOP was completed. The patient is at low risk (green) of readmission.      Electronically signed by Daniel Okeefe on 3/19/2021 at 3:29 PM

## 2021-03-19 NOTE — PROGRESS NOTES
[facility] Neurology Daily Progress Note  Name: Lucia Lamb  Age: 80 y.o. Gender: male  CodeStatus: Full Code  Allergies: Sulfa Antibiotics    Chief Complaint:No chief complaint on file. Primary Care Provider: Madeline Berg MD  InpatientTreatment Team: Treatment Team: Attending Provider: Larry Richter MD; Consulting Physician: Jose Marks MD; Consulting Physician: Cassidy Valero MD; Consulting Physician: Jenn Burroughs MD; : George Matias, LORENA; Registered Nurse: Alyson Truong RN; : JULIANO Jarvis; Registered Nurse: Perfecto Saxena RN  Admission Date: 3/18/2021      HPI   Pt seen and examined for neuro follow up for syncope with hypotension. Patient noted to be hypotensive again this morning with a blood pressure of 94/45. Alert oriented x3, no acute distress, cooperative. No recurrent syncope. Denies dizziness or lightheadedness. No tremors. No focal neuro deficits. No seizures. Patient is remained stable without any issues though he still hypotensive    Vitals:    03/19/21 1334   BP: (!) 94/45   Pulse: 64   Resp:    Temp: 97.3 °F (36.3 °C)   SpO2: 97%      Review of Systems   Constitutional: Negative for appetite change, chills, fatigue and fever. HENT: Negative for hearing loss and trouble swallowing. Eyes: Negative for visual disturbance. Respiratory: Negative for cough, chest tightness, shortness of breath and wheezing. Cardiovascular: Positive for leg swelling. Negative for chest pain and palpitations. Gastrointestinal: Negative for nausea and vomiting. Musculoskeletal: Positive for arthralgias and back pain. Negative for gait problem. Skin: Negative for color change and rash. Neurological: Negative for dizziness, tremors, seizures, syncope, facial asymmetry, speech difficulty, weakness, light-headedness, numbness and headaches. Psychiatric/Behavioral: Negative for agitation, confusion and hallucinations.  The patient is not nervous/anxious. Physical Exam  Vitals signs and nursing note reviewed. Constitutional:       General: He is not in acute distress. Appearance: He is not diaphoretic. HENT:      Head: Normocephalic and atraumatic. Eyes:      Extraocular Movements: Extraocular movements intact. Pupils: Pupils are equal, round, and reactive to light. Cardiovascular:      Rate and Rhythm: Normal rate and regular rhythm. Pulmonary:      Effort: Pulmonary effort is normal. No respiratory distress. Breath sounds: Normal breath sounds. Abdominal:      General: Bowel sounds are normal.      Palpations: Abdomen is soft. Skin:     General: Skin is warm and dry. Neurological:      General: No focal deficit present. Mental Status: He is alert and oriented to person, place, and time. Cranial Nerves: No cranial nerve deficit. Motor: No weakness, tremor, seizure activity or pronator drift. Coordination: Coordination normal. Finger-Nose-Finger Test normal.       Patient denies any dizziness        Medications:  Reviewed    Infusion Medications:   Scheduled Medications:    metoprolol tartrate  25 mg Oral BID    heparin (porcine)  5,000 Units Subcutaneous 3 times per day    cefTRIAXone (ROCEPHIN) IV  1,000 mg Intravenous Q24H    tamsulosin  0.4 mg Oral Daily    cyclobenzaprine  10 mg Oral QPM    gabapentin  300 mg Oral Nightly    finasteride  5 mg Oral Daily    mirtazapine  7.5 mg Oral Nightly     PRN Meds: traMADol    Labs:   Recent Labs     03/18/21  1053 03/19/21  0649   WBC 6.3 8.8   HGB 11.3* 11.2*   HCT 33.6* 32.1*    268     Recent Labs     03/18/21  1116 03/19/21  0649    141   K 3.9 3.9   CL 98 106   CO2 25 23   BUN 25* 25*   CREATININE 1.55* 1.43*   CALCIUM 9.8 9.4     Recent Labs     03/18/21  1116 03/19/21  0649   AST 29 27   ALT 30 29   BILITOT 0.4 0.4   ALKPHOS 92 87     No results for input(s): INR in the last 72 hours.   Recent Labs     03/18/21  1116 03/18/21  1726 03/19/21  0649   TROPONINI 0.013* <0.010 0.012*       Urinalysis:   Lab Results   Component Value Date    NITRU Negative 03/18/2021    WBCUA >100 03/18/2021    BACTERIA MANY 03/18/2021    RBCUA 3-5 02/19/2021    BLOODU Trace-lysed 03/18/2021    SPECGRAV 1.015 03/18/2021    GLUCOSEU Negative 03/18/2021       Radiology:   Most recent    EEG No procedure found. MRI of Brain No results found for this or any previous visit. Results for orders placed during the hospital encounter of 02/20/21   MRI brain without contrast    Narrative EXAM: MRI of the brain without contrast    History: Metabolic encephalopathy. Stroke. Technique: Multiplanar multisequence MRI of the brain was performed without contrast.    Comparison: CT brain from February 19, 2021    Findings:     Examination is degraded by motion artifact. Hyperintensity/FLAIR signal within the bilateral supratentorial white matter and patchy hyperintense T2 signal within the [place] are nonspecific findings most compatible with chronic small vessel ischemic changes in a patient of this age. Prominence of the sulci and ventricles compatible with moderate generalized parenchymal volume loss. No edema, hemorrhage, mass, mass effect, midline shift, or abnormal extra-axial fluid collection. Midline structures are within normal limits. The   posterior fossa is within normal limits. There is no diffusion restriction. No susceptibility artifact is identified on the gradient echo sequence. The major intracranial vascular flow voids are maintained. Cranial nerves 7/8 complexes appear grossly unremarkable. The visualized paranasal sinuses and bilateral mastoid air cells are clear. Impression No acute ischemia or acute intracranial process. Generalized parenchymal volume loss and nonspecific white matter findings most compatible with chronic small vessel ischemic changes in a patient of this age.                              MRA of the Head and Neck: No results found for this or any previous visit. No results found for this or any previous visit. No results found for this or any previous visit. CT of the Head:   Results for orders placed during the hospital encounter of 03/18/21   CT Head WO Contrast    Narrative CT Brain. Contrast medium:  without contrast.. History:  Syncope. Technical factors: CT imaging of the brain was obtained and formatted as 5 mm contiguous axial images. 2.5 mm contiguous axial images were obtained through the osseous structures. Sagittal and coronal reconstruction obtained during postprocessing. Comparison:  CT brain, February 19, 2021. MRI brain, February 20, 2021. Findings:    Extra-axial spaces:  Normal.     Intracranial hemorrhage:  None. Ventricular system: Ventricles moderately enlarged and sulci moderately prominent. Basal Cisterns:  Normal.    Cerebral Parenchyma: Bilateral symmetric periventricular areas decreased attenuation. Midline Shift:  None. Cerebellum:  Normal.     Paranasal sinuses and mastoid air cells:  Normal.    Visualized Orbits: Bilateral ocular surgery        Impression Impression:    Acute findings. Moderate cerebral atrophy. Chronic ischemic white matter disease. All CT scans at this facility use dose modulation, iterative reconstruction, and/or weight based dosing when appropriate to reduce radiation dose to as low as reasonably achievable. No results found for this or any previous visit. No results found for this or any previous visit. Carotid duplex: No results found for this or any previous visit. No results found for this or any previous visit.   Results for orders placed during the hospital encounter of 02/20/21   US CAROTID ARTERY BILATERAL    Narrative BILATERAL DUPLEX CAROTID ULTRASOUND    CLINICAL INFORMATION:  Carotid stenosis    COMPARISON:  None available     FINDINGS:  The bilateral carotid duplex ultrasound study demonstrates the following:                                                                        RIGHT            LEFT      Proximal common carotid artery           137 cm/s            111 cm/s    Mid common carotid artery                   120 cm/s            109 cm/s    Distal common carotid artery                121 cm/s           116 cm/s  Proximal internal carotid artery             149 cm/s            77 cm/s  Mid internal carotid artery                      177 cm/s           68 cm/s  Distal internal carotid artery                  98 cm/s             158 cm/s  External carotid artery                            256 cm/s           97 cm/s      ICA/CCA ratio                                         1.5                0.7       Vertebral arteries antegrade flow         Yes                     Yes            Impression 50-69% stenosis of the right internal carotid artery. Less than 50% stenosis of the left internal carotid artery. Antegrade flow both vertebral arteries. Validated velocity measurements with angiographic measurements, velocity criteria are extrapolated from diameter data as defined by the Society of Radiologist in [address] [place] [address] Drive Radiology 2003; 699;227-698. Echo No results found for this or any previous visit. Assessment/Plan:    Syncope likely to be vasovagal without any sequelae. Patient does not have any deficits on neurologic examination. Patient has only some age-related cognitive changes with no true dementia is noted. He is otherwise nonfocal and his examination. His carotid ultrasounds are done in February and shows some degree of right carotid stenosis though her of no clinical significance at least at this time. We will continue keep observation and reevaluate if there is any other symptoms. Syncope  Hypotension  UTI  CT the head no acute findings. Moderate cerebral atrophy.     Carotid duplex done 2/20/2021 with 50 to 69% stenosis of the right internal carotid artery and less than 50% stenosis left internal carotid artery   check orthostatic BPs    I have personally performed a face to face diagnostic evaluation on this patient, reviewed all data and investigations, and am the sole provider of all clinical decisions on the neurological status of this patient. Exam shows no focal findings except for hypertension. Patient does not appear to be dizzy though. Patient will be discharged from neurological standpoint      David Hirsch MD, [address], American Board of Psychiatry & Neurology  Board Certified in Vascular Neurology  Board Certified in Neuromuscular Medicine  Certified in Neurorehabilitation     Collaborating physicians: Dr Rosendo Hirsch    Electronically signed by TAYE Solares CNP on 3/19/2021 at 1:48 PM

## 2021-03-20 LAB
ALBUMIN SERPL-MCNC: 3.2 G/DL (ref 3.5–4.6)
ALP BLD-CCNC: 79 U/L (ref 35–104)
ALT SERPL-CCNC: 25 U/L (ref 0–41)
ANION GAP SERPL CALCULATED.3IONS-SCNC: 12 MEQ/L (ref 9–15)
AST SERPL-CCNC: 26 U/L (ref 0–40)
BASOPHILS ABSOLUTE: 0.1 K/UL (ref 0–0.2)
BASOPHILS RELATIVE PERCENT: 1.1 %
BILIRUB SERPL-MCNC: 0.4 MG/DL (ref 0.2–0.7)
BUN BLDV-MCNC: 27 MG/DL (ref 8–23)
CALCIUM SERPL-MCNC: 8.8 MG/DL (ref 8.5–9.9)
CHLORIDE BLD-SCNC: 106 MEQ/L (ref 95–107)
CO2: 22 MEQ/L (ref 20–31)
CREAT SERPL-MCNC: 1.1 MG/DL (ref 0.7–1.2)
EOSINOPHILS ABSOLUTE: 0.8 K/UL (ref 0–0.7)
EOSINOPHILS RELATIVE PERCENT: 10.1 %
GFR AFRICAN AMERICAN: >60
GFR NON-AFRICAN AMERICAN: >60
GLOBULIN: 2.9 G/DL (ref 2.3–3.5)
GLUCOSE BLD-MCNC: 89 MG/DL (ref 70–99)
HCT VFR BLD CALC: 32.5 % (ref 42–52)
HEMOGLOBIN: 11.1 G/DL (ref 14–18)
LYMPHOCYTES ABSOLUTE: 2.5 K/UL (ref 1–4.8)
LYMPHOCYTES RELATIVE PERCENT: 30.3 %
MCH RBC QN AUTO: 33.6 PG (ref 27–31.3)
MCHC RBC AUTO-ENTMCNC: 34.3 % (ref 33–37)
MCV RBC AUTO: 97.9 FL (ref 80–100)
MONOCYTES ABSOLUTE: 1.2 K/UL (ref 0.2–0.8)
MONOCYTES RELATIVE PERCENT: 15 %
NEUTROPHILS ABSOLUTE: 3.5 K/UL (ref 1.4–6.5)
NEUTROPHILS RELATIVE PERCENT: 43.5 %
PDW BLD-RTO: 13.6 % (ref 11.5–14.5)
PLATELET # BLD: 233 K/UL (ref 130–400)
POTASSIUM SERPL-SCNC: 4.3 MEQ/L (ref 3.4–4.9)
RBC # BLD: 3.32 M/UL (ref 4.7–6.1)
SODIUM BLD-SCNC: 140 MEQ/L (ref 135–144)
TOTAL PROTEIN: 6.1 G/DL (ref 6.3–8)
URINE CULTURE, ROUTINE: NORMAL
WBC # BLD: 8.1 K/UL (ref 4.8–10.8)

## 2021-03-20 PROCEDURE — 99232 SBSQ HOSP IP/OBS MODERATE 35: CPT | Performed by: INTERNAL MEDICINE

## 2021-03-20 PROCEDURE — 6360000002 HC RX W HCPCS: Performed by: INTERNAL MEDICINE

## 2021-03-20 PROCEDURE — 6370000000 HC RX 637 (ALT 250 FOR IP): Performed by: INTERNAL MEDICINE

## 2021-03-20 PROCEDURE — 99233 SBSQ HOSP IP/OBS HIGH 50: CPT | Performed by: PSYCHIATRY & NEUROLOGY

## 2021-03-20 PROCEDURE — 80053 COMPREHEN METABOLIC PANEL: CPT

## 2021-03-20 PROCEDURE — 2060000000 HC ICU INTERMEDIATE R&B

## 2021-03-20 PROCEDURE — APPSS30 APP SPLIT SHARED TIME 16-30 MINUTES: Performed by: STUDENT IN AN ORGANIZED HEALTH CARE EDUCATION/TRAINING PROGRAM

## 2021-03-20 PROCEDURE — 2580000003 HC RX 258: Performed by: INTERNAL MEDICINE

## 2021-03-20 PROCEDURE — 85025 COMPLETE CBC W/AUTO DIFF WBC: CPT

## 2021-03-20 PROCEDURE — 36415 COLL VENOUS BLD VENIPUNCTURE: CPT

## 2021-03-20 PROCEDURE — 97116 GAIT TRAINING THERAPY: CPT

## 2021-03-20 RX ORDER — 0.9 % SODIUM CHLORIDE 0.9 %
250 INTRAVENOUS SOLUTION INTRAVENOUS ONCE
Status: COMPLETED | OUTPATIENT
Start: 2021-03-20 | End: 2021-03-20

## 2021-03-20 RX ORDER — TRAMADOL HYDROCHLORIDE 50 MG/1
25 TABLET ORAL NIGHTLY PRN
Status: DISCONTINUED | OUTPATIENT
Start: 2021-03-20 | End: 2021-03-21 | Stop reason: HOSPADM

## 2021-03-20 RX ADMIN — CYCLOBENZAPRINE 10 MG: 10 TABLET, FILM COATED ORAL at 16:48

## 2021-03-20 RX ADMIN — TAMSULOSIN HYDROCHLORIDE 0.4 MG: 0.4 CAPSULE ORAL at 08:29

## 2021-03-20 RX ADMIN — FINASTERIDE 5 MG: 5 TABLET, FILM COATED ORAL at 08:29

## 2021-03-20 RX ADMIN — HEPARIN SODIUM 5000 UNITS: 5000 INJECTION INTRAVENOUS; SUBCUTANEOUS at 01:59

## 2021-03-20 RX ADMIN — MIRTAZAPINE 7.5 MG: 15 TABLET, ORALLY DISINTEGRATING ORAL at 21:05

## 2021-03-20 RX ADMIN — GABAPENTIN 300 MG: 300 CAPSULE ORAL at 21:05

## 2021-03-20 RX ADMIN — HEPARIN SODIUM 5000 UNITS: 5000 INJECTION INTRAVENOUS; SUBCUTANEOUS at 16:48

## 2021-03-20 RX ADMIN — SODIUM CHLORIDE 250 ML: 9 INJECTION, SOLUTION INTRAVENOUS at 15:08

## 2021-03-20 RX ADMIN — METOPROLOL TARTRATE 25 MG: 25 TABLET, FILM COATED ORAL at 08:29

## 2021-03-20 RX ADMIN — TRAMADOL HYDROCHLORIDE 50 MG: 50 TABLET, FILM COATED ORAL at 08:29

## 2021-03-20 RX ADMIN — HEPARIN SODIUM 5000 UNITS: 5000 INJECTION INTRAVENOUS; SUBCUTANEOUS at 10:20

## 2021-03-20 ASSESSMENT — ENCOUNTER SYMPTOMS
EYES NEGATIVE: 1
GASTROINTESTINAL NEGATIVE: 1
VOMITING: 0
COUGH: 0
WHEEZING: 0
COLOR CHANGE: 0
CHEST TIGHTNESS: 0
BACK PAIN: 1
NAUSEA: 0
STRIDOR: 0
BLOOD IN STOOL: 0
TROUBLE SWALLOWING: 0
DIARRHEA: 0
SHORTNESS OF BREATH: 0
RESPIRATORY NEGATIVE: 1

## 2021-03-20 ASSESSMENT — PAIN SCALES - GENERAL
PAINLEVEL_OUTOF10: 0

## 2021-03-20 ASSESSMENT — PAIN DESCRIPTION - DESCRIPTORS
DESCRIPTORS: SORE
DESCRIPTORS: ACHING

## 2021-03-20 ASSESSMENT — PAIN DESCRIPTION - PAIN TYPE: TYPE: CHRONIC PAIN

## 2021-03-20 ASSESSMENT — PAIN DESCRIPTION - ORIENTATION
ORIENTATION: LEFT
ORIENTATION: LEFT

## 2021-03-20 ASSESSMENT — PAIN DESCRIPTION - LOCATION: LOCATION: LEG;KNEE

## 2021-03-20 NOTE — PROGRESS NOTES
Progress Note  Patient: Lee Rolon  Unit/Bed: Z111/X805-42  YOB: 1928  MRN: [id number]  Acct: [de-identified]   Admitting Diagnosis: Syncope and collapse [R55]  Admit Date:  3/18/2021  Hospital Day: 2    Chief Complaint: Syncope    Subjective: Patient sitting up in bed. Denies any chest pain or shortness of breath. Hemodynamically stable. No significant labs on telemetry. Echo with normal LV function, no valve abnormalities. Histories:  Past Medical History:   Diagnosis Date    Back pain     Colitis, ulcerative ([address]     Hypertension      Past Surgical History:   Procedure Laterality Date    TONSILLECTOMY       No family history on file.   Social History     Socioeconomic History    Marital status:      Spouse name: Not on file    Number of children: Not on file    Years of education: Not on file    Highest education level: Not on file   Occupational History    Not on file   Social Needs    Financial resource strain: Not on file    Food insecurity     Worry: Not on file     Inability: Not on file    Transportation needs     Medical: Not on file     Non-medical: Not on file   Tobacco Use    Smoking status: Never Smoker    Smokeless tobacco: Never Used   Substance and Sexual Activity    Alcohol use: Not Currently    Drug use: Not Currently    Sexual activity: Not Currently     Partners: Female   Lifestyle    Physical activity     Days per week: Not on file     Minutes per session: Not on file    Stress: Not on file   Relationships    Social connections     Talks on phone: Not on file     Gets together: Not on file     Attends Congregation service: Not on file     Active member of club or organization: Not on file     Attends meetings of clubs or organizations: Not on file     Relationship status: Not on file    Intimate partner violence     Fear of current or ex partner: Not on file     Emotionally abused: Not on file     Physically abused: Not on file     Forced sexual activity: Not on file   Other Topics Concern    Not on file   Social History Narrative    Not on file       Subjective/HPI  Has his daily back aches. Did not sleep well no cp no sob    EKG: SR 78 no arrhythmia noted        Review of Systems:   Review of Systems   Constitutional: Negative. Negative for diaphoresis and fatigue. HENT: Negative. Eyes: Negative. Respiratory: Negative. Negative for cough, chest tightness, shortness of breath, wheezing and stridor. Cardiovascular: Negative. Negative for chest pain, palpitations and leg swelling. Gastrointestinal: Negative. Negative for blood in stool and nausea. Genitourinary: Negative. Musculoskeletal: Positive for arthralgias and back pain. Skin: Negative. Neurological: Negative. Negative for dizziness, syncope, weakness and light-headedness. Hematological: Negative. Psychiatric/Behavioral: Negative. Physical Examination:    BP (!) 90/42   Pulse 50   Temp 97.2 °F (36.2 °C) (Oral)   Resp 18   SpO2 97%    Physical Exam   Constitutional: He appears healthy. No distress. HENT:   Normal cephalic and Atraumatic   Eyes: Pupils are equal, round, and reactive to light. Neck: Normal range of motion and thyroid normal. Neck supple. No JVD present. No neck adenopathy. No thyromegaly present. Cardiovascular: Normal rate, regular rhythm, normal heart sounds, intact distal pulses and normal pulses. Pulmonary/Chest: Effort normal and breath sounds normal. He has no wheezes. He has no rales. He exhibits no tenderness. Abdominal: Soft. Bowel sounds are normal. There is no abdominal tenderness. Musculoskeletal: Normal range of motion. General: No tenderness or edema. Neurological: He is alert and oriented to person, place, and time. Skin: Skin is warm. No cyanosis. Nails show no clubbing.        LABS:  CBC:   Lab Results   Component Value Date    WBC 8.1 03/20/2021    RBC 3.32 03/20/2021    HGB 11.1 03/20/2021    HCT artery disease    Plan:  1. Continue monitor on telemetry  2. Conservative medical therapy from cardiology standpoint  3. GI/DVT prophylaxis  4. Consider outpatient event monitor  5.  Stable for discharge from cardiology standpoint       Electronically signed by Dion Milligan DO on 3/20/2021 at 2:57 PM

## 2021-03-20 NOTE — CARE COORDINATION
DR FISH SPOKE WITH PATIENT AND FAMILY AND PLAN IS [facility]. SPOKE WITH MARYAM SUPERVISOR AT [facility]. AWARE OF REFERRAL AND CAN TAKE PT TOMORROW. WILL NEED A COVID TEST TOMORROW PRIOR TO DC TO [facility].

## 2021-03-20 NOTE — FLOWSHEET NOTE
Patient resting in bed at this time. Wife at bedside visiting. BP improved to 126/56. Has no complaints at this time. TV on and call light remains in reach.  Electronically signed by Junior Damon RN on 3/20/2021 at 5:25 PM

## 2021-03-20 NOTE — PROGRESS NOTES
Physician Progress Note      PATIENT:               Reinier Davis  CSN #:                  [id number]  :                       1928  ADMIT DATE:       3/18/2021 4:03 PM  [address] [place] DATE:  RESPONDING  PROVIDER #:        Patrick Aden MD          QUERY TEXT:    Patient admitted with syncope and collapse, acute cystitis. Pt noted to have   CKD. If possible, please document in progress notes and discharge summary if   you are evaluating and/or treating any of the following: The medical record reflects the following:  Risk Factors: HTN  Clinical Indicators: SCr/GFR 1.55/42.1  3/1/21  1.24/54.4  3/4/21 1.67/38.6     1.13-1.31/>60-51.1  21 1.62/40  Treatment: monitoring labs    Thank you, Franchesca Cunningham RN BSN  [facility]  [phone]  Options provided:  -- CKD Stage 3a GFR 45-59  -- CKD Stage 3b GFR 30-44  -- Other - I will add my own diagnosis  -- Disagree - Not applicable / Not valid  -- Disagree - Clinically unable to determine / Unknown  -- Refer to Clinical Documentation Reviewer    PROVIDER RESPONSE TEXT:    This patient has CKD Stage 3a.     Query created by: Campbell Petty on 3/19/2021 7:59 AM      Electronically signed by:  Patrick Aden MD 3/20/2021 12:16 PM

## 2021-03-20 NOTE — PROGRESS NOTES
[facility] Neurology Daily Progress Note  Name: Toan Jenkins  Age: 80 y.o. Gender: male  CodeStatus: Full Code  Allergies: Sulfa Antibiotics    Chief Complaint:No chief complaint on file. Primary Care Provider: Travis Nesbitt MD  InpatientTreatment Team: Treatment Team: Attending Provider: Nora Luna MD; Consulting Physician: Crystal Spear MD; Consulting Physician: Deny Finley MD; Consulting Physician: Karrie Bryan MD; : JULIANO Samuel; : Jevon Alaniz RN; Registered Nurse: Gerardo Sanchez RN; Utilization Reviewer: Laura Villarreal RN; Tech: [employer]  Admission Date: 3/18/2021      HPI   Pt seen and examined for neuro follow up for syncope with hypotension. Patient lethargic today and oriented x3 but very lethargic likely related to recent tramadol administration. Discussed case with nursing who reports that ambulated with a Rollator with PT this morning. Is forgetful was confused in the evening, believing he was at a gas station. AVASYS in place. No repeat episodes of syncope. No dizziness or reports of lightheadedness. No obvious focal deficits. No seizures. Patient has not any further issues except for some sundowning. Vitals:    03/20/21 0724   BP: 134/60   Pulse: 58   Resp: 16   Temp: 97.2 °F (36.2 °C)   SpO2: 98%      Review of Systems   Constitutional: Positive for fatigue. Negative for appetite change, chills and fever. HENT: Negative for hearing loss and trouble swallowing. Eyes: Negative for visual disturbance. Respiratory: Negative for cough, chest tightness, shortness of breath and wheezing. Cardiovascular: Positive for leg swelling. Negative for chest pain and palpitations. Gastrointestinal: Negative for nausea and vomiting. Musculoskeletal: Positive for arthralgias and back pain. Negative for gait problem. Skin: Negative for color change and rash.    Neurological: Negative for dizziness, tremors, seizures, syncope, facial asymmetry, speech difficulty, weakness, light-headedness, numbness and headaches. Psychiatric/Behavioral: Negative for agitation, confusion and hallucinations. The patient is not nervous/anxious. Physical Exam  Vitals signs and nursing note reviewed. Constitutional:       General: He is not in acute distress. Appearance: He is not diaphoretic. HENT:      Head: Normocephalic and atraumatic. Eyes:      Extraocular Movements: Extraocular movements intact. Pupils: Pupils are equal, round, and reactive to light. Cardiovascular:      Rate and Rhythm: Normal rate and regular rhythm. Pulmonary:      Effort: Pulmonary effort is normal. No respiratory distress. Breath sounds: Normal breath sounds. Abdominal:      General: Bowel sounds are normal.      Palpations: Abdomen is soft. Skin:     General: Skin is warm and dry. Neurological:      General: No focal deficit present. Mental Status: He is alert and oriented to person, place, and time. Cranial Nerves: No cranial nerve deficit. Motor: No weakness, tremor, seizure activity or pronator drift.       Coordination: Coordination normal. Finger-Nose-Finger Test normal.               Medications:  Reviewed    Infusion Medications:   Scheduled Medications:    metoprolol tartrate  25 mg Oral BID    heparin (porcine)  5,000 Units Subcutaneous 3 times per day    cefTRIAXone (ROCEPHIN) IV  1,000 mg Intravenous Q24H    tamsulosin  0.4 mg Oral Daily    cyclobenzaprine  10 mg Oral QPM    gabapentin  300 mg Oral Nightly    finasteride  5 mg Oral Daily    mirtazapine  7.5 mg Oral Nightly     PRN Meds: traMADol    Labs:   Recent Labs     03/18/21  1053 03/19/21  0649 03/20/21  0651   WBC 6.3 8.8 8.1   HGB 11.3* 11.2* 11.1*   HCT 33.6* 32.1* 32.5*    268 233     Recent Labs     03/18/21  1116 03/19/21  0649 03/20/21  0651    141 140   K 3.9 3.9 4.3   CL 98 106 106   CO2 25 23 22   BUN 25* 25* 27*   CREATININE 1.55* 1.43* 1.10   CALCIUM 9.8 9.4 8.8     Recent Labs     03/18/21  1116 03/19/21  0649 03/20/21  0651   AST 29 27 26   ALT 30 29 25   BILITOT 0.4 0.4 0.4   ALKPHOS 92 87 79     No results for input(s): INR in the last 72 hours. Recent Labs     03/18/21  1116 03/18/21  1726 03/19/21  0649   TROPONINI 0.013* <0.010 0.012*       Urinalysis:   Lab Results   Component Value Date    NITRU Negative 03/18/2021    WBCUA >100 03/18/2021    BACTERIA MANY 03/18/2021    RBCUA 3-5 02/19/2021    BLOODU Trace-lysed 03/18/2021    SPECGRAV 1.015 03/18/2021    GLUCOSEU Negative 03/18/2021       Radiology:   Most recent    EEG No procedure found. MRI of Brain No results found for this or any previous visit. Results for orders placed during the hospital encounter of 02/20/21   MRI brain without contrast    Narrative EXAM: MRI of the brain without contrast    History: Metabolic encephalopathy. Stroke. Technique: Multiplanar multisequence MRI of the brain was performed without contrast.    Comparison: CT brain from February 19, 2021    Findings:     Examination is degraded by motion artifact. Hyperintensity/FLAIR signal within the bilateral supratentorial white matter and patchy hyperintense T2 signal within the [place] are nonspecific findings most compatible with chronic small vessel ischemic changes in a patient of this age. Prominence of the sulci and ventricles compatible with moderate generalized parenchymal volume loss. No edema, hemorrhage, mass, mass effect, midline shift, or abnormal extra-axial fluid collection. Midline structures are within normal limits. The   posterior fossa is within normal limits. There is no diffusion restriction. No susceptibility artifact is identified on the gradient echo sequence. The major intracranial vascular flow voids are maintained. Cranial nerves 7/8 complexes appear grossly unremarkable. The visualized paranasal sinuses and bilateral mastoid air cells are clear.       Impression No acute ischemia or acute intracranial process. Generalized parenchymal volume loss and nonspecific white matter findings most compatible with chronic small vessel ischemic changes in a patient of this age. MRA of the Head and Neck: No results found for this or any previous visit. No results found for this or any previous visit. No results found for this or any previous visit. CT of the Head:   Results for orders placed during the hospital encounter of 03/18/21   CT Head WO Contrast    Narrative CT Brain. Contrast medium:  without contrast.. History:  Syncope. Technical factors: CT imaging of the brain was obtained and formatted as 5 mm contiguous axial images. 2.5 mm contiguous axial images were obtained through the osseous structures. Sagittal and coronal reconstruction obtained during postprocessing. Comparison:  CT brain, February 19, 2021. MRI brain, February 20, 2021. Findings:    Extra-axial spaces:  Normal.     Intracranial hemorrhage:  None. Ventricular system: Ventricles moderately enlarged and sulci moderately prominent. Basal Cisterns:  Normal.    Cerebral Parenchyma: Bilateral symmetric periventricular areas decreased attenuation. Midline Shift:  None. Cerebellum:  Normal.     Paranasal sinuses and mastoid air cells:  Normal.    Visualized Orbits: Bilateral ocular surgery        Impression Impression:    Acute findings. Moderate cerebral atrophy. Chronic ischemic white matter disease. All CT scans at this facility use dose modulation, iterative reconstruction, and/or weight based dosing when appropriate to reduce radiation dose to as low as reasonably achievable. No results found for this or any previous visit. No results found for this or any previous visit. Carotid duplex: No results found for this or any previous visit. No results found for this or any previous visit.   Results for orders placed during the hospital encounter of 02/20/21   US CAROTID ARTERY BILATERAL    Narrative BILATERAL DUPLEX CAROTID ULTRASOUND    CLINICAL INFORMATION:  Carotid stenosis    COMPARISON:  None available     FINDINGS:  The bilateral carotid duplex ultrasound study demonstrates the following:                                                                        RIGHT            LEFT      Proximal common carotid artery           137 cm/s            111 cm/s    Mid common carotid artery                   120 cm/s            109 cm/s    Distal common carotid artery                121 cm/s           116 cm/s  Proximal internal carotid artery             149 cm/s            77 cm/s  Mid internal carotid artery                      177 cm/s           68 cm/s  Distal internal carotid artery                  98 cm/s             158 cm/s  External carotid artery                            256 cm/s           97 cm/s      ICA/CCA ratio                                         1.5                0.7       Vertebral arteries antegrade flow         Yes                     Yes            Impression 50-69% stenosis of the right internal carotid artery. Less than 50% stenosis of the left internal carotid artery. Antegrade flow both vertebral arteries. Validated velocity measurements with angiographic measurements, velocity criteria are extrapolated from diameter data as defined by the Society of Radiologist in [address] [place] [address] Drive Radiology 2003; [id number];[id number]. Echo No results found for this or any previous visit. Assessment/Plan:    Syncope likely to be vasovagal without any sequelae. Patient does not have any deficits on neurologic examination. Patient has only some age-related cognitive changes with no true dementia is noted. He is otherwise nonfocal and his examination.   His carotid ultrasounds are done in February and shows some degree of right carotid stenosis though her of no clinical significance at least at this time. We will continue keep observation and reevaluate if there is any other symptoms. Syncope  Hypotension  UTI  CT the head no acute findings. Moderate cerebral atrophy. Carotid duplex done 2/20/2021 with 50 to 69% stenosis of the right internal carotid artery and less than 50% stenosis left internal carotid artery   check orthostatic BPs    Orthostatics were positive with diastolic blood pressure decreasing by more than 10. Cards is following and holding amlodipine for now due to hypotension. Forgetfulness and confusion likely related to age-related moderate cerebral atrophy. Recent B12, folate, and TSH were within normal limits. No repeat episodes of syncope. No focal deficits. Patient stable from a neurological perspective and okay for discharge. I have personally performed a face to face diagnostic evaluation on this patient, reviewed all data and investigations, and am the sole provider of all clinical decisions on the neurological status of this patient. Neurological exam is normal except for some sundowning. Blood pressure is improved. Awaiting discharge      David Whitaker MD, Bailee Begum, American Board of Psychiatry & Neurology  Board Certified in Vascular Neurology  Board Certified in Neuromuscular Medicine  Certified in [address]         Electronically signed by Radha Montesinos PA-C on 3/20/2021 at 9:46 AM

## 2021-03-20 NOTE — PROGRESS NOTES
Physical Therapy  Physical Therapy Med Surg Daily Treatment Note  Facility/Department: [facility]  Room: G578/Y057-04       NAME: Katlyn Bear  : 1928 (80 y.o.)  MRN: [id number]  CODE STATUS: Full Code    Date of Service: 3/20/2021    Patient Diagnosis(es): Syncope and collapse [R55]   No chief complaint on file. Patient Active Problem List    Diagnosis Date Noted    Syncope and collapse     Metabolic encephalopathy     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    Gross hematuria     Encephalopathy acute     Closed head injury     Aphasia     Mild cognitive impairment     AMS (altered mental status) 2021    Fall 2021    BPH (benign prostatic hyperplasia) 2021    CKD (chronic kidney disease) 2021    Leukocytosis 2021        Past Medical History:   Diagnosis Date    Back pain     Colitis, ulcerative ([address]     Hypertension      Past Surgical History:   Procedure Laterality Date    TONSILLECTOMY         Restrictions  Restrictions/Precautions: Fall Risk    SUBJECTIVE   General  Chart Reviewed: Yes  Subjective  Subjective: \"My left leg is aching today 3-4/10\" Pain went to 6-7/10 while walking then 3/10 post.    Pre-Session Pain Report     Pain Screening  Patient Currently in Pain: Yes   3/10    Post-Session Pain Report  Pain Assessment  Pain Assessment: 0-10  Pain Level: 4  Patient's Stated Pain Goal: No pain  Pain Type: Acute pain  Pain Location: Leg  Pain Orientation: Left  Pain Descriptors: Aching         OBJECTIVE             Transfers  Sit to Stand: Contact guard assistance  Stand to sit: Contact guard assistance  Comment: Decreased safety with approach to chair requiring cues. No bed mob as pt was up in chair.     Ambulation  Ambulation?: Yes  Ambulation 1  Surface: level tile  Device: Rolling Walker  Assistance: Stand by assistance;Contact guard assistance  Quality of Gait: Forward flexed, occaionally bumping into objects but not requiring assistance to recove. Distance: 200 feet  Comments: Pt states fatigue post but no SOB. Stairs/Curb  Stairs?: No        ASSESSMENT   Body structures, Functions, Activity limitations: Decreased functional mobility ; Decreased safe awareness;Decreased endurance;Decreased posture  Assessment: Pt progressed well with quality and endurance ambulation. Stated fatigue post gait. Barriers to Learning: cognition  REQUIRES PT FOLLOW UP: Yes     Discharge Recommendations:  Continue to assess pending progress    Goals  Short term goals  Short term goal 1: SBA gait with rollator  Short term goal 2: SBA with transfers and approach to bed    PLAN    Times per week: 3-6  Safety Devices  Type of devices: Call light within reach, Left in chair, Chair alarm in place, Telesitter in use, Nurse notified     [facility] (6 CLICK) [address] Mobility Raw Score : 18     Therapy Time   Individual   Time In 0858   Time Out 0914   Minutes 16     Timed Code Treatment Minutes: 16 Minutes   TR 2   [address], 03/20/21 at 9:23 AM         Definitions for assistance levels  Independent = pt does not require any physical supervision or assistance from another person for activity completion. Device may be needed.   Stand by assistance = pt requires verbal cues or instructions from another person, close to but not touching, to perform the activity  Minimal assistance= pt performs 75% or more of the activity; assistance is required to complete the activity  Moderate assistance= pt performs 50% of the activity; assistance is required to complete the activity  Maximal assistance = pt performs 25% of the activity; assistance is required to complete the activity  Dependent = pt requires total physical assistance to accomplish the task

## 2021-03-20 NOTE — PROGRESS NOTES
Progress Note  Date:3/20/2021       PRDI:D993/E579-00  Patient Name:Emerson Salguero     Date of Birth:[birth year]     Age:89 y.o. Subjective    Subjective:  Symptoms:  He reports malaise and weakness. No shortness of breath, cough, chest pain, headache, chest pressure, anorexia, diarrhea or anxiety. Diet:  No nausea or vomiting. Review of Systems   Respiratory: Negative for cough and shortness of breath. Cardiovascular: Negative for chest pain. Gastrointestinal: Negative for anorexia, diarrhea, nausea and vomiting. Neurological: Positive for weakness. Objective         Vitals Last 24 Hours:  TEMPERATURE:  Temp  Av.2 °F (36.2 °C)  Min: 97.2 °F (36.2 °C)  Max: 97.3 °F (36.3 °C)  RESPIRATIONS RANGE: Resp  Av.3  Min: 16  Max: 18  PULSE OXIMETRY RANGE: SpO2  Av.3 %  Min: 97 %  Max: 100 %  PULSE RANGE: Pulse  Av.7  Min: 58  Max: 83  BLOOD PRESSURE RANGE: Systolic (12WQH), MSB:011 , Min:94 , UQ   ; Diastolic (72SKC), QZP:99, Min:45, Max:70    I/O (24Hr): No intake or output data in the 24 hours ending 21 1013  Objective:  General Appearance:  Comfortable, well-appearing and in no acute distress. Vital signs: (most recent): Blood pressure 134/60, pulse 58, temperature 97.2 °F (36.2 °C), temperature source Oral, resp. rate 16, SpO2 98 %. HEENT: Normal HEENT exam.    Lungs:  Normal effort and normal respiratory rate. Heart: S1 normal and S2 normal.    Abdomen: Abdomen is soft. Bowel sounds are normal.   There is no epigastric area tenderness. Pulses: Distal pulses are intact. Neurological: Patient is alert and oriented to person, place and time. Pupils:  Pupils are equal, round, and reactive to light. Skin:  Warm and dry.       Labs/Imaging/Diagnostics    Labs:  CBC:  Recent Labs     21  1053 21  0649 21  0651   WBC 6.3 8.8 8.1   RBC 3.43* 3.36* 3.32*   HGB 11.3* 11.2* 11.1*   HCT 33.6* 32.1* 32.5*   MCV 98.0 95.6 97.9   RDW 13.3 13.4 13.6    268 233     CHEMISTRIES:  Recent Labs     03/18/21  1116 03/19/21  0649 03/20/21  0651    141 140   K 3.9 3.9 4.3   CL 98 106 106   CO2 25 23 22   BUN 25* 25* 27*   CREATININE 1.55* 1.43* 1.10   GLUCOSE 173* 88 89   MG 1.8  --   --      PT/INR:No results for input(s): PROTIME, INR in the last 72 hours. APTT:No results for input(s): APTT in the last 72 hours. LIVER PROFILE:  Recent Labs     03/18/21  1116 03/19/21  0649 03/20/21  0651   AST 29 27 26   ALT 30 29 25   BILITOT 0.4 0.4 0.4   ALKPHOS 92 87 79       Imaging Last 24 Hours:  Ct Head Wo Contrast    Result Date: 3/18/2021  CT Brain. Contrast medium:  without contrast.. History:  Syncope. Technical factors: CT imaging of the brain was obtained and formatted as 5 mm contiguous axial images. 2.5 mm contiguous axial images were obtained through the osseous structures. Sagittal and coronal reconstruction obtained during postprocessing. Comparison:  CT brain, February 19, 2021. MRI brain, February 20, 2021. Findings: Extra-axial spaces:  Normal. Intracranial hemorrhage:  None. Ventricular system: Ventricles moderately enlarged and sulci moderately prominent. Basal Cisterns:  Normal. Cerebral Parenchyma: Bilateral symmetric periventricular areas decreased attenuation. Midline Shift:  None. Cerebellum:  Normal. Paranasal sinuses and mastoid air cells:  Normal. Visualized Orbits: Bilateral ocular surgery     Impression: Acute findings. Moderate cerebral atrophy. Chronic ischemic white matter disease. All CT scans at this facility use dose modulation, iterative reconstruction, and/or weight based dosing when appropriate to reduce radiation dose to as low as reasonably achievable. Xr Chest Portable    Result Date: 3/18/2021  EXAMINATION: XR CHEST PORTABLE CLINICAL HISTORY: SYNCOPE COMPARISONS: FEBRUARY 19, 2021 FINDINGS: Osseous structures intact. Cardiopericardial silhouette normal. Right diaphragm elevated, unchanged.  Blunting left costophrenic angle. Ill-defined area increased opacity left lung base. SMALL LEFT EFFUSION WITH LEFT LOWER LUNG ATELECTASIS/PNEUMONIA. Assessment//Plan           Hospital Problems           Last Modified POA    Syncope and collapse 3/18/2021 Yes    Mild cognitive disorder 3/20/2021 Yes        Assessment & Plan    1) Syncope and collapse  2) hypotension  3) acute urinary retention  4) Acute cystitis w/o hematuria    Follow-up urology. Follow-up urine culture. Continue with IV antibiotics PT OT as needed. Monitor as needed for pain chronic pain back pain. Hypotension resolved, MIGUE on CKD resolved. 3/20: Patient decided to go to [facility] with care coordination. As per CC needs additional day for discharge to [facility]. Spoke with daughter-in-law in great length about his care. Patient daughter-in-law wants to talk with urology. Spoke with nursing.  FU urine cx sensitiivity, voiding trial today  Electronically signed by Peewee De Luna MD on 3/19/21 at 11:00 AM EDT

## 2021-03-21 VITALS
DIASTOLIC BLOOD PRESSURE: 76 MMHG | TEMPERATURE: 97.2 F | HEART RATE: 68 BPM | SYSTOLIC BLOOD PRESSURE: 152 MMHG | OXYGEN SATURATION: 96 % | RESPIRATION RATE: 16 BRPM

## 2021-03-21 PROCEDURE — APPSS15 APP SPLIT SHARED TIME 0-15 MINUTES: Performed by: NURSE PRACTITIONER

## 2021-03-21 PROCEDURE — 99232 SBSQ HOSP IP/OBS MODERATE 35: CPT | Performed by: PSYCHIATRY & NEUROLOGY

## 2021-03-21 PROCEDURE — 6360000002 HC RX W HCPCS: Performed by: INTERNAL MEDICINE

## 2021-03-21 PROCEDURE — 6370000000 HC RX 637 (ALT 250 FOR IP): Performed by: INTERNAL MEDICINE

## 2021-03-21 RX ORDER — ACETAMINOPHEN 325 MG/1
650 TABLET ORAL EVERY 4 HOURS PRN
Status: DISCONTINUED | OUTPATIENT
Start: 2021-03-21 | End: 2021-03-21 | Stop reason: HOSPADM

## 2021-03-21 RX ADMIN — TAMSULOSIN HYDROCHLORIDE 0.4 MG: 0.4 CAPSULE ORAL at 09:14

## 2021-03-21 RX ADMIN — HEPARIN SODIUM 5000 UNITS: 5000 INJECTION INTRAVENOUS; SUBCUTANEOUS at 01:25

## 2021-03-21 RX ADMIN — HEPARIN SODIUM 5000 UNITS: 5000 INJECTION INTRAVENOUS; SUBCUTANEOUS at 09:14

## 2021-03-21 RX ADMIN — FINASTERIDE 5 MG: 5 TABLET, FILM COATED ORAL at 09:14

## 2021-03-21 RX ADMIN — ACETAMINOPHEN 650 MG: 325 TABLET ORAL at 12:26

## 2021-03-21 ASSESSMENT — ENCOUNTER SYMPTOMS
NAUSEA: 0
VOMITING: 0
SHORTNESS OF BREATH: 0
COLOR CHANGE: 0
COUGH: 0
BACK PAIN: 1
TROUBLE SWALLOWING: 0
WHEEZING: 0
CHEST TIGHTNESS: 0

## 2021-03-21 ASSESSMENT — PAIN SCALES - GENERAL
PAINLEVEL_OUTOF10: 0
PAINLEVEL_OUTOF10: 7
PAINLEVEL_OUTOF10: 0
PAINLEVEL_OUTOF10: 0

## 2021-03-21 NOTE — DISCHARGE SUMMARY
Discharge Summary    Date: 3/21/2021  Patient Name: Lee Rolon YOB: 1928 Age: 80 y.o. Admit Date: 3/18/2021  Discharge Date: 3/21/2021  Discharge Condition: [address]    Admission Diagnosis  Syncope and collapse (R55)     Discharge Diagnosis  Active Problems: Syncope and collapse Mild cognitive disorderResolved Problems: * No resolved hospital problems. [facility] Stay  Narrative of Hospital Course:  Patient 40-year-old man comes in from [facility] for syncope. Was eval by cardiology and recommended to be discharged. His syncope was vasovagal. Patient also had a urinary retention resolved with voiding trial. Patient was seen by urology and is to follow-up with urology as outpatient. Patient was started on IV antibiotics for UTI initially with due to a positive UA but urine culture was negative. Antibiotics were discontinued. Patient received few days of IV antibiotics. Patient denies any dysuria or urinary hesitancy. Consultants:  IP CONSULT TO CARDIOLOGYIP CONSULT TO UROLOGYIP CONSULT TO NEUROLOGY    Surgeries/procedures Performed:       Treatments:    Cardiac Medications, Antibiotics and IV Hydration    Ceftriaxone    Discharge Plan/Disposition:  Home    Hospital/Incidental Findings Requiring Follow Up:    Patient Instructions:    Diet:    Activity:  For number of days (if applicable): Other Instructions:    Provider Follow-Up:   No follow-ups on file. Significant Diagnostic Studies:    Recent Labs:  Admission on 03/18/2021Troponin                                      Date: 03/18/2021Value: <0.010      Ref range: 0.000 - 0.010 ng*  Status: Final              Comment: Methodology by Troponin T. Troponin                                      Date: 03/19/2021Value: 0.012*      Ref range: 0.000 - 0.010 ng*  Status: Final              Comment: Methodology by Troponin T.WBC                                           Date: 03/19/2021Value: 8.8         Ref range: 4.8 - 10.8 K/uL    Status: [facility]                                           Date: 03/19/2021Value: 3.36*       Ref range: 4.70 - 6.10 M/uL   Status: FinalHemoglobin                                    Date: 03/19/2021Value: 11.2*       Ref range: 14.0 - 18.0 g/dL   Status: FinalHematocrit                                    Date: 03/19/2021Value: 32.1*       Ref range: 42.0 - 52.0 %      Status: FinalMCV                                           Date: 03/19/2021Value: 95.6        Ref range: 80.0 - 100.0 fL    Status: [address]                                           Date: 03/19/2021Value: 33.3*       Ref range: 27.0 - 31.3 pg     Status: [address]                                          Date: 03/19/2021Value: 34.9        Ref range: 33.0 - 37.0 %      Status: FinalRDW                                           Date: 03/19/2021Value: 13.4        Ref range: 11.5 - 14.5 %      Status: FinalPlatelets                                     Date: 03/19/2021Value: 268         Ref range: 130 - 400 K/uL     Status: FinalNeutrophils %                                 Date: 03/19/2021Value: 56.0        Ref range: %                  Status: FinalLymphocytes %                                 Date: 03/19/2021Value: 23.0        Ref range: %                  Status: FinalMonocytes %                                   Date: 03/19/2021Value: 14.0        Ref range: %                  Status: FinalEosinophils %                                 Date: 03/19/2021Value: 5.9         Ref range: %                  Status: FinalBasophils %                                   Date: 03/19/2021Value: 1.1         Ref range: %                  Status: FinalNeutrophils Absolute                          Date: 03/19/2021Value: 4.9         Ref range: 1.4 - 6.5 K/uL     Status: FinalLymphocytes Absolute                          Date: 03/19/2021Value: 2.0         Ref range: 1.0 - 4.8 K/uL     Status: FinalMonocytes Absolute                            Date: 03/19/2021Value: 1.2*        Ref range: 0.2 - 0.8 K/uL     Status: FinalEosinophils Absolute                          Date: 03/19/2021Value: 0.5         Ref range: 0.0 - 0.7 K/uL     Status: FinalBasophils Absolute                            Date: 03/19/2021Value: 0.1         Ref range: 0.0 - 0.2 K/uL     Status: FinalSodium                                        Date: 03/19/2021Value: 141         Ref range: 135 - 144 mEq/L    Status: FinalPotassium                                     Date: 03/19/2021Value: 3.9         Ref range: 3.4 - 4.9 mEq/L    Status: FinalChloride                                      Date: 03/19/2021Value: 106         Ref range: 95 - 107 mEq/L     Status: FinalCO2                                           Date: 03/19/2021Value: 23          Ref range: 20 - 31 mEq/L      Status: FinalAnion Gap                                     Date: 03/19/2021Value: 12          Ref range: 9 - 15 mEq/L       Status: FinalGlucose                                       Date: 03/19/2021Value: 88          Ref range: 70 - 99 mg/dL      Status: FinalBUN                                           Date: 03/19/2021Value: 25*         Ref range: 8 - 23 mg/dL       Status: FinalCREATININE                                    Date: 03/19/2021Value: 1.43*       Ref range: 0.70 - 1.20 mg/dL  Status: FinalGFR Non-                      Date: 03/19/2021Value: 46.2*       Ref range: >60                Status: Final              Comment: >60 mL/min/1.73m2 EGFR, calc. for ages 25 and older using theMDRD formula (not corrected for weight), is valid for stablerenal function. GFR                           Date: 03/19/2021Value: 55.9*       Ref range: >60                Status: Final              Comment: >60 mL/min/1.73m2 EGFR, calc. for ages 25 and older using theMDRD formula (not corrected for weight), is valid for stablerenal function. Calcium                                       Date: 03/19/2021Value: 9.4         Ref range: 8.5 - 9.9 mg/dL    Status: FinalTotal Protein                                 Date: 03/19/2021Value: 6.4         Ref range: 6.3 - 8.0 g/dL     Status: FinalAlbumin                                       Date: 03/19/2021Value: 3.4*        Ref range: 3.5 - 4.6 g/dL     Status: FinalTotal Bilirubin                               Date: 03/19/2021Value: 0.4         Ref range: 0.2 - 0.7 mg/dL    Status: FinalAlkaline Phosphatase                          Date: 03/19/2021Value: 87          Ref range: 35 - 104 U/L       Status: FinalALT                                           Date: 03/19/2021Value: 29          Ref range: 0 - 41 U/L         Status: FinalAST                                           Date: 03/19/2021Value: 27          Ref range: 0 - 40 U/L         Status: FinalGlobulin                                      Date: 03/19/2021Value: 3.0         Ref range: 2.3 - 3.5 g/dL     Status: [address] [facility]                                           Date: 03/20/2021Value: 8.1         Ref range: 4.8 - 10.8 K/uL    Status: FinalRBC                                           Date: 03/20/2021Value: 3.32*       Ref range: 4.70 - 6.10 M/uL   Status: FinalHemoglobin                                    Date: 03/20/2021Value: 11.1*       Ref range: 14.0 - 18.0 g/dL   Status: FinalHematocrit                                    Date: 03/20/2021Value: 32.5*       Ref range: 42.0 - 52.0 %      Status: FinalMCV                                           Date: 03/20/2021Value: 97.9        Ref range: 80.0 - 100.0 fL    Status: [address]                                           Date: 03/20/2021Value: 33.6*       Ref range: 27.0 - 31.3 pg     Status: [address]                                          Date: 03/20/2021Value: 34.3        Ref range: 33.0 - 37.0 %      Status: FinalRDW                                           Date: 03/20/2021Value: 13.6        Ref range: 11.5 - 14.5 %      Status: FinalPlatelets                                     Date: 03/20/2021Value: 233         Ref range: 130 - 400 K/uL     Status: FinalNeutrophils %                                 Date: 03/20/2021Value: 43.5        Ref range: %                  Status: FinalLymphocytes %                                 Date: 03/20/2021Value: 30.3        Ref range: %                  Status: FinalMonocytes %                                   Date: 03/20/2021Value: 15.0        Ref range: %                  Status: FinalEosinophils %                                 Date: 03/20/2021Value: 10.1        Ref range: %                  Status: FinalBasophils %                                   Date: 03/20/2021Value: 1.1         Ref range: %                  Status: FinalNeutrophils Absolute                          Date: 03/20/2021Value: 3.5         Ref range: 1.4 - 6.5 K/uL     Status: FinalLymphocytes Absolute                          Date: 03/20/2021Value: 2.5         Ref range: 1.0 - 4.8 K/uL     Status: FinalMonocytes Absolute                            Date: 03/20/2021Value: 1.2*        Ref range: 0.2 - 0.8 K/uL     Status: FinalEosinophils Absolute                          Date: 03/20/2021Value: 0.8*        Ref range: 0.0 - 0.7 K/uL     Status: FinalBasophils Absolute                            Date: 03/20/2021Value: 0.1         Ref range: 0.0 - 0.2 K/uL     Status: FinalSodium                                        Date: 03/20/2021Value: 140         Ref range: 135 - 144 mEq/L    Status: FinalPotassium                                     Date: 03/20/2021Value: 4.3         Ref range: 3.4 - 4.9 mEq/L    Status: FinalChloride                                      Date: 03/20/2021Value: 106         Ref range: 95 - 107 mEq/L     Status: FinalCO2                                           Date: 03/20/2021Value: 22          Ref range: 20 - 31 mEq/L      Status: FinalAnion Gap                                     Date: 03/20/2021Value: 12          Ref range: 9 - 15 mEq/L       Status: FinalGlucose Date: 03/20/2021Value: 80          Ref range: 70 - 99 mg/dL      Status: FinalBUN                                           Date: 03/20/2021Value: 27*         Ref range: 8 - 23 mg/dL       Status: FinalCREATININE                                    Date: 03/20/2021Value: 1.10        Ref range: 0.70 - 1.20 mg/dL  Status: FinalGFR Non-                      Date: 03/20/2021Value: >60.0       Ref range: >60                Status: Final              Comment: >60 mL/min/1.73m2 EGFR, calc. for ages 25 and older using theMDRD formula (not corrected for weight), is valid for stablerenal function. GFR                           Date: 03/20/2021Value: >60.0       Ref range: >60                Status: Final              Comment: >60 mL/min/1.73m2 EGFR, calc. for ages 25 and older using theMDRD formula (not corrected for weight), is valid for stablerenal function. Calcium                                       Date: 03/20/2021Value: 8.8         Ref range: 8.5 - 9.9 mg/dL    Status: FinalTotal Protein                                 Date: 03/20/2021Value: 6.1*        Ref range: 6.3 - 8.0 g/dL     Status: FinalAlbumin                                       Date: 03/20/2021Value: 3.2*        Ref range: 3.5 - 4.6 g/dL     Status: FinalTotal Bilirubin                               Date: 03/20/2021Value: 0.4         Ref range: 0.2 - 0.7 mg/dL    Status: FinalAlkaline Phosphatase                          Date: 03/20/2021Value: 79          Ref range: 35 - 104 U/L       Status: FinalALT                                           Date: 03/20/2021Value: 25          Ref range: 0 - 41 U/L         Status: FinalAST                                           Date: 03/20/2021Value: 26          Ref range: 0 - 40 U/L         Status: Final              Comment: Specimen hemolysis has exceeded the interference as definedby Roche. Value may be falsely increased.  Suggestrecollection if clinically tabletComments:Reason for Stopping:    Time Spent on Discharge:E] minutes were spent in patient examination, evaluation, counseling as well as medication reconciliation, prescriptions for required medications, discharge plan, and follow up.     Electronically signed by Nik Albert MD on 3/21/21 at 10:31 AM EDT   Overtime on dc summary was 45 min

## 2021-03-21 NOTE — PROGRESS NOTES
[facility] Neurology Daily Progress Note  Name: Jose Maria Jeronimo  Age: 80 y.o. Gender: male  CodeStatus: Full Code  Allergies: Sulfa Antibiotics    Chief Complaint:No chief complaint on file. Primary Care Provider: Magdy Ngo MD  InpatientTreatment Team: Treatment Team: Attending Provider: Martin Cortes MD; Consulting Physician: Carmencita Lao MD; Consulting Physician: Omi Guerrero MD; Consulting Physician: Governor Rambo MD; Utilization Reviewer: Merrie Dancer, RN; Tech: Gissell Willoughby; : Simona Castellanos, LORENA; Registered Nurse: Chirag Romero RN  Admission Date: 3/18/2021      HPI   Pt seen and examined for neuro follow up for syncope with hypotension. Currently alert and oriented, no acute distress, cooperative. No repeat episodes of syncope. No dizziness or reports of lightheadedness. No obvious focal deficits. No seizures. Patient has not any further issues except for some sundowning. Patient does not report any new changes or weakness. Vitals:    03/21/21 0800   BP:    Pulse: 68   Resp:    Temp:    SpO2:       Review of Systems   Constitutional: Negative for appetite change, chills and fever. HENT: Negative for hearing loss and trouble swallowing. Eyes: Negative for visual disturbance. Respiratory: Negative for cough, chest tightness, shortness of breath and wheezing. Cardiovascular: Positive for leg swelling. Negative for chest pain and palpitations. Gastrointestinal: Negative for nausea and vomiting. Musculoskeletal: Positive for arthralgias and back pain. Negative for gait problem. Skin: Negative for color change and rash. Neurological: Negative for dizziness, tremors, seizures, syncope, facial asymmetry, speech difficulty, weakness, light-headedness, numbness and headaches. Psychiatric/Behavioral: Negative for agitation, confusion and hallucinations. The patient is not nervous/anxious. Physical Exam  Vitals signs and nursing note reviewed. >100 03/18/2021    BACTERIA MANY 03/18/2021    RBCUA 3-5 02/19/2021    BLOODU Trace-lysed 03/18/2021    SPECGRAV 1.015 03/18/2021    GLUCOSEU Negative 03/18/2021       Radiology:   Most recent    EEG No procedure found. MRI of Brain No results found for this or any previous visit. Results for orders placed during the hospital encounter of 02/20/21   MRI brain without contrast    Narrative EXAM: MRI of the brain without contrast    History: Metabolic encephalopathy. Stroke. Technique: Multiplanar multisequence MRI of the brain was performed without contrast.    Comparison: CT brain from February 19, 2021    Findings:     Examination is degraded by motion artifact. Hyperintensity/FLAIR signal within the bilateral supratentorial white matter and patchy hyperintense T2 signal within the [place] are nonspecific findings most compatible with chronic small vessel ischemic changes in a patient of this age. Prominence of the sulci and ventricles compatible with moderate generalized parenchymal volume loss. No edema, hemorrhage, mass, mass effect, midline shift, or abnormal extra-axial fluid collection. Midline structures are within normal limits. The   posterior fossa is within normal limits. There is no diffusion restriction. No susceptibility artifact is identified on the gradient echo sequence. The major intracranial vascular flow voids are maintained. Cranial nerves 7/8 complexes appear grossly unremarkable. The visualized paranasal sinuses and bilateral mastoid air cells are clear. Impression No acute ischemia or acute intracranial process. Generalized parenchymal volume loss and nonspecific white matter findings most compatible with chronic small vessel ischemic changes in a patient of this age. MRA of the Head and Neck: No results found for this or any previous visit. No results found for this or any previous visit. No results found for this or any previous visit.                          CT of the Head:   Results for orders placed during the hospital encounter of 03/18/21   CT Head WO Contrast    Narrative CT Brain. Contrast medium:  without contrast.. History:  Syncope. Technical factors: CT imaging of the brain was obtained and formatted as 5 mm contiguous axial images. 2.5 mm contiguous axial images were obtained through the osseous structures. Sagittal and coronal reconstruction obtained during postprocessing. Comparison:  CT brain, February 19, 2021. MRI brain, February 20, 2021. Findings:    Extra-axial spaces:  Normal.     Intracranial hemorrhage:  None. Ventricular system: Ventricles moderately enlarged and sulci moderately prominent. Basal Cisterns:  Normal.    Cerebral Parenchyma: Bilateral symmetric periventricular areas decreased attenuation. Midline Shift:  None. Cerebellum:  Normal.     Paranasal sinuses and mastoid air cells:  Normal.    Visualized Orbits: Bilateral ocular surgery        Impression Impression:    Acute findings. Moderate cerebral atrophy. Chronic ischemic white matter disease. All CT scans at this facility use dose modulation, iterative reconstruction, and/or weight based dosing when appropriate to reduce radiation dose to as low as reasonably achievable. No results found for this or any previous visit. No results found for this or any previous visit. Carotid duplex: No results found for this or any previous visit. No results found for this or any previous visit.   Results for orders placed during the hospital encounter of 02/20/21   US CAROTID ARTERY BILATERAL    Narrative BILATERAL DUPLEX CAROTID ULTRASOUND    CLINICAL INFORMATION:  Carotid stenosis    COMPARISON:  None available     FINDINGS:  The bilateral carotid duplex ultrasound study demonstrates the following:                                                                        RIGHT            LEFT      Proximal common carotid artery 137 cm/s            111 cm/s    Mid common carotid artery                   120 cm/s            109 cm/s    Distal common carotid artery                121 cm/s           116 cm/s  Proximal internal carotid artery             149 cm/s            77 cm/s  Mid internal carotid artery                      177 cm/s           68 cm/s  Distal internal carotid artery                  98 cm/s             158 cm/s  External carotid artery                            256 cm/s           97 cm/s      ICA/CCA ratio                                         1.5                0.7       Vertebral arteries antegrade flow         Yes                     Yes            Impression 50-69% stenosis of the right internal carotid artery. Less than 50% stenosis of the left internal carotid artery. Antegrade flow both vertebral arteries. Validated velocity measurements with angiographic measurements, velocity criteria are extrapolated from diameter data as defined by the Society of Radiologist in [address] [place] [address] Drive Radiology 2003; 322;799-091. Echo No results found for this or any previous visit. Assessment/Plan:    Syncope likely to be vasovagal without any sequelae. Patient does not have any deficits on neurologic examination. Patient has only some age-related cognitive changes with no true dementia is noted. He is otherwise nonfocal and his examination. His carotid ultrasounds are done in February and shows some degree of right carotid stenosis though her of no clinical significance at least at this time. We will continue keep observation and reevaluate if there is any other symptoms. Syncope  Hypotension  UTI  CT the head no acute findings. Moderate cerebral atrophy.     Carotid duplex done 2/20/2021 with 50 to 69% stenosis of the right internal carotid artery and less than 50% stenosis left internal carotid artery   check orthostatic BPs    Orthostatics were positive with diastolic blood pressure decreasing by more than 10. Cardio is following and holding amlodipine for now due to hypotension. Forgetfulness and confusion likely related to age-related moderate cerebral atrophy. Recent B12, folate, and TSH were within normal limits. No repeat episodes of syncope. No focal deficits. Patient stable from a neurological perspective and okay for discharge. I have personally performed a face to face diagnostic evaluation on this patient, reviewed all data and investigations, and am the sole provider of all clinical decisions on the neurological status of this patient. Events noted blood pressure is somewhat better maintained. Patient has age-related cognitive changes noted    David Lerma MD, [address], American Board of Psychiatry & Neurology  Board Certified in Vascular Neurology  Board Certified in Neuromuscular Medicine  Certified in Sheri Storm 38       Patient is awaiting discharge today  Okay to DC from neurology standpoint  Follow-up 4 to 6 weeks          Electronically signed by TAYE Holman CNP on 3/21/2021 at 12:13 PM

## 2021-03-21 NOTE — DISCHARGE INSTR - COC
Continuity of Care Form    Patient Name: Rhonda Sheridan   :  1928  MRN:  [id number]    [facility] date:  3/18/2021  Discharge date:  3-21    Code Status Order: Full Code   Advance Directives:   [facility] Documentation     Date/Time Healthcare Directive Type of Healthcare Directive Copy in [address] Agent's Name Healthcare Agent's Phone Number    21 [phone]  No, patient does not have an advance directive for healthcare treatment -- -- -- -- --          Admitting Physician:  Heriberto Canchola MD  PCP: Chely Browne MD    Discharging Nurse: [facility] WOMEN Unit/Room#: O271/S301-68  Discharging Unit Phone Number: [phone]    Emergency Contact:   Extended Emergency Contact Information  Primary Emergency Contact: [address] [address] Phone: [phone]  Mobile Phone: [phone]  Relation: Child  Secondary Emergency Contact: [address] [address] Phone: [phone]  Mobile Phone: [phone]  Relation: Child    Past Surgical History:  Past Surgical History:   Procedure Laterality Date    TONSILLECTOMY         Immunization History: There is no immunization history on file for this patient. Active Problems:  Patient Active Problem List   Diagnosis Code    AMS (altered mental status) R41.82    Fall W19. Germán Patient    BPH (benign prostatic hyperplasia) N40.0    CKD (chronic kidney disease) N18.9    Leukocytosis D72.829    Gross hematuria R31.0    Encephalopathy acute G93.40    Closed head injury S09.90XA    Aphasia R47.01    Mild cognitive impairment A01.91    Metabolic encephalopathy H05.08    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 N40.1, R33.8    Syncope and collapse R55    Mild cognitive disorder F09       Isolation/Infection:   Isolation          No Isolation        Patient Infection Status     Infection Onset Added Last Indicated Last Indicated By Review Planned Expiration Resolved Resolved By    None active    Resolved    COVID-19 Rule Out 03/18/21 03/18/21 03/18/21 COVID-19, Rapid (Ordered)   03/18/21 Rule-Out Test Resulted    COVID-19 Rule Out 02/23/21 02/23/21 02/23/21 COVID-19, Rapid (Ordered)   02/23/21 Rule-Out Test Resulted    COVID-19 Rule Out 02/19/21 02/19/21 02/19/21 COVID-19 (Ordered)   02/19/21 Rule-Out Test Resulted          Nurse Assessment:  Last Vital Signs: BP (!) 152/76   Pulse 68   Temp 97.2 °F (36.2 °C) (Oral)   Resp 16   SpO2 96%     Last documented pain score (0-10 scale): Pain Level: 0  Last Weight:   Wt Readings from Last 1 Encounters:   03/18/21 185 lb (83.9 kg)     Mental Status:  disoriented and alert    IV Access:  - None    Nursing Mobility/ADLs:  Walking   Assisted  Transfer  Assisted  Bathing  Assisted  Dressing  Assisted  Toileting  Assisted  Feeding  Assisted  Med Admin  Assisted  Med Delivery   whole    Wound Care Documentation and Therapy:  Wound 02/20/21 Knee Anterior;Right abrasion (Active)   Wound Etiology Other 03/03/21 0559   Dressing/Treatment Open to air 03/04/21 0928   Wound Assessment Dry;Pink/red 03/03/21 0559   Drainage Amount None 03/03/21 0559   Odor None 02/1934   Rosita-wound Assessment Intact 02/1934   Number of days: 29       Wound 02/20/21 Knee Anterior; Left abrasion (Active)   Wound Etiology Other 03/03/21 0559   Dressing/Treatment Open to air 03/04/21 0928   Wound Assessment Dry;Pink/red 03/03/21 0559   Drainage Amount None 03/03/21 0559   Odor None 02/1934   Rosita-wound Assessment Intact 02/1934   Number of days: 29        Elimination:  Continence:   · Bowel: Yes  · Bladder: Yes  Urinary Catheter: None   Colostomy/Ileostomy/Ileal Conduit: No       Date of Last BM: 3-20    Intake/Output Summary (Last 24 hours) at 3/21/2021 1137  Last data filed at 3/21/2021 7719  Gross per 24 hour   Intake 610 ml   Output 600 ml   Net 10 ml     I/O last 3 completed shifts: In: 850 [P.O.:600; IV Piggyback:250]  Out: 1350 [RHMSQ:2999]    Safety Concerns:      At Risk for

## 2021-03-21 NOTE — PROGRESS NOTES
Assessment completed as document. Medications per MAR. Pt up with walker and 1 person assist. Voids >300 cc and has large brown formed BM X1. Back to bed safely. Resting without complaints.
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
## 2021-03-22 NOTE — PROGRESS NOTES
Physical Therapy  Facility/Department: [facility] E737/J671-21  Physical Therapy Discharge      NAME: Andres Shields    : 1928 (80 y.o.)  MRN: [id number]    Account: [de-identified]  Gender: male      Patient has been discharged from acute care hospital. DC patient from current PT program.      Electronically signed by Juancarlos Alston PT on 3/22/21 at 4:55 PM EDT

## 2021-03-23 LAB
BLOOD CULTURE, ROUTINE: NORMAL
CULTURE, BLOOD 2: NORMAL

## 2022-08-22 ENCOUNTER — HOSPITAL ENCOUNTER (OUTPATIENT)
Dept: PHYSICAL THERAPY | Age: 87
Setting detail: THERAPIES SERIES
Discharge: HOME | End: 2022-08-22
Payer: MEDICARE

## 2022-08-22 PROCEDURE — 97110 THERAPEUTIC EXERCISES: CPT

## 2022-08-22 PROCEDURE — 97162 PT EVAL MOD COMPLEX 30 MIN: CPT

## 2022-08-22 PROCEDURE — 97530 THERAPEUTIC ACTIVITIES: CPT

## 2022-08-22 NOTE — PROGRESS NOTES
Physical Therapy: Initial Evaluation    Patient: Javier Median (31 y.o. male)   Examination Date:   Plan of Care Certification Period: 2022 to 22      :  1928 ;    Confirmed: Yes MRN: [id number]  CSN: [id number]   Insurance: Payor: Shaheen Mcginnis / Plan: MEDICARE PART A AND B / Product Type: *No Product type* /   Insurance ID: [id number] - (Medicare) Secondary Insurance (if applicable): Shan Makedebra   Referring Physician: Donnita Edelson, MD Brenna Merlin   PCP: Orlando Rodarte MD Visits to Date/Visits Approved:  (1-2x per week for 4-6 weeks=12)    No Show/Cancelled Appts: 0 / 0     Medical Diagnosis: Debility [R53.81]  Spinal stenosis [M48.00]  Abnormal gait [R26.9] Debility, spinal stenosis and abnornal gait  Treatment Diagnosis: Debility, spinal stenosis and abnormal gait     PERTINENT MEDICAL HISTORY      Self reported health status[de-identified] Good    Medical History: Chart Reviewed: Yes   Past Medical History:   Diagnosis Date    Back pain     Colitis, ulcerative ([address]     Hypertension      Surgical History:   Past Surgical History:   Procedure Laterality Date    TONSILLECTOMY         Medications:   Current Outpatient Medications:     Acetaminophen (TYLENOL ARTHRITIS EXT RELIEF PO), Take 1,300 mg by mouth 2 times daily, Disp: , Rfl:     Polyvinyl Alcohol-Povidone (REFRESH OP), Apply 1 drop to eye 4 times daily, Disp: , Rfl:     Cod Liver Oil (V-R COD LIVER) CAPS, Take by mouth, Disp: , Rfl:     tamsulosin (FLOMAX) 0.4 MG capsule, Take 1 capsule by mouth daily (Patient taking differently: Take 0.4 mg by mouth 2 times daily ), Disp: 90 capsule, Rfl: 3    metoprolol tartrate (LOPRESSOR) 25 MG tablet, Take 1 tablet by mouth 2 times daily, Disp: 60 tablet, Rfl: 3    mirtazapine (REMERON SOL-TAB) 15 MG disintegrating tablet, Take 0.5 tablets by mouth nightly, Disp: 30 tablet, Rfl: 3    finasteride (PROSCAR) 5 MG tablet, Take 1 tablet by mouth daily, Disp: 30 tablet, Rfl: 3    Cholecalciferol (VITAMIN D) 50 MCG (2000 UT) CAPS capsule, Take by mouth daily , Disp: , Rfl:     gabapentin (NEURONTIN) 600 MG tablet, Take 300 mg by mouth nightly. , Disp: , Rfl:     cyclobenzaprine (FLEXERIL) 10 MG tablet, Take 10 mg by mouth every evening , Disp: , Rfl:   Allergies: Sulfa antibiotics      SUBJECTIVE EXAMINATION     History obtained from[de-identified] Chart Review,      Family/Caregiver Present: Yes Raman Arvizu)    Subjective History: Onset Date:  (for 1 to 1 1/2 years)  Subjective: Pt reports not having any pain currently but reports increased back pain when standing more erect. Additional Pertinent Hx (if applicable): Per pts son, pt had demonstated decreased posture and shuffing gait for the past 12 to 15 months and has been using a rollator for that length of time. No falls in the past year but a syncopal episode. Left LE AROM  Right LE AROM       [facility]      WFL         Left UE AROM  Right UE AROM          [facility]      WFL      Left Strength  Right Strength         Strength LLE  L Hip Flexion: 4/5  L Hip Extension: 4/5  L Hip ABduction: 4/5  L Hip ADduction: 4/5  L Knee Flexion: 4/5  L Knee Extension: 4/5  L Ankle Dorsiflexion: 4-/5, 4/5  L Ankle Plantar Flexion: 4/5    Strength RLE  R Hip Flexion: 4/5  R Hip Extension: 4/5  R Hip ABduction: 4/5  R Hip ADduction: 4/5  R Knee Flexion: 4/5  R Knee Extension: 4/5  R Ankle Dorsiflexion: 4-/5, 4/5  R Ankle Plantar flexion: 4/5       Trunk Strength     Trunk Strength  Trunk Flexion: 4/5  Trunk Extension: 4/5   Special Tests:    90-90 Test (in sitting)= 30 degrees B LE     Balance/Gait Assessment(s) Performed:  5 Times Sit to Stand   Current Score:   (Date: 8/22/2022)    Interpretation of Score: The 5 Times Sit to Stand Test (FTSST) measures functional lower limb muscle strength and may be useful in quantifying functional change of transitional movements. Greater than 16.0 seconds indicates increased risk of falls. Cut-off scores of 16 seconds discriminates fallers from non-fallers.  < 60 years old: 11 seconds = normal; 79-80 years old: 13 seconds = normal; [de-identified]90 years old: 15 seconds = normal.    Additional Finding(s) (if applicable):    Ambulation/Gait (if applicable):         ASSESSMENT     Impression: Assessment: Pt is a healthy 79 yo male who family reports that he has been using a rollotor for 12-15 months and that his posture has decreased and he has started to shuffle his feet with gait. Pt also reports having increased low back pain when he attempts to stand up tall and that his wife Thea Burgos is telling me to stand up strainght\". Pt reports no falls in the past year but did have a syncopal episode. PT reviewed with pt and his son how to adjust the Rollator walker when he gets his own as pt is currently using his wife's rollator. Discussed standing tall in a painfree position. PT then instructed pt and his son in seated stretches and gave pt copy of HEP. Pts son has many good questions and observations regarding his dads posture, gait and noted muscle tightness. Plan to work on stretches, strengthening and improving pts mobility and decreasing his falls risk. Pt would benefit from continued PT for 1-2x per week for 4-6 weeks. Body Structures, Functions, Activity Limitations Requiring Skilled Therapeutic Intervention: Decreased functional mobility , Decreased ROM, Decreased body mechanics    Statement of Medical Necessity: Physical Therapy is both indicated and medically necessary as outlined in the POC to increase the likelihood of meeting the functionally related goals stated below. Patient's Activity Tolerance:        Patient's rehabilitation potential/prognosis is considered to be: Good    Factors which may impact rehabilitation potential include:          GOALS   Patient Goal(s):  To be able to stand up better  Short Term Goals Completed by 1-2 weeks from date of eval Goal Status   Pt able to perform his HEP 3-5x per week     Pt reports having 3/10 or less back pain with more erect posture during standing and while walking. Long Term Goals Completed by 4-6 weeks from date of evaluation Goal Status   Pt reports having 2/10 or less back pain with ADLS and when on his feet for >15 min     Increase pts core and B LE strength to 4+/5 or better so that pt demonstrates good posture when using his AD     Improve pts HS tightness from 30 degrees B HE to 15 degrees of less to decrease pts back pain and improve his posture with rollator     Pt able to perform Sit to Stand test x 5 reps less than 13 seconds with safe form     Pt/family indep with HEP                                        TREATMENT PLAN       Requires PT Follow-Up: Yes    Pt. actively involved in establishing Plan of Care and Goals: Yes  Patient/ Caregiver education and instruction:      CP, HP, KT tape, estim, US        Treatment may include any combination of the following: Strengthening, ROM, Balance training, Functional mobility training, Gait training, Pain management, Home exercise program, Safety education & training, Modalities     Frequency / Duration:  Patient to be seen 1-2 for 4-6 weeks weeks      Eval Complexity:    Decision Making: Medium Complexity    PT Treatment Completed:  Exercises:      Treatment Reasoning    Exercise 1: *Seated LB stretch x 3 H30  Exercise 2: *Seated HS stretch x 3 H30                          Gait: (CPT [id number])  Treatment Reasoning    Gait Training 1: Pt ambulated with a flexed forward posture and knees both slightly flexed demonstrating a shuffling gait pattern with decreased step length BLE for 75' x 2 while using the Rollator walker. Limitations addressed:  Mobility, Strength, Flexibility  Therapist provided: Assistance, Manual cuing                   Therapy Time  Individual Time In:       9:40am   Individual Time Out:  11:00am   Minutes:  80 min         Therapist Signature: Alcira Fonseca PT    Date: 0/55/7264     I certify that the above Therapy Services are being furnished while the patient is under my care. I agree with the treatment plan and certify that this therapy is necessary. Physician's Signature:  ___________________________   Date:_______                                                                   Eloy Torres MD        Physician Comments: _______________________________________________    Please sign and return to [facility]. Please fax to the location listed below.  Alejo Cyr for this referral!    [facility]  [address] [place] [address] [address] [address]  Dept: [phone]  Dept Fax: [phone] : [phone]

## 2022-08-24 ENCOUNTER — HOSPITAL ENCOUNTER (OUTPATIENT)
Dept: PHYSICAL THERAPY | Age: 87
Setting detail: THERAPIES SERIES
Discharge: HOME | End: 2022-08-24
Payer: MEDICARE

## 2022-08-24 PROCEDURE — 97110 THERAPEUTIC EXERCISES: CPT

## 2022-08-24 PROCEDURE — 97116 GAIT TRAINING THERAPY: CPT

## 2022-08-24 ASSESSMENT — PAIN DESCRIPTION - PAIN TYPE
TYPE: CHRONIC PAIN
TYPE: CHRONIC PAIN

## 2022-08-24 ASSESSMENT — PAIN DESCRIPTION - LOCATION: LOCATION: BACK

## 2022-08-24 ASSESSMENT — PAIN SCALES - GENERAL
PAINLEVEL_OUTOF10: 6
PAINLEVEL_OUTOF10: 5

## 2022-08-24 ASSESSMENT — PAIN DESCRIPTION - FREQUENCY: FREQUENCY: CONTINUOUS

## 2022-08-24 ASSESSMENT — PAIN DESCRIPTION - DESCRIPTORS: DESCRIPTORS: ACHING;SHARP

## 2022-08-24 NOTE — PROGRESS NOTES
Physical Therapy: Daily Note   Patient: Luis Manuel Avendaño (89 y.o. male)   Examination Date:   Plan of Care/Certification Expiration Date: 22    No data recorded   :  1928 # of Visits since [facility]:   Visit count could not be calculated. Make sure you are using a visit which is associated with an episode. MRN: [id number]  CSN: [id number] Start of Care Date:   No linked episodes   Insurance: Payor: MEDICARE / Plan: MEDICARE PART A AND B / Product Type: *No Product type* /   Insurance ID: [id number] - (Medicare) Secondary Insurance (if applicable): Andres Coles   Referring Physician: MD Riky Sharma   PCP: Radha Ruvalcaba MD Visits to Date/Visits Approved:  (1-2x per week for 4-6 weeks=12)    No Show/Cancelled Appts: 0 / 0     Medical Diagnosis: Spinal stenosis [M48.00] Debility, spinal stenosis and abnornal gait  Treatment Diagnosis: Debility, spinal stenosis and abnormal gait        SUBJECTIVE EXAMINATION   Pain Level: Pain Screening  Patient Currently in Pain: Yes  Pain Assessment: 0-10  Pain Level: 6  Patient's Stated Pain Goal: 2  Pain Type: Chronic pain  Pain Location: Back  Pain Descriptors: Aching, Sharp  Pain Frequency: Continuous    Patient Comments: Subjective: Pt reports 5-6/10 achy and sharp back pain. HEP Compliance: Good        OBJECTIVE EXAMINATION   Restrictions:  No data recorded No data recorded No data recorded      Ambulation/Gait (if applicable): Pt ambulated with a flexed forward posture and knees both slightly flexed demonstrating a shuffling gait pattern with PT giving cues for pt to march as he walks to increase pt knee flexion and decrease his shuffling gait pattern.  Pt ambulated 80'x 1 and 60'x 1       TREATMENT     Exercises:      Treatment Reasoning    Exercise 1: *Seated LB stretch x 3 H30  Exercise 2: *Seated HS stretch x 3 H30  Exercise 3: *Sit to Stand x 5 with CGA  Exercise 4: *Heel/toe raises x 10  Exercise 5: *Marching in place x 10 pain and improve his posture with rollator       Pt able to perform Sit to Stand test x 5 reps less than 15 seconds with safe form       Pt/family indep with HEP                                                    TREATMENT PLAN   Plan Frequency: 1-2  Plan weeks: 4-6 weeks  Current Treatment Recommendations: Strengthening, ROM, Balance training, Functional mobility training, Gait training, Pain management, Home exercise program, Safety education & training, Modalities   Requires PT Follow-Up: Yes  Plan Comment: CP, HP, KT tape, estim, US       Therapy Time  Individual Time In:    11:20pm      Individual Time Out:  12:05pm   Minutes:  45 min         Electronically signed by Bryan Coronel PT  on 8/24/2022 at 2:11 PM

## 2022-08-29 ENCOUNTER — HOSPITAL ENCOUNTER (OUTPATIENT)
Dept: PHYSICAL THERAPY | Age: 87
Setting detail: THERAPIES SERIES
Discharge: HOME | End: 2022-08-29
Payer: MEDICARE

## 2022-08-29 PROCEDURE — 97110 THERAPEUTIC EXERCISES: CPT

## 2022-08-29 ASSESSMENT — PAIN DESCRIPTION - DESCRIPTORS: DESCRIPTORS: ACHING;SHARP

## 2022-08-29 ASSESSMENT — PAIN DESCRIPTION - PAIN TYPE: TYPE: CHRONIC PAIN

## 2022-08-29 ASSESSMENT — PAIN SCALES - GENERAL: PAINLEVEL_OUTOF10: 6

## 2022-08-29 ASSESSMENT — PAIN DESCRIPTION - LOCATION: LOCATION: BACK

## 2022-08-29 NOTE — PROGRESS NOTES
Physical Therapy: Daily Note   Patient: Carolann Gregg (14 y.o. male)   Examination Date:   Plan of Care/Certification Expiration Date: 22    No data recorded   :  1928 # of Visits since [facility]:   Visit count could not be calculated. Make sure you are using a visit which is associated with an episode. MRN: [id number]  CSN: [id number] Start of Care Date:   No linked episodes   Insurance: Payor: MEDICARE / Plan: MEDICARE PART A AND B / Product Type: *No Product type* /   Insurance ID: [id number] - (Medicare) Secondary Insurance (if applicable): Yas Marcelino   Referring Physician: Alicia Tena MD     PCP: Albert Hector MD Visits to Date/Visits Approved: 3 / 12    No Show/Cancelled Appts: 0 / 0     Medical Diagnosis: Spinal stenosis [M48.00]    Treatment Diagnosis: Debility, spinal stenosis and abnormal gait        SUBJECTIVE EXAMINATION   Pain Level: Pain Screening  Patient Currently in Pain: Yes  Pain Assessment: 0-10  Pain Level: 6  Patient's Stated Pain Goal: 6  Pain Type: Chronic pain  Pain Location: Back  Pain Descriptors: Aching, Sharp    Patient Comments: Subjective: Pt reports 5-6/10 achy and sharp/achy back pain. HEP Compliance: Good        OBJECTIVE EXAMINATION   Restrictions:  No data recorded No data recorded No data recorded      TREATMENT     Exercises:      Treatment Reasoning    Exercise 1: *Seated LB stretch x 3 H30  Exercise 6: *Sit to Stand x 6 reps with hand and 6-7 reps without hands   Heel/toe raises x 10                         Pt Education: Plan Comment: CP, HP, KT tape, estim, US       ASSESSMENT     Assessment: Assessment: Pt arrives to therapy with 5-6/10 \"sharp/achy\" pain upon arrival. Pt performed seated stretches with assistnace from PT and pts son Adarsh Castellano as pt had a difficult time keeping his knee straight during his HS stretches. Pt did well with his seated LB stretch. Pt was then given an additonal exercises of Sit to Stand.  Pt needed cues to sit closer to the edge of his seat and to lean his \"nose over his toes\"  to help to upweight his buttock. Pt was then able to perform sit to stand without use of his hands with CGA and cues. Pt took a rest break and then performed his standing bar exercises needed constant cues for good posture and to keep his knees as straight as possible. Pts son is making sure pt performs his execising regularly at home. Plan to progress with ther ex for strengthening as pt able to tolerate. Body Structures, Functions, Activity Limitations Requiring Skilled Therapeutic Intervention: Decreased functional mobility , Decreased ROM, Decreased body mechanics    Post-Treatment Pain Level:  5-6/10     No data recorded  Therapy Prognosis: Good       GOALS   Patient goals : To be able to stand up better  Short Term Goals Completed by 1-2 weeks from date of eval Current Status Goal Status   Pt able to perform his HEP 3-5x per week       Pt reports having 3/10 or less back pain with more erect posture during standing and while walking.                                                                            Long Term Goals Completed by 4-6 weeks from date of evaluation Current Status Goal Status   Pt reports having 2/10 or less back pain with ADLS and when on his feet for >15 min       Increase pts core and B LE strength to 4+/5 or better so that pt demonstrates good posture when using his AD       Improve pts HS tightness from 30 degrees B LE to 15 degrees of less to decrease pts back pain and improve his posture with rollator       Pt able to perform Sit to Stand test x 5 reps less than 15 seconds with safe form       Pt/family indep with HEP                                                    TREATMENT PLAN   Plan Frequency: 1-2  Plan weeks: 4-6 weeks  Current Treatment Recommendations: Strengthening, ROM, Balance training, Functional mobility training, Gait training, Pain management, Home exercise program, Safety education & training, Modalities   Requires PT Follow-Up: Yes  Plan Comment: CP, HP, KT tape, estim, US       Therapy Time  Individual Time In:    11:15am      Individual Time Out:  12:00pm  Minutes:   45 min         Electronically signed by Alf Oglesby PT  on 8/29/2022 at 2:14 PM

## 2022-08-31 ENCOUNTER — HOSPITAL ENCOUNTER (OUTPATIENT)
Dept: PHYSICAL THERAPY | Age: 87
Setting detail: THERAPIES SERIES
Discharge: HOME | End: 2022-08-31
Payer: MEDICARE

## 2022-08-31 PROCEDURE — 97530 THERAPEUTIC ACTIVITIES: CPT

## 2022-08-31 PROCEDURE — 97110 THERAPEUTIC EXERCISES: CPT

## 2022-08-31 ASSESSMENT — PAIN DESCRIPTION - LOCATION: LOCATION: BACK

## 2022-08-31 ASSESSMENT — PAIN DESCRIPTION - DESCRIPTORS: DESCRIPTORS: ACHING;SHARP

## 2022-08-31 ASSESSMENT — PAIN SCALES - GENERAL: PAINLEVEL_OUTOF10: 5

## 2022-08-31 ASSESSMENT — PAIN DESCRIPTION - ORIENTATION: ORIENTATION: LEFT

## 2022-08-31 ASSESSMENT — PAIN DESCRIPTION - PAIN TYPE: TYPE: CHRONIC PAIN

## 2022-08-31 NOTE — PROGRESS NOTES
Physical Therapy: Daily Note   Patient: Aleida Reagan (66 y.o. male)   Examination Date:   Plan of Care/Certification Expiration Date: 22    No data recorded   :  1928 # of Visits since [facility]:   Visit count could not be calculated. Make sure you are using a visit which is associated with an episode. MRN: [id number]  CSN: [id number] Start of Care Date:   No linked episodes   Insurance: Payor: MEDICARE / Plan: MEDICARE PART A AND B / Product Type: *No Product type* /   Insurance ID: [id number] - (Medicare) Secondary Insurance (if applicable): Henrry Noriega   Referring Physician: Sage Palomares MD     PCP: Cher Don MD Visits to Date/Visits Approved:     No Show/Cancelled Appts: 0 / 0     Medical Diagnosis: Spinal stenosis [M48.00]    Treatment Diagnosis: Debility, spinal stenosis and abnormal gait        SUBJECTIVE EXAMINATION   Pain Level: Pain Screening  Patient Currently in Pain: Yes  Pain Assessment: 0-10  Pain Level: 5  Patient's Stated Pain Goal: 2  Pain Type: Chronic pain  Pain Location: Back  Pain Orientation: Left  Pain Descriptors: Aching, Sharp    Patient Comments: Subjective: Pt reports 4-5/10 achy and sharp/achy back pain. HEP Compliance: Good        OBJECTIVE EXAMINATION   Restrictions:  No data recorded No data recorded No data recorded      TREATMENT     Exercises:      Treatment Reasoning    Exercise 1: *Seated LB stretch x 3 H30  Exercise 2: *Seated HS stretch x 3 H30  Exercise 3: *Sit to Stand x 5 with B hand rest and then 3-5 without using arm rest but verbal cues  Exercise 5: *Marching in place x 10  Exercise 6: *Mini Squats x 10                          Manual Therapy: (CPT 87811) Treatment Reasoning     Other: Applied KT test strip to pts right upper arm                      Pt Education: Plan Comment: CP, HP, KT tape, estim, US       ASSESSMENT     Assessment: Assessment: Pt arrives to therapy with a rollator and a sligtly flexed forward posture.  Pt repors back pain as 4-5/10. Pts son Rizwan Orellana also present. Pt performed seated stretching needing Vqs to not lean too far forward with his HS stretch as pt was having HS pain and also tends to flex his knee. Pt performed his seated low back stretch with good form tolerance. PT focused tx session on sit to stand transfers and discussed the technique and level so that pt has better control on descend. Pts son demosntrated slow sitting techniques so that pt could see now the body mechanics work. Pts son also demonstrates good knowledge about giving good cues for form and technique. Added MS to pts home program and had pt perform with his rollator behind him. Pt is able to sit in a chair with between 50%-65% good form before \"plopping\" into the chair at the end. Plan to continue to review sitting technique and progress with pts home program as pt is make progress with better form and control as he gains knowledge and strength. Body Structures, Functions, Activity Limitations Requiring Skilled Therapeutic Intervention: Decreased functional mobility , Decreased ROM, Decreased body mechanics    Post-Treatment Pain Level:  4-5/10     No data recorded  Therapy Prognosis: Good       GOALS   Patient goals : To be able to stand up better  Short Term Goals Completed by 1-2 weeks from date of eval Current Status Goal Status   Pt able to perform his HEP 3-5x per week       Pt reports having 3/10 or less back pain with more erect posture during standing and while walking.                                                                            Long Term Goals Completed by 4-6 weeks from date of evaluation Current Status Goal Status   Pt reports having 2/10 or less back pain with ADLS and when on his feet for >15 min       Increase pts core and B LE strength to 4+/5 or better so that pt demonstrates good posture when using his AD       Improve pts HS tightness from 30 degrees B LE to 15 degrees of less to decrease pts back pain and improve his posture with rollator       Pt able to perform Sit to Stand test x 5 reps less than 15 seconds with safe form       Pt/family indep with HEP                                                    TREATMENT PLAN   Plan Frequency: 1-2  Plan weeks: 4-6 weeks  Current Treatment Recommendations: Strengthening, ROM, Balance training, Functional mobility training, Gait training, Pain management, Home exercise program, Safety education & training, Modalities   Requires PT Follow-Up: Yes  Plan Comment: CP, HP, KT tape, estim, US       Therapy Time  Individual Time In:       11:20am  Individual Time Out:     12:20pm  Minutes:   60 min         Electronically signed by Tamir Dyer PT  on 8/31/2022 at 12:40 PM

## 2022-09-07 ENCOUNTER — HOSPITAL ENCOUNTER (OUTPATIENT)
Dept: PHYSICAL THERAPY | Age: 87
Setting detail: THERAPIES SERIES
Discharge: HOME | End: 2022-09-07
Payer: MEDICARE

## 2022-09-07 PROCEDURE — 97110 THERAPEUTIC EXERCISES: CPT

## 2022-09-07 PROCEDURE — 97530 THERAPEUTIC ACTIVITIES: CPT

## 2022-09-07 ASSESSMENT — PAIN DESCRIPTION - FREQUENCY: FREQUENCY: CONTINUOUS

## 2022-09-07 ASSESSMENT — PAIN DESCRIPTION - PAIN TYPE: TYPE: CHRONIC PAIN

## 2022-09-07 ASSESSMENT — PAIN DESCRIPTION - LOCATION: LOCATION: BACK

## 2022-09-07 ASSESSMENT — PAIN DESCRIPTION - DESCRIPTORS: DESCRIPTORS: SHARP

## 2022-09-07 ASSESSMENT — PAIN DESCRIPTION - ORIENTATION: ORIENTATION: RIGHT;LEFT

## 2022-09-07 ASSESSMENT — PAIN SCALES - GENERAL: PAINLEVEL_OUTOF10: 7

## 2022-09-07 NOTE — PROGRESS NOTES
Physical Therapy: Daily Note   Patient: Sagrario Aponte (89 y.o. male)   Examination Date:   Plan of Care/Certification Expiration Date: 22    No data recorded   :  [birth year]28 # of Visits since [facility]:   Visit count could not be calculated. Make sure you are using a visit which is associated with an episode. MRN: [id number]  CSN: [id number] Start of Care Date:   No linked episodes   Insurance: Payor: MEDICARE / Plan: MEDICARE PART A AND B / Product Type: *No Product type* /   Insurance ID: [id number] - (Medicare) Secondary Insurance (if applicable): Paloma Feldman 150   Referring Physician: Eloy Torres MD     PCP: Brendon You MD Visits to Date/Visits Approved:     No Show/Cancelled Appts: 0 / 0     Medical Diagnosis: Spinal stenosis [M48.00]    Treatment Diagnosis: Debility, spinal stenosis and abnormal gait        SUBJECTIVE EXAMINATION   Pain Level: Pain Screening  Patient Currently in Pain: Yes  Pain Assessment: 0-10  Pain Level: 7  Patient's Stated Pain Goal: 2  Pain Type: Chronic pain  Pain Location: Back  Pain Orientation: Right, Left  Pain Descriptors: Sharp  Pain Frequency: Continuous    Patient Comments: Subjective: Pt reports 6-7/10 achy and sharp back pain. HEP Compliance: Fair        OBJECTIVE EXAMINATION   Restrictions:  No data recorded No data recorded No data recorded      TREATMENT     Exercises:      Treatment Reasoning    Exercise 3: *Sit to Stand x 5 with B hand rest and then 3-5 without using arm rest and occasionally one arm rest  Exercise 5: *Marching in place x 10  Exercise 6: *Mini Squats x 10 with cues                              Pt Education: Plan Comment: CP, HP, KT tape, estim, US       ASSESSMENT     Assessment: Assessment: Pt arrives to therapy using rollator walker and reports that his back pain is increased today to 6-7/10 and pt was stressed about the fact that his wife constantly tells him to stand up tall but that when he does it makes his back hurt.  PT and his son Cornel Arciniega, who was present at tx session, explained to pt at length to stand up to his comfort level, not to increased pain. Also discussed at length that he may not be able to always perform his sit to stand ther ex without holding onto the arm rests. Pt then perform sit to stand ther ex some with arm rest and some without. Pt then performed standing ther ex needing cues for form. Pt did not have a reaction to the KT tape so plan to apply to his back for pain the next session and then also instruct pt rows and shoulder extension with Tband to promote back strength. Pts back pain as the same post tx session. Body Structures, Functions, Activity Limitations Requiring Skilled Therapeutic Intervention: Decreased functional mobility , Decreased ROM, Decreased body mechanics    Post-Treatment Pain Level:  6-7/10     No data recorded  Therapy Prognosis: Good       GOALS   Patient goals : To be able to stand up better  Short Term Goals Completed by 1-2 weeks from date of eval Current Status Goal Status   Pt able to perform his HEP 3-5x per week       Pt reports having 3/10 or less back pain with more erect posture during standing and while walking.                                                                            Long Term Goals Completed by 4-6 weeks from date of evaluation Current Status Goal Status   Pt reports having 2/10 or less back pain with ADLS and when on his feet for >15 min       Increase pts core and B LE strength to 4+/5 or better so that pt demonstrates good posture when using his AD       Improve pts HS tightness from 30 degrees B LE to 15 degrees of less to decrease pts back pain and improve his posture with rollator       Pt able to perform Sit to Stand test x 5 reps less than 15 seconds with safe form       Pt/family indep with HEP                                                    TREATMENT PLAN   Plan Frequency: 1-2  Plan weeks: 4-6 weeks  Current Treatment Recommendations: Strengthening, ROM, Balance training, Functional mobility training, Gait training, Pain management, Home exercise program, Safety education & training, Modalities   Requires PT Follow-Up: Yes  Plan Comment: CP, HP, KT tape, estim, US       Therapy Gen  Individual Time In:    11:15am      Individual Time Out:  12:10pm   Minutes:  55 min         Electronically signed by Arya Ambrocio PT  on 9/7/2022 at 5:40 PM

## 2022-09-09 ENCOUNTER — HOSPITAL ENCOUNTER (OUTPATIENT)
Dept: PHYSICAL THERAPY | Age: 87
Setting detail: THERAPIES SERIES
Discharge: HOME | End: 2022-09-09
Payer: MEDICARE

## 2022-09-09 PROCEDURE — 97530 THERAPEUTIC ACTIVITIES: CPT

## 2022-09-09 PROCEDURE — 97110 THERAPEUTIC EXERCISES: CPT

## 2022-09-09 PROCEDURE — 97140 MANUAL THERAPY 1/> REGIONS: CPT

## 2022-09-09 ASSESSMENT — PAIN DESCRIPTION - PAIN TYPE: TYPE: CHRONIC PAIN

## 2022-09-09 ASSESSMENT — PAIN DESCRIPTION - LOCATION: LOCATION: KNEE

## 2022-09-09 ASSESSMENT — PAIN DESCRIPTION - ORIENTATION: ORIENTATION: RIGHT;LEFT

## 2022-09-09 ASSESSMENT — PAIN DESCRIPTION - DESCRIPTORS: DESCRIPTORS: ACHING

## 2022-09-09 ASSESSMENT — PAIN SCALES - GENERAL: PAINLEVEL_OUTOF10: 5

## 2022-09-09 NOTE — PROGRESS NOTES
Physical Therapy: Daily Note   Patient: Luiza Villareal (33 y.o. male)   Examination Date:   Plan of Care/Certification Expiration Date: 22    No data recorded   :  [birth year]28 # of Visits since [facility]:   Visit count could not be calculated. Make sure you are using a visit which is associated with an episode. MRN: [id number]  CSN: [id number] Start of Care Date:   No linked episodes   Insurance: Payor: MEDICARE / Plan: MEDICARE PART A AND B / Product Type: *No Product type* /   Insurance ID: [id number] - (Medicare) Secondary Insurance (if applicable): Rianna Marsh   Referring Physician: Eugenio Jones MD     PCP: Irene De Anda MD Visits to Date/Visits Approved:     No Show/Cancelled Appts: 0 / 0     Medical Diagnosis: Spinal stenosis [M48.00]    Treatment Diagnosis: Debility, spinal stenosis and abnormal gait        SUBJECTIVE EXAMINATION   Pain Level: Pain Screening  Patient Currently in Pain: Yes  Pain Assessment: 0-10  Pain Level: 5  Patient's Stated Pain Goal: 2  Pain Type: Chronic pain  Pain Location: Knee  Pain Orientation: Right, Left  Pain Descriptors: Aching    Patient Comments: Subjective: Pt reports 5/10 achy and sharp back pain.     HEP Compliance: Good        OBJECTIVE EXAMINATION   Restrictions:  No data recorded No data recorded No data recorded      TREATMENT     Exercises:      Treatment Reasoning    Exercise 1: *Seated LB stretch x 3 H30  Exercise 2: *Seated HS stretch x 3 H30  Exercise 7: *Rows x 15 YTB with tactile cues  Exercise 8: *Shoulder extension with YTB x 10 with tactilce cues                          Manual Therapy: (CPT 55269) Treatment Reasoning     Other: KT tape applied to pts lower back horizontally across lumbar spine with max tension in center and then 2 strips placed vertically along paraspinals anchored prox to distal with 25% pull for pain control and support                      Pt Education: Plan Comment: CP, HP, KT tape, estim, US       ASSESSMENT     Assessment: less than 15 seconds with safe form       Pt/family indep with HEP                                                    TREATMENT PLAN   Plan Frequency: 1-2  Plan weeks: 4-6 weeks  Current Treatment Recommendations: Strengthening, ROM, Balance training, Functional mobility training, Gait training, Pain management, Home exercise program, Safety education & training, Modalities   Requires PT Follow-Up: Yes  Plan Comment: CP, HP, KT tape, estim, US       Therapy Time  Individual Time In:     11:15am     Individual Time Out:  12:15pm   Minutes:   60 min         Electronically signed by Percy Baltazar PT  on 9/9/2022 at 2:54 PM

## 2022-09-12 ENCOUNTER — HOSPITAL ENCOUNTER (OUTPATIENT)
Dept: PHYSICAL THERAPY | Age: 87
Setting detail: THERAPIES SERIES
Discharge: HOME | End: 2022-09-12
Payer: MEDICARE

## 2022-09-12 PROCEDURE — 97110 THERAPEUTIC EXERCISES: CPT

## 2022-09-12 PROCEDURE — 97530 THERAPEUTIC ACTIVITIES: CPT

## 2022-09-12 PROCEDURE — 97116 GAIT TRAINING THERAPY: CPT

## 2022-09-12 ASSESSMENT — PAIN DESCRIPTION - PAIN TYPE: TYPE: CHRONIC PAIN

## 2022-09-12 ASSESSMENT — PAIN SCALES - GENERAL: PAINLEVEL_OUTOF10: 6

## 2022-09-12 ASSESSMENT — PAIN DESCRIPTION - LOCATION: LOCATION: BACK

## 2022-09-12 NOTE — PROGRESS NOTES
Physical Therapy: Daily Note   Patient: Lucien Laureano (28 y.o. male)   Examination Date:   Plan of Care/Certification Expiration Date: 22    No data recorded   :  1928 # of Visits since [facility]:   Visit count could not be calculated. Make sure you are using a visit which is associated with an episode.    MRN: [id number]  CSN: [id number] Start of Care Date:   No linked episodes   Insurance: Payor: MEDICARE / Plan: MEDICARE PART A AND B / Product Type: *No Product type* /   Insurance ID: [id number] - (Medicare) Secondary Insurance (if applicable): Chris Castano   Referring Physician: Keila Metcalf MD     PCP: Darling Alfaro MD Visits to Date/Visits Approved:     No Show/Cancelled Appts: 0 / 0     Medical Diagnosis: Spinal stenosis [M48.00]    Treatment Diagnosis: Debility, spinal stenosis and abnormal gait        SUBJECTIVE EXAMINATION   Pain Level: Pain Screening  Patient Currently in Pain: Yes  Pain Assessment: 0-10  Pain Level: 6  Patient's Stated Pain Goal: 2  Pain Type: Chronic pain  Pain Location: Back    Patient Comments: Subjective: Pt reports 5-6/10 dull pain    HEP Compliance: Good        OBJECTIVE EXAMINATION   Restrictions:  No data recorded No data recorded No data recorded      TREATMENT     Exercises:      Treatment Reasoning    Exercise 1: *Seated LB stretch x 3 H30  Exercise 2: *Seated HS stretch x 3 H30  Exercise 3: *Sit to Stand x 5 with B hand rest and then x5  without using arm rest and occasionally one arm rest  Exercise 6: *Mini Squats x 10 with cues  Exercise 7: *Rows x 15 YTB with tactile cues  Exercise 8: *Shoulder extension with YTB x 15 with tactilce cues  Exercise 9: sit to stand (in/out of car) x 10 with cues (able to do the last 3 without hands)                          Therapeutic Activities: (CPT 93057) Treatment Reasoning    Ther Act Exercise 1: Sit to stand from the passinger seat of the car x 10 reps (3-4 using door handle and Rollator, then just door handle and then no hands with verbal cues of \"nose over toes\"                      Gait: (CPT [id number])  Treatment Reasoning    Gait Training 1: Pt ambulated with increased upright posture with less cues for postural correction. Pt tended to shuffle when he walked but did well with cues to slightly \"march\" and then able to increase his heel strike. Pt ambulated with his rollator for 125'x 2 and 85'x 2 Limitations addressed: Mobility, Strength, Flexibility  Therapist provided: Assistance, Manual cuing               Pt Education: Plan Comment: CP, HP, KT tape, estim, US       ASSESSMENT     Assessment: Assessment: Pt arrives with his son Marty Price to his tx session and reports that he is having more difficulties mentally today but willing to work with therapy. Pt tolerated KT tape that was applied last session and PT trimmed end of one strip that had rolled up on the end. Pts son reports that pt was able to do his Tband ther ex at home. Pt then perform seated stretches and then progressed to Tband ther ex needing less cues for good posture and just needed cues to keep his elbows straight with rows. P then performed mini squats needing cues to sit back so there was less stess on his knees. Also worked on gait with pt demonstrating good posture throughout and reports of back pain as 3/10. Pt then performed sit to stand transfers from the car needing less assistance and cues by rep number 7-10 and able to stand wihout use of his hands on the rollator or the door handle. Continue to progress with pt strength and mobility next session and review the car transfers again next session. Body Structures, Functions, Activity Limitations Requiring Skilled Therapeutic Intervention: Decreased functional mobility , Decreased ROM, Decreased body mechanics, Decreased endurance, Increased pain    Post-Treatment Pain Level:  3/10     No data recorded  Therapy Prognosis: Good       GOALS   Patient goals :  To be able to stand up better  Short Term Goals Completed by 1-2 weeks from date of eval Current Status Goal Status   Pt able to perform his HEP 3-5x per week       Pt reports having 3/10 or less back pain with more erect posture during standing and while walking.                                                                            Long Term Goals Completed by 4-6 weeks from date of evaluation Current Status Goal Status   Pt reports having 2/10 or less back pain with ADLS and when on his feet for >15 min       Increase pts core and B LE strength to 4+/5 or better so that pt demonstrates good posture when using his AD       Improve pts HS tightness from 30 degrees B LE to 15 degrees of less to decrease pts back pain and improve his posture with rollator       Pt able to perform Sit to Stand test x 5 reps less than 15 seconds with safe form       Pt/family indep with HEP                                                    TREATMENT PLAN   Plan Frequency: 1-2  Plan weeks: 4-6 weeks  Current Treatment Recommendations: Strengthening, ROM, Balance training, Functional mobility training, Gait training, Pain management, Home exercise program, Safety education & training, Modalities   Requires PT Follow-Up: Yes  Plan Comment: CP, HP, KT tape, estim, US       Therapy Time  Individual Time In:       10:30am   Individual Time Out:     11:40am   Minutes:   80 min         Electronically signed by Kishore Cano PT  on 9/12/2022 at 11:57 AM

## 2022-09-14 ENCOUNTER — HOSPITAL ENCOUNTER (OUTPATIENT)
Dept: PHYSICAL THERAPY | Age: 87
Setting detail: THERAPIES SERIES
Discharge: HOME | End: 2022-09-14
Payer: MEDICARE

## 2022-09-14 PROCEDURE — 97110 THERAPEUTIC EXERCISES: CPT

## 2022-09-14 PROCEDURE — 97116 GAIT TRAINING THERAPY: CPT

## 2022-09-14 ASSESSMENT — PAIN SCALES - GENERAL: PAINLEVEL_OUTOF10: 5

## 2022-09-14 ASSESSMENT — PAIN DESCRIPTION - DESCRIPTORS: DESCRIPTORS: ACHING;DULL

## 2022-09-14 ASSESSMENT — PAIN DESCRIPTION - PAIN TYPE: TYPE: CHRONIC PAIN

## 2022-09-14 ASSESSMENT — PAIN DESCRIPTION - LOCATION: LOCATION: BACK

## 2022-09-14 ASSESSMENT — PAIN DESCRIPTION - ORIENTATION: ORIENTATION: RIGHT;LEFT

## 2022-09-19 ENCOUNTER — APPOINTMENT (OUTPATIENT)
Dept: PHYSICAL THERAPY | Age: 87
End: 2022-09-19
Payer: MEDICARE

## 2022-09-21 ENCOUNTER — HOSPITAL ENCOUNTER (OUTPATIENT)
Dept: PHYSICAL THERAPY | Age: 87
Setting detail: THERAPIES SERIES
Discharge: HOME | End: 2022-09-21
Payer: MEDICARE

## 2022-09-21 PROCEDURE — 97530 THERAPEUTIC ACTIVITIES: CPT

## 2022-09-21 ASSESSMENT — PAIN DESCRIPTION - PAIN TYPE: TYPE: CHRONIC PAIN

## 2022-09-21 ASSESSMENT — PAIN DESCRIPTION - ORIENTATION: ORIENTATION: RIGHT;LEFT

## 2022-09-21 ASSESSMENT — PAIN SCALES - GENERAL: PAINLEVEL_OUTOF10: 5

## 2022-09-21 ASSESSMENT — PAIN DESCRIPTION - DESCRIPTORS: DESCRIPTORS: ACHING

## 2022-09-21 ASSESSMENT — PAIN DESCRIPTION - LOCATION: LOCATION: BACK

## 2022-09-21 NOTE — PROGRESS NOTES
Physical Therapy: Recheck Note   Patient: Kindra Zavala (17 y.o. male)   Examination Date: 3478  Plan of Care/Certification Expiration Date: 22    No data recorded   :  1928 # of Visits since [facility]:   Visit count could not be calculated. Make sure you are using a visit which is associated with an episode. MRN: [id number]  CSN: [id number] Start of Care Date:   No linked episodes   Insurance: Payor: MEDICARE / Plan: MEDICARE PART A AND B / Product Type: *No Product type* /   Insurance ID: [id number] - (Medicare) Secondary Insurance (if applicable): Brianna Fine   Referring Physician: Elie Garduno MD     PCP: Pauline Rubio MD Visits to Date/Visits Approved:     No Show/Cancelled Appts: 0 / 0     Medical Diagnosis: Spinal stenosis [M48.00]    Treatment Diagnosis: Debility, spinal stenosis and abnormal gait        SUBJECTIVE EXAMINATION   Pain Level: Pain Screening  Patient Currently in Pain: Yes  Pain Assessment: 0-10  Pain Level: 5  Patient's Stated Pain Goal: 2  Pain Type: Chronic pain  Pain Location: Back  Pain Orientation: Right, Left  Pain Descriptors: Aching    Patient Comments: Subjective: Pt reports 5/10 dull pain low back pain.     HEP Compliance: Good        OBJECTIVE EXAMINATION   Restrictions:  No data recorded No data recorded No data recorded    Left AROM  Right AROM                Left Strength  Right Strength         Strength LLE  L Hip Flexion: 4/5  L Hip Extension: 4/5  L Hip ABduction: 4/5, 4+/5  L Hip ADduction: 4/5, 4+/5  L Knee Flexion: 4/5, 4+/5  L Knee Extension: 4/5, 4+/5  L Ankle Dorsiflexion: 4/5  L Ankle Plantar Flexion: 4/5    Strength RLE  R Hip Flexion: 4/5  R Hip Extension: 4/5  R Hip ABduction: 4/5, 4+/5  R Hip ADduction: 4/5, 4+/5  R Knee Flexion: 4/5, 4+/5  R Knee Extension: 4/5, 4+/5  R Ankle Dorsiflexion: 4/5  R Ankle Plantar flexion: 4/5       TREATMENT         Pt Education: Plan Comment: CP, HP, KT tape, estim, US       ASSESSMENT     Assessment: Assessment: Pt arrives to therapy with reports of 5/10 low back pain. Pt reports having increased back pain over the weekend but that he pain is better today. PT completed recheck and pt is demonstrating improvement in all areas. Pt is improving with his gait and demonstrates a more upright pattern and looks forward vs at the floor. Pt made great improvements with his HS tightness even without stretching before measuring. Pt son has attended all of pts sessions and is a great support to pt and demostrates propers ways to get out of a chair and now to properly perform an exercise. Pt can benefit from conitued PT to work on strength, mobility, decreasing tightness and pain to decrease pts falls risk and allow pt to be on his feet for longer periods of time. Body Structures, Functions, Activity Limitations Requiring Skilled Therapeutic Intervention: Decreased functional mobility , Decreased ROM, Decreased body mechanics, Decreased endurance, Increased pain    Post-Treatment Pain Level:  5/10     No data recorded  Therapy Prognosis: Good       GOALS   Patient goals : To be able to stand up better  Short Term Goals Completed by 1-2 weeks from date of eval Current Status Goal Status   Pt able to perform his HEP 3-5x per week Pt reports doing some exercises daily. Met   Pt reports having 3/10 or less back pain with more erect posture during standing and while walking.  Pt reports going down to 4/10 at best In progress                                                                       Long Term Goals Completed by 4-6 weeks from date of evaluation Current Status Goal Status   Pt reports having 2/10 or less back pain with ADLS and when on his feet for >15 min Pt reports continue to have increased pain in AM; Pt reports that he walks in his the apartment hallway for 15-20' without increased back pain In progress   Increase pts core and B LE strength to 4+/5 or better so that pt demonstrates good posture when using his AD In progress In progress   Improve pts HS tightness from 30 degrees B LE to 15 degrees of less to decrease pts back pain and improve his posture with rollator R LE= 20 degrees      L LE= 15 degrees In progress, Met   Pt able to perform Sit to Stand test x 5 reps less than 15 seconds with safe form Sit to Stand= 30 seconds In progress   Pt/family indep with HEP Pt is able to tolerate his execises with less discomfort In progress                                                TREATMENT PLAN   Plan Frequency: 1-2  Plan weeks: 4-6 weeks  Current Treatment Recommendations: Strengthening, ROM, Balance training, Functional mobility training, Gait training, Pain management, Home exercise program, Safety education & training, Modalities   Requires PT Follow-Up: Yes  Plan Comment: CP, HP, KT tape, estim, US       Therapy Time  Individual Time In:      12:00pm   Individual Time Out:  1:00pm  Minutes:   60 min        Electronically signed by Prem Carbone PT  on 9/21/2022 at 4:50 PM

## 2022-09-22 ENCOUNTER — HOSPITAL ENCOUNTER (OUTPATIENT)
Dept: PHYSICAL THERAPY | Age: 87
Setting detail: THERAPIES SERIES
Discharge: HOME | End: 2022-09-22
Payer: MEDICARE

## 2022-09-22 PROCEDURE — 97530 THERAPEUTIC ACTIVITIES: CPT

## 2022-09-22 PROCEDURE — 97110 THERAPEUTIC EXERCISES: CPT

## 2022-09-22 ASSESSMENT — PAIN SCALES - GENERAL: PAINLEVEL_OUTOF10: 6

## 2022-09-22 ASSESSMENT — PAIN DESCRIPTION - LOCATION: LOCATION: BACK

## 2022-09-22 ASSESSMENT — PAIN DESCRIPTION - PAIN TYPE: TYPE: CHRONIC PAIN

## 2022-09-22 ASSESSMENT — PAIN DESCRIPTION - ORIENTATION: ORIENTATION: RIGHT;LEFT

## 2022-09-22 ASSESSMENT — PAIN DESCRIPTION - DESCRIPTORS: DESCRIPTORS: ACHING

## 2022-09-22 NOTE — PROGRESS NOTES
Physical Therapy: Daily Note   Patient: Brunilda Rebolledo (89 y.o. male)   Examination Date:   Plan of Care/Certification Expiration Date: 10/21/22    No data recorded   :  1928 # of Visits since [facility]:   Visit count could not be calculated. Make sure you are using a visit which is associated with an episode. MRN: [id number]  CSN: [id number] Start of Care Date:   No linked episodes   Insurance: Payor: MEDICARE / Plan: MEDICARE PART A AND B / Product Type: *No Product type* /   Insurance ID: [id number] - (Medicare) Secondary Insurance (if applicable): [employer] [employer]   Referring Physician: Lilly Zavaleta MD     PCP: Nina Lambert MD Visits to Date/Visits Approved: 10 / 18 (9 current for 1-2x per week 4-6=18)    No Show/Cancelled Appts: 0 / 0     Medical Diagnosis: Spinal stenosis [M48.00]    Treatment Diagnosis: Debility, spinal stenosis and abnormal gait        SUBJECTIVE EXAMINATION   Pain Level: Pain Screening  Patient Currently in Pain: Yes  Pain Assessment: 0-10  Pain Level: 6  Patient's Stated Pain Goal: 0 - No pain  Pain Type: Chronic pain  Pain Location: Back  Pain Orientation: Right, Left  Pain Descriptors: Aching    Patient Comments: Subjective: Pt reports 5-6/10 dull pain low back pain.     HEP Compliance: Good        OBJECTIVE EXAMINATION   Restrictions:  No data recorded No data recorded No data recorded      TREATMENT     Exercises:      Treatment Reasoning    Exercise 2: *Seated HS stretch x 3 H30  Exercise 3: *Sit to Stand x 5 with B hand rest and then x5  without using arm rest and occasionally one arm rest  Exercise 6: *Mini Squats x 15 with cues  Exercise 7: *Rows x 15 YTB with tactile cues for form (but needing less cues)  Exercise 8: *Shoulder extension with YTB x 15 with tactilce cues  Exercise 9: sit to stand (in/out of car) x 3 with cues  Exercise 10: Standing hip abduction x 15 BLE needing cues for proper form                          Gait: (CPT [phone])  Treatment Reasoning    Gait Training 1: Pt ambulated with increased upright posture with less cues for postural correction. Pt tended to shuffle upon initial gait but able to improve with cues. Pt ambulated for 125'x 1 Limitations addressed: Mobility, Strength, Flexibility  Therapist provided: Manual cuing                   Pt Education: Plan Comment: CP, HP, KT tape, estim, US       ASSESSMENT     Assessment: Assessment: Pt arrives to therapy with 5-6/10 back pain but able to demonstrate better upright posture with gait. Pt ambulated with his rollator and needed less cues for postural correction but needed cues for heel toe gait pattern. Pt performed ther ex at the bar and PT added hip abduction. Pt needed constant verbal and tactile cue for proper form. Pt did well with his Tband ther ex and needed less cues for form. Pt was able to stand for a longer period of time and complete his exercises before needing one seated rest break. Pt then performed car transfers x 3 reps needing cues to lean forward when sitting on seat as to not hit his head on the door frame. Pt demonstrated better technique with transfers but was fatigued by the 3rd trial. Plan to contine to progress with standing strengthening exercises to continue to improve pts posture and strength and decrease pts risk for falls. Body Structures, Functions, Activity Limitations Requiring Skilled Therapeutic Intervention: Decreased functional mobility , Decreased ROM, Decreased body mechanics, Decreased endurance, Increased pain    Post-Treatment Pain Level:  5-6/10     No data recorded  Therapy Prognosis: Good       GOALS   Patient goals : To be able to stand up better  Short Term Goals Completed by 1-2 weeks from date of eval Current Status Goal Status   Pt able to perform his HEP 3-5x per week Pt reports doing some exercises daily. Met   Pt reports having 3/10 or less back pain with more erect posture during standing and while walking.  Pt reports going down to 4/10 at best In progress Long Term Goals Completed by 4-6 weeks from date of evaluation Current Status Goal Status   Pt reports having 2/10 or less back pain with ADLS and when on his feet for >15 min Pt reports continue to have increased pain in AM; Pt reports that he walks in his the apartment for 15-20' without increased back pain In progress   Increase pts core and B LE strength to 4+/5 or better so that pt demonstrates good posture when using his AD In progress In progress   Improve pts HS tightness from 30 degrees B LE to 15 degrees of less to decrease pts back pain and improve his posture with rollator R LE= 20 degrees      L LE= 15 degrees In progress, Met   Pt able to perform Sit to Stand test x 5 reps less than 15 seconds with safe form Sit to Stand= 30 seconds In progress   Pt/family indep with HEP Pt is able to tolerate his execises with less discomfort In progress                                                TREATMENT PLAN   Plan Frequency: 1-2  Plan weeks: 4-6 weeks  Current Treatment Recommendations: Strengthening, ROM, Balance training, Functional mobility training, Gait training, Pain management, Home exercise program, Safety education & training, Modalities   Requires PT Follow-Up: Yes  Plan Comment: CP, HP, KT tape, estim, US       Therapy Time  Individual Time In:    11:15am      Individual Time Out:  12:00pm  Minutes:  45 min        Electronically signed by Alisa Artis PT  on 9/22/2022 at 2:21 PM

## 2022-09-26 ENCOUNTER — HOSPITAL ENCOUNTER (OUTPATIENT)
Dept: PHYSICAL THERAPY | Age: 87
Setting detail: THERAPIES SERIES
Discharge: HOME | End: 2022-09-26
Payer: MEDICARE

## 2022-09-26 PROCEDURE — 97116 GAIT TRAINING THERAPY: CPT

## 2022-09-26 PROCEDURE — 97110 THERAPEUTIC EXERCISES: CPT

## 2022-09-26 ASSESSMENT — PAIN SCALES - GENERAL: PAINLEVEL_OUTOF10: 4

## 2022-09-26 ASSESSMENT — PAIN DESCRIPTION - LOCATION: LOCATION: BACK

## 2022-09-26 ASSESSMENT — PAIN DESCRIPTION - PAIN TYPE: TYPE: CHRONIC PAIN

## 2022-09-26 ASSESSMENT — PAIN DESCRIPTION - DESCRIPTORS: DESCRIPTORS: ACHING

## 2022-09-26 ASSESSMENT — PAIN DESCRIPTION - ORIENTATION: ORIENTATION: RIGHT;LEFT

## 2022-09-26 NOTE — PROGRESS NOTES
Physical Therapy  Physical Therapy: Daily Note   Patient: Eugenie Villanueva (17 y.o. male)   Examination Date:   Plan of Care/Certification Expiration Date: 10/21/22    No data recorded   :  1928 # of Visits since [facility]:   Visit count could not be calculated. Make sure you are using a visit which is associated with an episode.    MRN: [id number]  CSN: [id number] Start of Care Date:   No linked episodes   Insurance: Payor: MEDICARE / Plan: MEDICARE PART A AND B / Product Type: *No Product type* /   Insurance ID: [id number] - (Medicare) Secondary Insurance (if applicable): [address] [address] 150   Referring Physician: Gi Sierra MD     PCP: Armen Ibrahim MD Visits to Date/Visits Approved:     No Show/Cancelled Appts: 0 / 0     Medical Diagnosis: Spinal stenosis [M48.00]    Treatment Diagnosis: Debility, spinal stenosis and abnormal gait        SUBJECTIVE EXAMINATION   Pain Level: Pain Screening  Patient Currently in Pain: Yes  Pain Assessment: 0-10  Pain Level: 4  Patient's Stated Pain Goal: 0 - No pain  Pain Type: Chronic pain  Pain Location: Back  Pain Orientation: Right, Left  Pain Descriptors: Aching    Patient Comments: Subjective: Pt reports \"I fell pretty good today\" and reports his pain as a 4/10 \"achy\"    HEP Compliance: Good        OBJECTIVE EXAMINATION   Restrictions:  No data recorded No data recorded No data recorded      TREATMENT     Exercises:      Treatment Reasoning    Exercise 3: *Sit to Stand  2 x 5 in chair with no arm rests (pushed from the chair)  Exercise 4: *Heel/toe raises x 15  Exercise 6: *Mini Squats x 15 with cues while maintaining good form  Exercise 7: *Rows x 25 with RTB -  needing less cues)  Exercise 8: *Shoulder extension with RTB  x 25  with tactilce cues  Exercise 11: Lateral step ups x 15 reps B LE - cues for form to avoid toeing out                              Gait: (CPT [id number])  Treatment Reasoning    Gait Training 1: Pt ambulated at end of tx session with rolltor walker Pt reports that he walks in his the apartment for 15-20' without increased back pain In progress   Increase pts core and B LE strength to 4+/5 or better so that pt demonstrates good posture when using his AD In progress In progress   Improve pts HS tightness from 30 degrees B LE to 15 degrees of less to decrease pts back pain and improve his posture with rollator R LE= 20 degrees      L LE= 15 degrees In progress, Met   Pt able to perform Sit to Stand test x 5 reps less than 15 seconds with safe form Sit to Stand= 30 seconds In progress   Pt/family indep with HEP Pt is able to tolerate his execises with less discomfort In progress                                                TREATMENT PLAN   Plan Frequency: 1-2  Plan weeks: 4-6 weeks  Current Treatment Recommendations: Strengthening, ROM, Balance training, Functional mobility training, Gait training, Pain management, Home exercise program, Safety education & training, Modalities   Plan Comment: CP, HP, KT tape, estim, US       Therapy Time  Individual Time In:   11:15am       Individual Time Out:  12:00pm  Minutes:   45 min         Electronically signed by Tommy Dick PT  on 9/26/2022 at 2:12 PM

## 2022-09-28 ENCOUNTER — HOSPITAL ENCOUNTER (OUTPATIENT)
Dept: PHYSICAL THERAPY | Age: 87
Setting detail: THERAPIES SERIES
Discharge: HOME | End: 2022-09-28
Payer: MEDICARE

## 2022-09-28 PROCEDURE — 97110 THERAPEUTIC EXERCISES: CPT

## 2022-09-28 PROCEDURE — 97140 MANUAL THERAPY 1/> REGIONS: CPT

## 2022-09-28 PROCEDURE — 97112 NEUROMUSCULAR REEDUCATION: CPT

## 2022-09-28 ASSESSMENT — PAIN DESCRIPTION - PAIN TYPE: TYPE: CHRONIC PAIN

## 2022-09-28 ASSESSMENT — PAIN SCALES - GENERAL: PAINLEVEL_OUTOF10: 4

## 2022-09-28 NOTE — PROGRESS NOTES
Physical Therapy: Daily Note   Patient: Charlie Viera (66 y.o. male)   Examination Date: 15/83/8297  Plan of Care/Certification Expiration Date: 10/21/22    No data recorded   :  1928 # of Visits since [facility]:   Visit count could not be calculated. Make sure you are using a visit which is associated with an episode. MRN: [id number]  CSN: [id number] Start of Care Date:   No linked episodes   Insurance: Payor: MEDICARE / Plan: MEDICARE PART A AND B / Product Type: *No Product type* /   Insurance ID: [id number] - (Medicare) Secondary Insurance (if applicable): [employer]   Referring Physician: Avram Curling, MD     PCP: Nhung Baum MD Visits to Date/Visits Approved:     No Show/Cancelled Appts: 0 / 0     Medical Diagnosis: Spinal stenosis [M48.00]    Treatment Diagnosis: Debility, spinal stenosis and abnormal gait        SUBJECTIVE EXAMINATION   Pain Level: Pain Screening  Patient Currently in Pain: Yes  Pain Assessment: 0-10  Pain Level: 4  Patient's Stated Pain Goal: 0 - No pain  Pain Type: Chronic pain    Patient Comments: Subjective: Pt reports \"I fell pretty good today\" and reports his pain as a 4/10 \"achy\"    HEP Compliance: Good        OBJECTIVE EXAMINATION   Restrictions:  No data recorded No data recorded No data recorded      TREATMENT     Exercises:      Treatment Reasoning    Exercise 4: *Heel/toe raises x 15  Exercise 10: Standing hip abduction x 15 BLE needing cues for proper form to keep in line with the black mat (needed less cues)  Exercise 11: Lateral step ups \" x 15 reps B LE - cues for form to avoid toeing out - improved with his form - needing less cues                          Gait/Neuro Treatment Reasoning    Gait Training 1: Pt ambulated at end of tx session with rolltor walker with improved posture not needing any cues for posture; Pt tended to shuffle at times needing cues to heel toe gait and able to do with cues.  Pt able to improve heel toe gait with cues and decrease the shuffling of his feet. Pt ambulated for 125'x 1 with no reports of increased back pain. Gait Training 2: Pt ambulated with rollator walker and CGA on blue balance mats to simulate uneven ground. Pt needed increased CGA when on 4\" mat and then ambulated in the mat with hand held assitance with increased unsteadiness but cues to take smaller steps. Pt was able to perform x 4 trails. Manual Therapy: (CPT 82625) Treatment Reasoning     Other: KT tape applied to pts lower back horizontally across lumbar spine with max tension in center and then 2 strips placed vertically along paraspinals anchored prox to distal with 30% pull for pain control and support                      Pt Education: Plan Comment: CP, HP, KT tape, estim, US       ASSESSMENT     Assessment: Assessment: Pt arrives to therapy with reports of 4/10 back pain. Pts son present for his tx session. Pt performed standing hip abduciton with much better form and needing less cues. Pt was then able to perform 4\" step ups also with better foot placement and needing cues not toe out. Pt took a short rest break and then worked on walking on the blue gym mats to work on ambulating on uneven ground as pt reports having trouble walking on the uneven sidewalks. Pt then performed gait and then PT applied KT tape to pts low back for pain control. Continue to progress with more challenging exercises in standing. Body Structures, Functions, Activity Limitations Requiring Skilled Therapeutic Intervention: Decreased functional mobility , Decreased ROM, Decreased body mechanics, Decreased endurance, Increased pain    Post-Treatment Pain Level:      No data recorded  Therapy Prognosis: Good       GOALS   Patient goals : To be able to stand up better  Short Term Goals Completed by 1-2 weeks from date of eval Current Status Goal Status   Pt able to perform his HEP 3-5x per week Pt reports doing some exercises daily.  Met   Pt reports having 3/10 or less back pain with more erect posture during standing and while walking.  Pt reports going down to 4/10 at best In progress                                                                       Long Term Goals Completed by 4-6 weeks from date of evaluation Current Status Goal Status   Pt reports having 2/10 or less back pain with ADLS and when on his feet for >15 min Pt reports continue to have increased pain in AM; Pt reports that he walks in his the apartment for 15-20' without increased back pain In progress   Increase pts core and B LE strength to 4+/5 or better so that pt demonstrates good posture when using his AD In progress In progress   Improve pts HS tightness from 30 degrees B LE to 15 degrees of less to decrease pts back pain and improve his posture with rollator R LE= 20 degrees      L LE= 15 degrees In progress, Met   Pt able to perform Sit to Stand test x 5 reps less than 15 seconds with safe form Sit to Stand= 30 seconds In progress   Pt/family indep with HEP Pt is able to tolerate his execises with less discomfort In progress                                                TREATMENT PLAN   Plan Frequency: 1-2  Plan weeks: 4-6 weeks  Current Treatment Recommendations: Strengthening, ROM, Balance training, Functional mobility training, Gait training, Pain management, Home exercise program, Safety education & training, Modalities   Plan Comment: CP, HP, KT tape, estim, US       Therapy Time  Individual Time In:       12:00pm  Individual Time Out:   1:00pm  Minutes:   60 min         Electronically signed by Rain Parnell PT  on 9/28/2022 at 5:30 PM

## 2022-10-03 ENCOUNTER — HOSPITAL ENCOUNTER (OUTPATIENT)
Dept: PHYSICAL THERAPY | Age: 87
Setting detail: THERAPIES SERIES
Discharge: HOME | End: 2022-10-03
Payer: MEDICARE

## 2022-10-03 NOTE — PROGRESS NOTES
Physical Therapy  Daily Treatment Note  Date: 10/3/2022  Patient Name: Jahaira Drew  MRN: [id number]     :   1928    Referring Physician: Kimmy Pink MD     PCP: Rozina Church MD    Medical Diagnosis: Spinal stenosis [M48.00]    Treatment Diagnosis: Debility, spinal stenosis and abnormal gait      Insurance: Payor: MEDICARE / Plan: MEDICARE PART A AND B / Product Type: *No Product type* /   Insurance ID: [id number] - (Medicare)    Subjective:   PT Visit Information  Total # of Visits Approved: 18  Total # of Visits to Date: 15  Plan of Care/Certification Expiration Date: 10/21/22  No Show: 0  Canceled Appointment: 1  Subjective  Subjective: Pt cancelled his appt today due to not feeling well.             Therapy Time:   Individual Concurrent Group Co-treatment   Time In           Time Out           Minutes                   Deng Parr, [address] [facility] [id number]

## 2022-10-05 ENCOUNTER — HOSPITAL ENCOUNTER (OUTPATIENT)
Dept: PHYSICAL THERAPY | Age: 87
Setting detail: THERAPIES SERIES
Discharge: HOME | End: 2022-10-05
Payer: MEDICARE

## 2022-10-05 NOTE — PROGRESS NOTES
Physical Therapy: Cancelled Vist Note/ Placed on hold    Patient: Brennan Kulkarni (89 y.o. male)   Examination Date:   Plan of Care/Certification Expiration Date: 10/21/22    No data recorded   :  1928 # of Visits since [facility]:   Visit count could not be calculated. Make sure you are using a visit which is associated with an episode. MRN: [id number]  CSN: [id number] Start of Care Date:   No linked episodes   Insurance: Payor: MEDICARE / Plan: MEDICARE PART A AND B / Product Type: *No Product type* /   Insurance ID: [id number] - (Medicare) Secondary Insurance (if applicable): [address] [address] 150   Referring Physician: Cassandra Mancera MD     PCP: aSndy Robbins MD Visits to Date/Visits Approved:     No Show/Cancelled Appts: 0 / 2     Medical Diagnosis: Spinal stenosis [M48.00]    Treatment Diagnosis: Debility, spinal stenosis and abnormal gait        SUBJECTIVE EXAMINATION   Pain Level:      Patient Comments: Subjective: Pts appt cancelled today per son's request and planced on hold at this time as family is waiting to see about insurance approval and coverage. PT spoke with pts son, Marion Buerger, at length about plan to place pt on hold until his recheck is due on 10/21/22. Marion Buerger to call at that time to decide of pt will have a recheck completed with plan to finish is last 2 weeks or will Dc.    Therapy Time  Individual Time In:         Individual Time Out:    Minutes:          Electronically signed by Dafne Carpio PT  on 10/5/2022 at 5:30 PM

## 2022-10-14 ENCOUNTER — HOSPITAL ENCOUNTER (OUTPATIENT)
Dept: PHYSICAL THERAPY | Age: 87
Setting detail: THERAPIES SERIES
Discharge: HOME | End: 2022-10-14
Payer: MEDICARE

## 2022-10-14 NOTE — PROGRESS NOTES
Daily Treatment Note  Date: 10/14/2022  Patient Name: Taylor Linares  MRN: [id number]     :   1928    Referring Physician: Karlie Snow MD     PCP: Kellen De Santiago MD    Medical Diagnosis: Spinal stenosis [M48.00]    Treatment Diagnosis: Debility, spinal stenosis and abnormal gait      Insurance: Payor: MEDICARE / Plan: MEDICARE PART A AND B / Product Type: *No Product type* /   Insurance ID: [id number] - (Medicare)    Subjective:   PT Visit Information  Total # of Visits Approved: 18  Total # of Visits to Date: 15  Plan of Care/Certification Expiration Date: 10/21/22  No Show: 0  Canceled Appointment: 2  Subjective  Subjective: Cancelled does not have insurance info. Goals:  Short Term Goals  Time Frame for Short Term Goals: 1-2 weeks from date of eval  Short Term Goal 1: Pt able to perform his HEP 3-5x per week  STG 1 Current Status[de-identified] Pt reports doing some exercises daily. STG Goal 1 Status[de-identified] Met  Short Term Goal 2: Pt reports having 3/10 or less back pain with more erect posture during standing and while walking. STG 2 Current Status[de-identified] Pt reports going down to 4/10 at best  STG Goal 2 Status[de-identified] In progress  Long Term Goals  Time Frame for Long Term Goals : 4-6 weeks from date of evaluation  Long Term Goal 1: Pt reports having 2/10 or less back pain with ADLS and when on his feet for >15 min  LTG 1 Current Status[de-identified] Pt reports continue to have increased pain in AM; Pt reports that he walks in his the apartment for 15-20' without increased back pain  LTG Goal 1 Status[de-identified] In progress  Long Term Goal 2:  Increase pts core and B LE strength to 4+/5 or better so that pt demonstrates good posture when using his AD  LTG 2 Current Status[de-identified] In progress  LTG Goal 2 Status[de-identified] In progress  Long Term Goal 3: Improve pts HS tightness from 30 degrees B LE to 15 degrees of less to decrease pts back pain and improve his posture with rollator  LTG 3 Current Status[de-identified] R LE= 20 degrees      L LE= 15 degrees  LTG Goal 3 Status[de-identified] In progress, Met  Long Term Goal 4: Pt able to perform Sit to Stand test x 5 reps less than 15 seconds with safe form  LTG 4 Current Status[de-identified] Sit to Stand= 30 seconds  LTG Goal 4 Status[de-identified] In progress  Long Term Goal 5: Pt/family indep with HEP  LTG 5 Current Status[de-identified] Pt is able to tolerate his execises with less discomfort  LTG Goal 5 Status[de-identified] In progress  Patient Goals   Patient Goals :  To be able to stand up better    Plan:    Physcial Therapy Plan  Plan weeks: 4-6 weeks  Current Treatment Recommendations: Strengthening, ROM, Balance training, Functional mobility training, Gait training, Pain management, Home exercise program, Safety education & training, Modalities  Additional Comments: CP, HP, KT tape, estim, US        Therapy Time:   Individual Concurrent Group Co-treatment   Time In           Time Out           Minutes  0                 Rigoberto Jimenez [place]         Physical Therapy

## 2022-10-19 ENCOUNTER — HOSPITAL ENCOUNTER (OUTPATIENT)
Dept: PHYSICAL THERAPY | Age: 87
Setting detail: THERAPIES SERIES
Discharge: HOME | End: 2022-10-19
Payer: MEDICARE

## 2022-10-19 PROCEDURE — 97530 THERAPEUTIC ACTIVITIES: CPT

## 2022-10-19 ASSESSMENT — PAIN DESCRIPTION - LOCATION: LOCATION: BACK

## 2022-10-19 ASSESSMENT — PAIN SCALES - GENERAL: PAINLEVEL_OUTOF10: 3

## 2022-10-19 ASSESSMENT — PAIN DESCRIPTION - PAIN TYPE: TYPE: CHRONIC PAIN

## 2022-10-19 NOTE — PROGRESS NOTES
Physical Therapy: Recheck Note   Patient: Rosanne Juan [de-identified]80 y.o. male)   Examination Date: 80/15/7986  Plan of Care/Certification Expiration Date: 22    Progress Note Counter: Recheck completed with plans to continue due to pt being on hold approx 2 weeks; Plan to see pt for 1-2x per week for 4-6 weeks     :  1928 # of Visits since [facility]:   Visit count could not be calculated. Make sure you are using a visit which is associated with an episode. MRN: [id number]  CSN: [id number] Start of Care Date:   No linked episodes   Insurance: Payor: MEDICARE / Plan: MEDICARE PART A AND B / Product Type: *No Product type* /   Insurance ID: [id number] - (Medicare) Secondary Insurance (if applicable): Pa Gallegos   Referring Physician: Jared Phipps MD     PCP: Kya Patel MD Visits to Date/Visits Approved: 15 / 20    No Show/Cancelled Appts: 0 / 2     Medical Diagnosis: Spinal stenosis [M48.00]    Treatment Diagnosis: Debility, spinal stenosis and abnormal gait        SUBJECTIVE EXAMINATION   Pain Level: Pain Screening  Patient Currently in Pain: Yes  Pain Assessment: 0-10  Pain Level: 3  Patient's Stated Pain Goal: 0 - No pain  Pain Type: Chronic pain  Pain Location: Back    Patient Comments: Subjective: Pt reports that 3/10 low back pain currently.     HEP Compliance: Good        OBJECTIVE EXAMINATION   Restrictions:  No data recorded No data recorded No data recorded      Left Strength  Right Strength         Strength LLE  L Hip Flexion: 4/5, 4+/5  L Hip Extension: 4/5  L Hip ABduction: 4+/5  L Hip ADduction: 4+/5  L Knee Flexion: 4/5, 4+/5  L Knee Extension: 4+/5  L Ankle Dorsiflexion: 4/5  L Ankle Plantar Flexion: 4+/5    Strength RLE  R Hip Flexion: 4/5, 4+/5  R Hip Extension: 4/5  R Hip ABduction: 4+/5  R Hip ADduction: 4+/5  R Knee Flexion: 4+/5  R Knee Extension: 4/5, 4+/5  R Ankle Dorsiflexion: 4/5  R Ankle Plantar flexion: 4+/5       TREATMENT     Pt Education: Additional Comments: CP, HP, KT tape, estim, US       ASSESSMENT     Assessment: Assessment: Pt arrives to therapy session with reports of 3/10 back pain but that it is mostly at a 4/10. PT completed recheck with plans to continue to progress with pt 1-2x per week for 4-6 weeks to continue to progress with strengthening, mobility and pain control. PT and pts son adjusted pts new rollator for pts height. Pt and family agreeable with plan to cotinue with PT once pt reports from his vacation next week. Body Structures, Functions, Activity Limitations Requiring Skilled Therapeutic Intervention: Decreased functional mobility , Decreased ROM, Decreased body mechanics, Decreased endurance, Increased pain    Post-Treatment Pain Level:  3/10     No data recorded  Therapy Prognosis: Good       GOALS   Patient Goals : To be able to stand up better  Short Term Goals Completed by 1-2 weeks from date of recheck Current Status Goal Status   Pt able to perform his HEP 3-5x per week Pt reports doing exercises daily. Met   Pt reports having 3/10 or less back pain with more erect posture during standing and while walking. Pt reports going down to 3/10  but consistently at a 4/10 In progress                                                                       Long Term Goals Completed by 4-6 weeks from date of recheck Current Status Goal Status   Pt reports having 2/10 or less back pain with ADLS and when on his feet for >15 min Pt reports continue to have increased pain in AM; Pt is 3-4/10;   Pt was able to be on his feet for 15-20' at [employer] before needing to rest In progress   Increase pts core and B LE strength to 4+/5 or better so that pt demonstrates good posture when using his AD Pt strength is improving and pt demonstrated improved posture with gait needing less cues for posture In progress   Improve pts HS tightness from 30 degrees B LE to 15 degrees of less to decrease pts back pain and improve his posture with rollator R LE=17  degrees      L LE= 16 degrees Partially met   Pt able to perform Sit to Stand test x 5 reps less than 15 seconds with safe form Sit to Stand 15.2 Partially met   Pt/family indep with HEP Pt is able to tolerate his execises with less discomfort and reports that he does his exercises daily In progress                                                TREATMENT PLAN   Plan Frequency: 1-2  Plan weeks: 4-6 weeks  Current Treatment Recommendations: Strengthening, ROM, Balance training, Functional mobility training, Gait training, Pain management, Home exercise program, Safety education & training, Modalities   Additional Comments: CP, HP, KT tape, estim, US       Therapy Time  Individual Time In:    1:00pm     Individual Time Out:  1:50pm   Minutes:  50 min         Electronically signed by Malena Holder PT  on 10/19/2022 at 4:57 PM

## 2022-10-31 ENCOUNTER — HOSPITAL ENCOUNTER (OUTPATIENT)
Dept: PHYSICAL THERAPY | Age: 87
Setting detail: THERAPIES SERIES
Discharge: HOME | End: 2022-10-31
Payer: MEDICARE

## 2022-10-31 PROCEDURE — 97116 GAIT TRAINING THERAPY: CPT

## 2022-10-31 PROCEDURE — 97110 THERAPEUTIC EXERCISES: CPT

## 2022-10-31 ASSESSMENT — PAIN DESCRIPTION - ORIENTATION: ORIENTATION: RIGHT;LEFT

## 2022-10-31 ASSESSMENT — PAIN SCALES - GENERAL: PAINLEVEL_OUTOF10: 4

## 2022-10-31 ASSESSMENT — PAIN DESCRIPTION - PAIN TYPE: TYPE: CHRONIC PAIN

## 2022-10-31 ASSESSMENT — PAIN DESCRIPTION - LOCATION: LOCATION: BACK

## 2022-10-31 NOTE — PROGRESS NOTES
Physical Therapy: Daily Note   Patient: Truett Ache [de-identified]80 y.o. male)   Examination Date:   Plan of Care/Certification Expiration Date: 22    Progress Note Counter: Recheck completed with plans to continue due to pt being on hold approx 2 weeks; Plan to see pt for 1-2x per week for 4-6 weeks     :  1928 # of Visits since [facility]:   Visit count could not be calculated. Make sure you are using a visit which is associated with an episode.    MRN: [id number]  CSN: [id number] Start of Care Date:   No linked episodes   Insurance: Payor: Steph Thao / Plan: MEDICARE PART A AND B / Product Type: *No Product type* /   Insurance ID: [id number] - (Medicare) Secondary Insurance (if applicable): Paloma Feldman 150   Referring Physician: Silvina Jones MD     PCP: Jake Lockhart MD Visits to Date/Visits Approved: 15 / 20    No Show/Cancelled Appts: 0 / 2     Medical Diagnosis: Spinal stenosis [M48.00]    Treatment Diagnosis: Debility, spinal stenosis and abnormal gait        SUBJECTIVE EXAMINATION   Pain Level: Pain Screening  Patient Currently in Pain: Yes  Pain Assessment: 0-10  Pain Level: 4  Patient's Stated Pain Goal: 0 - No pain  Pain Type: Chronic pain  Pain Location: Back  Pain Orientation: Right, Left    Patient Comments: Subjective: Pt reports having back pain as 3-4/10 and just returned from vacation    HEP Compliance: Good        OBJECTIVE EXAMINATION   Restrictions:  No data recorded No data recorded No data recorded      TREATMENT     Exercises:      Treatment Reasoning    Exercise 2: *Seated HS stretch x 3 H30  Exercise 3: *Sit to Stand  2 x 5 in chair with arm rests (pushed from the chair)  Exercise 4: *Heel/toe raises x 15  Exercise 5: *Marching in place x 15 needing less cues  Exercise 6: *Mini Squats x 15 with cues while maintaining good form  Exercise 7: *Rows x 15 with RTB -  needing less cues)  Exercise 8: *Shoulder extension with RTB  x 15  with tactilce cues  Exercise 11: Lateral step ups 4\" x 15 reps B LE - cues for form to avoid toeing out - improved with his form - needing less cues                          Gait: (CPT [phone])  Treatment Reasoning    Gait Training 1: Pt ambulated at end of tx session with rollator walker with improved posture only needing a few cues for posture; Pt tended to shuffle at times needing cues to heel toe gait and able to do with cues. Pt able to improve heel toe gait with cues and decrease the shuffling of his feet. Pt ambulated for 125'x 1. Pt Education: Additional Comments: CP, HP, KT tape, estim, US       ASSESSMENT     Assessment: Assessment: Pt arrives to therapy with reports of 3-4/10 low back pain and recently returning for being on vacation last week. Pt and his son, Kyree Ramirez, reports that pt did well on vacation and was able to walk most places and only c/o of back pain one of the days. Pt did need some help getting off of a park bench but otherwise handled himself well. Today, pt performed his sit to stand exercises and then his bar exercises. Pt was fatigued and took severval rest breaks. Pts son reports that pt is having a hard time walking short distances withtout his rollator or his cane and this was evident during the therapy session so will continue to focus on strength training and next session with work on gait for short distances without AD. Plan to continue to work on pts strength and balance to decrease pts falls risk and increase his mobility. Body Structures, Functions, Activity Limitations Requiring Skilled Therapeutic Intervention: Decreased functional mobility , Decreased ROM, Decreased body mechanics, Decreased endurance, Increased pain    Post-Treatment Pain Level:  same    No data recorded  Therapy Prognosis: Good       GOALS   Patient Goals : To be able to stand up better  Short Term Goals Completed by 1-2 weeks from date of eval Current Status Goal Status   Pt able to perform his HEP 3-5x per week Pt reports doing exercises daily.  Met   Pt reports having 3/10 or less back pain with more erect posture during standing and while walking. Pt reports going down to 3/10  but consistently at a 4/10 In progress                                                                       Long Term Goals Completed by 4-6 weeks from date of recheck Current Status Goal Status   Pt reports having 2/10 or less back pain with ADLS and when on his feet for >15 min Pt reports continue to have increased pain in AM; Pt is 3-4/10;   Pt was able to be on his feet for 15-20' at [employer] before needing to rest In progress   Increase pts core and B LE strength to 4+/5 or better so that pt demonstrates good posture when using his AD In progress In progress   Improve pts HS tightness from 30 degrees B LE to 15 degrees of less to decrease pts back pain and improve his posture with rollator R LE= 20 degrees      L LE= 15 degrees In progress, Met   Pt able to perform Sit to Stand test x 5 reps less than 15 seconds with safe form Sit to Stand= 30 seconds In progress   Pt/family indep with HEP Pt is able to tolerate his execises with less discomfort In progress                                                TREATMENT PLAN   Plan Frequency: 1-2  Plan weeks: 4-6 weeks  Current Treatment Recommendations: Strengthening, ROM, Balance training, Functional mobility training, Gait training, Pain management, Home exercise program, Safety education & training, Modalities   Additional Comments: CP, HP, KT tape, estim, US       Therapy Time  Individual Time In:      11:20pm   Individual Time Out:  12:20pm   Minutes:  60 min         Electronically signed by Pam Euceda PT  on 10/31/2022 at 1:44 PM

## 2022-11-02 ENCOUNTER — HOSPITAL ENCOUNTER (OUTPATIENT)
Dept: PHYSICAL THERAPY | Age: 87
Setting detail: THERAPIES SERIES
Discharge: HOME | End: 2022-11-02
Payer: MEDICARE

## 2022-11-02 PROCEDURE — 97530 THERAPEUTIC ACTIVITIES: CPT

## 2022-11-02 ASSESSMENT — PAIN DESCRIPTION - PAIN TYPE: TYPE: CHRONIC PAIN

## 2022-11-02 ASSESSMENT — PAIN DESCRIPTION - FREQUENCY: FREQUENCY: CONTINUOUS

## 2022-11-02 ASSESSMENT — PAIN DESCRIPTION - DESCRIPTORS: DESCRIPTORS: ACHING

## 2022-11-02 ASSESSMENT — PAIN SCALES - GENERAL: PAINLEVEL_OUTOF10: 3

## 2022-11-02 ASSESSMENT — PAIN DESCRIPTION - LOCATION: LOCATION: BACK

## 2022-11-02 ASSESSMENT — PAIN DESCRIPTION - ORIENTATION: ORIENTATION: RIGHT;LEFT

## 2022-11-02 NOTE — PROGRESS NOTES
Physical Therapy: Daily Note   Patient: Helen Pandya [de-identified]80 y.o. male)   Examination Date:   Plan of Care/Certification Expiration Date: 22    Progress Note Counter: Recheck completed with plans to continue due to pt being on hold approx 2 weeks; Plan to see pt for 1-2x per week for 4-6 weeks     :  1928 # of Visits since [facility]:   Visit count could not be calculated. Make sure you are using a visit which is associated with an episode.    MRN: [id number]  CSN: [id number] Start of Care Date:   No linked episodes   Insurance: Payor: MEDICARE / Plan: MEDICARE PART A AND B / Product Type: *No Product type* /   Insurance ID: [id number] - (Medicare) Secondary Insurance (if applicable): Karley Rivera   Referring Physician: Miki Marquez MD     PCP: Debbi Frankel, MD Visits to Date/Visits Approved:     No Show/Cancelled Appts: 0 / 2     Medical Diagnosis: Spinal stenosis [M48.00]    Treatment Diagnosis: Debility, spinal stenosis and abnormal gait        SUBJECTIVE EXAMINATION   Pain Level: Pain Screening  Patient Currently in Pain: Yes  Pain Assessment: 0-10  Pain Level: 3  Patient's Stated Pain Goal: 0 - No pain  Pain Type: Chronic pain  Pain Location: Back  Pain Orientation: Right, Left  Pain Descriptors: Aching  Pain Frequency: Continuous    Patient Comments: Subjective: Pt reports 3/10 low back pain    HEP Compliance: Good        OBJECTIVE EXAMINATION   Restrictions:  No data recorded No data recorded No data recorded      TREATMENT     Exercises:      Treatment Reasoning    Exercise 3: *Sit to Stand  2 x 5 in chair with arm rests (pushed from the chair)  Exercise 12: *LAQs x 15 B LE  Exercise 13: *Seated hip abduction x 10 BTB  Exercise 14: *SLR x 10 BLE  Exercise 15: *Bridging x 10  Supine hip abd with BTB x 10                           Pt Education: Additional Comments: CP, HP, KT tape, estim, US       ASSESSMENT     Assessment: Assessment: Pt arrives to therapy with reports of 3/10 achy back pain and demonstrated improved posture during gait by looking straight a head vs looking down at the ground. Pt performed sit to stand from chair without arm rests needing to rest hands on the seat and demonstrated good control on descend. PT then instructed pt in seated and supine ther ex to add to pts program. Pt was given HEP with LAQs, seated hip abd with BTB, bridging and SLR. Pt had incresed back pain with supine so adjusted table and advised pt to use pain as his guide when exercising to modify exercise if needed. Plan to work on ambulatating short distances next session without his AD to build pts confidence if needed to walk short distances in his home without his AD. Body Structures, Functions, Activity Limitations Requiring Skilled Therapeutic Intervention: Decreased functional mobility , Decreased ROM, Decreased body mechanics, Decreased endurance, Increased pain    Post-Treatment Pain Level:  3/10     No data recorded  Therapy Prognosis: Good       GOALS   Patient Goals : To be able to stand up better  Short Term Goals Completed by 1-2 weeks from date of eval Current Status Goal Status   Pt able to perform his HEP 3-5x per week Pt reports doing exercises daily. Met   Pt reports having 3/10 or less back pain with more erect posture during standing and while walking. Pt reports going down to 3/10  but consistently at a 4/10 In progress                                                                       Long Term Goals Completed by 4-6 weeks from date of recheck Current Status Goal Status   Pt reports having 2/10 or less back pain with ADLS and when on his feet for >15 min Pt reports continue to have increased pain in AM; Pt is 3-4/10;   Pt was able to be on his feet for 15-20' at [employer] before needing to rest In progress   Increase pts core and B LE strength to 4+/5 or better so that pt demonstrates good posture when using his AD In progress In progress   Improve pts HS tightness from 30 degrees B LE to 15 degrees of less to decrease pts back pain and improve his posture with rollator R LE= 20 degrees      L LE= 15 degrees In progress, Met   Pt able to perform Sit to Stand test x 5 reps less than 15 seconds with safe form Sit to Stand= 30 seconds In progress   Pt/family indep with HEP Pt is able to tolerate his execises with less discomfort In progress                                                TREATMENT PLAN   Plan Frequency: 1-2  Plan weeks: 4-6 weeks  Current Treatment Recommendations: Strengthening, ROM, Balance training, Functional mobility training, Gait training, Pain management, Home exercise program, Safety education & training, Modalities   Additional Comments: CP, HP, KT tape, estim, US       Therapy Time  Individual Time In:   1:45pm       Individual Time Out:  2:30pm   Minutes:   45 min         Electronically signed by Roz Harper PT  on 11/2/2022 at 3:57 PM

## 2022-11-07 ENCOUNTER — HOSPITAL ENCOUNTER (OUTPATIENT)
Dept: PHYSICAL THERAPY | Age: 87
Setting detail: THERAPIES SERIES
Discharge: HOME | End: 2022-11-07
Payer: MEDICARE

## 2022-11-07 PROCEDURE — 97110 THERAPEUTIC EXERCISES: CPT

## 2022-11-07 PROCEDURE — 97112 NEUROMUSCULAR REEDUCATION: CPT

## 2022-11-07 ASSESSMENT — PAIN SCALES - GENERAL: PAINLEVEL_OUTOF10: 3

## 2022-11-07 ASSESSMENT — PAIN DESCRIPTION - LOCATION: LOCATION: BACK

## 2022-11-07 ASSESSMENT — PAIN DESCRIPTION - PAIN TYPE: TYPE: CHRONIC PAIN

## 2022-11-07 ASSESSMENT — PAIN DESCRIPTION - DESCRIPTORS: DESCRIPTORS: ACHING

## 2022-11-07 ASSESSMENT — PAIN DESCRIPTION - FREQUENCY: FREQUENCY: CONTINUOUS

## 2022-11-07 ASSESSMENT — PAIN DESCRIPTION - ORIENTATION: ORIENTATION: RIGHT;LEFT

## 2022-11-07 NOTE — PROGRESS NOTES
Physical Therapy: Daily Note   Patient: Yoko Lopez [de-identified]80 y.o. male)   Examination Date: 99/15/7322  Plan of Care/Certification Expiration Date: 22    Progress Note Counter: Recheck completed with plans to continue due to pt being on hold approx 2 weeks; Plan to see pt for 1-2x per week for 4-6 weeks     :  1928 # of Visits since [facility]:   Visit count could not be calculated. Make sure you are using a visit which is associated with an episode. MRN: [id number]  CSN: [id number] Start of Care Date:   No linked episodes   Insurance: Payor: MEDICARE / Plan: MEDICARE PART A AND B / Product Type: *No Product type* /   Insurance ID: [id number] - (Medicare) Secondary Insurance (if applicable): Roberto Anderson   Referring Physician: Lola Cranker, MD     PCP: Brii Leyva MD Visits to Date/Visits Approved:     No Show/Cancelled Appts: 0 / 2     Medical Diagnosis: Spinal stenosis [M48.00]    Treatment Diagnosis: Debility, spinal stenosis and abnormal gait        SUBJECTIVE EXAMINATION   Pain Level: Pain Screening  Patient Currently in Pain: Yes  Pain Assessment: 0-10  Pain Level: 3  Patient's Stated Pain Goal: 0 - No pain  Pain Type: Chronic pain  Pain Location: Back  Pain Orientation: Right, Left  Pain Descriptors: Aching  Pain Frequency: Continuous    Patient Comments: Subjective: Pt reports 3/10 low dull back pain    HEP Compliance: Good        OBJECTIVE EXAMINATION   Restrictions:  No data recorded No data recorded No data recorded        TREATMENT     Exercises:      Treatment Reasoning    Exercise 16: Walking forward/backward along railing - touching bar and the hand about bar x 5 reps;  Side stepping x 5 reps (touching bar and then hand above bar)  Exercise 17: Walking in hallway along 12-15' bar - walk forward/backward without touching bar; Sidestepping with and without touching bar - cues for keeping foot facing forward                          Pt Education: Additional Comments: CP, HP, KT tape, estim, US ASSESSMENT     Assessment: Assessment: Pt arrives to therapy with 3/10 low back achy pain. Pt worked on walking at the bar with light touch and then hand hovering over bar to work in his side to side balance and forward walking with pt demonstrating improved balance and gait. PT made a copy of ther ex for pt to perform at home at front of pts countertop so that pt can work on walking short distances with safe balance without his AD. Pt was fatigued and needed rest break. Pt had no increase in pain post tx session. Plan to review his balance walking exercises and his seated and standing ther ex to finalize his program for DC next week. Body Structures, Functions, Activity Limitations Requiring Skilled Therapeutic Intervention: Decreased functional mobility , Decreased ROM, Decreased body mechanics, Decreased endurance, Increased pain    Post-Treatment Pain Level:  3/10     No data recorded  Therapy Prognosis: Good       GOALS   Patient Goals : To be able to stand up better  Short Term Goals Completed by 1-2 weeks from date of eval Current Status Goal Status   Pt able to perform his HEP 3-5x per week Pt reports doing exercises daily. Met   Pt reports having 3/10 or less back pain with more erect posture during standing and while walking. Pt reports going down to 3/10  but consistently at a 4/10 In progress                                                                       Long Term Goals Completed by 4-6 weeks from date of recheck Current Status Goal Status   Pt reports having 2/10 or less back pain with ADLS and when on his feet for >15 min Pt reports continue to have increased pain in AM; Pt is 3-4/10;   Pt was able to be on his feet for 15-20' at [employer] before needing to rest In progress   Increase pts core and B LE strength to 4+/5 or better so that pt demonstrates good posture when using his AD In progress In progress   Improve pts HS tightness from 30 degrees B LE to 15 degrees of less to decrease pts back pain and improve his posture with rollator R LE= 20 degrees      L LE= 15 degrees In progress, Met   Pt able to perform Sit to Stand test x 5 reps less than 15 seconds with safe form Sit to Stand= 30 seconds In progress   Pt/family indep with HEP Pt is able to tolerate his execises with less discomfort In progress                                                TREATMENT PLAN   Plan Frequency: 1-2  Plan weeks: 4-6 weeks  Current Treatment Recommendations: Strengthening, ROM, Balance training, Functional mobility training, Gait training, Pain management, Home exercise program, Safety education & training, Modalities   Additional Comments: CP, HP, KT tape, estim, US       Therapy Time  Individual Time In:     11:15am     Individual Time Out:  12:00pm  Minutes:  45 min         Electronically signed by Jane Núñez PT  on 11/7/2022 at 3:54 PM

## 2022-11-09 ENCOUNTER — HOSPITAL ENCOUNTER (OUTPATIENT)
Dept: PHYSICAL THERAPY | Age: 87
Setting detail: THERAPIES SERIES
Discharge: HOME | End: 2022-11-09
Payer: MEDICARE

## 2022-11-09 PROCEDURE — 97110 THERAPEUTIC EXERCISES: CPT

## 2022-11-09 ASSESSMENT — PAIN DESCRIPTION - PAIN TYPE: TYPE: CHRONIC PAIN

## 2022-11-09 ASSESSMENT — PAIN DESCRIPTION - LOCATION: LOCATION: BACK

## 2022-11-09 ASSESSMENT — PAIN SCALES - GENERAL: PAINLEVEL_OUTOF10: 3

## 2022-11-09 NOTE — PROGRESS NOTES
Physical Therapy  Physical Therapy: Daily Note   Patient: Yoko Lopez [de-identified]80 y.o. male)   Examination Date:   Plan of Care/Certification Expiration Date: 22    Progress Note Counter: Recheck completed with plans to continue due to pt being on hold approx 2 weeks; Plan to see pt for 1-2x per week for 4-6 weeks     :  1928 # of Visits since [facility]:   Visit count could not be calculated. Make sure you are using a visit which is associated with an episode. MRN: [id number]  CSN: [id number] Start of Care Date:   No linked episodes   Insurance: Payor: MEDICARE / Plan: MEDICARE PART A AND B / Product Type: *No Product type* /   Insurance ID: [id number] - (Medicare) Secondary Insurance (if applicable): Roberto Anderson   Referring Physician: Lola Cranker, MD     PCP: Brii Leyva MD Visits to Date/Visits Approved:     No Show/Cancelled Appts:   0/ 2     Medical Diagnosis: Spinal stenosis [M48.00]    Treatment Diagnosis: Debility, spinal stenosis and abnormal gait        SUBJECTIVE EXAMINATION   Pain Level: Pain Screening  Patient Currently in Pain: Yes  Pain Assessment: 0-10  Pain Level: 3  Patient's Stated Pain Goal: 0 - No pain  Pain Type: Chronic pain  Pain Location: Back    Patient Comments: Subjective: Pt reports 3/10 low dull back pain    HEP Compliance: Good        OBJECTIVE EXAMINATION   Restrictions:  No data recorded No data recorded No data recorded      TREATMENT     Exercises:      Treatment Reasoning    Exercise 12: *LAQs x 15 B LE  Exercise 13: *Seated hip abduction x 15 BTB  Exercise 14: *SLR x 10 BLE  Exercise 15: *Bridging x 10  Exercise 16: Walking forward/backward along railing - touching bar and the hand about bar x 5 reps;  Side stepping x 5 reps (touching bar and then hand above bar)  Exercise 17: Walking in rehab gym 30'x 4 supervision  Supine SLR x 10 BLE   Supine bridging x 10                           Gait: (CPT [id number])  Treatment Reasoning    Gait Training 1: Pt ambulated in/out of rehab gym without AD with supervision with increased step length B LE and increased arm swing B LE for 30'x 4. Pt Education: Additional Comments: CP, HP, KT tape, estim, US       ASSESSMENT     Assessment: Assessment: Pt arrived with reports of 3/10 low back achy pain. Pt started session working on walking alongside bar without holding onto bar whenever possible. Pt was able to take better steps with less hesitation and less apprehension noted. Pt then ambulated for 30'x 4 trial in/out of rehab gym without AD with improvement noted in his step length and also demostrated increase arm swing bilaterally. Pt then performed his seated stretches and strengthening er ex needing some cues for form. Pt then laid down to a supine position and able to do ther ex but had increased low back pain with supine position. Pt has a copy of all of the ther ex. Plan to reviewed all seated, supine and standing ther ex next session and may give a few more next session. Plan to DC in 2 more sessions. Body Structures, Functions, Activity Limitations Requiring Skilled Therapeutic Intervention: Decreased functional mobility , Decreased ROM, Decreased body mechanics, Decreased endurance, Increased pain    Post-Treatment Pain Level:  3/10     No data recorded  Therapy Prognosis: Good       GOALS   Patient Goals : To be able to stand up better  Short Term Goals Completed by 1-2 weeks from date of eval Current Status Goal Status   Pt able to perform his HEP 3-5x per week Pt reports doing exercises daily. Met   Pt reports having 3/10 or less back pain with more erect posture during standing and while walking.  Pt reports going down to 3/10  but consistently at a 4/10 In progress                                                                       Long Term Goals Completed by 4-6 weeks from date of recheck Current Status Goal Status   Pt reports having 2/10 or less back pain with ADLS and when on his feet for >15 min Pt

## 2022-11-16 ENCOUNTER — HOSPITAL ENCOUNTER (OUTPATIENT)
Dept: PHYSICAL THERAPY | Age: 87
Setting detail: THERAPIES SERIES
Discharge: HOME | End: 2022-11-16
Payer: MEDICARE

## 2022-11-16 PROCEDURE — 97110 THERAPEUTIC EXERCISES: CPT

## 2022-11-16 PROCEDURE — 97116 GAIT TRAINING THERAPY: CPT

## 2022-11-16 ASSESSMENT — PAIN DESCRIPTION - PAIN TYPE: TYPE: CHRONIC PAIN

## 2022-11-16 ASSESSMENT — PAIN DESCRIPTION - DESCRIPTORS: DESCRIPTORS: ACHING

## 2022-11-16 ASSESSMENT — PAIN DESCRIPTION - FREQUENCY: FREQUENCY: CONTINUOUS

## 2022-11-16 ASSESSMENT — PAIN DESCRIPTION - ORIENTATION: ORIENTATION: RIGHT

## 2022-11-16 ASSESSMENT — PAIN SCALES - GENERAL: PAINLEVEL_OUTOF10: 2

## 2022-11-16 ASSESSMENT — PAIN DESCRIPTION - LOCATION: LOCATION: BACK

## 2022-11-16 NOTE — PROGRESS NOTES
cues  Exercise 10: Standing hip abduction x 15 BLE needing cues for proper form to keep in line with the black mat (needed less cues)  Exercise 12: *LAQs x 15 B LE  Exercise 13: *Seated hip abduction x 15 BTB  Exercise 14: *SLR x 10 BLE  Exercise 17: Walking without AD 75'x 1 with supervision                          Gait: (CPT [id number])  Treatment Reasoning    Gait Training 1: Pt ambulated in/out of rehab gym for 12'x 2 and then 75'x 1 without AD with supervision with increased step length B LE and a shuffling gait initially. Pt improved with his arm swing and step length once he ambulated further down the hallway with fair dynamic balance. Pt Education: Additional Comments: CP, HP, KT tape, estim, US       ASSESSMENT     Assessment: Assessment: Pt arrives to therapy with reports of 2/10 achy low back pain. Pt reports the he walks in the hallway of his apartment complex daily with his Rollator for approx 1/4 mile total. Pt also perform some form of execises daily as well. PT reviewed the seated, standing and discussed the supine ther ex from pts exercises list. Pt demostrates improved form but still needs cues at time. Pt also walked short distances in the gym without and AD and then at end of session pt ambulated 75'x 1 without an AD with increased step length and arm swing after walking approx 30' x 1. Plan to see pt for his final visit next session and review his home program and now to progress with his exercises and gait wiithout AD for short distances to improve pts confidence. Body Structures, Functions, Activity Limitations Requiring Skilled Therapeutic Intervention: Decreased functional mobility , Decreased ROM, Decreased body mechanics, Decreased endurance, Increased pain    Post-Treatment Pain Level:  2/10     No data recorded  Therapy Prognosis: Good       GOALS   Patient Goals :  To be able to stand up better  Short Term Goals Completed by 1-2 weeks from date of eval Current Status Goal Status   Pt able to perform his HEP 3-5x per week Pt reports doing exercises daily. Met   Pt reports having 3/10 or less back pain with more erect posture during standing and while walking. Pt reports going down to 3/10  but consistently at a 4/10 In progress                                                                       Long Term Goals Completed by 4-6 weeks from date of recheck Current Status Goal Status   Pt reports having 2/10 or less back pain with ADLS and when on his feet for >15 min Pt reports continue to have increased pain in AM; Pt is 3-4/10;   Pt was able to be on his feet for 15-20' at [employer] before needing to rest In progress   Increase pts core and B LE strength to 4+/5 or better so that pt demonstrates good posture when using his AD In progress In progress   Improve pts HS tightness from 30 degrees B LE to 15 degrees of less to decrease pts back pain and improve his posture with rollator R LE= 20 degrees      L LE= 15 degrees In progress, Met   Pt able to perform Sit to Stand test x 5 reps less than 15 seconds with safe form Sit to Stand= 30 seconds In progress   Pt/family indep with HEP Pt is able to tolerate his execises with less discomfort In progress                                                TREATMENT PLAN   Plan Frequency: 1-2  Plan weeks: 4-6 weeks  Current Treatment Recommendations: Strengthening, ROM, Balance training, Functional mobility training, Gait training, Pain management, Home exercise program, Safety education & training, Modalities   Additional Comments: CP, HP, KT tape, estim, US       Therapy Time  Individual Time In:     1:45pm     Individual Time Out:  2:30pm  Minutes:   45 min         Electronically signed by Jakob Curran PT  on 11/16/2022 at 5:29 PM

## 2022-11-18 ENCOUNTER — HOSPITAL ENCOUNTER (OUTPATIENT)
Dept: PHYSICAL THERAPY | Age: 87
Setting detail: THERAPIES SERIES
Discharge: HOME | End: 2022-11-18
Payer: MEDICARE

## 2022-11-18 PROCEDURE — 97530 THERAPEUTIC ACTIVITIES: CPT

## 2022-11-18 ASSESSMENT — PAIN DESCRIPTION - DESCRIPTORS: DESCRIPTORS: DULL

## 2022-11-18 ASSESSMENT — PAIN DESCRIPTION - ORIENTATION: ORIENTATION: RIGHT;LEFT

## 2022-11-18 ASSESSMENT — PAIN SCALES - GENERAL: PAINLEVEL_OUTOF10: 2

## 2022-11-18 NOTE — PROGRESS NOTES
Physical Therapy: Discharge Note   Patient: Jakob Zamora (35 y.o. male)   Examination Date:   Plan of Care/Certification Expiration Date: 22    Progress Note Counter: DC'd to HEP     :  1928 # of Visits since [facility]:   Visit count could not be calculated. Make sure you are using a visit which is associated with an episode.    MRN: [id number]  CSN: [id number] Start of Care Date:   No linked episodes   Insurance: Payor: MEDICARE / Plan: MEDICARE PART A AND B / Product Type: *No Product type* /   Insurance ID: [id number] - (Medicare) Secondary Insurance (if applicable): John Cabrera   Referring Physician: Malu Moreira MD     PCP: Telma Pryor MD Visits to Date/Visits Approved:     No Show/Cancelled Appts:   / 2     Medical Diagnosis: Spinal stenosis [M48.00]    Treatment Diagnosis: Debility, spinal stenosis and abnormal gait        SUBJECTIVE EXAMINATION   Pain Level: Pain Screening  Patient Currently in Pain: Yes  Pain Assessment: 0-10  Pain Level: 2  Patient's Stated Pain Goal: 0 - No pain  Pain Orientation: Right, Left  Pain Descriptors: Dull    Patient Comments: Subjective: Pt reports 2 /10 low dull back pain    HEP Compliance: Good        OBJECTIVE EXAMINATION   Restrictions:  No data recorded No data recorded No data recorded        Ambulation/Gait (if applicable):        Left Strength  Right Strength         Strength LLE  L Hip Flexion: 4+/5  L Hip Extension: 4+/5  L Hip ABduction: 4+/5  L Hip ADduction: 4+/5  L Knee Flexion: 4+/5  L Knee Extension: 4+/5  L Ankle Dorsiflexion: 4+/5  L Ankle Plantar Flexion: 4+/5    Strength RLE  R Hip Flexion: 4+/5  R Hip Extension: 4+/5  R Hip ABduction: 4+/5  R Hip ADduction: 4+/5  R Knee Flexion: 4+/5  R Knee Extension: 4+/5  R Ankle Dorsiflexion: 4+/5  R Ankle Plantar flexion: 4+/5       TREATMENT     Exercises:      Treatment Reasoning    Exercise 1: *Seated LB stretch x 3 H30  Exercise 2: *Seated HS stretch x 3 H30  Exercise 14: *SLR x 10 BLE  Exercise 15: *Bridging x 10                          Gait: (CPT [id number])  Treatment Reasoning    Gait Training 1: Pt ambulated in/out of rehab gym for 70'x 1 without AD with supervision with increased step length B LE and a shuffling gait initially. Pt improved with his arm swing and step length once he ambulated further down the hallway with fair dynamic balance. Pt Education: Additional Comments: CP, HP, KT tape, estim, US       ASSESSMENT     Assessment: Assessment: Pt arrives to therapy with 2/10 for his low back pain. PT completed discharge measures and has improved in all areas. Pt consistently performed ambulation and stretches and strengthening at home. Pts son Lillian Gardner helped to make sure pt does his execises with good form. PT, pt and son reviewed pts HEP and discussed how pt can progress with his home program and to stay active in order to stay healthy and mobile. Pt is now DC'd from PT to HEP. Body Structures, Functions, Activity Limitations Requiring Skilled Therapeutic Intervention: Decreased functional mobility , Decreased ROM, Decreased body mechanics, Decreased endurance, Increased pain    Post-Treatment Pain Level:  2/10     No data recorded  Therapy Prognosis: Good       GOALS   Patient Goals : To be able to stand up better  Short Term Goals Completed by 1-2 weeks from date of eval Current Status Goal Status   Pt able to perform his HEP 3-5x per week Pt reports doing exercises daily. Met   Pt reports having 3/10 or less back pain with more erect posture during standing and while walking. Pt reports going down to 2/10 Met                                                                       Long Term Goals Completed by 4-6 weeks from date of recheck Current Status Goal Status   Pt reports having 2/10 or less back pain with ADLS and when on his feet for >15 min Pt reports 2/10;   Pt was able to be on his feet for 15-20' walking in the hallway - reports fatigue but no increased pain. Met   Increase pts core and B LE strength to 4+/5 or better so that pt demonstrates good posture when using his AD In progress In progress   Improve pts HS tightness from 30 degrees B LE to 15 degrees of less to decrease pts back pain and improve his posture with rollator R LE= 14 degrees      L LE= 15 degrees Met   Pt able to perform Sit to Stand test x 5 reps less than 15 seconds with safe form Sit to Stand= 21.13 seconds In progress   Pt/family indep with HEP Pt is able to tolerate his execises and able to do on a regular basis Met                                                TREATMENT PLAN   Plan Frequency: 1-2  Plan weeks: 4-6 weeks  Current Treatment Recommendations: Strengthening, ROM, Balance training, Functional mobility training, Gait training, Pain management, Home exercise program, Safety education & training, Modalities   Additional Comments: CP, HP, KT tape, estim, US       Therapy Time  Individual Time In:     11:15am     Individual Time Out:  12:05pm   Minutes:   50 min         Electronically signed by Flor Rodriguez PT  on 11/18/2022 at 1:37 PM

## 2023-06-16 ENCOUNTER — APPOINTMENT (OUTPATIENT)
Dept: CT IMAGING | Age: 88
End: 2023-06-16
Payer: MEDICARE

## 2023-06-16 ENCOUNTER — HOSPITAL ENCOUNTER (EMERGENCY)
Age: 88
Discharge: HOME | End: 2023-06-16
Payer: MEDICARE

## 2023-06-16 VITALS
DIASTOLIC BLOOD PRESSURE: 92 MMHG | TEMPERATURE: 98.1 F | OXYGEN SATURATION: 97 % | RESPIRATION RATE: 16 BRPM | SYSTOLIC BLOOD PRESSURE: 167 MMHG | HEART RATE: 88 BPM

## 2023-06-16 DIAGNOSIS — S09.90XA INJURY OF HEAD, INITIAL ENCOUNTER: Primary | ICD-10-CM

## 2023-06-16 DIAGNOSIS — S01.01XA LACERATION OF SCALP, INITIAL ENCOUNTER: ICD-10-CM

## 2023-06-16 PROCEDURE — 6370000000 HC RX 637 (ALT 250 FOR IP): Performed by: NURSE PRACTITIONER

## 2023-06-16 PROCEDURE — 70450 CT HEAD/BRAIN W/O DYE: CPT

## 2023-06-16 PROCEDURE — 72125 CT NECK SPINE W/O DYE: CPT

## 2023-06-16 PROCEDURE — 99284 EMERGENCY DEPT VISIT MOD MDM: CPT

## 2023-06-16 RX ORDER — ACETAMINOPHEN 500 MG
1000 TABLET ORAL ONCE
Status: COMPLETED | OUTPATIENT
Start: 2023-06-16 | End: 2023-06-16

## 2023-06-16 RX ADMIN — ACETAMINOPHEN 1000 MG: 500 TABLET, FILM COATED ORAL at 19:17

## 2023-06-16 ASSESSMENT — ENCOUNTER SYMPTOMS
SORE THROAT: 0
ALLERGIC/IMMUNOLOGIC NEGATIVE: 1
BACK PAIN: 0
BLOOD IN STOOL: 0
EYE PAIN: 0
EYE REDNESS: 0
PHOTOPHOBIA: 0
CONSTIPATION: 0
WHEEZING: 0
RHINORRHEA: 0
CHOKING: 0
APNEA: 0
TROUBLE SWALLOWING: 0
COUGH: 0
CHEST TIGHTNESS: 0
FACIAL SWELLING: 0
ABDOMINAL DISTENTION: 0
NAUSEA: 0
EYE DISCHARGE: 0
ABDOMINAL PAIN: 0
DIARRHEA: 0
SHORTNESS OF BREATH: 0
SINUS PAIN: 0
VOMITING: 0
VOICE CHANGE: 0
EYE ITCHING: 0
COLOR CHANGE: 0
STRIDOR: 0
SINUS PRESSURE: 0

## 2023-06-16 ASSESSMENT — PAIN DESCRIPTION - PAIN TYPE: TYPE: ACUTE PAIN

## 2023-06-16 ASSESSMENT — PAIN SCALES - GENERAL
PAINLEVEL_OUTOF10: 8
PAINLEVEL_OUTOF10: 0
PAINLEVEL_OUTOF10: 2

## 2023-06-16 ASSESSMENT — PAIN DESCRIPTION - LOCATION
LOCATION: HEAD
LOCATION: HEAD

## 2023-06-16 ASSESSMENT — PAIN DESCRIPTION - FREQUENCY: FREQUENCY: CONTINUOUS

## 2023-06-16 ASSESSMENT — PAIN - FUNCTIONAL ASSESSMENT
PAIN_FUNCTIONAL_ASSESSMENT: 0-10
PAIN_FUNCTIONAL_ASSESSMENT: 0-10

## 2023-06-16 ASSESSMENT — PAIN DESCRIPTION - ONSET: ONSET: ON-GOING

## 2023-06-16 ASSESSMENT — PAIN DESCRIPTION - DESCRIPTORS: DESCRIPTORS: ACHING

## 2023-06-16 NOTE — ED NOTES
Cleaned wound  with betadine and saline  applied steri strips  and dressing      [employer]  06/16/23 0991

## 2023-06-16 NOTE — ED TRIAGE NOTES
Pt a/ox2  normal for patient  per family    pyulse strong and regular  approx 7cm lac noted across  back of head bleeding  controlled   wound  is well approximated

## 2023-06-17 NOTE — ED PROVIDER NOTES
respiratory distress. Breath sounds: Normal breath sounds. Abdominal:      General: Bowel sounds are normal.      Palpations: Abdomen is soft. Tenderness: There is no abdominal tenderness. There is no right CVA tenderness, left CVA tenderness, guarding or rebound. Musculoskeletal:         General: Normal range of motion. Cervical back: Normal range of motion and neck supple. No tenderness. Skin:     General: Skin is warm and dry. Capillary Refill: Capillary refill takes less than 2 seconds. Findings: No rash. Neurological:      General: No focal deficit present. Mental Status: He is alert and oriented to person, place, and time. GCS: GCS eye subscore is 4. GCS verbal subscore is 5. GCS motor subscore is 6. Cranial Nerves: Cranial nerves 2-12 are intact. No cranial nerve deficit, dysarthria or facial asymmetry. Sensory: Sensation is intact. No sensory deficit. Motor: Motor function is intact. No weakness or seizure activity. Coordination: Coordination is intact. Gait: Gait is intact. Gait normal.   Psychiatric:         Mood and Affect: Mood normal.         Behavior: Behavior normal.         Thought Content: Thought content normal.         Judgment: Judgment normal.       DIAGNOSTIC RESULTS     EKG: All EKG's are interpreted by the Emergency Department Physician who either signs or Co-signs this chart in the absence of a cardiologist.        RADIOLOGY:   Non-plain film images such as CT, Ultrasound and MRI are read by the radiologist. Plain radiographic images are visualized and preliminarily interpreted by the emergency physician with the below findings:        Interpretation per the Radiologist below, if available at the time of this note:    CT HEAD WO CONTRAST   Final Result   No acute intracranial abnormality. RECOMMENDATIONS:   Unavailable         CT CERVICAL SPINE WO CONTRAST   Final Result   No acute abnormality of the cervical spine.

## 2023-09-21 ENCOUNTER — HOSPITAL ENCOUNTER (OUTPATIENT)
Dept: LAB | Age: 88
Discharge: HOME | End: 2023-09-21
Payer: MEDICARE

## 2023-09-21 LAB
ALBUMIN SERPL-MCNC: 4.2 G/DL (ref 3.5–4.6)
ALP SERPL-CCNC: 71 U/L (ref 35–104)
ALT SERPL-CCNC: 8 U/L (ref 0–41)
ANION GAP SERPL CALCULATED.3IONS-SCNC: 10 MEQ/L (ref 9–15)
AST SERPL-CCNC: 17 U/L (ref 0–40)
BILIRUB SERPL-MCNC: 1 MG/DL (ref 0.2–0.7)
BUN SERPL-MCNC: 25 MG/DL (ref 8–23)
CALCIUM SERPL-MCNC: 9.5 MG/DL (ref 8.5–9.9)
CHLORIDE SERPL-SCNC: 105 MEQ/L (ref 95–107)
CO2 SERPL-SCNC: 26 MEQ/L (ref 20–31)
CREAT SERPL-MCNC: 1.13 MG/DL (ref 0.7–1.2)
GLOBULIN SER CALC-MCNC: 2.5 G/DL (ref 2.3–3.5)
GLUCOSE SERPL-MCNC: 88 MG/DL (ref 70–99)
POTASSIUM SERPL-SCNC: 4.2 MEQ/L (ref 3.4–4.9)
PROT SERPL-MCNC: 6.7 G/DL (ref 6.3–8)
SODIUM SERPL-SCNC: 141 MEQ/L (ref 135–144)

## 2023-09-21 PROCEDURE — 36415 COLL VENOUS BLD VENIPUNCTURE: CPT

## 2023-09-21 PROCEDURE — 80053 COMPREHEN METABOLIC PANEL: CPT

## 2023-10-02 ENCOUNTER — APPOINTMENT (OUTPATIENT)
Dept: PHYSICAL THERAPY | Facility: CLINIC | Age: 88
End: 2023-10-02
Payer: MEDICARE

## 2023-10-04 ENCOUNTER — TREATMENT (OUTPATIENT)
Dept: PHYSICAL THERAPY | Facility: CLINIC | Age: 88
End: 2023-10-04
Payer: MEDICARE

## 2023-10-04 DIAGNOSIS — G89.29 CHRONIC MIDLINE LOW BACK PAIN WITHOUT SCIATICA: Primary | ICD-10-CM

## 2023-10-04 DIAGNOSIS — M54.50 CHRONIC MIDLINE LOW BACK PAIN WITHOUT SCIATICA: Primary | ICD-10-CM

## 2023-10-04 DIAGNOSIS — R26.2 DIFFICULTY WALKING: ICD-10-CM

## 2023-10-04 DIAGNOSIS — M62.81 MUSCLE WEAKNESS (GENERALIZED): ICD-10-CM

## 2023-10-04 PROCEDURE — 97113 AQUATIC THERAPY/EXERCISES: CPT | Mod: CQ,GP

## 2023-10-04 NOTE — PROGRESS NOTES
Physical Therapy    Physical Therapy Treatment    Patient Name: Thuan Cordero  MRN: [id number]  Today's Date: 10/4/2023  Time Calculation  Start Time: 0135  Stop Time: 0205  Time Calculation (min): 30 min      Assessment:  Pt able to complete session without any rest periods . Improved confidence in aquatic setting .    Plan:  Continue 1 x week in aquatic setting . Increasing independency with program.    Current Problem  1. Chronic midline low back pain without sciatica        2. Muscle weakness (generalized)  Follow Up In Physical Therapy      3. Difficulty walking            Subjective   States that he had increased unsteadiness on 10/2/23 . Lost balance due to B LE buckling at home . Required assist to stand .  Denies any injuries .  Precautions   Fall Risk   Pain  Denies today     Objective   Step to gait pattern . Mod 1 -2 UE support on Rail.               Outcome Measures:  N/T     Treatments:  Aquatic gait forward /Lateral 2 laps each   March 1 Minute   HS/GS Stretches NV   Hip 3 way 2 x10   Squats x10  Noodle Pull down    DN teal + yellow scaption /flexion x10 each  Wall P/U at ladder x20

## 2023-10-08 PROBLEM — H52.203 ASTIGMATISM WITH PRESBYOPIA, BILATERAL: Status: ACTIVE | Noted: 2018-09-20

## 2023-10-08 PROBLEM — N40.1 URINARY RETENTION DUE TO BENIGN PROSTATIC HYPERPLASIA: Status: ACTIVE | Noted: 2021-02-23

## 2023-10-08 PROBLEM — R55 SYNCOPE AND COLLAPSE: Status: ACTIVE | Noted: 2021-03-18

## 2023-10-08 PROBLEM — M54.16 RIGHT LUMBAR RADICULOPATHY: Status: ACTIVE | Noted: 2017-04-07

## 2023-10-08 PROBLEM — M19.012 PRIMARY OSTEOARTHRITIS, LEFT SHOULDER: Status: ACTIVE | Noted: 2023-10-08

## 2023-10-08 PROBLEM — R41.82 AMS (ALTERED MENTAL STATUS): Status: ACTIVE | Noted: 2021-02-20

## 2023-10-08 PROBLEM — Z96.1 PSEUDOPHAKIA OF BOTH EYES: Status: ACTIVE | Noted: 2018-09-20

## 2023-10-08 PROBLEM — R40.0 DAYTIME SOMNOLENCE: Status: ACTIVE | Noted: 2023-06-19

## 2023-10-08 PROBLEM — I87.2 VENOUS INSUFFICIENCY: Status: ACTIVE | Noted: 2023-06-19

## 2023-10-08 PROBLEM — K63.9 BOWEL TROUBLE: Status: ACTIVE | Noted: 2023-10-08

## 2023-10-08 PROBLEM — H52.4 ASTIGMATISM WITH PRESBYOPIA, BILATERAL: Status: ACTIVE | Noted: 2018-09-20

## 2023-10-08 PROBLEM — H04.123 DRY EYE SYNDROME, BILATERAL: Status: ACTIVE | Noted: 2021-06-08

## 2023-10-08 PROBLEM — S09.90XA CLOSED HEAD INJURY: Status: ACTIVE | Noted: 2021-06-08

## 2023-10-08 PROBLEM — J45.909 ASTHMA IN ADULT (HHS-HCC): Status: ACTIVE | Noted: 2023-06-19

## 2023-10-08 PROBLEM — G47.30 MILD SLEEP APNEA: Status: ACTIVE | Noted: 2023-06-19

## 2023-10-08 PROBLEM — H35.3211 EXUDATIVE AGE-RELATED MACULAR DEGENERATION OF RIGHT EYE WITH ACTIVE CHOROIDAL NEOVASCULARIZATION (MULTI): Status: ACTIVE | Noted: 2017-10-30

## 2023-10-08 PROBLEM — G31.84 MILD COGNITIVE IMPAIRMENT: Status: ACTIVE | Noted: 2021-06-08

## 2023-10-08 PROBLEM — D72.829 LEUKOCYTOSIS: Status: ACTIVE | Noted: 2021-02-20

## 2023-10-08 PROBLEM — R31.0 GROSS HEMATURIA: Status: ACTIVE | Noted: 2021-06-08

## 2023-10-08 PROBLEM — N18.9 CKD (CHRONIC KIDNEY DISEASE): Status: ACTIVE | Noted: 2021-02-20

## 2023-10-08 PROBLEM — N40.1 BENIGN LOCALIZED PROSTATIC HYPERPLASIA WITH LOWER URINARY TRACT SYMPTOMS (LUTS): Status: ACTIVE | Noted: 2023-06-19

## 2023-10-08 PROBLEM — F03.A0 MILD DEMENTIA (MULTI): Status: ACTIVE | Noted: 2023-06-19

## 2023-10-08 PROBLEM — R47.01 APHASIA: Status: ACTIVE | Noted: 2021-06-08

## 2023-10-08 PROBLEM — R33.8 URINARY RETENTION DUE TO BENIGN PROSTATIC HYPERPLASIA: Status: ACTIVE | Noted: 2021-02-23

## 2023-10-08 PROBLEM — R26.81 UNSTABLE GAIT: Status: ACTIVE | Noted: 2023-06-19

## 2023-10-08 PROBLEM — M48.061 SPINAL STENOSIS OF LUMBAR REGION: Status: ACTIVE | Noted: 2017-04-07

## 2023-10-08 PROBLEM — R26.89 BALANCE PROBLEMS: Status: ACTIVE | Noted: 2023-10-08

## 2023-10-08 PROBLEM — G93.41 METABOLIC ENCEPHALOPATHY: Status: ACTIVE | Noted: 2021-02-23

## 2023-10-08 PROBLEM — N32.9 BLADDER TROUBLES: Status: ACTIVE | Noted: 2023-10-08

## 2023-10-08 RX ORDER — ACETAMINOPHEN 500 MG
TABLET ORAL
COMMUNITY

## 2023-10-08 RX ORDER — ESCITALOPRAM OXALATE 5 MG/1
TABLET ORAL
COMMUNITY
Start: 2023-03-21

## 2023-10-08 RX ORDER — GABAPENTIN 300 MG/1
CAPSULE ORAL
COMMUNITY
Start: 2019-10-29

## 2023-10-08 RX ORDER — CHOLECALCIFEROL (VITAMIN D3) 25 MCG
TABLET ORAL
COMMUNITY

## 2023-10-08 RX ORDER — ALBUTEROL SULFATE 90 UG/1
AEROSOL, METERED RESPIRATORY (INHALATION)
COMMUNITY
Start: 2023-03-21

## 2023-10-08 RX ORDER — LOSARTAN POTASSIUM 50 MG/1
TABLET ORAL
COMMUNITY
Start: 2023-05-06

## 2023-10-08 RX ORDER — MIRTAZAPINE 15 MG/1
TABLET, ORALLY DISINTEGRATING ORAL
COMMUNITY
Start: 2021-03-04

## 2023-10-08 RX ORDER — DUTASTERIDE 0.5 MG/1
CAPSULE, LIQUID FILLED ORAL
COMMUNITY
Start: 2020-08-24

## 2023-10-08 RX ORDER — ATENOLOL 25 MG/1
TABLET ORAL
COMMUNITY
Start: 2019-10-29

## 2023-10-08 RX ORDER — CYCLOBENZAPRINE HCL 10 MG
TABLET ORAL
COMMUNITY

## 2023-10-08 RX ORDER — GABAPENTIN 600 MG/1
TABLET ORAL
COMMUNITY

## 2023-10-08 RX ORDER — CARBOXYMETHYLCELLULOSE SODIUM 5 MG/ML
SOLUTION/ DROPS OPHTHALMIC
COMMUNITY

## 2023-10-08 RX ORDER — NAPROXEN SODIUM 220 MG/1
TABLET ORAL
COMMUNITY

## 2023-10-08 RX ORDER — FINASTERIDE 5 MG/1
TABLET, FILM COATED ORAL
COMMUNITY
Start: 2021-02-24

## 2023-10-08 RX ORDER — METOPROLOL TARTRATE 25 MG/1
TABLET, FILM COATED ORAL
COMMUNITY
Start: 2021-03-04

## 2023-10-08 RX ORDER — TAMSULOSIN HYDROCHLORIDE 0.4 MG/1
CAPSULE ORAL
COMMUNITY
Start: 2021-03-09

## 2023-10-08 RX ORDER — SOLIFENACIN SUCCINATE 5 MG/1
TABLET, FILM COATED ORAL
COMMUNITY
Start: 2020-08-24

## 2023-10-08 RX ORDER — LOSARTAN POTASSIUM AND HYDROCHLOROTHIAZIDE 12.5; 5 MG/1; MG/1
TABLET ORAL
COMMUNITY
Start: 2019-10-29

## 2023-10-08 RX ORDER — GABAPENTIN 100 MG/1
CAPSULE ORAL
COMMUNITY

## 2023-10-08 RX ORDER — DOXAZOSIN 4 MG/1
TABLET ORAL
COMMUNITY
Start: 2019-10-29

## 2023-10-08 RX ORDER — DEXTROMETHORPHAN HYDROBROMIDE, GUAIFENESIN 5; 100 MG/5ML; MG/5ML
LIQUID ORAL
COMMUNITY

## 2023-10-08 RX ORDER — SODIUM FLUORIDE/POTASSIUM NIT 1.1 %-5 %
PASTE (ML) DENTAL
COMMUNITY
Start: 2023-09-06

## 2023-10-09 ENCOUNTER — APPOINTMENT (OUTPATIENT)
Dept: PHYSICAL THERAPY | Facility: CLINIC | Age: 88
End: 2023-10-09
Payer: MEDICARE

## 2023-10-16 ENCOUNTER — TREATMENT (OUTPATIENT)
Dept: PHYSICAL THERAPY | Facility: CLINIC | Age: 88
End: 2023-10-16
Payer: MEDICARE

## 2023-10-16 DIAGNOSIS — M62.81 MUSCLE WEAKNESS (GENERALIZED): Primary | ICD-10-CM

## 2023-10-16 DIAGNOSIS — R26.2 DIFFICULTY WALKING: ICD-10-CM

## 2023-10-16 DIAGNOSIS — M54.50 CHRONIC MIDLINE LOW BACK PAIN WITHOUT SCIATICA: ICD-10-CM

## 2023-10-16 DIAGNOSIS — G89.29 CHRONIC MIDLINE LOW BACK PAIN WITHOUT SCIATICA: ICD-10-CM

## 2023-10-16 PROCEDURE — 97113 AQUATIC THERAPY/EXERCISES: CPT | Mod: GP,CQ

## 2023-10-16 ASSESSMENT — PAIN - FUNCTIONAL ASSESSMENT: PAIN_FUNCTIONAL_ASSESSMENT: 0-10

## 2023-10-16 ASSESSMENT — PAIN SCALES - GENERAL: PAINLEVEL_OUTOF10: 6

## 2023-10-16 NOTE — PROGRESS NOTES
Physical Therapy    Physical Therapy Treatment    Patient Name: Thuan Cordero  MRN: [id number]  Today's Date: 10/17/2023  Time Calculation  Start Time: 0435  Stop Time: 0500  Time Calculation (min): 25 min  Insurance reviewed   Visit number: 21of 23   Approved number of visits: bmn   Authorization not required after evaluation   POC extended 4 more visits to solidify aquatic HEP program  0% coins, no ded/copay, bmn, no PA  Medicare - 2 visits previously used in    Medicare Certification Period: Beginnin2023 Endin2023  Beginnin2023 Ending: 10/ 26/ 2023         Assessment:  Pt and and caregiver demo increased knowledge of aquatic exercises . Making gains toward independency with aquatic program.    Plan:  2 more aquatic sessions then DC to independent program.  Current Problem  1. Muscle weakness (generalized)        2. Chronic midline low back pain without sciatica        3. Difficulty walking            Subjective   Reports R LB discomfort.   Precautions  No recent falls .    Pain  Pain Assessment: 0-10  Pain Score: 6    Objective   Aquatic performance independently recalled 30% from patient .Caregiver recalled approximately 30%      Treatments:  AQUATIC THERAPEUTIC EXERCISE 20392 25 minutes   Aquatic gait forward /Lateral 2 laps each   March 1 Minute   HSCx20   HS/GS Stretches NV   Hip 3 way 2 x10   Squats x20  Noodle Pull down    DN teal + yellow scaption /flexion x10 each NV   Wall P/U at ladder x20

## 2023-10-23 ENCOUNTER — TREATMENT (OUTPATIENT)
Dept: PHYSICAL THERAPY | Facility: CLINIC | Age: 88
End: 2023-10-23
Payer: MEDICARE

## 2023-10-23 DIAGNOSIS — G89.29 CHRONIC MIDLINE LOW BACK PAIN WITHOUT SCIATICA: ICD-10-CM

## 2023-10-23 DIAGNOSIS — M54.50 CHRONIC MIDLINE LOW BACK PAIN WITHOUT SCIATICA: ICD-10-CM

## 2023-10-23 DIAGNOSIS — R26.2 DIFFICULTY WALKING: ICD-10-CM

## 2023-10-23 DIAGNOSIS — M62.81 MUSCLE WEAKNESS (GENERALIZED): Primary | ICD-10-CM

## 2023-10-23 PROCEDURE — 97113 AQUATIC THERAPY/EXERCISES: CPT | Mod: GP,CQ

## 2023-10-23 ASSESSMENT — PAIN - FUNCTIONAL ASSESSMENT: PAIN_FUNCTIONAL_ASSESSMENT: 0-10

## 2023-10-23 ASSESSMENT — PAIN SCALES - GENERAL: PAINLEVEL_OUTOF10: 2

## 2023-10-23 NOTE — PROGRESS NOTES
"Patient Name: Thuan Cordero  MRN: [id number]  Today's Date: 10/24/2023       Subjective:  States that he has less LBP today. \" Feel goof today  Objective:  Aquatic performance independently recalled 80% of current program with aide from son     Assessment:   Pt able to complete program with assist from son for exercise recall. Good follow though on proper form . Modified UE exercises due to pt reports increased pain with attempts of regularly performed aquatic UE exercises even with muse of water resistance only.  Added bicep and tricep press at neutral with no aggravation.     Plan:   1 more aquatic session then pt to perfrom aquatic exercises on own with help of son .  Treatment :     AQUATIC THERAPEUTIC EXERCISE 88121 30 minutes   Aquatic gait forward /Lateral 2 laps each   March 1 Minute   HSC 2 x10   HS/GS Stretches NV   Hip 3 way 2 x10   Row noodle x10  Squats x20  Bicep/tricep x10 water resistance only   Wall P/U at ladder x20   Attempted shld flex/scaption increased pain    Current Problem:  1. Muscle weakness (generalized)        2. Chronic midline low back pain without sciatica        3. Difficulty walking                Pain:  Pain Assessment: 0-10  Pain Score: 2   Location:  L Shld     Precautions:   Precautions  Precautions Comment: No recent falls     "

## 2023-10-30 ENCOUNTER — TREATMENT (OUTPATIENT)
Dept: PHYSICAL THERAPY | Facility: CLINIC | Age: 88
End: 2023-10-30
Payer: MEDICARE

## 2023-10-30 DIAGNOSIS — M54.50 CHRONIC MIDLINE LOW BACK PAIN WITHOUT SCIATICA: ICD-10-CM

## 2023-10-30 DIAGNOSIS — M62.81 MUSCLE WEAKNESS (GENERALIZED): Primary | ICD-10-CM

## 2023-10-30 DIAGNOSIS — G89.29 CHRONIC MIDLINE LOW BACK PAIN WITHOUT SCIATICA: ICD-10-CM

## 2023-10-30 DIAGNOSIS — R26.2 DIFFICULTY WALKING: ICD-10-CM

## 2023-10-30 PROCEDURE — 97113 AQUATIC THERAPY/EXERCISES: CPT | Mod: GP,CQ

## 2023-10-30 ASSESSMENT — PAIN SCALES - GENERAL: PAINLEVEL_OUTOF10: 2

## 2023-10-30 ASSESSMENT — PAIN - FUNCTIONAL ASSESSMENT: PAIN_FUNCTIONAL_ASSESSMENT: 0-10

## 2023-10-30 NOTE — PROGRESS NOTES
Patient Name: Thuan Cordero  MRN: [id number]  Today's Date: 10/30/2023  Time Calculation  Start Time: 0435  Stop Time: 0500  Time Calculation (min): 25 min    Subjective:  Son alfonso and pt report that they plan on continuing aquatics through physician referral program through fitness center.    Objective:  Issued Aquatic HEP included  Heel and toe raises   Aquatic gait fwd/lat  March at ladder  Hip 3 way   Squats  Wall push up   Bicep   Tricep  Assessment:   Pt able to complete the program with assistance from son on exercise recall and to ensure proper technique.  Son daleos good awareness on proper exercises to instruct patient. Patient able to recall approximately 30% of exercises without assistance.    Plan:   Discharge to independent Aquatic Program    Treatment :    AQUATIC THERAPEUTIC EXERCISE 99460 30 minutes 2 units   Aquatic gait forward /Lateral 2 laps each   March 1 Minute   HSC 2 x10   HS/GS Stretches NV   Hip 3 way 2 x10   Row noodle x10  Squats x20  Bicep/tricep x10 water resistance only   Wall P/U at ladder x20   Attempted shld flex/scaption increased p     Current Problem:  1. Muscle weakness (generalized)        2. Chronic midline low back pain without sciatica        3. Difficulty walking             Pain:  Pain Assessment: 0-10  Pain Score: 2  Pain Location:  (LB)     Precautions:   Precautions  Precautions Comment: No recent falls

## 2023-11-01 ENCOUNTER — DOCUMENTATION (OUTPATIENT)
Dept: PHYSICAL THERAPY | Facility: CLINIC | Age: 88
End: 2023-11-01
Payer: MEDICARE

## 2023-11-01 NOTE — PROGRESS NOTES
Physical Therapy    Discharge Summary    Name: Thuan Cordero  MRN: [id number]  : 1928  Date: 23    Discharge Summary: PT    Discharge Information: Date of discharge 23, Date of last visit 10.30.23, Date of evaluation 23, Number of attended visits 23, Referred by Radha Reece, and Referred for muscle weakness/ balance deficitsfor aquatics     Therapy Summary: Patient seen for aquatic therapy to improve strength and balance and decrease risk for falls.  Son accompanied patient to treatment and both are indep with program and have joined the [facility] to cont indep     Discharge Status:   Goals: Goals set and discussed today.     Goals: To be achieved in 20 visits STATUS AS OF 23    Patient will verbalize a decrease in pain low back and L shoulder to improve his ability to perform his ADL,s at home with less pain- ACHIEVED - PATIENT REPORTS A DECREASE IN PAIN IN L SHOULDER AND IMPROVED MOBILITY. STATES HE IS ABLE TO NANO AND DOFF HIS SHIRT INDEP AND DRESS HIMSELF    Patient will improve ROM, flexibility and joint mobility to allow patient to static stand with upright posture with minimal deviations. ACHIEVED WFL AROM TRUNK, HIPS, SHOULDERS    Patient will increase strength of B LE /core to allow the patient to stand without AD x 30 seconds to improve standing ADL ACHIEVED DLS WITHOUT AD 60 SEC    Patient to improve balance to static standing without UE support x 30 seconds to allow patient to brush teeth without LOB or instability ACHIEVED ABLE TO MAINTAIN DLS WITHOUT UE ASSIST X 60 SEC    Patient will improve FAUSTINO outcome measure score to < 20% to demonstrate an overall improvement in function and tolerate an improvement in activity tolerance to 30 min prior to needing a recovery period. UNSURE IF INITIAL FORM WAS COMPLETED CORRECTLY - SCORE TODAY WAS 66%    Patient will correctly perform HEP without cues to maintain progress and overall functional status - in order for patient/son to  be able to continue with aquatic ex indep.PARTIALLY ACHIEVED -4 VISITS ADDED TO POC TO TRANSITION PATIENT AND SON TO AN INDEP AQUATIC EX PROGRAM    Patient will be independent with strategies designed to manage symptoms ACHIEVED - SYMPTOMS ARE MANAGED WITH HIS AQUATIC EX PROGRAM       Frequency and duration: 1 time(s) a week, for 4 visits.   Potential to achieve rehab goals is good       Rehab Discharge Reason: Achieved all and/or the most significant goals(s)

## 2024-01-11 DIAGNOSIS — M19.012 PRIMARY OSTEOARTHRITIS, LEFT SHOULDER: ICD-10-CM

## 2024-02-09 ENCOUNTER — HOSPITAL ENCOUNTER (OUTPATIENT)
Dept: RADIOLOGY | Facility: HOSPITAL | Age: 89
Discharge: HOME | End: 2024-02-09
Payer: MEDICARE

## 2024-02-09 DIAGNOSIS — M19.012 PRIMARY OSTEOARTHRITIS, LEFT SHOULDER: ICD-10-CM

## 2024-02-09 PROCEDURE — 20610 DRAIN/INJ JOINT/BURSA W/O US: CPT | Mod: LEFT SIDE | Performed by: RADIOLOGY

## 2024-02-09 PROCEDURE — 2550000001 HC RX 255 CONTRASTS: Performed by: ORTHOPAEDIC SURGERY

## 2024-02-09 PROCEDURE — 77002 NEEDLE LOCALIZATION BY XRAY: CPT | Mod: LEFT SIDE | Performed by: RADIOLOGY

## 2024-02-09 PROCEDURE — 2500000005 HC RX 250 GENERAL PHARMACY W/O HCPCS: Performed by: ORTHOPAEDIC SURGERY

## 2024-02-09 PROCEDURE — 77002 NEEDLE LOCALIZATION BY XRAY: CPT | Mod: LT

## 2024-02-09 PROCEDURE — 2500000004 HC RX 250 GENERAL PHARMACY W/ HCPCS (ALT 636 FOR OP/ED): Performed by: ORTHOPAEDIC SURGERY

## 2024-02-09 RX ORDER — BUPIVACAINE HYDROCHLORIDE 5 MG/ML
INJECTION, SOLUTION PERINEURAL AS NEEDED
Status: COMPLETED | OUTPATIENT
Start: 2024-02-09 | End: 2024-02-09

## 2024-02-09 RX ORDER — LIDOCAINE HYDROCHLORIDE 20 MG/ML
INJECTION, SOLUTION EPIDURAL; INFILTRATION; INTRACAUDAL; PERINEURAL AS NEEDED
Status: COMPLETED | OUTPATIENT
Start: 2024-02-09 | End: 2024-02-09

## 2024-02-09 RX ORDER — METHYLPREDNISOLONE ACETATE 40 MG/ML
INJECTION, SUSPENSION INTRA-ARTICULAR; INTRALESIONAL; INTRAMUSCULAR; SOFT TISSUE AS NEEDED
Status: COMPLETED | OUTPATIENT
Start: 2024-02-09 | End: 2024-02-09

## 2024-02-09 RX ADMIN — IOHEXOL 3 ML: 300 INJECTION, SOLUTION INTRAVENOUS at 14:29

## 2024-02-09 RX ADMIN — METHYLPREDNISOLONE ACETATE 40 MG: 40 INJECTION, SUSPENSION INTRA-ARTICULAR; INTRALESIONAL; INTRAMUSCULAR; INTRASYNOVIAL; SOFT TISSUE at 14:17

## 2024-02-09 RX ADMIN — BUPIVACAINE HYDROCHLORIDE 3 ML: 5 INJECTION, SOLUTION PERINEURAL at 14:28

## 2024-02-09 RX ADMIN — LIDOCAINE HYDROCHLORIDE 10 ML: 20 INJECTION, SOLUTION EPIDURAL; INFILTRATION; INTRACAUDAL; PERINEURAL at 14:16

## 2024-07-10 ENCOUNTER — APPOINTMENT (OUTPATIENT)
Dept: ORTHOPEDIC SURGERY | Facility: CLINIC | Age: 89
End: 2024-07-10
Payer: MEDICARE

## 2024-07-10 VITALS — BODY MASS INDEX: 27.89 KG/M2 | WEIGHT: 184 LBS | HEIGHT: 68 IN

## 2024-07-10 DIAGNOSIS — M19.019 GLENOHUMERAL ARTHRITIS: Primary | ICD-10-CM

## 2024-07-10 PROCEDURE — 99213 OFFICE O/P EST LOW 20 MIN: CPT | Performed by: ORTHOPAEDIC SURGERY

## 2024-07-10 PROCEDURE — 1159F MED LIST DOCD IN RCRD: CPT | Performed by: ORTHOPAEDIC SURGERY

## 2024-07-10 PROCEDURE — 1125F AMNT PAIN NOTED PAIN PRSNT: CPT | Performed by: ORTHOPAEDIC SURGERY

## 2024-07-10 PROCEDURE — 1036F TOBACCO NON-USER: CPT | Performed by: ORTHOPAEDIC SURGERY

## 2024-07-10 ASSESSMENT — PATIENT HEALTH QUESTIONNAIRE - PHQ9
1. LITTLE INTEREST OR PLEASURE IN DOING THINGS: NOT AT ALL
SUM OF ALL RESPONSES TO PHQ9 QUESTIONS 1 AND 2: 0
2. FEELING DOWN, DEPRESSED OR HOPELESS: NOT AT ALL

## 2024-07-10 ASSESSMENT — PAIN SCALES - GENERAL: PAINLEVEL_OUTOF10: 8

## 2024-07-10 ASSESSMENT — PAIN - FUNCTIONAL ASSESSMENT: PAIN_FUNCTIONAL_ASSESSMENT: 0-10

## 2024-07-10 ASSESSMENT — PAIN DESCRIPTION - DESCRIPTORS: DESCRIPTORS: SHARP;NUMBNESS

## 2024-07-10 NOTE — PROGRESS NOTES
History of present illness: Left shoulder end-stage arthritis    He is 89 years of age he does much better with fluoro-guided injections    His last injection was in February 2024 under fluoroscopic guidance at [facility]    Physical exam:    General: No acute distress or breathing difficulty or discomfort, pleasant and cooperative with the examination.    Extremities: The left shoulder was inspected and was found to have no obvious deformity.  There was tenderness to touch over the lateral edges of the shoulder over the rotator cuff insertion.  Active forward flexion 110 degrees, external rotation to 50 degrees, abduction to 45 degrees, and internal rotation to the level of L2.    At this time the shoulder is neurovascular tact and neurosensory intact.  Motor intact C5-T1.  There was no obvious neck pain or radiculopathy noted.  There was no gross instability or adhesive capsulitis symptoms.  There was no evidence of apprehension or apprehension suppression.    Strength was tested in 4 planes with weakness in the supraspinatus strength testing and external rotation position.  There was no strength deficit in internal rotation.  Impingement signs were positive both supine and standing for impingement test type I and II.  There was mild pain over the bicipital groove with a positive speeds sign    Diagnostic studies: No new x-ray    Impression:  Osteoarthritis left shoulder    Plan: Fluoro-guided injection [facility]    In 4 months they can call we can schedule the shot as an outpatient hide anticipate new x-rays sometime in the winter 2025

## 2024-07-16 ENCOUNTER — HOSPITAL ENCOUNTER (OUTPATIENT)
Dept: RADIOLOGY | Facility: HOSPITAL | Age: 89
Discharge: HOME | End: 2024-07-16
Payer: MEDICARE

## 2024-07-16 DIAGNOSIS — M19.019 GLENOHUMERAL ARTHRITIS: ICD-10-CM

## 2024-07-16 PROCEDURE — 20610 DRAIN/INJ JOINT/BURSA W/O US: CPT | Mod: LT

## 2024-07-16 PROCEDURE — 2500000004 HC RX 250 GENERAL PHARMACY W/ HCPCS (ALT 636 FOR OP/ED): Performed by: ORTHOPAEDIC SURGERY

## 2024-07-16 PROCEDURE — 2500000005 HC RX 250 GENERAL PHARMACY W/O HCPCS: Performed by: ORTHOPAEDIC SURGERY

## 2024-07-16 PROCEDURE — 2550000001 HC RX 255 CONTRASTS: Performed by: ORTHOPAEDIC SURGERY

## 2024-07-16 PROCEDURE — 96373 THER/PROPH/DIAG INJ IA: CPT | Performed by: ORTHOPAEDIC SURGERY

## 2024-07-16 RX ORDER — METHYLPREDNISOLONE ACETATE 40 MG/ML
INJECTION, SUSPENSION INTRA-ARTICULAR; INTRALESIONAL; INTRAMUSCULAR; SOFT TISSUE AS NEEDED
Status: COMPLETED | OUTPATIENT
Start: 2024-07-16 | End: 2024-07-16

## 2024-07-16 RX ORDER — LIDOCAINE HYDROCHLORIDE 20 MG/ML
INJECTION, SOLUTION EPIDURAL; INFILTRATION; INTRACAUDAL; PERINEURAL AS NEEDED
Status: COMPLETED | OUTPATIENT
Start: 2024-07-16 | End: 2024-07-16

## 2024-07-16 RX ORDER — BUPIVACAINE HYDROCHLORIDE 5 MG/ML
INJECTION, SOLUTION EPIDURAL; INTRACAUDAL AS NEEDED
Status: COMPLETED | OUTPATIENT
Start: 2024-07-16 | End: 2024-07-16

## 2024-10-25 ENCOUNTER — HOSPITAL ENCOUNTER (OUTPATIENT)
Dept: LAB | Age: 89
Discharge: HOME | End: 2024-10-25
Payer: MEDICARE

## 2024-10-25 LAB
ALBUMIN SERPL-MCNC: 4.2 G/DL (ref 3.5–4.6)
ALP SERPL-CCNC: 79 U/L (ref 35–104)
ALT SERPL-CCNC: 12 U/L (ref 0–41)
ANION GAP SERPL CALCULATED.3IONS-SCNC: 10 MEQ/L (ref 9–15)
AST SERPL-CCNC: 19 U/L (ref 0–40)
BILIRUB SERPL-MCNC: 0.6 MG/DL (ref 0.2–0.7)
BUN SERPL-MCNC: 23 MG/DL (ref 8–23)
CALCIUM SERPL-MCNC: 9.5 MG/DL (ref 8.5–9.9)
CHLORIDE SERPL-SCNC: 99 MEQ/L (ref 95–107)
CO2 SERPL-SCNC: 28 MEQ/L (ref 20–31)
CREAT SERPL-MCNC: 1.03 MG/DL (ref 0.7–1.2)
ERYTHROCYTE [DISTWIDTH] IN BLOOD BY AUTOMATED COUNT: 13.4 % (ref 11.5–14.5)
GLOBULIN SER CALC-MCNC: 2.8 G/DL (ref 2.3–3.5)
GLUCOSE SERPL-MCNC: 112 MG/DL (ref 70–99)
HCT VFR BLD AUTO: 33.9 % (ref 42–52)
HGB BLD-MCNC: 11.2 G/DL (ref 14–18)
MCH RBC QN AUTO: 34.3 PG (ref 27–31.3)
MCHC RBC AUTO-ENTMCNC: 33 % (ref 33–37)
MCV RBC AUTO: 103.7 FL (ref 79–92.2)
PLATELET # BLD AUTO: 265 K/UL (ref 130–400)
POTASSIUM SERPL-SCNC: 4.3 MEQ/L (ref 3.4–4.9)
PROT SERPL-MCNC: 7 G/DL (ref 6.3–8)
RBC # BLD AUTO: 3.27 M/UL (ref 4.7–6.1)
SODIUM SERPL-SCNC: 137 MEQ/L (ref 135–144)
WBC # BLD AUTO: 4.8 K/UL (ref 4.8–10.8)

## 2024-10-25 PROCEDURE — 36415 COLL VENOUS BLD VENIPUNCTURE: CPT

## 2024-10-25 PROCEDURE — 80053 COMPREHEN METABOLIC PANEL: CPT

## 2024-10-25 PROCEDURE — 85027 COMPLETE CBC AUTOMATED: CPT

## 2024-11-04 ENCOUNTER — TELEPHONE (OUTPATIENT)
Dept: ORTHOPEDIC SURGERY | Facility: CLINIC | Age: 89
End: 2024-11-04
Payer: MEDICARE

## 2024-11-04 DIAGNOSIS — M19.019 GLENOHUMERAL ARTHRITIS: ICD-10-CM

## 2024-11-20 ENCOUNTER — HOSPITAL ENCOUNTER (OUTPATIENT)
Dept: RADIOLOGY | Facility: HOSPITAL | Age: 89
Discharge: HOME | End: 2024-11-20
Payer: MEDICARE

## 2024-11-20 DIAGNOSIS — M19.019 GLENOHUMERAL ARTHRITIS: ICD-10-CM

## 2024-11-20 PROCEDURE — 96372 THER/PROPH/DIAG INJ SC/IM: CPT | Performed by: RADIOLOGY

## 2024-11-20 PROCEDURE — 77002 NEEDLE LOCALIZATION BY XRAY: CPT | Mod: LT

## 2024-11-20 PROCEDURE — 2500000004 HC RX 250 GENERAL PHARMACY W/ HCPCS (ALT 636 FOR OP/ED): Performed by: RADIOLOGY

## 2024-11-20 PROCEDURE — 2550000001 HC RX 255 CONTRASTS: Performed by: ORTHOPAEDIC SURGERY

## 2024-11-20 RX ORDER — BUPIVACAINE HYDROCHLORIDE 5 MG/ML
10 INJECTION, SOLUTION EPIDURAL; INTRACAUDAL ONCE
Status: COMPLETED | OUTPATIENT
Start: 2024-11-20 | End: 2024-11-20

## 2024-11-20 RX ORDER — METHYLPREDNISOLONE ACETATE 40 MG/ML
40 INJECTION, SUSPENSION INTRA-ARTICULAR; INTRALESIONAL; INTRAMUSCULAR; SOFT TISSUE ONCE
Status: COMPLETED | OUTPATIENT
Start: 2024-11-20 | End: 2024-11-20

## 2024-11-20 RX ORDER — LIDOCAINE HYDROCHLORIDE 20 MG/ML
20 INJECTION, SOLUTION INFILTRATION; PERINEURAL ONCE
Status: COMPLETED | OUTPATIENT
Start: 2024-11-20 | End: 2024-11-20

## 2025-01-01 DIAGNOSIS — R40.1 OBTUNDATION: ICD-10-CM

## 2025-01-01 DIAGNOSIS — R44.3 HALLUCINATION: ICD-10-CM

## 2025-01-01 LAB
ALBUMIN SERPL-MCNC: 3.1 G/DL (ref 3.5–4.6)
ALBUMIN SERPL-MCNC: 3.5 G/DL (ref 3.5–4.6)
ALP SERPL-CCNC: 103 U/L (ref 35–104)
ALP SERPL-CCNC: 107 U/L (ref 35–104)
ALT SERPL-CCNC: 28 U/L (ref 0–41)
ALT SERPL-CCNC: 30 U/L (ref 0–41)
ANION GAP SERPL CALCULATED.3IONS-SCNC: 7 MEQ/L (ref 9–15)
ANION GAP SERPL CALCULATED.3IONS-SCNC: 8 MEQ/L (ref 9–15)
AST SERPL-CCNC: 25 U/L (ref 0–40)
AST SERPL-CCNC: 25 U/L (ref 0–40)
BASOPHILS # BLD: 0 K/UL (ref 0–0.2)
BASOPHILS NFR BLD: 0.5 %
BILIRUB SERPL-MCNC: 0.3 MG/DL (ref 0.2–0.7)
BILIRUB SERPL-MCNC: 0.4 MG/DL (ref 0.2–0.7)
BUN SERPL-MCNC: 30 MG/DL (ref 8–23)
BUN SERPL-MCNC: 43 MG/DL (ref 8–23)
CALCIUM SERPL-MCNC: 9.2 MG/DL (ref 8.5–9.9)
CALCIUM SERPL-MCNC: 9.8 MG/DL (ref 8.5–9.9)
CHLORIDE SERPL-SCNC: 104 MEQ/L (ref 95–107)
CHLORIDE SERPL-SCNC: 107 MEQ/L (ref 95–107)
CO2 SERPL-SCNC: 26 MEQ/L (ref 20–31)
CO2 SERPL-SCNC: 26 MEQ/L (ref 20–31)
CREAT SERPL-MCNC: 1.38 MG/DL (ref 0.7–1.2)
CREAT SERPL-MCNC: 1.84 MG/DL (ref 0.7–1.2)
CRP SERPL HS-MCNC: 3.4 MG/L (ref 0–5)
EOSINOPHIL # BLD: 0.2 K/UL (ref 0–0.7)
EOSINOPHIL NFR BLD: 3.8 %
ERYTHROCYTE [DISTWIDTH] IN BLOOD BY AUTOMATED COUNT: 16 % (ref 11.5–14.5)
ERYTHROCYTE [DISTWIDTH] IN BLOOD BY AUTOMATED COUNT: 16.5 % (ref 11.5–14.5)
GLOBULIN SER CALC-MCNC: 2.8 G/DL (ref 2.3–3.5)
GLOBULIN SER CALC-MCNC: 3.1 G/DL (ref 2.3–3.5)
GLUCOSE SERPL-MCNC: 86 MG/DL (ref 70–99)
GLUCOSE SERPL-MCNC: 95 MG/DL (ref 70–99)
HCT VFR BLD AUTO: 26.4 % (ref 42–52)
HCT VFR BLD AUTO: 29 % (ref 42–52)
HGB BLD-MCNC: 9.2 G/DL (ref 14–18)
HGB BLD-MCNC: 9.8 G/DL (ref 14–18)
LYMPHOCYTES # BLD: 0.8 K/UL (ref 1–4.8)
LYMPHOCYTES NFR BLD: 13 %
MCH RBC QN AUTO: 34 PG (ref 27–31.3)
MCH RBC QN AUTO: 35.1 PG (ref 27–31.3)
MCHC RBC AUTO-ENTMCNC: 33.8 % (ref 33–37)
MCHC RBC AUTO-ENTMCNC: 34.8 % (ref 33–37)
MCV RBC AUTO: 100.7 FL (ref 79–92.2)
MCV RBC AUTO: 100.8 FL (ref 79–92.2)
MONOCYTES # BLD: 0.5 K/UL (ref 0.2–0.8)
MONOCYTES NFR BLD: 8.5 %
NEUTROPHILS # BLD: 4.5 K/UL (ref 1.4–6.5)
NEUTS SEG NFR BLD: 73.7 %
PLATELET # BLD AUTO: 115 K/UL (ref 130–400)
PLATELET # BLD AUTO: 77 K/UL (ref 130–400)
PLATELET BLD QL SMEAR: ABNORMAL
POTASSIUM SERPL-SCNC: 4.2 MEQ/L (ref 3.4–4.9)
POTASSIUM SERPL-SCNC: 4.9 MEQ/L (ref 3.4–4.9)
PROT SERPL-MCNC: 6.2 G/DL (ref 6.3–8)
PROT SERPL-MCNC: 6.3 G/DL (ref 6.3–8)
RBC # BLD AUTO: 2.62 M/UL (ref 4.7–6.1)
RBC # BLD AUTO: 2.88 M/UL (ref 4.7–6.1)
SLIDE REVIEW: ABNORMAL
SODIUM SERPL-SCNC: 137 MEQ/L (ref 135–144)
SODIUM SERPL-SCNC: 141 MEQ/L (ref 135–144)
WBC # BLD AUTO: 4 K/UL (ref 4.8–10.8)
WBC # BLD AUTO: 6.1 K/UL (ref 4.8–10.8)

## 2025-03-11 ENCOUNTER — TELEPHONE (OUTPATIENT)
Dept: ORTHOPEDIC SURGERY | Facility: CLINIC | Age: OVER 89
End: 2025-03-11
Payer: MEDICARE

## 2025-03-11 DIAGNOSIS — M19.019 GLENOHUMERAL ARTHRITIS: ICD-10-CM

## 2025-04-09 ENCOUNTER — HOSPITAL ENCOUNTER (OUTPATIENT)
Dept: RADIOLOGY | Facility: HOSPITAL | Age: OVER 89
Discharge: HOME | End: 2025-04-09
Payer: MEDICARE

## 2025-04-09 DIAGNOSIS — M19.019 GLENOHUMERAL ARTHRITIS: ICD-10-CM

## 2025-04-09 PROCEDURE — 77002 NEEDLE LOCALIZATION BY XRAY: CPT | Mod: LEFT SIDE | Performed by: RADIOLOGY

## 2025-04-09 PROCEDURE — 2500000004 HC RX 250 GENERAL PHARMACY W/ HCPCS (ALT 636 FOR OP/ED): Performed by: RADIOLOGY

## 2025-04-09 PROCEDURE — 20610 DRAIN/INJ JOINT/BURSA W/O US: CPT | Mod: LT

## 2025-04-09 PROCEDURE — 96372 THER/PROPH/DIAG INJ SC/IM: CPT | Performed by: RADIOLOGY

## 2025-04-09 PROCEDURE — 2550000001 HC RX 255 CONTRASTS: Performed by: ORTHOPAEDIC SURGERY

## 2025-04-09 PROCEDURE — 20610 DRAIN/INJ JOINT/BURSA W/O US: CPT | Mod: LEFT SIDE | Performed by: RADIOLOGY

## 2025-04-09 RX ORDER — METHYLPREDNISOLONE ACETATE 40 MG/ML
40 INJECTION, SUSPENSION INTRA-ARTICULAR; INTRALESIONAL; INTRAMUSCULAR; SOFT TISSUE ONCE
Status: COMPLETED | OUTPATIENT
Start: 2025-04-09 | End: 2025-04-09

## 2025-04-09 RX ORDER — LIDOCAINE HYDROCHLORIDE 20 MG/ML
20 INJECTION, SOLUTION INFILTRATION; PERINEURAL ONCE
Status: COMPLETED | OUTPATIENT
Start: 2025-04-09 | End: 2025-04-09

## 2025-04-09 RX ORDER — BUPIVACAINE HYDROCHLORIDE 5 MG/ML
10 INJECTION, SOLUTION EPIDURAL; INTRACAUDAL; PERINEURAL ONCE
Status: COMPLETED | OUTPATIENT
Start: 2025-04-09 | End: 2025-04-09

## 2025-04-09 RX ADMIN — LIDOCAINE HYDROCHLORIDE 5 ML: 20 INJECTION, SOLUTION INFILTRATION; PERINEURAL at 14:26

## 2025-04-09 RX ADMIN — BUPIVACAINE HYDROCHLORIDE 3 ML: 5 INJECTION, SOLUTION EPIDURAL; INTRACAUDAL; PERINEURAL at 14:27

## 2025-04-09 RX ADMIN — METHYLPREDNISOLONE ACETATE 40 MG: 40 INJECTION, SUSPENSION INTRA-ARTICULAR; INTRALESIONAL; INTRAMUSCULAR; INTRASYNOVIAL; SOFT TISSUE at 14:28

## 2025-04-09 RX ADMIN — IOHEXOL 2 ML: 300 INJECTION, SOLUTION INTRAVENOUS at 14:26

## 2025-04-29 ENCOUNTER — APPOINTMENT (OUTPATIENT)
Dept: GENERAL RADIOLOGY | Age: 89
End: 2025-04-29
Payer: MEDICARE

## 2025-04-29 ENCOUNTER — HOSPITAL ENCOUNTER (EMERGENCY)
Age: 89
Discharge: ANOTHER HOSPITAL | End: 2025-04-30
Attending: EMERGENCY MEDICINE
Payer: MEDICARE

## 2025-04-29 ENCOUNTER — APPOINTMENT (OUTPATIENT)
Dept: CT IMAGING | Age: 89
End: 2025-04-29
Payer: MEDICARE

## 2025-04-29 VITALS
RESPIRATION RATE: 24 BRPM | SYSTOLIC BLOOD PRESSURE: 153 MMHG | DIASTOLIC BLOOD PRESSURE: 109 MMHG | OXYGEN SATURATION: 92 % | HEART RATE: 57 BPM | WEIGHT: 183.74 LBS | BODY MASS INDEX: 27.85 KG/M2 | TEMPERATURE: 96.7 F | HEIGHT: 68 IN

## 2025-04-29 DIAGNOSIS — A41.89 SEPSIS DUE TO OTHER ETIOLOGY (HCC): Primary | ICD-10-CM

## 2025-04-29 DIAGNOSIS — T68.XXXA HYPOTHERMIA, INITIAL ENCOUNTER: ICD-10-CM

## 2025-04-29 DIAGNOSIS — E86.0 DEHYDRATION: ICD-10-CM

## 2025-04-29 LAB
ALBUMIN SERPL-MCNC: 3.7 G/DL (ref 3.5–4.6)
ALP SERPL-CCNC: 122 U/L (ref 35–104)
ALT SERPL-CCNC: 54 U/L (ref 0–41)
AMPHET UR QL SCN: NEGATIVE
ANION GAP SERPL CALCULATED.3IONS-SCNC: 10 MEQ/L (ref 9–15)
AST SERPL-CCNC: 48 U/L (ref 0–40)
BACTERIA URNS QL MICRO: ABNORMAL /HPF
BARBITURATES UR QL SCN: NEGATIVE
BASOPHILS # BLD: 0 K/UL (ref 0–0.1)
BASOPHILS NFR BLD: 0.2 % (ref 0.2–1.2)
BENZODIAZ UR QL SCN: NEGATIVE
BILIRUB SERPL-MCNC: 0.4 MG/DL (ref 0.2–0.7)
BILIRUB UR QL STRIP: NEGATIVE
BNP BLD-MCNC: 150 PG/ML
BUN SERPL-MCNC: 61 MG/DL (ref 8–23)
CALCIUM SERPL-MCNC: 10 MG/DL (ref 8.5–9.9)
CANNABINOIDS UR QL SCN: NEGATIVE
CHLORIDE SERPL-SCNC: 100 MEQ/L (ref 95–107)
CHP ED QC CHECK: YES
CLARITY UR: CLEAR
CO2 SERPL-SCNC: 27 MEQ/L (ref 20–31)
COCAINE UR QL SCN: NEGATIVE
COLOR UR: YELLOW
CREAT SERPL-MCNC: 1.2 MG/DL (ref 0.7–1.2)
DRUG SCREEN COMMENT UR-IMP: NORMAL
EKG ATRIAL RATE: 47 BPM
EKG DIAGNOSIS: NORMAL
EKG Q-T INTERVAL: 506 MS
EKG QRS DURATION: 112 MS
EKG QTC CALCULATION (BAZETT): 447 MS
EKG R AXIS: -9 DEGREES
EKG T AXIS: 66 DEGREES
EKG VENTRICULAR RATE: 47 BPM
EOSINOPHIL # BLD: 0.1 K/UL (ref 0–0.5)
EOSINOPHIL NFR BLD: 0.4 % (ref 0.8–7)
EPI CELLS #/AREA URNS HPF: ABNORMAL /HPF
ERYTHROCYTE [DISTWIDTH] IN BLOOD BY AUTOMATED COUNT: 14.4 % (ref 11.6–14.4)
FENTANYL SCREEN, URINE: NEGATIVE
GLOBULIN SER CALC-MCNC: 3.3 G/DL (ref 2.3–3.5)
GLUCOSE BLD-MCNC: 125 MG/DL
GLUCOSE BLD-MCNC: 125 MG/DL (ref 70–99)
GLUCOSE SERPL-MCNC: 126 MG/DL (ref 70–99)
GLUCOSE UR STRIP-MCNC: NEGATIVE MG/DL
HCT VFR BLD AUTO: 30.1 % (ref 42–52)
HGB BLD-MCNC: 10.2 G/DL (ref 13.7–17.5)
HGB UR QL STRIP: ABNORMAL
IMM GRANULOCYTES # BLD: 0 K/UL
IMM GRANULOCYTES NFR BLD: 0.2 %
INFLUENZA A BY PCR: NEGATIVE
INFLUENZA B BY PCR: NEGATIVE
KETONES UR STRIP-MCNC: NEGATIVE MG/DL
LACTATE BLDV-SCNC: 0.9 MMOL/L (ref 0.5–2.2)
LEUKOCYTE ESTERASE UR QL STRIP: NEGATIVE
LYMPHOCYTES # BLD: 0.5 K/UL (ref 1.3–3.6)
LYMPHOCYTES NFR BLD: 4.8 %
MCH RBC QN AUTO: 34.1 PG (ref 25.7–32.2)
MCHC RBC AUTO-ENTMCNC: 33.9 % (ref 32.3–36.5)
MCV RBC AUTO: 100.7 FL (ref 79–92.2)
METHADONE UR QL SCN: NEGATIVE
MONOCYTES # BLD: 0.8 K/UL (ref 0.3–0.8)
MONOCYTES NFR BLD: 6.7 % (ref 5.3–12.2)
NEUTROPHILS # BLD: 9.8 K/UL (ref 1.8–5.4)
NEUTS SEG NFR BLD: 87.7 % (ref 34–67.9)
NITRITE UR QL STRIP: NEGATIVE
OPIATES UR QL SCN: NEGATIVE
OXYCODONE UR QL SCN: NEGATIVE
PCP UR QL SCN: NEGATIVE
PERFORMED ON: ABNORMAL
PH UR STRIP: 7.5 [PH] (ref 5–9)
PLATELET # BLD AUTO: 116 K/UL (ref 163–337)
PLATELET BLD QL SMEAR: ABNORMAL
POTASSIUM SERPL-SCNC: 4.7 MEQ/L (ref 3.4–4.9)
PROT SERPL-MCNC: 7 G/DL (ref 6.3–8)
PROT UR STRIP-MCNC: NEGATIVE MG/DL
RBC # BLD AUTO: 2.99 M/UL (ref 4.63–6.08)
RBC #/AREA URNS HPF: ABNORMAL /HPF (ref 0–2)
SARS-COV-2 RDRP RESP QL NAA+PROBE: NOT DETECTED
SODIUM SERPL-SCNC: 137 MEQ/L (ref 135–144)
SP GR UR STRIP: 1.01 (ref 1–1.03)
URINE REFLEX TO CULTURE: ABNORMAL
UROBILINOGEN UR STRIP-ACNC: 0.2 E.U./DL
WBC # BLD AUTO: 11.2 K/UL (ref 4.2–9)
WBC #/AREA URNS HPF: ABNORMAL /HPF (ref 0–5)

## 2025-04-29 PROCEDURE — 96361 HYDRATE IV INFUSION ADD-ON: CPT

## 2025-04-29 PROCEDURE — 81001 URINALYSIS AUTO W/SCOPE: CPT

## 2025-04-29 PROCEDURE — 87502 INFLUENZA DNA AMP PROBE: CPT

## 2025-04-29 PROCEDURE — 87635 SARS-COV-2 COVID-19 AMP PRB: CPT

## 2025-04-29 PROCEDURE — 83880 ASSAY OF NATRIURETIC PEPTIDE: CPT

## 2025-04-29 PROCEDURE — 85025 COMPLETE CBC W/AUTO DIFF WBC: CPT

## 2025-04-29 PROCEDURE — 2580000003 HC RX 258: Performed by: EMERGENCY MEDICINE

## 2025-04-29 PROCEDURE — 70450 CT HEAD/BRAIN W/O DYE: CPT

## 2025-04-29 PROCEDURE — 71045 X-RAY EXAM CHEST 1 VIEW: CPT

## 2025-04-29 PROCEDURE — 93005 ELECTROCARDIOGRAM TRACING: CPT

## 2025-04-29 PROCEDURE — 96365 THER/PROPH/DIAG IV INF INIT: CPT

## 2025-04-29 PROCEDURE — 87040 BLOOD CULTURE FOR BACTERIA: CPT

## 2025-04-29 PROCEDURE — 6360000002 HC RX W HCPCS: Performed by: EMERGENCY MEDICINE

## 2025-04-29 PROCEDURE — 99285 EMERGENCY DEPT VISIT HI MDM: CPT

## 2025-04-29 PROCEDURE — 80307 DRUG TEST PRSMV CHEM ANLYZR: CPT

## 2025-04-29 PROCEDURE — 84145 PROCALCITONIN (PCT): CPT

## 2025-04-29 PROCEDURE — 80053 COMPREHEN METABOLIC PANEL: CPT

## 2025-04-29 PROCEDURE — 36415 COLL VENOUS BLD VENIPUNCTURE: CPT

## 2025-04-29 PROCEDURE — 96368 THER/DIAG CONCURRENT INF: CPT

## 2025-04-29 PROCEDURE — 83605 ASSAY OF LACTIC ACID: CPT

## 2025-04-29 PROCEDURE — 96367 TX/PROPH/DG ADDL SEQ IV INF: CPT

## 2025-04-29 RX ORDER — 0.9 % SODIUM CHLORIDE 0.9 %
1000 INTRAVENOUS SOLUTION INTRAVENOUS ONCE
Status: COMPLETED | OUTPATIENT
Start: 2025-04-29 | End: 2025-04-29

## 2025-04-29 RX ORDER — LOSARTAN POTASSIUM 50 MG/1
50 TABLET ORAL DAILY
Status: ON HOLD | COMMUNITY

## 2025-04-29 RX ORDER — HYDROCHLOROTHIAZIDE 25 MG/1
25 TABLET ORAL DAILY
Status: ON HOLD | COMMUNITY

## 2025-04-29 RX ORDER — ALBUTEROL SULFATE 0.83 MG/ML
2.5 SOLUTION RESPIRATORY (INHALATION) EVERY 6 HOURS PRN
Status: ON HOLD | COMMUNITY

## 2025-04-29 RX ORDER — ESCITALOPRAM OXALATE 5 MG/1
5 TABLET ORAL DAILY
Status: ON HOLD | COMMUNITY

## 2025-04-29 RX ADMIN — PIPERACILLIN AND TAZOBACTAM 3375 MG: 3; .375 INJECTION, POWDER, FOR SOLUTION INTRAVENOUS at 23:06

## 2025-04-29 RX ADMIN — VANCOMYCIN HYDROCHLORIDE 1000 MG: 1 INJECTION, POWDER, LYOPHILIZED, FOR SOLUTION INTRAVENOUS at 23:38

## 2025-04-29 RX ADMIN — SODIUM CHLORIDE 1000 ML: 0.9 INJECTION, SOLUTION INTRAVENOUS at 21:16

## 2025-04-29 ASSESSMENT — ENCOUNTER SYMPTOMS
ABDOMINAL PAIN: 0
NAUSEA: 0
BACK PAIN: 0
DIARRHEA: 0
SHORTNESS OF BREATH: 0
SORE THROAT: 0
VOMITING: 0
COUGH: 0

## 2025-04-29 ASSESSMENT — LIFESTYLE VARIABLES
HOW OFTEN DO YOU HAVE A DRINK CONTAINING ALCOHOL: NEVER
HOW MANY STANDARD DRINKS CONTAINING ALCOHOL DO YOU HAVE ON A TYPICAL DAY: PATIENT DOES NOT DRINK
HOW MANY STANDARD DRINKS CONTAINING ALCOHOL DO YOU HAVE ON A TYPICAL DAY: PATIENT DOES NOT DRINK

## 2025-04-29 ASSESSMENT — PAIN - FUNCTIONAL ASSESSMENT: PAIN_FUNCTIONAL_ASSESSMENT: ADULT NONVERBAL PAIN SCALE (NPVS)

## 2025-04-29 NOTE — ED PROVIDER NOTES
Hypothermia, initial encounter    3. Dehydration          DISPOSITION/PLAN   DISPOSITION Decision To Transfer 04/29/2025 10:51:31 PM   DISPOSITION CONDITION Undetermined           PATIENT REFERRED TO:  No follow-up provider specified.    DISCHARGE MEDICATIONS:  New Prescriptions    No medications on file       (Please note that portions of this note were completed with a voice recognition program.  Efforts were made to edit the dictations but occasionally words are mis-transcribed.)    MD Vinod Perez Martin O, MD  04/30/25 0234

## 2025-04-30 ENCOUNTER — APPOINTMENT (OUTPATIENT)
Age: 89
DRG: 871 | End: 2025-04-30
Attending: INTERNAL MEDICINE
Payer: MEDICARE

## 2025-04-30 ENCOUNTER — HOSPITAL ENCOUNTER (INPATIENT)
Age: 89
LOS: 8 days | Discharge: NURSING, REHAB OR LONG-TERM CARE | DRG: 871 | End: 2025-05-08
Attending: INTERNAL MEDICINE | Admitting: INTERNAL MEDICINE
Payer: MEDICARE

## 2025-04-30 DIAGNOSIS — R06.02 SHORTNESS OF BREATH: Primary | ICD-10-CM

## 2025-04-30 PROBLEM — A41.9 SEPSIS (HCC): Status: ACTIVE | Noted: 2025-04-30

## 2025-04-30 LAB
ALBUMIN SERPL-MCNC: 3.2 G/DL (ref 3.5–4.6)
ALBUMIN SERPL-MCNC: 3.3 G/DL (ref 3.5–4.6)
ALP SERPL-CCNC: 103 U/L (ref 35–104)
ALP SERPL-CCNC: 97 U/L (ref 35–104)
ALT SERPL-CCNC: 37 U/L (ref 0–41)
ALT SERPL-CCNC: 39 U/L (ref 0–41)
AMMONIA PLAS-SCNC: 25 UMOL/L (ref 16–60)
ANION GAP SERPL CALCULATED.3IONS-SCNC: 11 MEQ/L (ref 9–15)
ANION GAP SERPL CALCULATED.3IONS-SCNC: 12 MEQ/L (ref 9–15)
AST SERPL-CCNC: 33 U/L (ref 0–40)
AST SERPL-CCNC: 34 U/L (ref 0–40)
B PARAP IS1001 DNA NPH QL NAA+NON-PROBE: NOT DETECTED
B PERT.PT PRMT NPH QL NAA+NON-PROBE: NOT DETECTED
BASOPHILS # BLD: 0 K/UL (ref 0–0.2)
BASOPHILS # BLD: 0 K/UL (ref 0–0.2)
BASOPHILS NFR BLD: 0.2 %
BASOPHILS NFR BLD: 0.2 %
BILIRUB SERPL-MCNC: 0.4 MG/DL (ref 0.2–0.7)
BILIRUB SERPL-MCNC: 0.5 MG/DL (ref 0.2–0.7)
BUN SERPL-MCNC: 51 MG/DL (ref 8–23)
BUN SERPL-MCNC: 54 MG/DL (ref 8–23)
C PNEUM DNA NPH QL NAA+NON-PROBE: NOT DETECTED
CALCIUM SERPL-MCNC: 9.2 MG/DL (ref 8.5–9.9)
CALCIUM SERPL-MCNC: 9.3 MG/DL (ref 8.5–9.9)
CHLORIDE SERPL-SCNC: 101 MEQ/L (ref 95–107)
CHLORIDE SERPL-SCNC: 99 MEQ/L (ref 95–107)
CO2 SERPL-SCNC: 22 MEQ/L (ref 20–31)
CO2 SERPL-SCNC: 24 MEQ/L (ref 20–31)
CREAT SERPL-MCNC: 1.21 MG/DL (ref 0.7–1.2)
CREAT SERPL-MCNC: 1.24 MG/DL (ref 0.7–1.2)
ECHO AV MEAN GRADIENT: 4 MMHG
ECHO AV MEAN VELOCITY: 0.9 M/S
ECHO AV PEAK GRADIENT: 7 MMHG
ECHO AV PEAK VELOCITY: 1.5 M/S
ECHO AV VELOCITY RATIO: 0.8
ECHO AV VTI: 36.1 CM
ECHO BSA: 2.02 M2
ECHO EST RA PRESSURE: 8 MMHG
ECHO LA VOL A-L A2C: 35 ML (ref 18–58)
ECHO LA VOL A-L A4C: 49 ML (ref 18–58)
ECHO LA VOL MOD A2C: 32 ML (ref 18–58)
ECHO LA VOL MOD A4C: 44 ML (ref 18–58)
ECHO LA VOLUME AREA LENGTH: 48 ML
ECHO LA VOLUME INDEX A-L A2C: 18 ML/M2 (ref 16–34)
ECHO LA VOLUME INDEX A-L A4C: 25 ML/M2 (ref 16–34)
ECHO LA VOLUME INDEX AREA LENGTH: 24 ML/M2 (ref 16–34)
ECHO LA VOLUME INDEX MOD A2C: 16 ML/M2 (ref 16–34)
ECHO LA VOLUME INDEX MOD A4C: 22 ML/M2 (ref 16–34)
ECHO LV E' LATERAL VELOCITY: 7.19 CM/S
ECHO LV E' SEPTAL VELOCITY: 8.54 CM/S
ECHO LV EDV A4C: 107 ML
ECHO LV EDV INDEX A4C: 54 ML/M2
ECHO LV EF PHYSICIAN: 60 %
ECHO LV EJECTION FRACTION A4C: 64 %
ECHO LV ESV A4C: 38 ML
ECHO LV ESV INDEX A4C: 19 ML/M2
ECHO LVOT AV VTI INDEX: 0.83
ECHO LVOT MEAN GRADIENT: 3 MMHG
ECHO LVOT PEAK GRADIENT: 5 MMHG
ECHO LVOT PEAK VELOCITY: 1.2 M/S
ECHO LVOT VTI: 29.8 CM
ECHO MV A VELOCITY: 0.86 M/S
ECHO MV E DECELERATION TIME (DT): 244 MS
ECHO MV E VELOCITY: 0.86 M/S
ECHO MV E/A RATIO: 1
ECHO MV E/E' LATERAL: 11.96
ECHO MV E/E' RATIO (AVERAGED): 11.02
ECHO MV E/E' SEPTAL: 10.07
ECHO PV MAX VELOCITY: 0.9 M/S
ECHO PV PEAK GRADIENT: 3 MMHG
ECHO RIGHT VENTRICULAR SYSTOLIC PRESSURE (RVSP): 49 MMHG
ECHO RV TAPSE: 3 CM (ref 1.7–?)
ECHO TV REGURGITANT MAX VELOCITY: 3.19 M/S
ECHO TV REGURGITANT PEAK GRADIENT: 41 MMHG
EOSINOPHIL # BLD: 0 K/UL (ref 0–0.7)
EOSINOPHIL # BLD: 0 K/UL (ref 0–0.7)
EOSINOPHIL NFR BLD: 0.3 %
EOSINOPHIL NFR BLD: 0.4 %
ERYTHROCYTE [DISTWIDTH] IN BLOOD BY AUTOMATED COUNT: 14.1 % (ref 11.5–14.5)
ERYTHROCYTE [DISTWIDTH] IN BLOOD BY AUTOMATED COUNT: 14.4 % (ref 11.5–14.5)
FLUAV RNA NPH QL NAA+NON-PROBE: NOT DETECTED
FLUBV RNA NPH QL NAA+NON-PROBE: NOT DETECTED
FOLATE: 11.6 NG/ML (ref 4.8–24.2)
GLOBULIN SER CALC-MCNC: 3 G/DL (ref 2.3–3.5)
GLOBULIN SER CALC-MCNC: 3.2 G/DL (ref 2.3–3.5)
GLUCOSE SERPL-MCNC: 102 MG/DL (ref 70–99)
GLUCOSE SERPL-MCNC: 117 MG/DL (ref 70–99)
HADV DNA NPH QL NAA+NON-PROBE: NOT DETECTED
HCOV 229E RNA NPH QL NAA+NON-PROBE: NOT DETECTED
HCOV HKU1 RNA NPH QL NAA+NON-PROBE: NOT DETECTED
HCOV NL63 RNA NPH QL NAA+NON-PROBE: NOT DETECTED
HCOV OC43 RNA NPH QL NAA+NON-PROBE: NOT DETECTED
HCT VFR BLD AUTO: 26.6 % (ref 42–52)
HCT VFR BLD AUTO: 27.9 % (ref 42–52)
HGB BLD-MCNC: 9.2 G/DL (ref 14–18)
HGB BLD-MCNC: 9.7 G/DL (ref 14–18)
HMPV RNA NPH QL NAA+NON-PROBE: NOT DETECTED
HPIV1 RNA NPH QL NAA+NON-PROBE: NOT DETECTED
HPIV2 RNA NPH QL NAA+NON-PROBE: NOT DETECTED
HPIV3 RNA NPH QL NAA+NON-PROBE: NOT DETECTED
HPIV4 RNA NPH QL NAA+NON-PROBE: NOT DETECTED
LACTATE BLDV-SCNC: 0.9 MMOL/L (ref 0.5–2.2)
LYMPHOCYTES # BLD: 0.4 K/UL (ref 1–4.8)
LYMPHOCYTES # BLD: 0.6 K/UL (ref 1–4.8)
LYMPHOCYTES NFR BLD: 3.7 %
LYMPHOCYTES NFR BLD: 6 %
M PNEUMO DNA NPH QL NAA+NON-PROBE: NOT DETECTED
MAGNESIUM SERPL-MCNC: 2.1 MG/DL (ref 1.7–2.4)
MAGNESIUM SERPL-MCNC: 2.2 MG/DL (ref 1.7–2.4)
MCH RBC QN AUTO: 34.5 PG (ref 27–31.3)
MCH RBC QN AUTO: 34.6 PG (ref 27–31.3)
MCHC RBC AUTO-ENTMCNC: 34.6 % (ref 33–37)
MCHC RBC AUTO-ENTMCNC: 34.8 % (ref 33–37)
MCV RBC AUTO: 99.6 FL (ref 79–92.2)
MCV RBC AUTO: 99.6 FL (ref 79–92.2)
MONOCYTES # BLD: 0.6 K/UL (ref 0.2–0.8)
MONOCYTES # BLD: 0.8 K/UL (ref 0.2–0.8)
MONOCYTES NFR BLD: 6.6 %
MONOCYTES NFR BLD: 6.7 %
NEUTROPHILS # BLD: 10.3 K/UL (ref 1.4–6.5)
NEUTROPHILS # BLD: 8.2 K/UL (ref 1.4–6.5)
NEUTS SEG NFR BLD: 86.4 %
NEUTS SEG NFR BLD: 88.9 %
PLATELET # BLD AUTO: 115 K/UL (ref 130–400)
PLATELET # BLD AUTO: 120 K/UL (ref 130–400)
POTASSIUM SERPL-SCNC: 4.3 MEQ/L (ref 3.4–4.9)
POTASSIUM SERPL-SCNC: 4.5 MEQ/L (ref 3.4–4.9)
PROCALCITONIN SERPL IA-MCNC: 0.18 NG/ML (ref 0–0.15)
PROCALCITONIN SERPL IA-MCNC: 0.18 NG/ML (ref 0–0.15)
PROT SERPL-MCNC: 6.2 G/DL (ref 6.3–8)
PROT SERPL-MCNC: 6.5 G/DL (ref 6.3–8)
RBC # BLD AUTO: 2.67 M/UL (ref 4.7–6.1)
RBC # BLD AUTO: 2.8 M/UL (ref 4.7–6.1)
RSV RNA NPH QL NAA+NON-PROBE: NOT DETECTED
RV+EV RNA NPH QL NAA+NON-PROBE: NOT DETECTED
SARS-COV-2 RNA NPH QL NAA+NON-PROBE: NOT DETECTED
SODIUM SERPL-SCNC: 133 MEQ/L (ref 135–144)
SODIUM SERPL-SCNC: 136 MEQ/L (ref 135–144)
TROPONIN, HIGH SENSITIVITY: 58 NG/L (ref 0–19)
TSH REFLEX: 1.03 UIU/ML (ref 0.44–3.86)
VITAMIN B-12: 1454 PG/ML (ref 232–1245)
WBC # BLD AUTO: 11.6 K/UL (ref 4.8–10.8)
WBC # BLD AUTO: 9.5 K/UL (ref 4.8–10.8)

## 2025-04-30 PROCEDURE — 93306 TTE W/DOPPLER COMPLETE: CPT | Performed by: INTERNAL MEDICINE

## 2025-04-30 PROCEDURE — 82607 VITAMIN B-12: CPT

## 2025-04-30 PROCEDURE — 2500000003 HC RX 250 WO HCPCS: Performed by: INTERNAL MEDICINE

## 2025-04-30 PROCEDURE — 82140 ASSAY OF AMMONIA: CPT

## 2025-04-30 PROCEDURE — 84443 ASSAY THYROID STIM HORMONE: CPT

## 2025-04-30 PROCEDURE — 83605 ASSAY OF LACTIC ACID: CPT

## 2025-04-30 PROCEDURE — 97166 OT EVAL MOD COMPLEX 45 MIN: CPT

## 2025-04-30 PROCEDURE — 1210000000 HC MED SURG R&B

## 2025-04-30 PROCEDURE — 83735 ASSAY OF MAGNESIUM: CPT

## 2025-04-30 PROCEDURE — 99222 1ST HOSP IP/OBS MODERATE 55: CPT | Performed by: INTERNAL MEDICINE

## 2025-04-30 PROCEDURE — 84484 ASSAY OF TROPONIN QUANT: CPT

## 2025-04-30 PROCEDURE — APPSS45 APP SPLIT SHARED TIME 31-45 MINUTES: Performed by: NURSE PRACTITIONER

## 2025-04-30 PROCEDURE — 2580000003 HC RX 258: Performed by: NURSE PRACTITIONER

## 2025-04-30 PROCEDURE — 93306 TTE W/DOPPLER COMPLETE: CPT

## 2025-04-30 PROCEDURE — 6370000000 HC RX 637 (ALT 250 FOR IP): Performed by: NURSE PRACTITIONER

## 2025-04-30 PROCEDURE — 6360000002 HC RX W HCPCS: Performed by: NURSE PRACTITIONER

## 2025-04-30 PROCEDURE — 92610 EVALUATE SWALLOWING FUNCTION: CPT

## 2025-04-30 PROCEDURE — 82746 ASSAY OF FOLIC ACID SERUM: CPT

## 2025-04-30 PROCEDURE — 6360000002 HC RX W HCPCS: Performed by: INTERNAL MEDICINE

## 2025-04-30 PROCEDURE — 2500000003 HC RX 250 WO HCPCS: Performed by: NURSE PRACTITIONER

## 2025-04-30 PROCEDURE — 99222 1ST HOSP IP/OBS MODERATE 55: CPT | Performed by: PSYCHIATRY & NEUROLOGY

## 2025-04-30 PROCEDURE — 36415 COLL VENOUS BLD VENIPUNCTURE: CPT

## 2025-04-30 PROCEDURE — 84145 PROCALCITONIN (PCT): CPT

## 2025-04-30 PROCEDURE — 85025 COMPLETE CBC W/AUTO DIFF WBC: CPT

## 2025-04-30 PROCEDURE — 0202U NFCT DS 22 TRGT SARS-COV-2: CPT

## 2025-04-30 PROCEDURE — 97162 PT EVAL MOD COMPLEX 30 MIN: CPT

## 2025-04-30 PROCEDURE — 80053 COMPREHEN METABOLIC PANEL: CPT

## 2025-04-30 PROCEDURE — 2580000003 HC RX 258: Performed by: INTERNAL MEDICINE

## 2025-04-30 RX ORDER — ESCITALOPRAM OXALATE 10 MG/1
5 TABLET ORAL DAILY
Status: DISCONTINUED | OUTPATIENT
Start: 2025-04-30 | End: 2025-05-03

## 2025-04-30 RX ORDER — TAMSULOSIN HYDROCHLORIDE 0.4 MG/1
0.4 CAPSULE ORAL
Status: DISCONTINUED | OUTPATIENT
Start: 2025-04-30 | End: 2025-05-08 | Stop reason: HOSPADM

## 2025-04-30 RX ORDER — SODIUM CHLORIDE 9 MG/ML
INJECTION, SOLUTION INTRAVENOUS CONTINUOUS
Status: DISCONTINUED | OUTPATIENT
Start: 2025-04-30 | End: 2025-04-30 | Stop reason: ALTCHOICE

## 2025-04-30 RX ORDER — ACETAMINOPHEN 325 MG/1
650 TABLET ORAL EVERY 6 HOURS PRN
Status: DISCONTINUED | OUTPATIENT
Start: 2025-04-30 | End: 2025-05-08 | Stop reason: HOSPADM

## 2025-04-30 RX ORDER — HYDROCHLOROTHIAZIDE 25 MG/1
25 TABLET ORAL DAILY
Status: DISCONTINUED | OUTPATIENT
Start: 2025-04-30 | End: 2025-05-03

## 2025-04-30 RX ORDER — SODIUM CHLORIDE 0.9 % (FLUSH) 0.9 %
5-40 SYRINGE (ML) INJECTION PRN
Status: DISCONTINUED | OUTPATIENT
Start: 2025-04-30 | End: 2025-05-08 | Stop reason: HOSPADM

## 2025-04-30 RX ORDER — ACETAMINOPHEN 650 MG/1
650 SUPPOSITORY RECTAL EVERY 6 HOURS PRN
Status: DISCONTINUED | OUTPATIENT
Start: 2025-04-30 | End: 2025-05-08 | Stop reason: HOSPADM

## 2025-04-30 RX ORDER — FINASTERIDE 5 MG/1
5 TABLET, FILM COATED ORAL DAILY
Status: DISCONTINUED | OUTPATIENT
Start: 2025-04-30 | End: 2025-05-03

## 2025-04-30 RX ORDER — VITAMIN B COMPLEX
2000 TABLET ORAL DAILY
Status: DISCONTINUED | OUTPATIENT
Start: 2025-04-30 | End: 2025-05-08 | Stop reason: HOSPADM

## 2025-04-30 RX ORDER — SODIUM CHLORIDE 0.9 % (FLUSH) 0.9 %
5-40 SYRINGE (ML) INJECTION EVERY 12 HOURS SCHEDULED
Status: DISCONTINUED | OUTPATIENT
Start: 2025-04-30 | End: 2025-05-08 | Stop reason: HOSPADM

## 2025-04-30 RX ORDER — LOSARTAN POTASSIUM 50 MG/1
50 TABLET ORAL DAILY
Status: DISCONTINUED | OUTPATIENT
Start: 2025-04-30 | End: 2025-05-03

## 2025-04-30 RX ORDER — SODIUM CHLORIDE 9 MG/ML
INJECTION, SOLUTION INTRAVENOUS PRN
Status: DISCONTINUED | OUTPATIENT
Start: 2025-04-30 | End: 2025-05-08 | Stop reason: HOSPADM

## 2025-04-30 RX ORDER — MEMANTINE HYDROCHLORIDE 10 MG/1
5 TABLET ORAL 2 TIMES DAILY
Status: DISCONTINUED | OUTPATIENT
Start: 2025-04-30 | End: 2025-05-03 | Stop reason: SDUPTHER

## 2025-04-30 RX ORDER — DEXTROSE, SODIUM CHLORIDE, SODIUM LACTATE, POTASSIUM CHLORIDE, AND CALCIUM CHLORIDE 5; .6; .31; .03; .02 G/100ML; G/100ML; G/100ML; G/100ML; G/100ML
INJECTION, SOLUTION INTRAVENOUS CONTINUOUS
Status: DISCONTINUED | OUTPATIENT
Start: 2025-04-30 | End: 2025-05-01

## 2025-04-30 RX ORDER — ENOXAPARIN SODIUM 100 MG/ML
40 INJECTION SUBCUTANEOUS DAILY
Status: DISCONTINUED | OUTPATIENT
Start: 2025-04-30 | End: 2025-05-08 | Stop reason: HOSPADM

## 2025-04-30 RX ORDER — VANCOMYCIN 1 G/200ML
1000 INJECTION, SOLUTION INTRAVENOUS EVERY 24 HOURS
Status: DISCONTINUED | OUTPATIENT
Start: 2025-04-30 | End: 2025-04-30

## 2025-04-30 RX ADMIN — DEXTROSE, SODIUM CHLORIDE, SODIUM LACTATE, POTASSIUM CHLORIDE, AND CALCIUM CHLORIDE: 5; .6; .31; .03; .02 INJECTION, SOLUTION INTRAVENOUS at 14:21

## 2025-04-30 RX ADMIN — TAMSULOSIN HYDROCHLORIDE 0.4 MG: 0.4 CAPSULE ORAL at 20:42

## 2025-04-30 RX ADMIN — SODIUM CHLORIDE, PRESERVATIVE FREE 10 ML: 5 INJECTION INTRAVENOUS at 12:48

## 2025-04-30 RX ADMIN — Medication 2000 UNITS: at 12:45

## 2025-04-30 RX ADMIN — WATER 2000 MG: 1 INJECTION INTRAMUSCULAR; INTRAVENOUS; SUBCUTANEOUS at 12:43

## 2025-04-30 RX ADMIN — SODIUM CHLORIDE: 0.9 INJECTION, SOLUTION INTRAVENOUS at 01:59

## 2025-04-30 RX ADMIN — FINASTERIDE 5 MG: 5 TABLET, FILM COATED ORAL at 12:45

## 2025-04-30 RX ADMIN — ESCITALOPRAM OXALATE 5 MG: 10 TABLET ORAL at 12:46

## 2025-04-30 RX ADMIN — TAMSULOSIN HYDROCHLORIDE 0.4 MG: 0.4 CAPSULE ORAL at 12:44

## 2025-04-30 RX ADMIN — ENOXAPARIN SODIUM 40 MG: 100 INJECTION SUBCUTANEOUS at 12:47

## 2025-04-30 RX ADMIN — ACETAMINOPHEN 650 MG: 325 TABLET ORAL at 12:44

## 2025-04-30 ASSESSMENT — PAIN SCALES - WONG BAKER
WONGBAKER_NUMERICALRESPONSE: NO HURT
WONGBAKER_NUMERICALRESPONSE: HURTS LITTLE MORE

## 2025-04-30 NOTE — ED NOTES
Pt more alert at this time  Pt drinking apple juice at this time.  Pt answering basic questions.    Pt alert to person and self

## 2025-04-30 NOTE — PROGRESS NOTES
0145 Spoke with son Shayne Salguero (POA), Shayne wanted to verify that his fathers code status was limited - POA confirmed no chest compressions should be initiated in the event of cardiac arrest. Code status reviewed with Liz CORTES.

## 2025-04-30 NOTE — CARE COORDINATION
Case Management Assessment  Initial Evaluation    Date/Time of Evaluation: 4/30/2025 11:22 AM  Assessment Completed by: JULIANO Souza    If patient is discharged prior to next notation, then this note serves as note for discharge by case management.    Patient Name: Emerson Ames                   YOB: 1928  Diagnosis: Sepsis, unspecified organism (HCC) [A41.9]  Sepsis (HCC) [A41.9]                   Date / Time: 4/30/2025 12:22 AM    Patient Admission Status: Inpatient   Readmission Risk (Low < 19, Mod (19-27), High > 27): Readmission Risk Score: 17.8    Current PCP: Misty Vela MD  PCP verified by CM? Yes    Chart Reviewed: Yes      History Provided by:    Patient Orientation: Alert and Oriented, Person    Patient Cognition: Short Term Memory Deficit    Hospitalization in the last 30 days (Readmission):  No    If yes, Readmission Assessment in CM Navigator will be completed.    Advance Directives:      Code Status: Limited   Patient's Primary Decision Maker is: Named in Scanned ACP Document    Primary Decision Maker: KELSIE AMES - Child - [phone]    Discharge Planning:    Patient lives with: Alone Type of Home: Apartment  Primary Care Giver: Family  Patient Support Systems include: Children   Current Financial resources:    Current community resources:    Current services prior to admission: None            Current DME:              Type of Home Care services:       ADLS  Prior functional level: Assistance with the following:, Shopping, Housework, Cooking  Current functional level: Assistance with the following:, Mobility, Shopping, Housework, Cooking    PT AM-PAC:   /24  OT AM-PAC:   /24    Family can provide assistance at DC: Yes  Would you like Case Management to discuss the discharge plan with any other family members/significant others, and if so, who? Yes (son Kelsie)  Plans to Return to Present Housing: Other (see comment) (pt will be going to SNF and then transferring to

## 2025-04-30 NOTE — CONSULTS
Infectious Diseases Inpatient Consult Note      Reason for Consult:   sepsis  Requesting Physician:   PAPA Cross CNP  Primary Care Physician:  Misty Vela MD  History Obtained From:   Pt, EPIC    Admit Date: 4/30/2025  Hospital Day: 1      HISTORY OF PRESENT ILLNESS:  This is a 89 y.o. male with PMHx HTN, BPH, depression and dementia was admitted to [facility]  from  home through ER with progressive fatigue, decreased appetite and confusion of 2 weeks duration. Was hypothermic, bradycardic with altered mental status and leukocytosis on admission, was admitted with sepsis.  Was started on IV vancomycin and Zosyn.  Initial workup was negative including lactic acid level, respiratory antigen panel, urinalysis.  Had slightly elevated liver function test with ALT of 54 and AST of 48 on admission.  Patient remains to be confused.  Patient reports back pain.  He has an abrasion on the back and right second toe.   Incontinent of urine with a pure wick and slightly bloody urine.  Patient is very weak.  No obvious cough.  Currently needs full assistance in sitting at bedside, getting evaluated by physical therapy.   Blood cultures are pending.   CT of the head with no acute intracranial abnormality, prominent age-appropriate atrophy.  Chest x-ray with no acute process.     Past medical surgical and social history were reviewed  Past Medical History:   Diagnosis Date    Back pain     Colitis, ulcerative (HCC)     Hypertension        Past Surgical History:   Procedure Laterality Date    TONSILLECTOMY         Current Medications:     sodium chloride flush  5-40 mL IntraVENous 2 times per day    enoxaparin  40 mg SubCUTAneous Daily    piperacillin-tazobactam  4,500 mg IntraVENous Q8H    vancomycin (VANCOCIN) intermittent dosing (placeholder)   Other RX Placeholder    vancomycin  1,000 mg IntraVENous Q24H    Vitamin D  2,000 Units Oral Daily    escitalopram  5 mg Oral Daily    finasteride  5 mg Oral Daily

## 2025-04-30 NOTE — DISCHARGE INSTR - COC
Continuity of Care Form    Patient Name: Emerson Ames   :  1928  MRN:  [id number]    Admit date:  2025  Discharge date:  2025    Code Status Order: Limited   Advance Directives:     Admitting Physician:  Bandar Baum MD  PCP: Misty Vela MD    Discharging Nurse: LORENA Nova  Discharging Hospital Unit/Room#: W282/W282-01  Discharging Unit Phone Number: [phone]    Emergency Contact:   Extended Emergency Contact Information  Primary Emergency Contact: HANK AMES  Home Phone: [phone]  Mobile Phone: [phone]  Relation: Child  Secondary Emergency Contact: KELSIE AMES  Home Phone: [phone]  Mobile Phone: [phone]  Relation: Child    Past Surgical History:  Past Surgical History:   Procedure Laterality Date    TONSILLECTOMY         Immunization History:   Immunization History   Administered Date(s) Administered    COVID-19, PFIZER Bivalent, DO NOT Dilute, (age 12y+), IM, 30 mcg/0.3 mL 2022, 2023    COVID-19, PFIZER GRAY top, DO NOT Dilute, (age 12 y+), IM, 30 mcg/0.3 mL 2022    COVID-19, PFIZER PURPLE top, DILUTE for use, (age 12 y+), 30mcg/0.3mL 2021, 2021, 2021    COVID-19, PFIZER, , (age 12y+), IM, 30mcg/0.3mL 2023, 2024, 2024, 2025       Active Problems:  Patient Active Problem List   Diagnosis Code    AMS (altered mental status) R41.82    BPH (benign prostatic hyperplasia) N40.0    CKD (chronic kidney disease) N18.9    Leukocytosis D72.829    Gross hematuria R31.0    Encephalopathy acute G93.40    Closed head injury S09.90XA    Aphasia R47.01    Mild cognitive impairment G31.84    Metabolic encephalopathy G93.41    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 N40.1, R33.8    Syncope and collapse R55    Mild cognitive disorder F09    Sepsis (HCC) A41.9       Isolation/Infection:   Isolation            No Isolation          Patient Infection Status    None to display              Nurse

## 2025-04-30 NOTE — PROGRESS NOTES
[facility] OCCUPATIONAL THERAPY EVALUATION - ACUTE     NAME: Emerson Salguero  : 1928 (89 y.o.)  MRN: [id number]  CODE STATUS: Limited  Room: W282/W282-01    Date of Service: 2025    Patient Diagnosis(es): Sepsis, unspecified organism (HCC) [A41.9]  Sepsis (HCC) [A41.9]   Patient Active Problem List    Diagnosis Date Noted    Sepsis (HCC) 2025    Shortness of breath 2025    Mild cognitive disorder     Syncope and collapse 2021    Metabolic encephalopathy 2021    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    Gross hematuria     Encephalopathy acute     Closed head injury     Aphasia     Mild cognitive impairment     AMS (altered mental status) 2021    BPH (benign prostatic hyperplasia) 2021    CKD (chronic kidney disease) 2021    Leukocytosis 2021      Shortness of breath    Past Medical History:   Diagnosis Date    Back pain     Colitis, ulcerative (HCC)     Hypertension      Past Surgical History:   Procedure Laterality Date    TONSILLECTOMY          Restrictions  Restrictions/Precautions: Fall Risk  Activity Level: Up as Tolerated   Up as Tolerated    Safety Devices: Safety Devices  Type of Devices: All fall risk precautions in place     Patient's date of birth confirmed: Yes    General:  Chart Reviewed: Yes  Patient assessed for rehabilitation services?: Yes    Subjective  Subjective: \"Rcoael Cote!\" Pt appears to be possibly hallucinating, difficulty attending to therapist and conversation, calling out to someone not present despite redirection       Pain at start of treatment: No    Pain at end of treatment: No    Location:   Description:   Nursing notified: Not Applicable  RN:   Intervention: Repositioned    Prior Level of Function:  Social/Functional History  Lives With: Alone  Type of Home: Apartment  Active : No  Additional Comments: Pt unable to provide PLOF d/t confusion.    OBJECTIVE:     Orientation

## 2025-04-30 NOTE — PROGRESS NOTES
[facility]   Facility/Department: [address] TELE  Speech Language Pathology  Clinical Bedside Swallow Evaluation    NAME:Emerson Salguero  : 1928 (89 y.o.)   [x]   confirmed    MRN: [id number]  ROOM: [place] [id number]  ADMISSION DATE: 2025  PATIENT DIAGNOSIS(ES): Sepsis, unspecified organism (HCC) [A41.9]  Sepsis (HCC) [A41.9]  No chief complaint on file.    Patient Active Problem List    Diagnosis Date Noted    Sepsis (HCC) 2025    Shortness of breath 2025    Mild cognitive disorder     Syncope and collapse 2021    Metabolic encephalopathy 2021    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    Gross hematuria     Encephalopathy acute     Closed head injury     Aphasia     Mild cognitive impairment     AMS (altered mental status) 2021    BPH (benign prostatic hyperplasia) 2021    CKD (chronic kidney disease) 2021    Leukocytosis 2021     Past Medical History:   Diagnosis Date    Back pain     Colitis, ulcerative (HCC)     Hypertension      Past Surgical History:   Procedure Laterality Date    TONSILLECTOMY       Allergies   Allergen Reactions    Sulfa Antibiotics        DATE ONSET: 25    Date of Evaluation: 2025   Evaluating Therapist: VIKA Hernandez    Dysphagia Diagnosis  Dysphagia Diagnosis: Moderate oral stage dysphagia;Suspected needs further assessment  Dysphagia Impression : Clinical indicators of oral dysphagia and suspected pharyngeal dysphagia at bedside; likely acute due to current admission, increased confusion, and AMS. A PO diet is recommended with supervision with all PO intake. Due to pts cognitive status, pt is higher risk of pulmonary complications related to aspiration. Monitor pt closely and rec NPO if overt s/s of aspiration or change in respiratory status is noted. Patient's prognosis for diet advancement is good at this time given the patient's PLOF. ST to follow.    Recommended

## 2025-04-30 NOTE — ACP (ADVANCE CARE PLANNING)
Advance Care Planning   Healthcare Decision Maker:    Primary Decision Maker: KELSIE AMES - [facility] - [phone]    Click here to complete Healthcare Decision Makers including selection of the Healthcare Decision Maker Relationship (ie \"Primary\").  Today we documented Decision Maker(s) consistent with ACP documents on file.       If the relationship to the patient does NOT follow our state's Next of Kin hierarchy, the patient MUST complete an ACP Document to allow him/her to act on the patient's behalf. :

## 2025-04-30 NOTE — H&P
DEPARTMENT OF HOSPITAL MEDICINE    HISTORY AND PHYSICAL EXAM    PATIENT NAME:  Emerson Salguero    MRN:  [id number]  SERVICE DATE:  4/30/2025   SERVICE TIME:  1:39 AM    Primary Care Physician: Misty Vela MD         SUBJECTIVE  CHIEF COMPLAINT: Fatigue; general weakness       HPI: Patient being transferred from [facility] for sepsis of unknown etiology.  Patient is a 89-year-old  male who presented to the ED with a chief complaints of increased fatigue and some increased confusion over the past couple weeks.  Patient's son reports that patient has not wanted to get out of bed or walk around and has decreased appetite.  Patient does have a history of dementia and sadly lost his wife in death about a month or so ago.  Patient is able to state his name, date of birth, deny pain or shortness of breath.  However, patient unable to provide much other history.  Also, no report of any recent fever, nausea, vomiting, or dysuria.    Patient past medical history includes dementia, HTN, BPH, and depression.  Confirmation with family has patient CODE STATUS as DNR limited.  No chest compressions but all other resuscitative measures acceptable including intubation    PAST MEDICAL HISTORY:    Past Medical History:   Diagnosis Date    Back pain     Colitis, ulcerative (HCC)     Hypertension      PAST SURGICAL HISTORY:    Past Surgical History:   Procedure Laterality Date    TONSILLECTOMY       FAMILY HISTORY:  No family history on file.  SOCIAL HISTORY:    Social History     Socioeconomic History    Marital status:      Spouse name: Not on file    Number of children: Not on file    Years of education: Not on file    Highest education level: Not on file   Occupational History    Not on file   Tobacco Use    Smoking status: Never    Smokeless tobacco: Never   Vaping Use    Vaping status: Never Used   Substance and Sexual Activity    Alcohol use: Not Currently    Drug use: Not Currently    Sexual activity: Not

## 2025-04-30 NOTE — CONSULTS
Spiritual Health History and Assessment/Progress Note  [facility]    Spiritual/Emotional Needs,  ,  ,      Name: Emerson Salguero MRN: [id number]    Age: 89 y.o.     Sex: male   Language: English   Orthodoxy: Tenriism   Sepsis (HCC)     Date: 4/30/2025            Total Time Calculated: 15 min              Spiritual Assessment began in [facility] ORTHO TELE        Referral/Consult From: Rounding, Nurse   Encounter Overview/Reason: Spiritual/Emotional Needs  Service Provided For: Patient     reports, Pt, resting, unresponsive in communcation, alert to chaplains voice.  reports Pt, was consistent in calling out \"Mom.\" Pt, son reports to , passing of spouse of [Five weeks ago].      reports, pt, seems to be confused and alert only to memory of his spouse and son.  reports, Pt, responsive to chaplains words of prayer and comfort.      offered words of consolation, comfort, compassion, empathy and words of prayer to Pt, and Son.     Spiritual Care Consult Completed.      Phoebe, Belief, Meaning:   Patient is connected with a phoebe tradition or spiritual practice and has beliefs or practices that help with coping during difficult times  Family/Friends are connected with a phoebe tradition or spiritual practice and have beliefs or practices that help with coping during difficult times      Importance and Influence:  Patient has spiritual/personal beliefs that influence decisions regarding their health  Family/Friends have spiritual/personal beliefs that influence decisions regarding the patient's health    Community:  Patient is connected with a spiritual community and feels well-supported. Support system includes: Children and Phoebe Community  Family/Friends     Assessment and Plan of Care:     Patient Interventions include: Facilitated expression of thoughts and feelings and Other: prayers of healing and wellness, supportive ministry of presence.   Family/Friends

## 2025-04-30 NOTE — CONSULTS
[facility] Consult Note  Name: Emerson Salguero  Age: 89 y.o.  Gender: male  CodeStatus: Limited  Allergies: Sulfa Antibiotics    Chief Complaint:No chief complaint on file.    Primary Care Provider: Misty Vela MD  InpatientTreatment Team: Treatment Team:   Miguel Alarcon DO Abbud, Rita A, MD Dadas, Lauren A, APRN - CNP  [place], AugustinaJessie Martinez, Benita NOBLE, Sierra Pham Isa R, RN Hipp, Lisa, RN Patel, Dhruv R, MD  Admission Date: 4/30/2025      HPI   Consulting provider: Dr miguel Alarcon for hallucinations, baseline dementia but less interactive plus hallucinating  Pt seen and examined for neurology consult.  Patient is a 89-year-old  male with past medical history of ulcerative colitis, hypertension, dementia, BPH, CKD, aphasia who initially presented to [facility] emergency room on 4/29/2025 via EMS due to change in mental status.  Son was accompanying patient who is currently staying with him to assist in care.  Patient's wife passed away approximately 1 month ago.  Patient's had increasing confusion over the last 2 weeks.  Decreased p.o. intake.  Cough, fatigue.  Episode of loose stool yesterday.  Vital signs in the emergency room 105/59, 49, 14, 94%.  Patient was hypothermic at 90.4 °F requiring warming blanket.  CT of the head negative for acute intracranial findings.  There is prominent age-appropriate atrophy and mild small vessel ischemia.  Chest x-ray negative for acute findings.  Initial lab testing remarkable for white blood cell count of 11.2, hemoglobin 10.2, platelets 116, BUN 61, ALT 54, AST 48.  Urinalysis negative for UTI.  TSH normal.  Ammonia normal.  Respiratory viral panel negative.  No B12 or folate deficiency.    Patient is currently alert.  Son at bedside.  Patient is confused.  Mostly nonverbal at this time.  Not able to follow commands.  Hallucinating and grabbing at things and picking at his skin.  Afebrile.  No seizure activity

## 2025-04-30 NOTE — PLAN OF CARE
See OT evaluation for all goals and OT POC. Electronically signed by Deanne Meek OTR/L on 4/30/2025 at 1:55 PM

## 2025-04-30 NOTE — PROGRESS NOTES
Pharmacy Note  Vancomycin Consult    Emerson Salguero is a 89 y.o. male started on Vancomycin for sepsis; consult received from Liz Cross APRN - CNP  to manage therapy. Also receiving the following antibiotics: Zosyn.    Sepsis, unspecified organism (HCC) [A41.9]  Sepsis (HCC) [A41.9]  Allergies: Sulfa antibiotics    Temp max: 92.3 *F    Cultures  Recent Labs     04/29/25 2038   COVID19 Not Detected      , Weight - Scale: 84.8 kg (187 lb), Body mass index is 28.43 kg/m².       Recent Labs     04/30/25  0059   CREATININE 1.21*   Estimated Creatinine Clearance: 38 mL/min (A) (based on SCr of 1.21 mg/dL (H)).  .    Goal AUC24,ss Level: 400-600 mg/L.hr    Assessment/Plan:  Will initiate vancomycin 1,000 mg IV every 24 hours. Vancomycin 1,000 mg IV x1 given in [facility] ED. LOT is TBD by ID. Predicted AUC24,ss 536 mg/L.hr, Ctrough,ss 16.4 mg/L, PAUC 80 %, Pconc 33 %. Random Vancomycin level ordered for 5/1 with morning labs.  Timing of future trough levels will be determined based on culture results, renal function, and clinical response.    Thank you for the consult.  Will continue to follow.  Veronica Short, PharmD [facility] 4/30/2025 2:01 AM

## 2025-04-30 NOTE — PROGRESS NOTES
0900  Patient assessment complete, patient is oriented to self   Patient is unable to follow commands, at this time   1000  Per patient son at the bedside request, patient is a late riser and usually take his medications around 10 or 11 am.  Patient pills to be given one at a time.  Speech eval requested.  1100  Provider notified in regards to patient elevated trop  1300  IV access obtained R/T current IV site is in the AC and patient is unable to keep arm straight  1600  Per provider request, bladder scan complete showing 307  ml  Patient output from external catheter is at 600 ml at this time and is pink, provider aware  No new orders at this time.  Electronically signed by Telma Escudero RN on 4/30/2025 at 6:21 PM

## 2025-04-30 NOTE — ED NOTES
Patient accepted to [facility] ICU, awaiting bed assignment

## 2025-04-30 NOTE — PROGRESS NOTES
Physical Therapy Med Surg Initial Assessment  Facility/Department: [address] ORTHO TELE  Room: [place] [id number]       NAME: Emerson Salguero  : 1928 (89 y.o.)  MRN: [id number]  CODE STATUS: Limited    Date of Service: 2025    Patient Diagnosis(es): Sepsis, unspecified organism (HCC) [A41.9]  Sepsis (HCC) [A41.9]   No chief complaint on file.    Patient Active Problem List    Diagnosis Date Noted    Sepsis (HCC) 2025    Mild cognitive disorder     Syncope and collapse 2021    Metabolic encephalopathy 2021    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    Gross hematuria     Encephalopathy acute     Closed head injury     Aphasia     Mild cognitive impairment     AMS (altered mental status) 2021    BPH (benign prostatic hyperplasia) 2021    CKD (chronic kidney disease) 2021    Leukocytosis 2021        Past Medical History:   Diagnosis Date    Back pain     Colitis, ulcerative (HCC)     Hypertension      Past Surgical History:   Procedure Laterality Date    TONSILLECTOMY         Chart Reviewed: Yes  Family/Caregiver Present: No  Diagnosis: Sepsis (HCC)  General  General Comments: Pt resting in bed - agreeable to PT evaluation    Restrictions:  Restrictions/Precautions: Fall Risk     SUBJECTIVE:   Subjective: \"Rocael. Are you here?\"    Pain  Pain  Pre-Pain: 0  Post-Pain: 0       Prior Level of Function:  Social/Functional History  Lives With: Alone  Type of Home: Apartment  Additional Comments: Pt unable to provide PLOF d/t confusion.    OBJECTIVE:   Vision  Vision:  (Cues required for pt to maintain eyes open during assessment)  Hearing: Exceptions to WFL  Hearing Exceptions: Hard of hearing/hearing concerns    Cognition:  Orientation Level: Disoriented X4  Follows Commands: Within Functional Limits    Observation/Palpation  Observation: No acute distresss noted. Pt significantly confused.    ROM:  AROM: Generally decreased, functional  PROM: Generally

## 2025-04-30 NOTE — PROGRESS NOTES
Spiritual Health History and Assessment/Progress Note  [facility]    Spiritual/Emotional Needs, Follow-up, Grief, Loss, and Adjustments, Family Care,  , Adjustment to illness, Life Adjustments, Grief and loss, Anticipatory Grief,      Name: Emerson Salguero MRN: [id number]    Age: 89 y.o.     Sex: male   Language: English   Islam: Jehovah's witness   Sepsis (HCC)     Date: 4/30/2025            Total Time Calculated: 24 min              Spiritual Assessment continued in [facility] ORTHO TELE        Referral/Consult From: Rounding   Encounter Overview/Reason: Spiritual/Emotional Needs, Follow-up, Grief, Loss, and Adjustments, Family Care  Service Provided For: Family    REFERRAL:  Rounding  ASSESSMENT:  The pt was laying on the bed, reclining, the pt's son was present in the room, the pt was lethargic and was not able to fully communicate. The pt's son is experiencing anticipatory grief, and is also grieving for the loss of his mother and spouse. The pt's son shared much of his father's beliefs, traditions, the son also shared his coping methods and beliefs that have help him find strength and maintain a healthy life.  INTERVENTION: The  provided supportive presence by sitting and visiting with the pt's son, active listening by allowing the son to express his feelings, concerns, and fears as well as his theological beliefs and traditions. The  provide pastoral support through Pent[employer]al interhumility by learning about the family phoebe traditions. The  provided prayer.   OUTCOME: The pt's son display his gratitude at the end of the visit and requested for additional visits.  PLAN:  the  will continue to provided as needed.     Phoebe, Belief, Meaning:   Patient Unable to assess  Family/Friends identify as spiritual, are connected with a phoebe tradition or spiritual practice, and have beliefs or practices that help with coping during difficult times      Importance and  Influence:  Patient unable to assess at this time  Family/Friends have spiritual/personal beliefs that influence decisions regarding the patient's health    Community:  Patient Other: unable to assess at this time  Family/Friends are connected with a spiritual community: and feel well-supported. Support system includes: Pho[place] Community, Friends, and Extended family    Assessment and Plan of Care:     Patient Interventions include: Other: unable to assess at this time  Family/Friends Interventions include: Facilitated expression of thoughts and feelings, Explored spiritual coping/struggle/distress, Engaged in theological reflection, and Affirmed coping skills/support systems    Patient Plan of Care: Other: unable to assess at this time  Family/Friends Plan of Care: Spiritual Care available upon further referral    Electronically signed by Chaplain Paola on 4/30/2025 at 3:21 PM

## 2025-05-01 ENCOUNTER — APPOINTMENT (OUTPATIENT)
Dept: MRI IMAGING | Age: 89
DRG: 871 | End: 2025-05-01
Attending: INTERNAL MEDICINE
Payer: MEDICARE

## 2025-05-01 ENCOUNTER — APPOINTMENT (OUTPATIENT)
Dept: GENERAL RADIOLOGY | Age: 89
DRG: 871 | End: 2025-05-01
Attending: INTERNAL MEDICINE
Payer: MEDICARE

## 2025-05-01 PROBLEM — Z71.89 ADVANCED CARE PLANNING/COUNSELING DISCUSSION: Status: ACTIVE | Noted: 2025-05-01

## 2025-05-01 PROBLEM — Z51.5 PALLIATIVE CARE ENCOUNTER: Status: ACTIVE | Noted: 2025-05-01

## 2025-05-01 PROBLEM — Z71.89 GOALS OF CARE, COUNSELING/DISCUSSION: Status: ACTIVE | Noted: 2025-05-01

## 2025-05-01 LAB
ALBUMIN SERPL-MCNC: 3.2 G/DL (ref 3.5–4.6)
ALP SERPL-CCNC: 104 U/L (ref 35–104)
ALT SERPL-CCNC: 38 U/L (ref 0–41)
ANION GAP SERPL CALCULATED.3IONS-SCNC: 15 MEQ/L (ref 9–15)
AST SERPL-CCNC: 35 U/L (ref 0–40)
BASE EXCESS ARTERIAL: 3 (ref -3–3)
BASOPHILS # BLD: 0 K/UL (ref 0–0.2)
BASOPHILS NFR BLD: 0.2 %
BILIRUB SERPL-MCNC: 0.4 MG/DL (ref 0.2–0.7)
BUN SERPL-MCNC: 41 MG/DL (ref 8–23)
CALCIUM IONIZED: 1.29 MMOL/L (ref 1.12–1.32)
CALCIUM SERPL-MCNC: 9.3 MG/DL (ref 8.5–9.9)
CHLORIDE SERPL-SCNC: 104 MEQ/L (ref 95–107)
CO2 SERPL-SCNC: 22 MEQ/L (ref 20–31)
CREAT SERPL-MCNC: 1.29 MG/DL (ref 0.7–1.2)
EOSINOPHIL # BLD: 0 K/UL (ref 0–0.7)
EOSINOPHIL NFR BLD: 0.2 %
ERYTHROCYTE [DISTWIDTH] IN BLOOD BY AUTOMATED COUNT: 14.2 % (ref 11.5–14.5)
GLOBULIN SER CALC-MCNC: 3.3 G/DL (ref 2.3–3.5)
GLUCOSE BLD-MCNC: 105 MG/DL (ref 70–99)
GLUCOSE BLD-MCNC: 98 MG/DL (ref 70–99)
GLUCOSE SERPL-MCNC: 108 MG/DL (ref 70–99)
HCO3 ARTERIAL: 26.2 MMOL/L (ref 21–29)
HCT VFR BLD AUTO: 28.6 % (ref 42–52)
HCT VFR BLD AUTO: 29 % (ref 41–53)
HGB BLD CALC-MCNC: 9.9 GM/DL (ref 13.5–17.5)
HGB BLD-MCNC: 10 G/DL (ref 14–18)
INR PPP: 1.1
LACTATE: 0.86 MMOL/L (ref 0.4–2)
LYMPHOCYTES # BLD: 0.7 K/UL (ref 1–4.8)
LYMPHOCYTES NFR BLD: 5.4 %
MAGNESIUM SERPL-MCNC: 2 MG/DL (ref 1.7–2.4)
MCH RBC QN AUTO: 34.2 PG (ref 27–31.3)
MCHC RBC AUTO-ENTMCNC: 35 % (ref 33–37)
MCV RBC AUTO: 97.9 FL (ref 79–92.2)
MONOCYTES # BLD: 1.2 K/UL (ref 0.2–0.8)
MONOCYTES NFR BLD: 10.2 %
NEUTROPHILS # BLD: 10.1 K/UL (ref 1.4–6.5)
NEUTS SEG NFR BLD: 83.3 %
O2 SAT, ARTERIAL: 98 % (ref 93–100)
PCO2 ARTERIAL: 34 MM HG (ref 35–45)
PERFORMED ON: ABNORMAL
PERFORMED ON: ABNORMAL
PH ARTERIAL: 7.49 (ref 7.35–7.45)
PLATELET # BLD AUTO: 130 K/UL (ref 130–400)
PO2 ARTERIAL: 96 MM HG (ref 75–108)
POC CHLORIDE: 108 MEQ/L (ref 99–110)
POC CREATININE: 1.3 MG/DL (ref 0.8–1.3)
POC FIO2: 28
POC SAMPLE TYPE: ABNORMAL
POTASSIUM SERPL-SCNC: 3.6 MEQ/L (ref 3.5–5.1)
POTASSIUM SERPL-SCNC: 4 MEQ/L (ref 3.4–4.9)
PROT SERPL-MCNC: 6.5 G/DL (ref 6.3–8)
PROTHROMBIN TIME: 15.1 SEC (ref 12.3–14.9)
RBC # BLD AUTO: 2.92 M/UL (ref 4.7–6.1)
SODIUM BLD-SCNC: 144 MEQ/L (ref 136–145)
SODIUM SERPL-SCNC: 141 MEQ/L (ref 135–144)
TCO2 ARTERIAL: 27 MMOL/L (ref 21–32)
WBC # BLD AUTO: 12.1 K/UL (ref 4.8–10.8)

## 2025-05-01 PROCEDURE — 82435 ASSAY OF BLOOD CHLORIDE: CPT

## 2025-05-01 PROCEDURE — 85014 HEMATOCRIT: CPT

## 2025-05-01 PROCEDURE — 82803 BLOOD GASES ANY COMBINATION: CPT

## 2025-05-01 PROCEDURE — 85610 PROTHROMBIN TIME: CPT

## 2025-05-01 PROCEDURE — 84295 ASSAY OF SERUM SODIUM: CPT

## 2025-05-01 PROCEDURE — 99232 SBSQ HOSP IP/OBS MODERATE 35: CPT | Performed by: INTERNAL MEDICINE

## 2025-05-01 PROCEDURE — 82565 ASSAY OF CREATININE: CPT

## 2025-05-01 PROCEDURE — 80053 COMPREHEN METABOLIC PANEL: CPT

## 2025-05-01 PROCEDURE — 6370000000 HC RX 637 (ALT 250 FOR IP)

## 2025-05-01 PROCEDURE — 83735 ASSAY OF MAGNESIUM: CPT

## 2025-05-01 PROCEDURE — 95816 EEG AWAKE AND DROWSY: CPT

## 2025-05-01 PROCEDURE — 71045 X-RAY EXAM CHEST 1 VIEW: CPT

## 2025-05-01 PROCEDURE — 99222 1ST HOSP IP/OBS MODERATE 55: CPT | Performed by: RADIOLOGY

## 2025-05-01 PROCEDURE — 2580000003 HC RX 258: Performed by: NURSE PRACTITIONER

## 2025-05-01 PROCEDURE — 87529 HSV DNA AMP PROBE: CPT

## 2025-05-01 PROCEDURE — APPSS45 APP SPLIT SHARED TIME 31-45 MINUTES: Performed by: NURSE PRACTITIONER

## 2025-05-01 PROCEDURE — 84132 ASSAY OF SERUM POTASSIUM: CPT

## 2025-05-01 PROCEDURE — 85025 COMPLETE CBC W/AUTO DIFF WBC: CPT

## 2025-05-01 PROCEDURE — 99232 SBSQ HOSP IP/OBS MODERATE 35: CPT | Performed by: PSYCHIATRY & NEUROLOGY

## 2025-05-01 PROCEDURE — 36600 WITHDRAWAL OF ARTERIAL BLOOD: CPT

## 2025-05-01 PROCEDURE — 2580000003 HC RX 258: Performed by: INTERNAL MEDICINE

## 2025-05-01 PROCEDURE — 6360000002 HC RX W HCPCS: Performed by: NURSE PRACTITIONER

## 2025-05-01 PROCEDURE — 70551 MRI BRAIN STEM W/O DYE: CPT

## 2025-05-01 PROCEDURE — 2500000003 HC RX 250 WO HCPCS: Performed by: INTERNAL MEDICINE

## 2025-05-01 PROCEDURE — 82330 ASSAY OF CALCIUM: CPT

## 2025-05-01 PROCEDURE — APPSS15 APP SPLIT SHARED TIME 0-15 MINUTES: Performed by: NURSE PRACTITIONER

## 2025-05-01 PROCEDURE — 6360000002 HC RX W HCPCS: Performed by: INTERNAL MEDICINE

## 2025-05-01 PROCEDURE — 83605 ASSAY OF LACTIC ACID: CPT

## 2025-05-01 PROCEDURE — 51798 US URINE CAPACITY MEASURE: CPT

## 2025-05-01 PROCEDURE — 36415 COLL VENOUS BLD VENIPUNCTURE: CPT

## 2025-05-01 PROCEDURE — 99223 1ST HOSP IP/OBS HIGH 75: CPT

## 2025-05-01 PROCEDURE — 1210000000 HC MED SURG R&B

## 2025-05-01 RX ORDER — LEVETIRACETAM 500 MG/5ML
500 INJECTION, SOLUTION, CONCENTRATE INTRAVENOUS EVERY 12 HOURS
Status: DISCONTINUED | OUTPATIENT
Start: 2025-05-02 | End: 2025-05-08 | Stop reason: HOSPADM

## 2025-05-01 RX ORDER — FUROSEMIDE 10 MG/ML
20 INJECTION INTRAMUSCULAR; INTRAVENOUS ONCE
Status: COMPLETED | OUTPATIENT
Start: 2025-05-01 | End: 2025-05-01

## 2025-05-01 RX ORDER — LORAZEPAM 2 MG/ML
1 INJECTION INTRAMUSCULAR ONCE
Status: DISCONTINUED | OUTPATIENT
Start: 2025-05-01 | End: 2025-05-08

## 2025-05-01 RX ORDER — HYOSCYAMINE SULFATE 0.12 MG/1
0.12 TABLET SUBLINGUAL EVERY 6 HOURS PRN
Status: DISCONTINUED | OUTPATIENT
Start: 2025-05-01 | End: 2025-05-04

## 2025-05-01 RX ADMIN — LEVETIRACETAM 500 MG: 100 INJECTION INTRAVENOUS at 15:29

## 2025-05-01 RX ADMIN — FUROSEMIDE 20 MG: 10 INJECTION, SOLUTION INTRAMUSCULAR; INTRAVENOUS at 12:27

## 2025-05-01 RX ADMIN — DEXTROSE, SODIUM CHLORIDE, SODIUM LACTATE, POTASSIUM CHLORIDE, AND CALCIUM CHLORIDE: 5; .6; .31; .03; .02 INJECTION, SOLUTION INTRAVENOUS at 03:08

## 2025-05-01 RX ADMIN — WATER 2000 MG: 1 INJECTION INTRAMUSCULAR; INTRAVENOUS; SUBCUTANEOUS at 11:33

## 2025-05-01 RX ADMIN — ACYCLOVIR SODIUM 800 MG: 50 INJECTION, SOLUTION INTRAVENOUS at 16:46

## 2025-05-01 RX ADMIN — HYOSCYAMINE SULFATE 0.12 MG: 0.12 TABLET SUBLINGUAL at 20:16

## 2025-05-01 ASSESSMENT — PAIN SCALES - WONG BAKER: WONGBAKER_NUMERICALRESPONSE: NO HURT

## 2025-05-01 NOTE — CONSULTS
Vascular Medicine and Interventional Radiology    Date of Consultation:2025    Emerson Salguero  : 1928  MR #: [id number]    Consultant:TAYE Sanon CNP  Consulting Physician: Dr. Rocael Roberto, Vascular Medicine and Interventional Radiology     Consult Ordered By: Barbie Moore            PCP:  Misty Vela MD     Attending Physician: Miguel Alarcon DO     Date of Admission: 2025 12:22 AM     Chief Complaint: Encephalopathy     Reason for Consultation: Encephalitis, needs LP    History of Present Illness: 89-year-old male with history of baseline dementia admitted with encephalopathy, possible pneumonia.  Patient's son, Shayne, at bedside providing history.  Patient's son reports patient has history of baseline dementia, however was alert, oriented to self and place, disoriented to time and experienced short-term memory loss. Patient's son and family were planning on moving patient to assisted living, [facility] [place], as patient's wife recently passed away approximately 5 weeks ago. He reports approximately last Thursday they began noticed patient was increasingly confused, by last weekend he was not answering questions appropriately in his usual manner and was seeing and talking to people who were not present and therefore they decided to bring him to the emergency room.  Up until yesterday patient was alert and answering questions/following commands.  However, today patient has been difficult to arouse and not following commands.  Neurology has been following, EEG revealing left focus of sharp waves, suggestive of underlying encephalitis.  He is to go for MRI brain today per neurology request with plans for lumbar puncture tomorrow.   Patient's son reports he does not take anticoagulation/antiplatelet therapy.  Does use Tylenol as needed for arthritis pain.  Lovenox was started for VTE prophylaxis during admission, however is placed on hold per neurology in anticipation of  Number of children: Not on file    Years of education: Not on file    Highest education level: Not on file   Occupational History    Not on file   Tobacco Use    Smoking status: Never    Smokeless tobacco: Never   Vaping Use    Vaping status: Never Used   Substance and Sexual Activity    Alcohol use: Not Currently    Drug use: Not Currently    Sexual activity: Not Currently     Partners: Female   Other Topics Concern    Not on file   Social History Narrative    Not on file     Social Drivers of Health     Financial Resource Strain: Not on file   Food Insecurity: Not on file   Transportation Needs: Not on file   Physical Activity: Not on file   Stress: No Stress Concern Present (12/8/2023)    Received from [facility] Occup[facility] - Occupational Stress Questionnaire     Feeling of Stress : Not at all   Social Connections: Not on file   Intimate Partner Violence: Not on file   Housing Stability: Not on file        Review of Systems:    Review of Systems   Unable to perform ROS: Mental status change       Objective:   Vitals: BP (!) 123/54   Pulse 85   Temp 97.7 °F (36.5 °C) (Axillary)   Resp 18   Ht 1.727 m (5' 7.99\")   Wt 80.2 kg (176 lb 12.8 oz)   SpO2 95%   BMI 26.89 kg/m²      Physical Examination:      Physical Exam  Constitutional:       General: He is not in acute distress.     Appearance: Normal appearance. He is normal weight. He is not ill-appearing.      Comments: Patent resting, eyes closed   HENT:      Head: Normocephalic and atraumatic.      Nose: Nose normal.      Mouth/Throat:      Mouth: Mucous membranes are moist.   Eyes:      General: No scleral icterus.     Conjunctiva/sclera: Conjunctivae normal.   Cardiovascular:      Rate and Rhythm: Normal rate and regular rhythm.      Pulses: Normal pulses.      Heart sounds: Normal heart sounds.   Pulmonary:      Effort: Pulmonary effort is normal. No respiratory distress.      Breath sounds: Normal

## 2025-05-01 NOTE — PROGRESS NOTES
[facility]  Occupational Therapy        NAME:  Emerson Salguero  ROOM: W282/W282-01  :  1928  DATE: 2025    Attempted to see Emerson Salguero at 1406 on this date for:   []  Initial Evaluation   [x]  Treatment       Patient was unable to be seen due to:   [] Off unit for testing/procedure    [] Patient refused, stating \"    [] Therapy on hold due to     [] Nursing deferred due to    [x] Other: Pt is to be picked up for an MRI shortly and is too groggy to participate appropriately at this time per son and Dr. Vegas.    Will re-attempt at earliest convenience/when appropriate.    Discussed with LORENA Gonzales    Electronically signed by Mahnaz Byrd OT on 25 at 2:45 PM EDT

## 2025-05-01 NOTE — CONSULTS
Palliative Care Consult Note  Patient: Emerson Salguero  Gender: male  YOB: 1928  Unit/Bed: W282/W282-01  CodeStatus: Limited  Inpatient Treatment Team: Treatment Team:   Miguel Alarcon DO Abbud, Rita A, MD Dadas, Lauren A, APRPAPA - David Espino MD Salisbury, Janet Blanchard RN Hipp, Lisa, RN  Admit Date:  4/30/2025    Chief Complaint: Altered Mental Status    History of Presenting Illness:      Emerson Salguero is a 89 y.o. male on hospital day 1 with a history of dementia.  Patient was brought to the emergency room with altered mental status. Patient was admitted in the setting of acute metabolic encephalopathy with severe underlying dementia, sepsis    Palliative care was consulted by Dr. Alarcon for goals of care.     Upon entering the room I find the patient a/o x 0, will occasionally open eyes. Son at bedside. Patient has had a functional decline over the last 14 days. Reports cognitive decline over the last 2 weeks but then also states only intermittent episodes of increased confusion. Son also reports fatigue of sleeping 20+ hours a day but also reports frequent episodes of \"insightfulness/lucid periods\". Son reports patient recently lost his wife of 70 years 6 weeks ago. Son reports that his wife noticed patient having swallowing issues over the last several days. Patient son also reports \"small fall on Friday\" unable to clarify exact date/which Friday but son report the ambulance did arrive and \"sign off on patient\".     Most recent notable labs:   Sodium: 141 Potassium: 4.0 BUN: 41 Creat: 1.29 GFR: 50.4  WBC: 12.1 Hemoglobin: 10.0 Hematocrit: 28.6        Review of Systems:       Review of Systems   Unable to perform ROS: Dementia       Physical Examination:       BP (!) 120/54   Pulse (!) 102   Temp 98.2 °F (36.8 °C) (Axillary)   Resp 18   Ht 1.727 m (5' 7.99\")   Wt 80.2 kg (176 lb 12.8 oz)   SpO2 94%   BMI 26.89 kg/m²      Physical Exam  Constitutional:

## 2025-05-01 NOTE — PROGRESS NOTES
Physical Therapy Missed Treatment   Facility/Department: [facility] MED SURG W282/W282-01    NAME: Emerson Salguero  Patient Status:   : 1928 (89 y.o.)  MRN: [id number]  Account: [id number]  Gender: male        [] Patient Declines PT Treatment            [x] Patient Unavailable:     Pt going for MRI soon. Son didn't want patient woken up since they are trying to avoid giving pt a sedative for MRI. Will return tomorrow.   Will attempt PT Treatment again at earliest convenience.        Electronically signed by Krysta Waddell PTA on 25 at 1:24 PM EDT

## 2025-05-01 NOTE — PROGRESS NOTES
Physical Therapy Missed Treatment   Facility/Department: [facility] MED SURG W282/W282-01    NAME: Emerson Salguero  Patient Status:   : 1928 (89 y.o.)  MRN: [id number]  Account: [id number]  Gender: male        [] Patient Declines PT Treatment            [x] Patient Unavailable:     Pt not available, having EEG in room.   Will attempt PT Treatment again at earliest convenience.        Electronically signed by Krysta Waddell PTA on 25 at 10:50 AM EDT

## 2025-05-01 NOTE — PROGRESS NOTES
[facility]   Facility/Department: [facility] 2W ORTHO TELE  Speech Language Pathology    Emerson Salguero  5/4/1928  W282/W282-01    Date: 5/1/2025      Speech Therapy attempted to see Emerson Salguero on this date for a/an:    Treatment    Pt was unable to be seen due to:   Patient is too lethargic to participate Following verbal and tactile cues, pt remained soundly asleep. Will re-att at next opportunity.         Electronically signed by VIKA Hernandez on 5/1/25 at 10:43 AM EDT

## 2025-05-01 NOTE — PROGRESS NOTES
[facility] Neurology Daily Progress Note  Name: Emerson Salguero  Age: 89 y.o.  Gender: male  CodeStatus: Limited  Allergies: Sulfa Antibiotics    Chief Complaint:No chief complaint on file.    Primary Care Provider: Misty Vela MD  InpatientTreatment Team: Treatment Team:   Miguel Alarcon DO Abbud, Rita A, MD Dadas, Lauren A, APRN - CNP  David Vegas MD Salisbury, Mary Jo C Losiewicz, Olivia, RN Hipp, Lisa, RN  Admission Date: 4/30/2025      HPI   Pt seen and examined for neuro follow up.  Patient sleeping.  Awakens to verbal stimuli.  Mostly nonverbal.  Confused.  Unable to follow commands.  Afebrile.  Moves extremities equally and spontaneously.  No seizure activity.    Patient seen and examined events as noted  Vitals:    05/01/25 0729   BP: (!) 120/54   Pulse: (!) 102   Resp: 18   Temp: 98.2 °F (36.8 °C)   SpO2: 94%        Review of Systems   Unable to perform ROS: Dementia   Constitutional:  Negative for fever.   HENT:  Positive for trouble swallowing. Negative for hearing loss.    Respiratory:  Negative for cough and wheezing.    Cardiovascular:  Negative for chest pain, palpitations and leg swelling.   Gastrointestinal:  Negative for vomiting.   Skin:  Negative for color change and rash.   Neurological:  Positive for speech difficulty. Negative for tremors, seizures, syncope and facial asymmetry.   Psychiatric/Behavioral:  Positive for behavioral problems, confusion and hallucinations. Negative for agitation. The patient is not nervous/anxious.          Physical Exam  Vitals and nursing note reviewed.   Constitutional:       General: He is sleeping. He is not in acute distress.     Appearance: He is not diaphoretic.   HENT:      Head: Normocephalic and atraumatic.   Eyes:      Extraocular Movements: Extraocular movements intact.      Pupils: Pupils are equal, round, and reactive to light.   Cardiovascular:      Rate and Rhythm: Normal rate and regular rhythm.   Pulmonary:      Effort: Pulmonary  effort is normal. No respiratory distress.   Skin:     General: Skin is warm and dry.   Neurological:      Mental Status: He is disoriented.      Motor: No tremor, abnormal muscle tone or seizure activity.      Comments: Neuroexam limited.  Patient with severe dementia and encephalopathy.  Unable to follow commands.  Does move all extremities equally and spontaneously.  No obvious focal deficits.           Patient seen and examined events as noted    Medications:  Reviewed    Infusion Medications:    sodium chloride      dextrose 5% in lactated ringers 75 mL/hr at 05/01/25 0741     Scheduled Medications:    sodium chloride flush  5-40 mL IntraVENous 2 times per day    [Held by provider] enoxaparin  40 mg SubCUTAneous Daily    Vitamin D  2,000 Units Oral Daily    escitalopram  5 mg Oral Daily    finasteride  5 mg Oral Daily    [Held by provider] hydroCHLOROthiazide  25 mg Oral Daily    losartan  50 mg Oral Daily    tamsulosin  0.4 mg Oral BID RT    cefTRIAXone (ROCEPHIN) IV  2,000 mg IntraVENous Q24H    memantine  5 mg Oral BID     PRN Meds: sodium chloride flush, sodium chloride, acetaminophen **OR** acetaminophen    Labs:   Recent Labs     04/30/25 0059 04/30/25 0514 05/01/25 0458   WBC 9.5 11.6* 12.1*   HGB 9.2* 9.7* 10.0*   HCT 26.6* 27.9* 28.6*   * 120* 130     Recent Labs     04/30/25 0059 04/30/25 0514 05/01/25  0458   * 136 141   K 4.5 4.3 4.0   CL 99 101 104   CO2 22 24 22   BUN 54* 51* 41*   CREATININE 1.21* 1.24* 1.29*   CALCIUM 9.2 9.3 9.3     Recent Labs     04/30/25 0059 04/30/25 0514 05/01/25  0458   AST 34 33 35   ALT 37 39 38   BILITOT 0.4 0.5 0.4   ALKPHOS 97 103 104     No results for input(s): \"INR\" in the last 72 hours.  No results for input(s): \"CKTOTAL\", \"TROPONINI\" in the last 72 hours.    Urinalysis:   Lab Results   Component Value Date/Time    NITRU Negative 04/29/2025 07:44 PM    WBCUA 0-2 04/29/2025 07:44 PM    BACTERIA RARE 04/29/2025 07:44 PM    RBCUA 0-2 04/29/2025  therefore will discontinue Namenda for now.  Patient more drowsy and lethargic and I did review the EEG and there is a left focus of sharp waves notable on the EEG.  This would be suspicious for underlying encephalitis which was ruled out  Patient therefore will require MRI of the brain and lumbar puncture.  Will try to pursue the MRI today with LP tomorrow.  Will start him on acyclovir for now.        David Vegas MD, JESSI  Diplomate, American Board of Psychiatry & Neurology  Board Certified in Vascular Neurology  Board Certified in Neuromuscular Medicine  Certified in Neurorehabilitation           Collaborating physicians: Dr Vegas    Electronically signed by TAYE Vance CNP on 5/1/2025 at 10:28 AM

## 2025-05-01 NOTE — CARE COORDINATION
Team IDR done earlier this am. Pt seen by [facility] care and plan is [facility] once medically ready.

## 2025-05-01 NOTE — PROGRESS NOTES
Physician Progress Note    5/1/2025   12:34 PM    Name:  Emerson Salguero  MRN:    [id number]     IP Day: 1     Admit Date: 4/30/2025 12:22 AM  PCP: Misty Vela MD    Code Status:  Limited    Assessment and Plan:        1.  Encephalopathy in the setting of baseline dementia.  Suspect multifactorial etiology.  Possible pneumonia (dysphagia, secretions, cough, hypothermia).    - S/p IVF and now appears to be developing some congestion.  Will give small dose IV Lasix  - Check arterial blood gas  - MRI-rule out underlying CVA  - EEG completed  - Supportive care for secretions and feeding if patient is alert enough for oral intake  - Currently on ceftriaxone which would treat aspiration pneumonia.  Blood cultures with no growth today.  Urinalysis not suggestive of UTI.  Patient is urinating.  -Palliative care is following.  We will optimize variables under our control and see how he does in terms of mental status / clearance of secretions / oral intake and that will determine his clinical trajectory.  Due to his advanced age, underlying dementia, and worsening mental status which hinders his ability to clear secretions and take oral intake, prognosis is guarded at this time.  Significant time spent with son in room detailing medical care and expectations on clinical course.     CKD 3  Dementia  BPH  Hypertension    Diet: ADULT DIET; Dysphagia - Minced and Moist  Ppx: Hold chemical prophylaxis due to pink tinge in urine  Limited    Electronically signed by Miguel Alarcon DO on 5/1/2025 at 12:34 PM    Subjective:     Patient was somewhat interactive and was being fed ice cream last night but today he has been lethargic.  He is not alert enough to take medications or eat food.    Current Facility-Administered Medications   Medication Dose Route Frequency Provider Last Rate Last Admin    hyoscyamine (LEVSIN/SL) sublingual tablet 0.125 mg  0.125 mg SubLINGual Q6H PRN Gladis Mckeon, APRN - CNP        sodium chloride flush

## 2025-05-01 NOTE — PLAN OF CARE
Problem: Discharge Planning  Goal: Discharge to home or other facility with appropriate resources  Outcome: Progressing     Problem: ABCDS Injury Assessment  Goal: Absence of physical injury  Outcome: Progressing     Problem: Skin/Tissue Integrity  Goal: Skin integrity remains intact  Description: 1.  Monitor for areas of redness and/or skin breakdown2.  Assess vascular access sites hourly3.  Every 4-6 hours minimum:  Change oxygen saturation probe site4.  Every 4-6 hours:  If on nasal continuous positive airway pressure, respiratory therapy assess nares and determine need for appliance change or resting period  Outcome: Progressing  Flowsheets (Taken 4/30/2025 1807 by Telma Escudero, RN)  Skin Integrity Remains Intact: Monitor for areas of redness and/or skin breakdown     Problem: Confusion  Goal: Confusion, delirium, dementia, or psychosis is improved or at baseline  Description: INTERVENTIONS:1. Assess for possible contributors to thought disturbance, including medications, impaired vision or hearing, underlying metabolic abnormalities, dehydration, psychiatric diagnoses, and notify attending LIP2. [place] high risk fall precautions, as indicated3. Provide frequent short contacts to provide reality reorientation, refocusing and direction4. Decrease environmental stimuli, including noise as appropriate5. Monitor and intervene to maintain adequate nutrition, hydration, elimination, sleep and activity6. If unable to ensure safety without constant attention obtain sitter and review sitter guidelines with assigned personnel7. Initiate Psychosocial CNS and Spiritual Care consult, as indicated  INTERVENTIONS:1. Assess for possible contributors to thought disturbance, including medications, impaired vision or hearing, underlying metabolic abnormalities, dehydration, psychiatric diagnoses, and notify attending LIP2. [place] high risk fall precautions, as indicated3. Provide frequent short contacts to

## 2025-05-01 NOTE — PLAN OF CARE
Problem: Discharge Planning  Goal: Discharge to home or other facility with appropriate resources  5/1/2025 1406 by Janet Stafford RN  Outcome: Progressing  5/1/2025 0450 by Mabel Jacobsen RN  Outcome: Progressing     Problem: ABCDS Injury Assessment  Goal: Absence of physical injury  5/1/2025 1406 by Janet Stafford RN  Outcome: Progressing  5/1/2025 0450 by Mabel Jacobsen RN  Outcome: Progressing     Problem: Skin/Tissue Integrity  Goal: Skin integrity remains intact  Description: 1.  Monitor for areas of redness and/or skin breakdown2.  Assess vascular access sites hourly3.  Every 4-6 hours minimum:  Change oxygen saturation probe site4.  Every 4-6 hours:  If on nasal continuous positive airway pressure, respiratory therapy assess nares and determine need for appliance change or resting period  5/1/2025 1406 by Janet Stafford RN  Outcome: Progressing  5/1/2025 0450 by Mabel Jacobsen RN  Outcome: Progressing  Flowsheets (Taken 4/30/2025 1807 by Telma Escudero, RN)  Skin Integrity Remains Intact: Monitor for areas of redness and/or skin breakdown     Problem: Confusion  Goal: Confusion, delirium, dementia, or psychosis is improved or at baseline  Description: INTERVENTIONS:1. Assess for possible contributors to thought disturbance, including medications, impaired vision or hearing, underlying metabolic abnormalities, dehydration, psychiatric diagnoses, and notify attending LIP2. [place] high risk fall precautions, as indicated3. Provide frequent short contacts to provide reality reorientation, refocusing and direction4. Decrease environmental stimuli, including noise as appropriate5. Monitor and intervene to maintain adequate nutrition, hydration, elimination, sleep and activity6. If unable to ensure safety without constant attention obtain sitter and review sitter guidelines with assigned personnel7. Initiate Psychosocial CNS and Spiritual Care consult, as

## 2025-05-02 ENCOUNTER — HOSPITAL ENCOUNTER (INPATIENT)
Dept: INTERVENTIONAL RADIOLOGY/VASCULAR | Age: 89
Discharge: HOME | DRG: 871 | End: 2025-05-04
Attending: INTERNAL MEDICINE
Payer: MEDICARE

## 2025-05-02 ENCOUNTER — APPOINTMENT (OUTPATIENT)
Dept: GENERAL RADIOLOGY | Age: 89
DRG: 871 | End: 2025-05-02
Attending: INTERNAL MEDICINE
Payer: MEDICARE

## 2025-05-02 VITALS
OXYGEN SATURATION: 96 % | HEART RATE: 77 BPM | DIASTOLIC BLOOD PRESSURE: 99 MMHG | RESPIRATION RATE: 16 BRPM | SYSTOLIC BLOOD PRESSURE: 175 MMHG

## 2025-05-02 PROBLEM — J18.9 COMMUNITY ACQUIRED PNEUMONIA OF LEFT LOWER LOBE OF LUNG: Status: ACTIVE | Noted: 2025-05-02

## 2025-05-02 PROBLEM — R13.12 OROPHARYNGEAL DYSPHAGIA: Status: ACTIVE | Noted: 2025-05-02

## 2025-05-02 PROBLEM — A41.89 SEPSIS DUE TO OTHER ETIOLOGY (HCC): Status: ACTIVE | Noted: 2025-04-30

## 2025-05-02 LAB
ALBUMIN SERPL-MCNC: 3.2 G/DL (ref 3.5–4.6)
ALP SERPL-CCNC: 98 U/L (ref 35–104)
ALT SERPL-CCNC: 30 U/L (ref 0–41)
ANION GAP SERPL CALCULATED.3IONS-SCNC: 16 MEQ/L (ref 9–15)
AST SERPL-CCNC: 32 U/L (ref 0–40)
BASOPHILS # BLD: 0 K/UL (ref 0–0.2)
BASOPHILS NFR BLD: 0.1 %
BILIRUB SERPL-MCNC: 0.4 MG/DL (ref 0.2–0.7)
BNP BLD-MCNC: 1034 PG/ML
BUN SERPL-MCNC: 38 MG/DL (ref 8–23)
CALCIUM SERPL-MCNC: 9.2 MG/DL (ref 8.5–9.9)
CHLORIDE SERPL-SCNC: 107 MEQ/L (ref 95–107)
CO2 SERPL-SCNC: 21 MEQ/L (ref 20–31)
CREAT SERPL-MCNC: 1.42 MG/DL (ref 0.7–1.2)
CRP SERPL HS-MCNC: 216.5 MG/L (ref 0–5)
EOSINOPHIL # BLD: 0 K/UL (ref 0–0.7)
EOSINOPHIL NFR BLD: 0 %
ERYTHROCYTE [DISTWIDTH] IN BLOOD BY AUTOMATED COUNT: 14.3 % (ref 11.5–14.5)
GLOBULIN SER CALC-MCNC: 3.6 G/DL (ref 2.3–3.5)
GLUCOSE CSF-MCNC: 68 MG/DL (ref 50–70)
GLUCOSE SERPL-MCNC: 93 MG/DL (ref 70–99)
HCT VFR BLD AUTO: 28.1 % (ref 42–52)
HGB BLD-MCNC: 9.8 G/DL (ref 14–18)
LYMPHOCYTES # BLD: 0.6 K/UL (ref 1–4.8)
LYMPHOCYTES NFR BLD: 6.6 %
MAGNESIUM SERPL-MCNC: 2.1 MG/DL (ref 1.7–2.4)
MCH RBC QN AUTO: 34.5 PG (ref 27–31.3)
MCHC RBC AUTO-ENTMCNC: 34.9 % (ref 33–37)
MCV RBC AUTO: 98.9 FL (ref 79–92.2)
MONOCYTES # BLD: 1.1 K/UL (ref 0.2–0.8)
MONOCYTES NFR BLD: 12.5 %
NEUTROPHILS # BLD: 7 K/UL (ref 1.4–6.5)
NEUTS SEG NFR BLD: 80 %
PLATELET # BLD AUTO: 116 K/UL (ref 130–400)
POTASSIUM SERPL-SCNC: 3.7 MEQ/L (ref 3.4–4.9)
PROT CSF-MCNC: 126 MG/DL (ref 15–45)
PROT SERPL-MCNC: 6.8 G/DL (ref 6.3–8)
RBC # BLD AUTO: 2.84 M/UL (ref 4.7–6.1)
SODIUM SERPL-SCNC: 144 MEQ/L (ref 135–144)
WBC # BLD AUTO: 8.8 K/UL (ref 4.8–10.8)

## 2025-05-02 PROCEDURE — 6360000002 HC RX W HCPCS: Performed by: NURSE PRACTITIONER

## 2025-05-02 PROCEDURE — 86787 VARICELLA-ZOSTER ANTIBODY: CPT

## 2025-05-02 PROCEDURE — 86140 C-REACTIVE PROTEIN: CPT

## 2025-05-02 PROCEDURE — 99232 SBSQ HOSP IP/OBS MODERATE 35: CPT | Performed by: INTERNAL MEDICINE

## 2025-05-02 PROCEDURE — 99233 SBSQ HOSP IP/OBS HIGH 50: CPT | Performed by: PSYCHIATRY & NEUROLOGY

## 2025-05-02 PROCEDURE — 87205 SMEAR GRAM STAIN: CPT

## 2025-05-02 PROCEDURE — 84157 ASSAY OF PROTEIN OTHER: CPT

## 2025-05-02 PROCEDURE — 6360000002 HC RX W HCPCS: Performed by: INTERNAL MEDICINE

## 2025-05-02 PROCEDURE — 2500000003 HC RX 250 WO HCPCS: Performed by: NURSE PRACTITIONER

## 2025-05-02 PROCEDURE — 1210000000 HC MED SURG R&B

## 2025-05-02 PROCEDURE — 94761 N-INVAS EAR/PLS OXIMETRY MLT: CPT

## 2025-05-02 PROCEDURE — 86789 WEST NILE VIRUS ANTIBODY: CPT

## 2025-05-02 PROCEDURE — 2500000003 HC RX 250 WO HCPCS: Performed by: INTERNAL MEDICINE

## 2025-05-02 PROCEDURE — APPSS15 APP SPLIT SHARED TIME 0-15 MINUTES: Performed by: NURSE PRACTITIONER

## 2025-05-02 PROCEDURE — 62328 DX LMBR SPI PNXR W/FLUOR/CT: CPT | Performed by: RADIOLOGY

## 2025-05-02 PROCEDURE — 2709999900 IR LUMBAR PUNCTURE FOR DIAGNOSIS

## 2025-05-02 PROCEDURE — 94640 AIRWAY INHALATION TREATMENT: CPT

## 2025-05-02 PROCEDURE — 6370000000 HC RX 637 (ALT 250 FOR IP)

## 2025-05-02 PROCEDURE — 87798 DETECT AGENT NOS DNA AMP: CPT

## 2025-05-02 PROCEDURE — 62328 DX LMBR SPI PNXR W/FLUOR/CT: CPT

## 2025-05-02 PROCEDURE — 009U3ZX DRAINAGE OF SPINAL CANAL, PERCUTANEOUS APPROACH, DIAGNOSTIC: ICD-10-PCS | Performed by: RADIOLOGY

## 2025-05-02 PROCEDURE — 71045 X-RAY EXAM CHEST 1 VIEW: CPT

## 2025-05-02 PROCEDURE — 36415 COLL VENOUS BLD VENIPUNCTURE: CPT

## 2025-05-02 PROCEDURE — 86788 WEST NILE VIRUS AB IGM: CPT

## 2025-05-02 PROCEDURE — 2580000003 HC RX 258: Performed by: NURSE PRACTITIONER

## 2025-05-02 PROCEDURE — 85025 COMPLETE CBC W/AUTO DIFF WBC: CPT

## 2025-05-02 PROCEDURE — 83880 ASSAY OF NATRIURETIC PEPTIDE: CPT

## 2025-05-02 PROCEDURE — 87070 CULTURE OTHR SPECIMN AEROBIC: CPT

## 2025-05-02 PROCEDURE — 82945 GLUCOSE OTHER FLUID: CPT

## 2025-05-02 PROCEDURE — 83735 ASSAY OF MAGNESIUM: CPT

## 2025-05-02 PROCEDURE — 87498 ENTEROVIRUS PROBE&REVRS TRNS: CPT

## 2025-05-02 PROCEDURE — 80053 COMPREHEN METABOLIC PANEL: CPT

## 2025-05-02 PROCEDURE — 6370000000 HC RX 637 (ALT 250 FOR IP): Performed by: INTERNAL MEDICINE

## 2025-05-02 PROCEDURE — 89050 BODY FLUID CELL COUNT: CPT

## 2025-05-02 PROCEDURE — 2700000000 HC OXYGEN THERAPY PER DAY

## 2025-05-02 RX ORDER — HYDRALAZINE HYDROCHLORIDE 20 MG/ML
10 INJECTION INTRAMUSCULAR; INTRAVENOUS EVERY 6 HOURS PRN
Status: DISCONTINUED | OUTPATIENT
Start: 2025-05-02 | End: 2025-05-08 | Stop reason: HOSPADM

## 2025-05-02 RX ORDER — LIDOCAINE HYDROCHLORIDE 20 MG/ML
JELLY TOPICAL ONCE
Status: DISCONTINUED | OUTPATIENT
Start: 2025-05-02 | End: 2025-05-08

## 2025-05-02 RX ORDER — IPRATROPIUM BROMIDE AND ALBUTEROL SULFATE 2.5; .5 MG/3ML; MG/3ML
1 SOLUTION RESPIRATORY (INHALATION)
Status: DISCONTINUED | OUTPATIENT
Start: 2025-05-02 | End: 2025-05-08

## 2025-05-02 RX ORDER — LABETALOL HYDROCHLORIDE 5 MG/ML
10 INJECTION, SOLUTION INTRAVENOUS EVERY 4 HOURS PRN
Status: DISCONTINUED | OUTPATIENT
Start: 2025-05-02 | End: 2025-05-08 | Stop reason: HOSPADM

## 2025-05-02 RX ORDER — LIDOCAINE HYDROCHLORIDE 20 MG/ML
INJECTION, SOLUTION INFILTRATION; PERINEURAL PRN
Status: COMPLETED | OUTPATIENT
Start: 2025-05-02 | End: 2025-05-02

## 2025-05-02 RX ORDER — OXYMETAZOLINE HYDROCHLORIDE 0.05 G/100ML
2 SPRAY NASAL ONCE
Status: DISPENSED | OUTPATIENT
Start: 2025-05-02 | End: 2025-05-05

## 2025-05-02 RX ADMIN — IPRATROPIUM BROMIDE AND ALBUTEROL SULFATE 1 DOSE: 2.5; .5 SOLUTION RESPIRATORY (INHALATION) at 14:52

## 2025-05-02 RX ADMIN — WATER 2000 MG: 1 INJECTION INTRAMUSCULAR; INTRAVENOUS; SUBCUTANEOUS at 10:08

## 2025-05-02 RX ADMIN — SODIUM CHLORIDE, PRESERVATIVE FREE 10 ML: 5 INJECTION INTRAVENOUS at 20:25

## 2025-05-02 RX ADMIN — LEVETIRACETAM 500 MG: 100 INJECTION INTRAVENOUS at 03:22

## 2025-05-02 RX ADMIN — HYOSCYAMINE SULFATE 0.12 MG: 0.12 TABLET SUBLINGUAL at 05:55

## 2025-05-02 RX ADMIN — SODIUM CHLORIDE, PRESERVATIVE FREE 10 ML: 5 INJECTION INTRAVENOUS at 10:10

## 2025-05-02 RX ADMIN — ACYCLOVIR SODIUM 800 MG: 50 INJECTION, SOLUTION INTRAVENOUS at 03:43

## 2025-05-02 RX ADMIN — LIDOCAINE HYDROCHLORIDE 6 ML: 20 INJECTION, SOLUTION INFILTRATION; PERINEURAL at 11:09

## 2025-05-02 RX ADMIN — LEVETIRACETAM 500 MG: 100 INJECTION INTRAVENOUS at 17:35

## 2025-05-02 NOTE — PROGRESS NOTES
05/02/25 1503   RT Protocol   History Pulmonary Disease 0   Respiratory pattern 0   Breath sounds 6   Cough 0   Indications for Bronchodilator Therapy Decreased or absent breath sounds   Bronchodilator Assessment Score 6

## 2025-05-02 NOTE — RT PROTOCOL NOTE
RT Inhaler-Nebulizer Bronchodilator Protocol Note    There is a bronchodilator order in the chart from a provider indicating to follow the RT Bronchodilator Protocol and there is an “Initiate RT Inhaler-Nebulizer Bronchodilator Protocol” order as well (see protocol at bottom of note).    CXR Findings:  XR CHEST PORTABLE  Result Date: 5/1/2025  Cardiomegaly with small effusions and atelectasis.       The findings from the last RT Protocol Assessment were as follows:   History Pulmonary Disease: None or smoker <15 pack years  Respiratory Pattern: Regular pattern and RR 12-20 bpm  Breath Sounds: Inspiratory and expiratory or bilateral wheezing and/or rhonchi  Cough: Strong, spontaneous, non-productive  Indication for Bronchodilator Therapy: Decreased or absent breath sounds  Bronchodilator Assessment Score: 6    Aerosolized bronchodilator medication orders have been revised according to the RT Inhaler-Nebulizer Bronchodilator Protocol below.    Respiratory Therapist to perform RT Therapy Protocol Assessment initially then follow the protocol.  Repeat RT Therapy Protocol Assessment PRN for score 0-3 or on second treatment, BID, and PRN for scores above 3.    No Indications - adjust the frequency to every 6 hours PRN wheezing or bronchospasm, if no treatments needed after 48 hours then discontinue using Per Protocol order mode.     If indication present, adjust the RT bronchodilator orders based on the Bronchodilator Assessment Score as indicated below.  Use Inhaler orders unless patient has one or more of the following: on home nebulizer, not able to hold breath for 10 seconds, is not alert and oriented, cannot activate and use MDI correctly, or respiratory rate 25 breaths per minute or more, then use the equivalent nebulizer order(s) with same Frequency and PRN reasons based on the score.  If a patient is on this medication at home then do not decrease Frequency below that used at home.    0-3 - enter or revise RT

## 2025-05-02 NOTE — PROGRESS NOTES
Infectious Diseases Inpatient Progress Note          HISTORY OF PRESENT ILLNESS:  Follow up sepsis without definite etiology in a patient with acute encephalopathy with underlying cognitive impairment on IV Rocephin and Acyclovir, well tolerated.  Currently NPO.  Remains to be very lethargic.  Had LP today, pending results  As a plan to protein NG and tube feeding since patient remains to be very lethargic  Blood cultures remain negative.   Right second toe wound.   Patient was reported to have weak cough  Current Medications:     oxymetazoline  2 spray Each Nostril Once    lidocaine   Topical Once    LORazepam  1 mg IntraVENous Once    acyclovir  10 mg/kg IntraVENous Q12H    levETIRAcetam  500 mg IntraVENous Q12H    sodium chloride flush  5-40 mL IntraVENous 2 times per day    [Held by provider] enoxaparin  40 mg SubCUTAneous Daily    Vitamin D  2,000 Units Oral Daily    escitalopram  5 mg Oral Daily    finasteride  5 mg Oral Daily    [Held by provider] hydroCHLOROthiazide  25 mg Oral Daily    losartan  50 mg Oral Daily    tamsulosin  0.4 mg Oral BID RT    cefTRIAXone (ROCEPHIN) IV  2,000 mg IntraVENous Q24H    [Held by provider] memantine  5 mg Oral BID       Allergies:  Sulfa antibiotics      Review of Systems  unable to provide ROS because of altered mentation      Physical Exam  Vitals:    05/01/25 1404 05/01/25 1928 05/02/25 0231 05/02/25 0715   BP: (!) 123/54 (!) 116/52 (!) 151/71 115/86   Pulse: 85 84 80 88   Resp: 18 16 16 18   Temp: 97.7 °F (36.5 °C) 98.2 °F (36.8 °C) 98.2 °F (36.8 °C) 98.1 °F (36.7 °C)   TempSrc: Axillary Axillary Axillary Oral   SpO2: 95% 98% 97% 96%   Weight:       Height:         General Appearance: Lethargic   Opens eyes spontaneously, calling for his wife who passed away 5 weeks ago  Skin: warm and dry, no rash.   Head: normocephalic and atraumatic  Eyes: anicteric sclerae  Lungs: normal respiratory effort, bilateral scattered rales  Heart normal S1-S2, no murmur  Abdomen: soft,no

## 2025-05-02 NOTE — PROGRESS NOTES
[facility] Neurology Daily Progress Note  Name: Emerson Salguero  Age: 89 y.o.  Gender: male  CodeStatus: Limited  Allergies: Sulfa Antibiotics    Chief Complaint:No chief complaint on file.    Primary Care Provider: Misty Vela MD  InpatientTreatment Team: Treatment Team:   Miguel Alarcon DO Abbud, Rita A, MD Dadas, Lauren A, APRN - CNP  David Vegas MD Wehbe, Charles R, MD Dewitt, Pamela J, RN Diaz, Kimberly, RN Hipp, Lisa, RN  Admission Date: 4/30/2025      HPI   Pt seen and examined for neuro follow up.  Patient sleeping.  Awakens to verbal stimuli.  Mostly nonverbal but responding more today.  Confused.  Unable to follow commands.  Afebrile.  Moves extremities equally and spontaneously.  No seizure activity.  Patient seen and examined awakens to stimuli but still lethargic and confused    Vitals:    05/02/25 0715   BP: 115/86   Pulse: 88   Resp: 18   Temp: 98.1 °F (36.7 °C)   SpO2: 96%        Review of Systems   Unable to perform ROS: Dementia   Constitutional:  Negative for fever.   HENT:  Positive for trouble swallowing. Negative for hearing loss.    Respiratory:  Negative for cough and wheezing.    Cardiovascular:  Negative for chest pain, palpitations and leg swelling.   Gastrointestinal:  Negative for vomiting.   Skin:  Negative for color change and rash.   Neurological:  Positive for speech difficulty. Negative for tremors, seizures, syncope and facial asymmetry.   Psychiatric/Behavioral:  Positive for behavioral problems, confusion and hallucinations. Negative for agitation. The patient is not nervous/anxious.          Physical Exam  Vitals and nursing note reviewed.   Constitutional:       General: He is sleeping. He is not in acute distress.     Appearance: He is not diaphoretic.   HENT:      Head: Normocephalic and atraumatic.   Eyes:      Extraocular Movements: Extraocular movements intact.      Pupils: Pupils are equal, round, and reactive to light.   Cardiovascular:      Rate and

## 2025-05-02 NOTE — OR NURSING
NO SEDATION        Pt arrived to specials dept via bed and 2L NC with son at bedside. Pt son taken to radiology lobby.  Consent confirmed for Lumbar Puncture. Pt lethargic, does not follow simple commands, pt with skin tear/old drainage on existing band aid near left arm IV site. Pt transferred  onto procedure table and positioned side lying with left side down, right side up position with staff assistance x4. VSS. CSF labs printed, along with additional CSF labs ordered today.      Marilee PACE providing reassurance and support during procedure.    Courtney Lawrence CNP marked lumbar site using fluoro.    Timeout complete.     Using sterile technique, Courtney Lawrence CNP prepped area with iodione and draped area.     Courtney Lawrence CNP numbed site with 2% lidocaine, see eMar.     Courtney Lawrence CNP obtained access using 20G spinal needle and AP and lateral fluoro imaging used to confirm placement.      1111: CSF fluid, clear and colorless, obtained. Total of 12.25ml CSF collected and placed into 4 separate specimen tubes labeled with numbers one through four.      Dr. Roberto removed needle and large band aid applied. Site soft and no drainage or swelling assessed. Pt transferred back onto bed with head flat. Report called to Angela CARRILLO on 2WT. Bedrest with HOB flat for 4 hours=1515. Transport took pt via bed back to [facility] with son. Pt tolerated all well. Specimens taken to lab by Nisha CARRILLO.Electronically signed by Dayana Abdi RN on 5/2/2025 at 11:18 AM

## 2025-05-02 NOTE — PROGRESS NOTES
Physical Therapy Missed Treatment   Facility/Department: [facility] MED SURG W282/W282-01    NAME: Emerson Salguero    : 1928 (89 y.o.)  MRN: [id number]    Account: [id number]  Gender: male    Chart reviewed, attempted PT at 1048. Patient unavailable 2° to:    [] Hold per nsg request    [] Pt declined    [] Nsg notified   [] Other notified    [x] Pt.. off floor for test/procedure. Lumbar puncture.     [] Pt. Unavailable       Will attempt PT treatment again at earliest convenience.      Electronically signed by Rolf Thomas PTA on 25 at 10:48 AM EDT

## 2025-05-02 NOTE — BRIEF OP NOTE
Brief Postoperative Note      Patient: Emerson Salguero  YOB: 1928  MRN: [id number]    Date of Procedure: 5/2/2025    Acute encephalopathy    Post-Op Diagnosis: Same       Fluoroscopy guided lumbar puncture    TAYE Sanon CNP    Assistant: Dayana Barreto RN    Anesthesia: Local only: 2% Lidocaine    Estimated Blood Loss (mL): Minimal    Complications: None    Specimens: Sent to the lab    Findings:  12.25 mL of clear colorless CSF drained    Electronically signed by TAYE Sanon CNP on 5/2/2025 at 12:38 PM

## 2025-05-02 NOTE — PROGRESS NOTES
[facility]  Occupational Therapy        NAME:  Emerson Salguero  ROOM: W282/W282-01  :  1928  DATE: 2025    Attempted to see Emerson Salguero at 1111 on this date for:   []  Initial Evaluation   [x]  Treatment       Patient was unable to be seen due to:   [x] Off unit for testing/procedure - Lumbar Puncture    [] Patient refused, stating \"    [] Therapy on hold due to     [] Nursing deferred due to    [] Other:      Will attempt again as able.     Electronically signed by ROBERT Lake on 25 at 11:16 AM EDT

## 2025-05-02 NOTE — PROGRESS NOTES
Comprehensive Nutrition Assessment    Type and Reason for Visit:  Initial, Consult (Tube feeding order & manage)    Nutrition Recommendations/Plan:   Continue NPO  Start Tube Feeding: standard fiber formula (Jevity 1.5) @ 25ml/hr, and advance after 24hrs if well-tolerated,   to goal of  50ml/hr.   Suggest 100mls q 4hours H2O flush or as per MD for fluid management.     Malnutrition Assessment:  Malnutrition Status:  At risk for malnutrition (05/02/25 1415)    Context:  Acute Illness     Findings of the 6 clinical characteristics of malnutrition:  Energy Intake:  Mild decrease in energy intake  Weight Loss:  No weight loss     Body Fat Loss:  Unable to assess     Muscle Mass Loss:  Mild muscle mass loss Temples (temporalis)  Fluid Accumulation:  Unable to assess     Strength:  Not Performed    Nutrition Assessment:    No nutritional concerns pta per conversation with son, stating pt ate well/stable weight. Pt currently unable to safely take po diet d/t cognition. To initiate enteral nutritional support via NGT today. When okay per Physician, recommend intiate standard fiber formula (Jevity 1.5) @ 25ml/hr, and advance after 24hrs if well-tolerated, to goal of  50ml/hr. Suggest 100mls q 4hours H2O flush or as per MD for fluid management.    Nutrition Related Findings:    PMH- CKD3, htn, dementia; admitted with 'Encephalopathy in the setting of baseline dementia'. Labs/meds reviewed. BUN/Cr elevated. Pt given lasix 5/1; takes HCTZ at home. +3 BLE edma noted. Wound Type: None       Current Nutrition Intake & Therapies:    ADULT DIET; Dysphagia - NPO  Current Tube Feeding (TF) Orders:  Feeding Route: Nasogastric  Formula: Standard with Fiber (Jevity 1.5)  Schedule: Continuous  Feeding Regimen: 25ml/np=[id number]  Additives/Modulars: None  Water Flushes: 100mls q 4 hours (600mls)  Current TF Provides: 900kcals/ 38gms pro/ 1056mls H2O  Goal TF Provides: @50ml/az=9355licdb/ 76gms pro/ 1612mls H2O     Anthropometric

## 2025-05-02 NOTE — CARE COORDINATION
Lsw spoke with pt's son to update.  Message left for Mita Alessandro to give her an update. Mita explained that the facility would prefer a Monday transfer if possible.  Pt having LP today and not yet ready for DC.

## 2025-05-02 NOTE — PROGRESS NOTES
Physician Progress Note    5/2/2025   12:04 PM    Name:  Emerson Salguero  MRN:    [id number]     IP Day: 2     Admit Date: 4/30/2025 12:22 AM  PCP: Misty Vela MD    Code Status:  Limited    Assessment and Plan:        1.  Encephalopathy in the setting of baseline dementia.  EEG suggestive of underlying encephalitis  - IR guided lumbar puncture 5/2.  Evaluate for CNS infection, viral encephalitis, autoimmune.  Discussed with neurology Barbie to consider addition of anti-NMDA receptor Ab  - Too encephalopathic for oral intake over past 24h.  Will place NG tube and start tube feed. Dietician consulted for TF ordering/management. Discussed with RN Angela.  - Continue empiric ceftriaxone and acyclovir  - Supportive care for secretions   - Palliative care is following.  Prognosis guarded at this time     CKD 3  Dementia  BPH  Hypertension    Diet: ADULT DIET; Dysphagia - Minced and Moist  Ppx: Hold chemical prophylaxis due to pink tinge in urine  Limited    Electronically signed by Miguel Alarcon DO on 5/2/2025 at 12:04 PM    Subjective:     Unfortunately remains lethargic and has been unable to really take oral intake.    Current Facility-Administered Medications   Medication Dose Route Frequency Provider Last Rate Last Admin    oxymetazoline (AFRIN) 0.05 % nasal spray 2 spray  2 spray Each Nostril Once Miguel Alarcon DO        lidocaine (XYLOCAINE) 2 % uro-jet   Topical Once Miguel Alarcon DO        hyoscyamine (LEVSIN/SL) sublingual tablet 0.125 mg  0.125 mg SubLINGual Q6H PRN Gladis Mckeon APRN - CNP   0.125 mg at 05/02/25 0555    LORazepam (ATIVAN) injection 1 mg  1 mg IntraVENous Once Barbie Reis APRN - CNP        acyclovir (ZOVIRAX) 800 mg in sodium chloride 0.9 % 250 mL IVPB  10 mg/kg IntraVENous Q12H Barbie Reis APRN - CNP   Stopped at 05/02/25 0502    levETIRAcetam (KEPPRA) injection 500 mg  500 mg IntraVENous Q12H Barbie Reis APRN - CNP   500 mg at 05/02/25 0322    sodium chloride  flush 0.9 % injection 5-40 mL  5-40 mL IntraVENous 2 times per day Liz Cross APRN - CNP   10 mL at 25 1010    sodium chloride flush 0.9 % injection 5-40 mL  5-40 mL IntraVENous PRN Liz Cross APRN - CNP        0.9 % sodium chloride infusion   IntraVENous PRN Liz Cross APRN - CNP        [Held by provider] enoxaparin (LOVENOX) injection 40 mg  40 mg SubCUTAneous Daily Liz Cross APRN - CNP   40 mg at 25 1247    acetaminophen (TYLENOL) tablet 650 mg  650 mg Oral Q6H PRN Liz Cross APRN - CNP   650 mg at 25 1244    Or    acetaminophen (TYLENOL) suppository 650 mg  650 mg Rectal Q6H PRN Liz Cross APRN - CNP        Vitamin D (CHOLECALCIFEROL) tablet 2,000 Units  2,000 Units Oral Daily Liz Cross APRN - CNP   2,000 Units at 25 1245    escitalopram (LEXAPRO) tablet 5 mg  5 mg Oral Daily Liz Cross APRN - CNP   5 mg at 25 1246    finasteride (PROSCAR) tablet 5 mg  5 mg Oral Daily Liz Cross APRN - CNP   5 mg at 25 1245    [Held by provider] hydroCHLOROthiazide (HYDRODIURIL) tablet 25 mg  25 mg Oral Daily Liz Cross APRN - CNP        losartan (COZAAR) tablet 50 mg  50 mg Oral Daily Liz Cross APRN - CNP        tamsulosin (FLOMAX) capsule 0.4 mg  0.4 mg Oral BID RT Liz Cross APRN - CNP   0.4 mg at 25    cefTRIAXone (ROCEPHIN) 2,000 mg in sterile water 20 mL IV syringe  2,000 mg IntraVENous Q24H Kylah Kemp MD   2,000 mg at 25 1008    [Held by provider] memantine (NAMENDA) tablet 5 mg  5 mg Oral BID Barbie Reis, APRN - CNP           Physical Examination:      Vitals:  /86   Pulse 88   Temp 98.1 °F (36.7 °C) (Oral)   Resp 18   Ht 1.727 m (5' 7.99\")   Wt 80.2 kg (176 lb 12.8 oz)   SpO2 96%   BMI 26.89 kg/m²   Temp (24hrs), Av.1 °F (36.7 °C), Min:97.7 °F (36.5 °C), Max:98.2 °F (36.8

## 2025-05-02 NOTE — CARE COORDINATION
Per physician rounds pt is not medically cleared for discharge today. Orders in for NG and tubefeed.

## 2025-05-02 NOTE — PLAN OF CARE
Problem: Discharge Planning  Goal: Discharge to home or other facility with appropriate resources  5/1/2025 2341 by Dorita Zamora RN  Outcome: Progressing  5/1/2025 1406 by Janet Stafford RN  Outcome: Progressing     Problem: ABCDS Injury Assessment  Goal: Absence of physical injury  5/1/2025 2341 by Dorita Zamora RN  Outcome: Progressing  5/1/2025 1406 by Janet Stafford RN  Outcome: Progressing     Problem: Skin/Tissue Integrity  Goal: Skin integrity remains intact  Description: 1.  Monitor for areas of redness and/or skin breakdown2.  Assess vascular access sites hourly3.  Every 4-6 hours minimum:  Change oxygen saturation probe site4.  Every 4-6 hours:  If on nasal continuous positive airway pressure, respiratory therapy assess nares and determine need for appliance change or resting period  5/1/2025 2341 by Dorita Zamora RN  Outcome: Progressing  5/1/2025 1406 by Janet Stafford RN  Outcome: Progressing     Problem: Confusion  Goal: Confusion, delirium, dementia, or psychosis is improved or at baseline  Description: INTERVENTIONS:1. Assess for possible contributors to thought disturbance, including medications, impaired vision or hearing, underlying metabolic abnormalities, dehydration, psychiatric diagnoses, and notify attending LIP2. [place] high risk fall precautions, as indicated3. Provide frequent short contacts to provide reality reorientation, refocusing and direction4. Decrease environmental stimuli, including noise as appropriate5. Monitor and intervene to maintain adequate nutrition, hydration, elimination, sleep and activity6. If unable to ensure safety without constant attention obtain sitter and review sitter guidelines with assigned personnel7. Initiate Psychosocial CNS and Spiritual Care consult, as indicated  INTERVENTIONS:1. Assess for possible contributors to thought disturbance, including medications, impaired vision or hearing, underlying metabolic abnormalities,

## 2025-05-03 ENCOUNTER — APPOINTMENT (OUTPATIENT)
Dept: GENERAL RADIOLOGY | Age: 89
DRG: 871 | End: 2025-05-03
Attending: INTERNAL MEDICINE
Payer: MEDICARE

## 2025-05-03 LAB
ALBUMIN SERPL-MCNC: 3 G/DL (ref 3.5–4.6)
ALP SERPL-CCNC: 101 U/L (ref 35–104)
ALT SERPL-CCNC: 29 U/L (ref 0–41)
ANION GAP SERPL CALCULATED.3IONS-SCNC: 14 MEQ/L (ref 9–15)
AST SERPL-CCNC: 29 U/L (ref 0–40)
BASOPHILS # BLD: 0 K/UL (ref 0–0.2)
BASOPHILS NFR BLD: 0.2 %
BILIRUB SERPL-MCNC: 0.5 MG/DL (ref 0.2–0.7)
BUN SERPL-MCNC: 34 MG/DL (ref 8–23)
CALCIUM SERPL-MCNC: 9.2 MG/DL (ref 8.5–9.9)
CHLORIDE SERPL-SCNC: 106 MEQ/L (ref 95–107)
CO2 SERPL-SCNC: 22 MEQ/L (ref 20–31)
CREAT SERPL-MCNC: 1.14 MG/DL (ref 0.7–1.2)
EOSINOPHIL # BLD: 0 K/UL (ref 0–0.7)
EOSINOPHIL NFR BLD: 0.2 %
ERYTHROCYTE [DISTWIDTH] IN BLOOD BY AUTOMATED COUNT: 14.3 % (ref 11.5–14.5)
GLOBULIN SER CALC-MCNC: 4.1 G/DL (ref 2.3–3.5)
GLUCOSE SERPL-MCNC: 81 MG/DL (ref 70–99)
HCT VFR BLD AUTO: 31.6 % (ref 42–52)
HGB BLD-MCNC: 10.5 G/DL (ref 14–18)
LYMPHOCYTES # BLD: 0.7 K/UL (ref 1–4.8)
LYMPHOCYTES NFR BLD: 7.2 %
MAGNESIUM SERPL-MCNC: 2.1 MG/DL (ref 1.7–2.4)
MCH RBC QN AUTO: 33.1 PG (ref 27–31.3)
MCHC RBC AUTO-ENTMCNC: 33.2 % (ref 33–37)
MCV RBC AUTO: 99.7 FL (ref 79–92.2)
MONOCYTES # BLD: 1.2 K/UL (ref 0.2–0.8)
MONOCYTES NFR BLD: 12.2 %
NEUTROPHILS # BLD: 7.7 K/UL (ref 1.4–6.5)
NEUTS SEG NFR BLD: 78.3 %
PHOSPHATE SERPL-MCNC: 2.8 MG/DL (ref 2.3–4.8)
PLATELET # BLD AUTO: 133 K/UL (ref 130–400)
POTASSIUM SERPL-SCNC: 3.3 MEQ/L (ref 3.4–4.9)
PROT SERPL-MCNC: 7.1 G/DL (ref 6.3–8)
RBC # BLD AUTO: 3.17 M/UL (ref 4.7–6.1)
SODIUM SERPL-SCNC: 142 MEQ/L (ref 135–144)
WBC # BLD AUTO: 9.8 K/UL (ref 4.8–10.8)

## 2025-05-03 PROCEDURE — 6370000000 HC RX 637 (ALT 250 FOR IP): Performed by: NURSE PRACTITIONER

## 2025-05-03 PROCEDURE — 80053 COMPREHEN METABOLIC PANEL: CPT

## 2025-05-03 PROCEDURE — 6370000000 HC RX 637 (ALT 250 FOR IP): Performed by: INTERNAL MEDICINE

## 2025-05-03 PROCEDURE — 6370000000 HC RX 637 (ALT 250 FOR IP): Performed by: PSYCHIATRY & NEUROLOGY

## 2025-05-03 PROCEDURE — 85025 COMPLETE CBC W/AUTO DIFF WBC: CPT

## 2025-05-03 PROCEDURE — 83735 ASSAY OF MAGNESIUM: CPT

## 2025-05-03 PROCEDURE — 84100 ASSAY OF PHOSPHORUS: CPT

## 2025-05-03 PROCEDURE — 6360000002 HC RX W HCPCS: Performed by: NURSE PRACTITIONER

## 2025-05-03 PROCEDURE — 6370000000 HC RX 637 (ALT 250 FOR IP)

## 2025-05-03 PROCEDURE — 71045 X-RAY EXAM CHEST 1 VIEW: CPT

## 2025-05-03 PROCEDURE — 92526 ORAL FUNCTION THERAPY: CPT

## 2025-05-03 PROCEDURE — 2500000003 HC RX 250 WO HCPCS: Performed by: NURSE PRACTITIONER

## 2025-05-03 PROCEDURE — 2700000000 HC OXYGEN THERAPY PER DAY

## 2025-05-03 PROCEDURE — 94761 N-INVAS EAR/PLS OXIMETRY MLT: CPT

## 2025-05-03 PROCEDURE — 6360000002 HC RX W HCPCS: Performed by: INTERNAL MEDICINE

## 2025-05-03 PROCEDURE — 2500000003 HC RX 250 WO HCPCS: Performed by: INTERNAL MEDICINE

## 2025-05-03 PROCEDURE — 1210000000 HC MED SURG R&B

## 2025-05-03 PROCEDURE — 94640 AIRWAY INHALATION TREATMENT: CPT

## 2025-05-03 PROCEDURE — 36415 COLL VENOUS BLD VENIPUNCTURE: CPT

## 2025-05-03 PROCEDURE — 99232 SBSQ HOSP IP/OBS MODERATE 35: CPT | Performed by: INTERNAL MEDICINE

## 2025-05-03 PROCEDURE — 99232 SBSQ HOSP IP/OBS MODERATE 35: CPT | Performed by: PSYCHIATRY & NEUROLOGY

## 2025-05-03 RX ORDER — MEMANTINE HYDROCHLORIDE 10 MG/1
5 TABLET ORAL 2 TIMES DAILY
Status: DISCONTINUED | OUTPATIENT
Start: 2025-05-03 | End: 2025-05-07

## 2025-05-03 RX ORDER — HALOPERIDOL 5 MG/ML
2 INJECTION INTRAMUSCULAR EVERY 6 HOURS PRN
Status: DISCONTINUED | OUTPATIENT
Start: 2025-05-03 | End: 2025-05-08 | Stop reason: HOSPADM

## 2025-05-03 RX ORDER — QUETIAPINE FUMARATE 50 MG/1
50 TABLET, FILM COATED ORAL ONCE
Status: DISCONTINUED | OUTPATIENT
Start: 2025-05-03 | End: 2025-05-08

## 2025-05-03 RX ORDER — FINASTERIDE 5 MG/1
5 TABLET, FILM COATED ORAL DAILY
Status: DISCONTINUED | OUTPATIENT
Start: 2025-05-03 | End: 2025-05-08

## 2025-05-03 RX ORDER — QUETIAPINE FUMARATE 25 MG/1
25 TABLET, FILM COATED ORAL ONCE
Status: DISCONTINUED | OUTPATIENT
Start: 2025-05-03 | End: 2025-05-03

## 2025-05-03 RX ORDER — QUETIAPINE FUMARATE 25 MG/1
25 TABLET, FILM COATED ORAL 2 TIMES DAILY
Status: DISCONTINUED | OUTPATIENT
Start: 2025-05-03 | End: 2025-05-05

## 2025-05-03 RX ORDER — HYDROCHLOROTHIAZIDE 25 MG/1
25 TABLET ORAL DAILY
Status: DISCONTINUED | OUTPATIENT
Start: 2025-05-03 | End: 2025-05-08

## 2025-05-03 RX ORDER — LOSARTAN POTASSIUM 50 MG/1
50 TABLET ORAL DAILY
Status: DISCONTINUED | OUTPATIENT
Start: 2025-05-03 | End: 2025-05-08

## 2025-05-03 RX ORDER — ESCITALOPRAM OXALATE 10 MG/1
5 TABLET ORAL DAILY
Status: DISCONTINUED | OUTPATIENT
Start: 2025-05-03 | End: 2025-05-08

## 2025-05-03 RX ADMIN — HALOPERIDOL LACTATE 2 MG: 5 INJECTION, SOLUTION INTRAMUSCULAR at 19:58

## 2025-05-03 RX ADMIN — LOSARTAN POTASSIUM 50 MG: 50 TABLET, FILM COATED ORAL at 08:09

## 2025-05-03 RX ADMIN — HYOSCYAMINE SULFATE 0.12 MG: 0.12 TABLET SUBLINGUAL at 20:50

## 2025-05-03 RX ADMIN — HYDRALAZINE HYDROCHLORIDE 10 MG: 20 INJECTION INTRAMUSCULAR; INTRAVENOUS at 00:21

## 2025-05-03 RX ADMIN — IPRATROPIUM BROMIDE AND ALBUTEROL SULFATE 1 DOSE: 2.5; .5 SOLUTION RESPIRATORY (INHALATION) at 09:22

## 2025-05-03 RX ADMIN — POTASSIUM BICARBONATE 40 MEQ: 782 TABLET, EFFERVESCENT ORAL at 08:07

## 2025-05-03 RX ADMIN — HYOSCYAMINE SULFATE 0.12 MG: 0.12 TABLET SUBLINGUAL at 08:37

## 2025-05-03 RX ADMIN — LEVETIRACETAM 500 MG: 100 INJECTION INTRAVENOUS at 15:32

## 2025-05-03 RX ADMIN — WATER 2000 MG: 1 INJECTION INTRAMUSCULAR; INTRAVENOUS; SUBCUTANEOUS at 11:05

## 2025-05-03 RX ADMIN — HYDROCHLOROTHIAZIDE 25 MG: 25 TABLET ORAL at 08:10

## 2025-05-03 RX ADMIN — Medication 2000 UNITS: at 08:10

## 2025-05-03 RX ADMIN — TAMSULOSIN HYDROCHLORIDE 0.4 MG: 0.4 CAPSULE ORAL at 20:50

## 2025-05-03 RX ADMIN — IPRATROPIUM BROMIDE AND ALBUTEROL SULFATE 1 DOSE: 2.5; .5 SOLUTION RESPIRATORY (INHALATION) at 19:41

## 2025-05-03 RX ADMIN — ACETAMINOPHEN 650 MG: 325 TABLET ORAL at 08:37

## 2025-05-03 RX ADMIN — LEVETIRACETAM 500 MG: 100 INJECTION INTRAVENOUS at 02:43

## 2025-05-03 RX ADMIN — QUETIAPINE FUMARATE 25 MG: 25 TABLET ORAL at 20:50

## 2025-05-03 RX ADMIN — SODIUM CHLORIDE, PRESERVATIVE FREE 10 ML: 5 INJECTION INTRAVENOUS at 08:12

## 2025-05-03 RX ADMIN — ESCITALOPRAM OXALATE 5 MG: 10 TABLET ORAL at 08:08

## 2025-05-03 RX ADMIN — FINASTERIDE 5 MG: 5 TABLET, FILM COATED ORAL at 08:09

## 2025-05-03 ASSESSMENT — PAIN SCALES - WONG BAKER
WONGBAKER_NUMERICALRESPONSE: NO HURT
WONGBAKER_NUMERICALRESPONSE: HURTS LITTLE MORE
WONGBAKER_NUMERICALRESPONSE: HURTS LITTLE MORE

## 2025-05-03 NOTE — FLOWSHEET NOTE
Face to face with Dr. Alarcon regarding patient's residual. Notified this RN to follow facility protocol. This RN contacted the dietitian for guidelines for TF.     Secure message sent to Dr. Alarcon regarding patient's agitation, new orders received.     Secure message sent to Dr. Vegas per family request, new orders received.

## 2025-05-03 NOTE — PROGRESS NOTES
Infectious Diseases Inpatient Progress Note          HISTORY OF PRESENT ILLNESS:  Follow up sepsis without definite etiology in a patient with acute encephalopathy with underlying cognitive impairment on IV Rocephin, well tolerated.  Currently NPO.  Currently confused, talkative and hallucinating.    Spinal tap result was negative for infection  Currently with NG and tube feeding, well-tolerated   Son is at bedside  Occasional cough  Blood cultures remain negative.   Right second toe wound.     Current Medications:     hydroCHLOROthiazide  25 mg Per NG tube Daily    losartan  50 mg Per NG tube Daily    escitalopram  5 mg Per NG tube Daily    finasteride  5 mg Per NG tube Daily    memantine  5 mg Oral BID    QUEtiapine  25 mg PEG Tube Once    oxymetazoline  2 spray Each Nostril Once    lidocaine   Topical Once    ipratropium 0.5 mg-albuterol 2.5 mg  1 Dose Inhalation BID RT    LORazepam  1 mg IntraVENous Once    levETIRAcetam  500 mg IntraVENous Q12H    sodium chloride flush  5-40 mL IntraVENous 2 times per day    enoxaparin  40 mg SubCUTAneous Daily    Vitamin D  2,000 Units Oral Daily    tamsulosin  0.4 mg Oral BID RT    cefTRIAXone (ROCEPHIN) IV  2,000 mg IntraVENous Q24H       Allergies:  Sulfa antibiotics      Review of Systems  unable to provide ROS because of altered mentation      Physical Exam  Vitals:    05/03/25 0021 05/03/25 0121 05/03/25 0719 05/03/25 0924   BP: (!) 186/88 (!) 141/62 (!) 174/73    Pulse:  82 82    Resp:  16 16    Temp:  97.5 °F (36.4 °C) 98.1 °F (36.7 °C)    TempSrc:  Axillary Axillary    SpO2:  95% 96% 96%   Weight:       Height:       Patient is trying to move the NG, currently in restraints.  Son is at bedside  General Appearance: Eyes are opened, confused and hallucinating  Intact NG  Skin: warm and dry, no rash.   Head: normocephalic and atraumatic  Eyes: anicteric sclerae  Lungs: normal respiratory effort, bilateral scattered rales  Heart normal S1-S2, no murmur  Abdomen: soft,no

## 2025-05-03 NOTE — PROGRESS NOTES
[facility]   Facility/Department: [facility] 2W ORTHO TELE  Speech Language Pathology  Treatment Note      Emerson Salguero  1928  W282/W282-01  [x]   confirmed      Date: 5/3/2025    Sepsis, unspecified organism (HCC) [A41.9]  Sepsis (HCC) [A41.9]    Restrictions/Precautions: Fall Risk    ADULT TUBE FEEDING; Nasogastric; Standard with Fiber; Continuous; 25; Yes; 25; Q 24 hours; 50; 100; Q 4 hours     Respiratory Status:O2 Flow Rate (L/min): 2 L/min (25 0924) room air  No active isolations    Subjective:  Self Limiting and Agitated        Interventions used this date:  Dysphagia Treatment      Objective/Assessment:  Patient progressing towards goals:  Short-term Goals  Timeframe for Short-term Goals: 1 week  Goal 1: Pt will tolerate the recommended diet level with no s/s of aspiration to assess diet tolerance prior to d/c  Patient has NG tube placed for nutrition. SLP used ice chips with patient with patient swallowing ice chips in 3/5 trials, spitting out bolus in 2/5 trials. Max encouragement given to participate in po trials. SLP answered patient's sons questions about dysphagia therapy and his father's progress.  Goal 2: Pt and family will be educated on importance of oral care, swallow strategies, and s/s of aspiration prior to d/c to reduce risk of dysphagia related re-admission.  Given max verbal cues, patient participated in oral care. Patient's son observed session and asked if their is a flavored swab we can use for oral care next time.  Goal 3: Pt will tolerate trials of soft and bite sized solids with adequate mastication, timely A-P, with no evidence of pocketing or s/s of aspiration.  Not addressed      Rec: continue. NPO with NG for nutrition. LORENA Duggan notified.    Treatment/Activity Tolerance:  Patient limited by fatigue    Plan:  Alter current POC (see above)    Pain Assessment:  Patient does not c/o pain.    Pain Re-assessment:  Patient does not c/o pain.    Patient/Caregiver

## 2025-05-03 NOTE — PROGRESS NOTES
Physician Progress Note    5/3/2025   12:08 PM    Name:  Emerson Salguero  MRN:    [id number]     IP Day: 3     Admit Date: 4/30/2025 12:22 AM  PCP: Misty Vela MD    Code Status:  Limited    Assessment and Plan:        1.  Encephalopathy in the setting of baseline dementia.  EEG suggestive of underlying encephalitis however MRI with no acute findings.  CSF<3 wbc.  Possible underlying aspiration pneumonia (dysphagia, secretions, cough)  - IR guided lumbar puncture 5/2.  Some CSF studies still pending  - Continues on tube feed  - Continue empiric ceftriaxone and acyclovir  - Supportive care for secretions   - Palliative care is following.  Prognosis guarded at this time     CKD 3  Dementia  BPH  Hypertension    Diet: ADULT TUBE FEEDING; Nasogastric; Standard with Fiber; Continuous; 25; Yes; 25; Q 24 hours; 50; 100; Q 4 hours  Ppx: Pink-tinged to urine has resolved and LP has been completed.  Will resume chemical prophylaxis  Limited    Updated son in detail.  He is upset that tube feed did not get started when it was ordered-I am also upset with this and discussed this with the nursing manager.      Electronically signed by Miguel Alarcon DO on 5/3/2025 at 12:08 PM    Subjective:     Very lethargic but did open eyes to voice and say some words.    Current Facility-Administered Medications   Medication Dose Route Frequency Provider Last Rate Last Admin    hydroCHLOROthiazide (HYDRODIURIL) tablet 25 mg  25 mg Per NG tube Daily Miguel Alarcon DO   25 mg at 05/03/25 0810    losartan (COZAAR) tablet 50 mg  50 mg Per NG tube Daily Miguel Alarcon DO   50 mg at 05/03/25 0809    escitalopram (LEXAPRO) tablet 5 mg  5 mg Per NG tube Daily Miguel Alarcon DO   5 mg at 05/03/25 0808    finasteride (PROSCAR) tablet 5 mg  5 mg Per NG tube Daily Miguel Alarcon DO   5 mg at 05/03/25 0809    memantine (NAMENDA) tablet 5 mg  5 mg Oral BID David Vegas MD        oxymetazoline (AFRIN) 0.05 % nasal spray 2 spray  2 spray

## 2025-05-03 NOTE — PLAN OF CARE
Problem: Discharge Planning  Goal: Discharge to home or other facility with appropriate resources  Outcome: Progressing     Problem: ABCDS Injury Assessment  Goal: Absence of physical injury  Outcome: Progressing     Problem: Skin/Tissue Integrity  Goal: Skin integrity remains intact  Description: 1.  Monitor for areas of redness and/or skin breakdown2.  Assess vascular access sites hourly3.  Every 4-6 hours minimum:  Change oxygen saturation probe site4.  Every 4-6 hours:  If on nasal continuous positive airway pressure, respiratory therapy assess nares and determine need for appliance change or resting period  Outcome: Progressing     Problem: Confusion  Goal: Confusion, delirium, dementia, or psychosis is improved or at baseline  Description: INTERVENTIONS:1. Assess for possible contributors to thought disturbance, including medications, impaired vision or hearing, underlying metabolic abnormalities, dehydration, psychiatric diagnoses, and notify attending LIP2. [place] high risk fall precautions, as indicated3. Provide frequent short contacts to provide reality reorientation, refocusing and direction4. Decrease environmental stimuli, including noise as appropriate5. Monitor and intervene to maintain adequate nutrition, hydration, elimination, sleep and activity6. If unable to ensure safety without constant attention obtain sitter and review sitter guidelines with assigned personnel7. Initiate Psychosocial CNS and Spiritual Care consult, as indicated  INTERVENTIONS:1. Assess for possible contributors to thought disturbance, including medications, impaired vision or hearing, underlying metabolic abnormalities, dehydration, psychiatric diagnoses, and notify attending LIP2. [place] high risk fall precautions, as indicated3. Provide frequent short contacts to provide reality reorientation, refocusing and direction4. Decrease environmental stimuli, including noise as appropriate5. Monitor and intervene to  maintain adequate nutrition, hydration, elimination, sleep and activity6. If unable to ensure safety without constant attention obtain sitter and review sitter guidelines with assigned personnel7. Initiate Psychosocial CNS and Spiritual Care consult, as indicated  INTERVENTIONS:1. Assess for possible contributors to thought disturbance, including medications, impaired vision or hearing, underlying metabolic abnormalities, dehydration, psychiatric diagnoses, and notify attending LIP2. [place] high risk fall precautions, as indicated3. Provide frequent short contacts to provide reality reorientation, refocusing and direction4. Decrease environmental stimuli, including noise as appropriate5. Monitor and intervene to maintain adequate nutrition, hydration, elimination, sleep and activity6. If unable to ensure safety without constant attention obtain sitter and review sitter guidelines with assigned personnel7. Initiate Psychosocial CNS and Spiritual Care consult, as indicated  Outcome: Progressing     Problem: Safety - Adult  Goal: Free from fall injury  Outcome: Progressing     Problem: Pain  Goal: Verbalizes/displays adequate comfort level or baseline comfort level  Outcome: Progressing     Problem: Safety - Medical Restraint  Goal: Remains free of injury from restraints (Restraint for Interference with Medical Device)  Description: INTERVENTIONS:1. Determine that other, less restrictive measures have been tried or would not be effective before applying the restraint2. Evaluate the patient's condition at the time of restraint application3. Inform patient/family regarding the reason for restraint4. Q2H: Monitor safety, psychosocial status, comfort, nutrition and hydration  Outcome: Progressing  Flowsheets (Taken 5/2/2025 1641 by Shruti Meade RN)  Remains free of injury from restraints (restraint for interference with medical device):   Determine that other, less restrictive measures have been tried or would not

## 2025-05-03 NOTE — PROGRESS NOTES
[facility] Neurology Daily Progress Note  Name: Emerson Salguero  Age: 89 y.o.  Gender: male  CodeStatus: Limited  Allergies: Sulfa Antibiotics    Chief Complaint:No chief complaint on file.    Primary Care Provider: Misty Vela MD  InpatientTreatment Team: Treatment Team:   Miguel Alarcon DO Abbud, Rita A, MD Dadas, Lauren A, APRN - CNP  David Vegas MD Ensign, Claire, Ever Uriarte Jacquelyn A [facility]  Admission Date: 4/30/2025      HPI   Pt seen and examined for neuro follow up.  Patient sleeping.  Awakens to verbal stimuli.  Mostly nonverbal but responding more today.  Confused.  Unable to follow commands.  Afebrile.  Moves extremities equally and spontaneously.  No seizure activity.  Patient seen and examined awakens to stimuli but still lethargic and confused    5/3  .  Patient much more awake and quite focused and calling his wife.  He gets somewhat agitated at times he actually recognize his son but did not follow any other commands.    Vitals:    05/03/25 0924   BP:    Pulse:    Resp:    Temp:    SpO2: 96%        Review of Systems   Unable to perform ROS: Dementia   Constitutional:  Negative for fever.   HENT:  Positive for trouble swallowing. Negative for hearing loss.    Respiratory:  Negative for cough and wheezing.    Cardiovascular:  Negative for chest pain, palpitations and leg swelling.   Gastrointestinal:  Negative for vomiting.   Skin:  Negative for color change and rash.   Neurological:  Positive for speech difficulty. Negative for tremors, seizures, syncope and facial asymmetry.   Psychiatric/Behavioral:  Positive for behavioral problems, confusion and hallucinations. Negative for agitation. The patient is not nervous/anxious.          Physical Exam  Vitals and nursing note reviewed.   Constitutional:       General: He is sleeping. He is not in acute distress.     Appearance: He is not diaphoretic.   HENT:      Head: Normocephalic and atraumatic.   Eyes:      Extraocular  condition->Emergency Medical Condition (MA)  What reading provider will be dictating this exam?->CRC    FINDINGS:  BRAIN/VENTRICLES: There is no acute intracranial hemorrhage, mass effect or  midline shift.  No abnormal extra-axial fluid collection.  The gray-white  differentiation is maintained without evidence of an acute infarct.  There is  no evidence of hydrocephalus.    There is prominent dilatation of the ventricles and sulci representing  age-appropriate atrophy. There is mild periventricular and subcortical white  matter hypodensity representing age-appropriate small vessel ischemia.    ORBITS: Again seen are changes of bilateral cataract surgery.  The orbits are  otherwise normal in appearance.    SINUSES: The visualized paranasal sinuses and mastoid air cells demonstrate  no acute abnormality.    SOFT TISSUES/SKULL:  No acute abnormality of the visualized skull or soft  tissues.    Impression  1. No acute intracranial abnormality.  2. Prominent age-appropriate atrophy and mild small vessel ischemia.  No results found for this or any previous visit.  No results found for this or any previous visit.      Carotid duplex: No results found for this or any previous visit.  No results found for this or any previous visit.  Results for orders placed during the hospital encounter of 02/20/21    US CAROTID ARTERY BILATERAL    Narrative  BILATERAL DUPLEX CAROTID ULTRASOUND    CLINICAL INFORMATION:  Carotid stenosis    COMPARISON:  None available    FINDINGS:  The bilateral carotid duplex ultrasound study demonstrates the following:      RIGHT            LEFT      Proximal common carotid artery           137 cm/s            111 cm/s  Mid common carotid artery                   120 cm/s            109 cm/s  Distal common carotid artery                121 cm/s           116 cm/s  Proximal internal carotid artery             149 cm/s            77 cm/s  Mid internal carotid artery                      177 cm/s

## 2025-05-03 NOTE — PROGRESS NOTES
Physical Therapy Missed Treatment   Facility/Department: [facility] MED SURG W282/W282-01    NAME: Emerson Salguero    : 1928 (89 y.o.)  MRN: [id number]    Account: [id number]  Gender: male    Chart reviewed, attempted PT at 1054. Patient unavailable 2° to:    [] Hold per nsg request    [x] Pt declined pt agitated and repeatedly saying \"no!\" When encouraged to complete any therex or bed mobility. Son present and attempts to encourage pt as well, unsuccessful.      [x] Nsg notified   [] Other notified    [] Pt.. off floor for test/procedure.     [] Pt. Unavailable       Will attempt PT treatment again at earliest convenience.      Electronically signed by Rolf Thomas PTA on 5/3/25 at 12:41 PM EDT

## 2025-05-03 NOTE — PROGRESS NOTES
Nutrition Note    Nursing reported a 50ml residual. TF currently only running @25ml/hr.    Advised RN to continue TF. Hold TF if residuals exceed 150ml at current rate.     If residuals remain <150ml, ok to advance tomorrow monitoring tolerance.       Electronically signed by Sarai Duncan MS, RD, LD on 5/3/25 at 1:15 PM EDT

## 2025-05-04 PROBLEM — R63.0 ANOREXIA: Status: ACTIVE | Noted: 2025-05-04

## 2025-05-04 LAB
ALBUMIN SERPL-MCNC: 3.2 G/DL (ref 3.5–4.6)
ALP SERPL-CCNC: 103 U/L (ref 35–104)
ALT SERPL-CCNC: 26 U/L (ref 0–41)
ANION GAP SERPL CALCULATED.3IONS-SCNC: 12 MEQ/L (ref 9–15)
AST SERPL-CCNC: 30 U/L (ref 0–40)
BACTERIA BLD CULT ORG #2: NORMAL
BACTERIA BLD CULT: NORMAL
BASOPHILS # BLD: 0 K/UL (ref 0–0.2)
BASOPHILS NFR BLD: 0.3 %
BILIRUB SERPL-MCNC: 0.5 MG/DL (ref 0.2–0.7)
BUN SERPL-MCNC: 37 MG/DL (ref 8–23)
CALCIUM SERPL-MCNC: 9.6 MG/DL (ref 8.5–9.9)
CHLORIDE SERPL-SCNC: 108 MEQ/L (ref 95–107)
CO2 SERPL-SCNC: 26 MEQ/L (ref 20–31)
CREAT SERPL-MCNC: 1.08 MG/DL (ref 0.7–1.2)
EOSINOPHIL # BLD: 0.1 K/UL (ref 0–0.7)
EOSINOPHIL NFR BLD: 0.9 %
ERYTHROCYTE [DISTWIDTH] IN BLOOD BY AUTOMATED COUNT: 14.3 % (ref 11.5–14.5)
GLOBULIN SER CALC-MCNC: 3.9 G/DL (ref 2.3–3.5)
GLUCOSE SERPL-MCNC: 99 MG/DL (ref 70–99)
HCT VFR BLD AUTO: 31.8 % (ref 42–52)
HGB BLD-MCNC: 10.9 G/DL (ref 14–18)
HSV1 DNA CSF QL NAA+PROBE: NOT DETECTED
HSV2 DNA CSF QL NAA+PROBE: NOT DETECTED
LYMPHOCYTES # BLD: 0.9 K/UL (ref 1–4.8)
LYMPHOCYTES NFR BLD: 7.8 %
MAGNESIUM SERPL-MCNC: 2.2 MG/DL (ref 1.7–2.4)
MCH RBC QN AUTO: 34.2 PG (ref 27–31.3)
MCHC RBC AUTO-ENTMCNC: 34.3 % (ref 33–37)
MCV RBC AUTO: 99.7 FL (ref 79–92.2)
MONOCYTES # BLD: 1.5 K/UL (ref 0.2–0.8)
MONOCYTES NFR BLD: 13.1 %
NEUTROPHILS # BLD: 8.6 K/UL (ref 1.4–6.5)
NEUTS SEG NFR BLD: 76.3 %
PHOSPHATE SERPL-MCNC: 2.5 MG/DL (ref 2.3–4.8)
PLATELET # BLD AUTO: 149 K/UL (ref 130–400)
POTASSIUM SERPL-SCNC: 3.2 MEQ/L (ref 3.4–4.9)
PROT SERPL-MCNC: 7.1 G/DL (ref 6.3–8)
RBC # BLD AUTO: 3.19 M/UL (ref 4.7–6.1)
SODIUM SERPL-SCNC: 146 MEQ/L (ref 135–144)
SPECIMEN SOURCE: NORMAL
WBC # BLD AUTO: 11.3 K/UL (ref 4.8–10.8)

## 2025-05-04 PROCEDURE — 2500000003 HC RX 250 WO HCPCS: Performed by: INTERNAL MEDICINE

## 2025-05-04 PROCEDURE — 83735 ASSAY OF MAGNESIUM: CPT

## 2025-05-04 PROCEDURE — 84100 ASSAY OF PHOSPHORUS: CPT

## 2025-05-04 PROCEDURE — 2700000000 HC OXYGEN THERAPY PER DAY

## 2025-05-04 PROCEDURE — 6370000000 HC RX 637 (ALT 250 FOR IP): Performed by: INTERNAL MEDICINE

## 2025-05-04 PROCEDURE — 99232 SBSQ HOSP IP/OBS MODERATE 35: CPT | Performed by: PSYCHIATRY & NEUROLOGY

## 2025-05-04 PROCEDURE — 94760 N-INVAS EAR/PLS OXIMETRY 1: CPT

## 2025-05-04 PROCEDURE — 80053 COMPREHEN METABOLIC PANEL: CPT

## 2025-05-04 PROCEDURE — 85025 COMPLETE CBC W/AUTO DIFF WBC: CPT

## 2025-05-04 PROCEDURE — 6360000002 HC RX W HCPCS: Performed by: NURSE PRACTITIONER

## 2025-05-04 PROCEDURE — 94761 N-INVAS EAR/PLS OXIMETRY MLT: CPT

## 2025-05-04 PROCEDURE — 99232 SBSQ HOSP IP/OBS MODERATE 35: CPT | Performed by: INTERNAL MEDICINE

## 2025-05-04 PROCEDURE — 2580000003 HC RX 258: Performed by: INTERNAL MEDICINE

## 2025-05-04 PROCEDURE — 6360000002 HC RX W HCPCS: Performed by: INTERNAL MEDICINE

## 2025-05-04 PROCEDURE — 99223 1ST HOSP IP/OBS HIGH 75: CPT | Performed by: SURGERY

## 2025-05-04 PROCEDURE — 2500000003 HC RX 250 WO HCPCS: Performed by: NURSE PRACTITIONER

## 2025-05-04 PROCEDURE — 94640 AIRWAY INHALATION TREATMENT: CPT

## 2025-05-04 PROCEDURE — 1210000000 HC MED SURG R&B

## 2025-05-04 PROCEDURE — 97535 SELF CARE MNGMENT TRAINING: CPT

## 2025-05-04 PROCEDURE — 92526 ORAL FUNCTION THERAPY: CPT

## 2025-05-04 PROCEDURE — 36415 COLL VENOUS BLD VENIPUNCTURE: CPT

## 2025-05-04 RX ORDER — DEXTROSE MONOHYDRATE, SODIUM CHLORIDE, SODIUM LACTATE, POTASSIUM CHLORIDE, CALCIUM CHLORIDE 5; 600; 310; 179; 20 G/100ML; MG/100ML; MG/100ML; MG/100ML; MG/100ML
INJECTION, SOLUTION INTRAVENOUS CONTINUOUS
Status: DISPENSED | OUTPATIENT
Start: 2025-05-04 | End: 2025-05-05

## 2025-05-04 RX ORDER — ATROPINE SULFATE 10 MG/ML
1 SOLUTION/ DROPS OPHTHALMIC 4 TIMES DAILY PRN
Status: DISCONTINUED | OUTPATIENT
Start: 2025-05-04 | End: 2025-05-08 | Stop reason: HOSPADM

## 2025-05-04 RX ADMIN — ENOXAPARIN SODIUM 40 MG: 100 INJECTION SUBCUTANEOUS at 10:17

## 2025-05-04 RX ADMIN — LEVETIRACETAM 500 MG: 100 INJECTION INTRAVENOUS at 03:29

## 2025-05-04 RX ADMIN — WATER 2000 MG: 1 INJECTION INTRAMUSCULAR; INTRAVENOUS; SUBCUTANEOUS at 10:18

## 2025-05-04 RX ADMIN — IPRATROPIUM BROMIDE AND ALBUTEROL SULFATE 1 DOSE: 2.5; .5 SOLUTION RESPIRATORY (INHALATION) at 06:55

## 2025-05-04 RX ADMIN — ATROPINE SULFATE 1 DROP: 10 SOLUTION/ DROPS OPHTHALMIC at 12:51

## 2025-05-04 RX ADMIN — PIPERACILLIN AND TAZOBACTAM 3375 MG: 3; .375 INJECTION, POWDER, LYOPHILIZED, FOR SOLUTION INTRAVENOUS at 12:50

## 2025-05-04 RX ADMIN — DEXTROSE MONOHYDRATE, SODIUM CHLORIDE, SODIUM LACTATE, POTASSIUM CHLORIDE, CALCIUM CHLORIDE: 5; 600; 310; 179; 20 INJECTION, SOLUTION INTRAVENOUS at 12:43

## 2025-05-04 RX ADMIN — IPRATROPIUM BROMIDE AND ALBUTEROL SULFATE 1 DOSE: 2.5; .5 SOLUTION RESPIRATORY (INHALATION) at 19:42

## 2025-05-04 RX ADMIN — SODIUM CHLORIDE, PRESERVATIVE FREE 10 ML: 5 INJECTION INTRAVENOUS at 10:22

## 2025-05-04 RX ADMIN — PIPERACILLIN AND TAZOBACTAM 3375 MG: 3; .375 INJECTION, POWDER, LYOPHILIZED, FOR SOLUTION INTRAVENOUS at 20:23

## 2025-05-04 RX ADMIN — LEVETIRACETAM 500 MG: 100 INJECTION INTRAVENOUS at 14:55

## 2025-05-04 NOTE — PLAN OF CARE
Problem: Discharge Planning  Goal: Discharge to home or other facility with appropriate resources  Outcome: Progressing     Problem: ABCDS Injury Assessment  Goal: Absence of physical injury  Outcome: Progressing     Problem: Skin/Tissue Integrity  Goal: Skin integrity remains intact  Description: 1.  Monitor for areas of redness and/or skin breakdown2.  Assess vascular access sites hourly3.  Every 4-6 hours minimum:  Change oxygen saturation probe site4.  Every 4-6 hours:  If on nasal continuous positive airway pressure, respiratory therapy assess nares and determine need for appliance change or resting period  Outcome: Progressing     Problem: Confusion  Goal: Confusion, delirium, dementia, or psychosis is improved or at baseline  Description: INTERVENTIONS:1. Assess for possible contributors to thought disturbance, including medications, impaired vision or hearing, underlying metabolic abnormalities, dehydration, psychiatric diagnoses, and notify attending LIP2. [place] high risk fall precautions, as indicated3. Provide frequent short contacts to provide reality reorientation, refocusing and direction4. Decrease environmental stimuli, including noise as appropriate5. Monitor and intervene to maintain adequate nutrition, hydration, elimination, sleep and activity6. If unable to ensure safety without constant attention obtain sitter and review sitter guidelines with assigned personnel7. Initiate Psychosocial CNS and Spiritual Care consult, as indicated  INTERVENTIONS:1. Assess for possible contributors to thought disturbance, including medications, impaired vision or hearing, underlying metabolic abnormalities, dehydration, psychiatric diagnoses, and notify attending LIP2. [place] high risk fall precautions, as indicated3. Provide frequent short contacts to provide reality reorientation, refocusing and direction4. Decrease environmental stimuli, including noise as appropriate5. Monitor and intervene to

## 2025-05-04 NOTE — PROGRESS NOTES
Physician Progress Note    5/4/2025   11:56 AM    Name:  Emerson Salguero  MRN:    [id number]     IP Day: 4     Admit Date: 4/30/2025 12:22 AM  PCP: Misty Vela MD    Code Status:  Limited    Assessment and Plan:        1.  Encephalopathy in the setting of baseline dementia suspect secondary to underlying aspiration pneumonia (dysphagia, secretions, cough, increasing right lower lobe infiltrate).  EEG was suggestive of underlying encephalitis however MRI with no acute findings.  CSF<3 wbc.   -S/p IR guided lumbar puncture 5/2.  Some CSF studies still pending  -Issues with NG tube which I asked RN to remove today.  Patient's son wants to continue aggressive care at this time.  I expressed that the prognosis is guarded given his advanced age, inconsistent mental status, poor p.o. intake, and reduced ability to clear his secretions. Son is optimistic because at times the patient will be able to have more fluent conversation and take some p.o.-also all his organs appear to be functioning fine.  He would like to continue with PEG tube placement to optimize nutrition  -Escalated to IV Zosyn for aspiration pneumonia  - Supportive care for secretions-added as needed sublingual atropine  -Neurology, ID, palliative care following     CKD 3  Dementia  BPH  Hypertension    Diet: No diet orders on file  Ppx: Lovenox  Limited    Son updated in detail at bedside.    Electronically signed by Miguel Alarcon DO on 5/4/2025 at 11:56 AM    Subjective:     Unfortunately NG tube came out somewhat and patient had gurgling secretions and tube feed was noted in suction canister.  Tube feeds have been stopped.  Patient continues to have upper airway gurgling that per nursing is too deep to get out so far with the suction.    He is able to wake up a little bit and say some words.    Current Facility-Administered Medications   Medication Dose Route Frequency Provider Last Rate Last Admin    potassium bicarb-citric acid (EFFER-K)

## 2025-05-04 NOTE — PROGRESS NOTES
[facility] Neurology Daily Progress Note  Name: Emerson Salguero  Age: 89 y.o.  Gender: male  CodeStatus: Limited  Allergies: Sulfa Antibiotics    Chief Complaint:No chief complaint on file.    Primary Care Provider: Misty Vela MD  InpatientTreatment Team: Treatment Team:   Miguel Alarcon DO Abbud, Rita A, MD Dadas, Lauren A, APRN - CNP  David Vegas MD Klekota, Aristia, Rosaura Rogers MD  Admission Date: 4/30/2025      HPI   Pt seen and examined for neuro follow up.  Patient sleeping.  Awakens to verbal stimuli.  Mostly nonverbal but responding more today.  Confused.  Unable to follow commands.  Afebrile.  Moves extremities equally and spontaneously.  No seizure activity.  Patient seen and examined awakens to stimuli but still lethargic and confused    5/3  .  Patient much more awake and quite focused and calling his wife.  He gets somewhat agitated at times he actually recognize his son but did not follow any other commands.    5/4  We were called yesterday as patient was very agitated.  We had added low-dose Seroquel.  Patient did sleep well and today he was able to answer few questions but did not follow commands he still lethargic.  He has NG tube out, it is reinserted.  There is some question of continued back placement for him    Vitals:    05/04/25 0711   BP: (!) 154/76   Pulse: 88   Resp: 18   Temp: 98.1 °F (36.7 °C)   SpO2: 90%        Review of Systems   Unable to perform ROS: Dementia   Constitutional:  Negative for fever.   HENT:  Positive for trouble swallowing. Negative for hearing loss.    Respiratory:  Negative for cough and wheezing.    Cardiovascular:  Negative for chest pain, palpitations and leg swelling.   Gastrointestinal:  Negative for vomiting.   Skin:  Negative for color change and rash.   Neurological:  Positive for speech difficulty. Negative for tremors, seizures, syncope and facial asymmetry.   Psychiatric/Behavioral:  Positive for behavioral problems, confusion and

## 2025-05-04 NOTE — PROGRESS NOTES
05/03/25 2000   RT Protocol   History Pulmonary Disease 0   Respiratory pattern 0   Breath sounds 6   Cough 0   Indications for Bronchodilator Therapy Decreased or absent breath sounds   Bronchodilator Assessment Score 6

## 2025-05-04 NOTE — PROGRESS NOTES
Infectious Diseases Inpatient Progress Note          HISTORY OF PRESENT ILLNESS:  Follow up sepsis without definite etiology in a patient with acute encephalopathy with underlying cognitive impairment on IV Rocephin, well tolerated.    Patient aspirated tube feed after and she got dislodged last night.   Patient has currently a moist cough  Awake with visual hallucination and confusion.    Spinal tap result was negative for infection  Currently with resumed tube feeding   blood cultures remain negative.     Current Medications:     potassium bicarb-citric acid  40 mEq Per NG tube Q4H    hydroCHLOROthiazide  25 mg Per NG tube Daily    losartan  50 mg Per NG tube Daily    escitalopram  5 mg Per NG tube Daily    finasteride  5 mg Per NG tube Daily    [Held by provider] memantine  5 mg Oral BID    QUEtiapine  50 mg Oral Once    QUEtiapine  25 mg Oral BID    oxymetazoline  2 spray Each Nostril Once    lidocaine   Topical Once    ipratropium 0.5 mg-albuterol 2.5 mg  1 Dose Inhalation BID RT    LORazepam  1 mg IntraVENous Once    levETIRAcetam  500 mg IntraVENous Q12H    sodium chloride flush  5-40 mL IntraVENous 2 times per day    enoxaparin  40 mg SubCUTAneous Daily    Vitamin D  2,000 Units Oral Daily    tamsulosin  0.4 mg Oral BID RT    cefTRIAXone (ROCEPHIN) IV  2,000 mg IntraVENous Q24H       Allergies:  Sulfa antibiotics      Review of Systems  unable to provide ROS because of altered mentation      Physical Exam  Vitals:    05/04/25 0223 05/04/25 0600 05/04/25 0655 05/04/25 0711   BP: (!) 152/78   (!) 154/76   Pulse: 80   88   Resp: 18   18   Temp: 97.7 °F (36.5 °C)   98.1 °F (36.7 °C)   TempSrc: Axillary   Oral   SpO2: 91%  96% 90%   Weight:  79.8 kg (175 lb 14.4 oz)     Height:       On 2 L nasal cannula  Oxygen saturation of 90%  General Appearance: Eyes are opened, confused and hallucinating  Intact NG  Skin: warm and dry, no rash.   Head: normocephalic and atraumatic  Eyes: anicteric sclerae  Lungs: normal

## 2025-05-04 NOTE — PROGRESS NOTES
Writer entered room, NG tube was out further d/t nose adhesive being off. Pt has gurgling secretions, writer attempted to suction, tube feed was in suction canister. Writer advanced NG and secured. Respiratory assessment as charted. Call made to Dr Baum, order received for stat chest xray and to hold tube feed at this time.    Electronically signed by Dorita Zamora RN on 5/3/25 at 11:35 PM EDT

## 2025-05-04 NOTE — PROGRESS NOTES
[facility]  [place] [facility] [place] [facility]  Speech Language Pathology   Treatment Note      Emerson Salguero  1928  W282/W282-01  [x]   confirmed      Date: 2025    Sepsis, unspecified organism (HCC) [A41.9]  Sepsis (HCC) [A41.9]    Restrictions/Precautions: Fall Risk    No diet orders on file     Respiratory Status:O2 Flow Rate (L/min): 2 L/min (25 0655)   No active isolations      Subjective:  Lethargic and Confused        Interventions used this date:  Dysphagia Treatment, Oral Motor Treatment, and Caregiver education      Objective/Assessment:  Patient progressing towards goals:  Short-term Goals  Timeframe for Short-term Goals: 1 week  Goal 1: Pt will tolerate the recommended diet level with no s/s of aspiration to assess diet tolerance prior to d/c  Goal 2: Pt and family will be educated on importance of oral care, swallow strategies, and s/s of aspiration prior to d/c to reduce risk of dysphagia related re-admission.  Goal 3: Pt will tolerate trials of soft and bite sized solids with adequate mastication, timely A-P, with no evidence of pocketing or s/s of aspiration.    Pt appeared confused and lethargic. Patient demonstrated intermittent hallucinations during the session. Patient also demonstrating wet/gurgle cough. Respiratory needs. Son (Shayne) was present during treatment session. Son reported patient has aspiration pneumonia. Son also reported NG tube got backed up in which patient is supposed to be getting a PEG tube tomorrow. SLP provided max education to caregiver this date regarding aspiration pneumonia, oral care, SLP treatment, etc. Caregiver needs continuing education. SLP utilized lemon swabs this date with max encouragement to arouse patient. Patient responded intermittently by opening mouth. SLP provided oral care utilizing toothette. SLP attempted ice chips. However, patient unable to accept bolus. Bolus presented in mouth. Patient spit out all trials. Patient was

## 2025-05-04 NOTE — PLAN OF CARE
Problem: Discharge Planning  Goal: Discharge to home or other facility with appropriate resources  5/3/2025 2206 by Dorita Zamora RN  Outcome: Progressing  5/3/2025 1305 by Olga Lidia Spivey RN  Outcome: Progressing     Problem: ABCDS Injury Assessment  Goal: Absence of physical injury  5/3/2025 2206 by Dorita Zmaora RN  Outcome: Progressing  5/3/2025 1305 by Olga Lidia Spivey RN  Outcome: Progressing     Problem: Skin/Tissue Integrity  Goal: Skin integrity remains intact  Description: 1.  Monitor for areas of redness and/or skin breakdown2.  Assess vascular access sites hourly3.  Every 4-6 hours minimum:  Change oxygen saturation probe site4.  Every 4-6 hours:  If on nasal continuous positive airway pressure, respiratory therapy assess nares and determine need for appliance change or resting period  5/3/2025 2206 by Dorita Zamora RN  Outcome: Progressing  5/3/2025 1305 by Olga Lidia Spivey RN  Outcome: Progressing     Problem: Confusion  Goal: Confusion, delirium, dementia, or psychosis is improved or at baseline  Description: INTERVENTIONS:1. Assess for possible contributors to thought disturbance, including medications, impaired vision or hearing, underlying metabolic abnormalities, dehydration, psychiatric diagnoses, and notify attending LIP2. [place] high risk fall precautions, as indicated3. Provide frequent short contacts to provide reality reorientation, refocusing and direction4. Decrease environmental stimuli, including noise as appropriate5. Monitor and intervene to maintain adequate nutrition, hydration, elimination, sleep and activity6. If unable to ensure safety without constant attention obtain sitter and review sitter guidelines with assigned personnel7. Initiate Psychosocial CNS and Spiritual Care consult, as indicated  INTERVENTIONS:1. Assess for possible contributors to thought disturbance, including medications, impaired vision or hearing, underlying metabolic abnormalities, dehydration,

## 2025-05-04 NOTE — PROGRESS NOTES
Physical Therapy Med Surg Daily Treatment Note  Facility/Department: [address] ORTHO TELE  Room: [facility]/[place] [address]       NAME: Emerson Salguero  : 1928 (89 y.o.)  MRN: [id number]  CODE STATUS: Limited    Date of Service: 2025    Patient Diagnosis(es): Sepsis, unspecified organism (HCC) [A41.9]  Sepsis (HCC) [A41.9]   No chief complaint on file.    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    Gross hematuria     Acute encephalopathy     Closed head injury     Aphasia     Mild cognitive impairment     AMS (altered mental status) 2021    BPH (benign prostatic hyperplasia) 2021    CKD (chronic kidney disease) 2021    Leukocytosis 2021        Past Medical History:   Diagnosis Date    Back pain     Colitis, ulcerative (HCC)     Hypertension      Past Surgical History:   Procedure Laterality Date    LUMBAR PUNCTURE N/A 2025    12.25 ml clear colorless CSF obtained performed by Courtney PRAKASH CNP    TONSILLECTOMY         Chart Reviewed: Yes    Restrictions:       SUBJECTIVE:   Subjective: Son present for session, reports he just finished speech therapy.    Pain  Pain  Pre-Pain: 0  Post-Pain: 0    OBJECTIVE:        Bed mobility  Rolling to Left: 2 Person assistance;Dependent/Total  Supine to Sit: 2 Person assistance;Dependent/Total  Sit to Supine: 2 Person assistance;Dependent/Total  Bed Mobility Comments: Hand over hand assist to complete with no carry over. Opens eyes but does not

## 2025-05-04 NOTE — PROGRESS NOTES
05/04/25 1900   RT Protocol   History Pulmonary Disease 0   Respiratory pattern 0   Breath sounds 6   Cough 0   Indications for Bronchodilator Therapy Decreased or absent breath sounds   Bronchodilator Assessment Score 6

## 2025-05-05 ENCOUNTER — APPOINTMENT (OUTPATIENT)
Dept: CT IMAGING | Age: 89
DRG: 871 | End: 2025-05-05
Attending: INTERNAL MEDICINE
Payer: MEDICARE

## 2025-05-05 ENCOUNTER — APPOINTMENT (OUTPATIENT)
Dept: GENERAL RADIOLOGY | Age: 89
DRG: 871 | End: 2025-05-05
Attending: INTERNAL MEDICINE
Payer: MEDICARE

## 2025-05-05 ENCOUNTER — HOSPITAL ENCOUNTER (INPATIENT)
Dept: INTERVENTIONAL RADIOLOGY/VASCULAR | Age: 89
Discharge: HOME | DRG: 871 | End: 2025-05-07
Attending: INTERNAL MEDICINE
Payer: MEDICARE

## 2025-05-05 LAB
ALBUMIN SERPL-MCNC: 2.8 G/DL (ref 3.5–4.6)
ALP SERPL-CCNC: 87 U/L (ref 35–104)
ALT SERPL-CCNC: 21 U/L (ref 0–41)
ANION GAP SERPL CALCULATED.3IONS-SCNC: 12 MEQ/L (ref 9–15)
AST SERPL-CCNC: 23 U/L (ref 0–40)
BACTERIA CSF CULT: NORMAL
BASOPHILS # BLD: 0 K/UL (ref 0–0.2)
BASOPHILS NFR BLD: 0.2 %
BILIRUB SERPL-MCNC: 0.7 MG/DL (ref 0.2–0.7)
BNP BLD-MCNC: 474 PG/ML
BUN SERPL-MCNC: 41 MG/DL (ref 8–23)
CALCIUM SERPL-MCNC: 9.2 MG/DL (ref 8.5–9.9)
CHLORIDE SERPL-SCNC: 109 MEQ/L (ref 95–107)
CO2 SERPL-SCNC: 27 MEQ/L (ref 20–31)
CREAT SERPL-MCNC: 1.36 MG/DL (ref 0.7–1.2)
EOSINOPHIL # BLD: 0 K/UL (ref 0–0.7)
EOSINOPHIL NFR BLD: 0.4 %
ERYTHROCYTE [DISTWIDTH] IN BLOOD BY AUTOMATED COUNT: 14.3 % (ref 11.5–14.5)
GLOBULIN SER CALC-MCNC: 3.8 G/DL (ref 2.3–3.5)
GLUCOSE SERPL-MCNC: 97 MG/DL (ref 70–99)
HCT VFR BLD AUTO: 29.4 % (ref 42–52)
HGB BLD-MCNC: 10.1 G/DL (ref 14–18)
LYMPHOCYTES # BLD: 1 K/UL (ref 1–4.8)
LYMPHOCYTES NFR BLD: 9 %
MAGNESIUM SERPL-MCNC: 2.2 MG/DL (ref 1.7–2.4)
MCH RBC QN AUTO: 34.4 PG (ref 27–31.3)
MCHC RBC AUTO-ENTMCNC: 34.4 % (ref 33–37)
MCV RBC AUTO: 100 FL (ref 79–92.2)
MONOCYTES # BLD: 1.2 K/UL (ref 0.2–0.8)
MONOCYTES NFR BLD: 10.8 %
NEUTROPHILS # BLD: 8.6 K/UL (ref 1.4–6.5)
NEUTS SEG NFR BLD: 78.2 %
PHOSPHATE SERPL-MCNC: 2.6 MG/DL (ref 2.3–4.8)
PLATELET # BLD AUTO: 161 K/UL (ref 130–400)
POTASSIUM SERPL-SCNC: 3.3 MEQ/L (ref 3.4–4.9)
PROT SERPL-MCNC: 6.6 G/DL (ref 6.3–8)
RBC # BLD AUTO: 2.94 M/UL (ref 4.7–6.1)
SODIUM SERPL-SCNC: 148 MEQ/L (ref 135–144)
WBC # BLD AUTO: 10.9 K/UL (ref 4.8–10.8)
WNV IGG CSF-ACNC: 0.04 IV
WNV IGM CSF-ACNC: 0 IV

## 2025-05-05 PROCEDURE — 95816 EEG AWAKE AND DROWSY: CPT | Performed by: PSYCHIATRY & NEUROLOGY

## 2025-05-05 PROCEDURE — 75984 XRAY CONTROL CATHETER CHANGE: CPT | Performed by: RADIOLOGY

## 2025-05-05 PROCEDURE — 99232 SBSQ HOSP IP/OBS MODERATE 35: CPT | Performed by: INTERNAL MEDICINE

## 2025-05-05 PROCEDURE — 6360000002 HC RX W HCPCS: Performed by: INTERNAL MEDICINE

## 2025-05-05 PROCEDURE — 2709999900 IR PLACE NG TUBE BY DR W FLUORO

## 2025-05-05 PROCEDURE — 6370000000 HC RX 637 (ALT 250 FOR IP): Performed by: NURSE PRACTITIONER

## 2025-05-05 PROCEDURE — 43752 NASAL/OROGASTRIC W/TUBE PLMT: CPT | Performed by: RADIOLOGY

## 2025-05-05 PROCEDURE — 0DH67UZ INSERTION OF FEEDING DEVICE INTO STOMACH, VIA NATURAL OR ARTIFICIAL OPENING: ICD-10-PCS | Performed by: RADIOLOGY

## 2025-05-05 PROCEDURE — 94640 AIRWAY INHALATION TREATMENT: CPT

## 2025-05-05 PROCEDURE — 6360000004 HC RX CONTRAST MEDICATION: Performed by: RADIOLOGY

## 2025-05-05 PROCEDURE — 83880 ASSAY OF NATRIURETIC PEPTIDE: CPT

## 2025-05-05 PROCEDURE — 43752 NASAL/OROGASTRIC W/TUBE PLMT: CPT

## 2025-05-05 PROCEDURE — 6370000000 HC RX 637 (ALT 250 FOR IP): Performed by: INTERNAL MEDICINE

## 2025-05-05 PROCEDURE — APPSS30 APP SPLIT SHARED TIME 16-30 MINUTES: Performed by: NURSE PRACTITIONER

## 2025-05-05 PROCEDURE — 36415 COLL VENOUS BLD VENIPUNCTURE: CPT

## 2025-05-05 PROCEDURE — 2500000003 HC RX 250 WO HCPCS: Performed by: NURSE PRACTITIONER

## 2025-05-05 PROCEDURE — 94761 N-INVAS EAR/PLS OXIMETRY MLT: CPT

## 2025-05-05 PROCEDURE — 99232 SBSQ HOSP IP/OBS MODERATE 35: CPT | Performed by: PSYCHIATRY & NEUROLOGY

## 2025-05-05 PROCEDURE — 80053 COMPREHEN METABOLIC PANEL: CPT

## 2025-05-05 PROCEDURE — 99232 SBSQ HOSP IP/OBS MODERATE 35: CPT

## 2025-05-05 PROCEDURE — 83735 ASSAY OF MAGNESIUM: CPT

## 2025-05-05 PROCEDURE — 71250 CT THORAX DX C-: CPT

## 2025-05-05 PROCEDURE — 85025 COMPLETE CBC W/AUTO DIFF WBC: CPT

## 2025-05-05 PROCEDURE — 2580000003 HC RX 258: Performed by: INTERNAL MEDICINE

## 2025-05-05 PROCEDURE — 1210000000 HC MED SURG R&B

## 2025-05-05 PROCEDURE — 97535 SELF CARE MNGMENT TRAINING: CPT

## 2025-05-05 PROCEDURE — 97112 NEUROMUSCULAR REEDUCATION: CPT

## 2025-05-05 PROCEDURE — 2700000000 HC OXYGEN THERAPY PER DAY

## 2025-05-05 PROCEDURE — 71045 X-RAY EXAM CHEST 1 VIEW: CPT

## 2025-05-05 PROCEDURE — 6360000002 HC RX W HCPCS: Performed by: NURSE PRACTITIONER

## 2025-05-05 PROCEDURE — 84100 ASSAY OF PHOSPHORUS: CPT

## 2025-05-05 PROCEDURE — 95816 EEG AWAKE AND DROWSY: CPT

## 2025-05-05 PROCEDURE — 92526 ORAL FUNCTION THERAPY: CPT

## 2025-05-05 RX ORDER — IOPAMIDOL 755 MG/ML
30 INJECTION, SOLUTION INTRAVASCULAR
Status: COMPLETED | OUTPATIENT
Start: 2025-05-05 | End: 2025-05-05

## 2025-05-05 RX ORDER — IPRATROPIUM BROMIDE AND ALBUTEROL SULFATE 2.5; .5 MG/3ML; MG/3ML
1 SOLUTION RESPIRATORY (INHALATION) EVERY 4 HOURS PRN
Status: DISCONTINUED | OUTPATIENT
Start: 2025-05-05 | End: 2025-05-08 | Stop reason: HOSPADM

## 2025-05-05 RX ORDER — QUETIAPINE FUMARATE 25 MG/1
25 TABLET, FILM COATED ORAL NIGHTLY
Status: DISCONTINUED | OUTPATIENT
Start: 2025-05-06 | End: 2025-05-08 | Stop reason: HOSPADM

## 2025-05-05 RX ADMIN — ENOXAPARIN SODIUM 40 MG: 100 INJECTION SUBCUTANEOUS at 13:10

## 2025-05-05 RX ADMIN — SODIUM CHLORIDE, PRESERVATIVE FREE 10 ML: 5 INJECTION INTRAVENOUS at 13:11

## 2025-05-05 RX ADMIN — LEVETIRACETAM 500 MG: 100 INJECTION INTRAVENOUS at 03:17

## 2025-05-05 RX ADMIN — PIPERACILLIN AND TAZOBACTAM 3375 MG: 3; .375 INJECTION, POWDER, LYOPHILIZED, FOR SOLUTION INTRAVENOUS at 03:20

## 2025-05-05 RX ADMIN — HALOPERIDOL LACTATE 2 MG: 5 INJECTION, SOLUTION INTRAMUSCULAR at 03:28

## 2025-05-05 RX ADMIN — IPRATROPIUM BROMIDE AND ALBUTEROL SULFATE 1 DOSE: 2.5; .5 SOLUTION RESPIRATORY (INHALATION) at 07:29

## 2025-05-05 RX ADMIN — SODIUM CHLORIDE, PRESERVATIVE FREE 10 ML: 5 INJECTION INTRAVENOUS at 21:35

## 2025-05-05 RX ADMIN — PIPERACILLIN AND TAZOBACTAM 3375 MG: 3; .375 INJECTION, POWDER, LYOPHILIZED, FOR SOLUTION INTRAVENOUS at 21:39

## 2025-05-05 RX ADMIN — IOPAMIDOL 30 ML: 755 INJECTION, SOLUTION INTRAVENOUS at 10:14

## 2025-05-05 RX ADMIN — IPRATROPIUM BROMIDE AND ALBUTEROL SULFATE 1 DOSE: 2.5; .5 SOLUTION RESPIRATORY (INHALATION) at 19:23

## 2025-05-05 RX ADMIN — LEVETIRACETAM 500 MG: 100 INJECTION INTRAVENOUS at 17:18

## 2025-05-05 RX ADMIN — PIPERACILLIN AND TAZOBACTAM 3375 MG: 3; .375 INJECTION, POWDER, LYOPHILIZED, FOR SOLUTION INTRAVENOUS at 13:22

## 2025-05-05 RX ADMIN — TAMSULOSIN HYDROCHLORIDE 0.4 MG: 0.4 CAPSULE ORAL at 21:34

## 2025-05-05 ASSESSMENT — ENCOUNTER SYMPTOMS
COLOR CHANGE: 0
TROUBLE SWALLOWING: 1
COUGH: 0
GASTROINTESTINAL NEGATIVE: 1
VOMITING: 0
WHEEZING: 0
RESPIRATORY NEGATIVE: 1

## 2025-05-05 NOTE — PROGRESS NOTES
[facility] OCCUPATIONAL THERAPY MED SURG TREATMENT NOTE     Date: 2025  Patient Name: Emerson Salguero        MRN: [id number]  Account: [id number]   : 1928  (89 y.o.)  Room: [place] [address]    Chart Review:    Restrictions  Restrictions/Precautions  Restrictions/Precautions: Fall Risk  Activity Level: Up as Tolerated, Up with Assist     Safety:  Safety Devices  Type of Devices: All fall risk precautions in place    Patient's birthday verified: Yes    Subjective:     \" Im not well.\"       Pain at start/end of treatment: pt could not properly identify pain- pt very lethargic but just stated, \" Im not well.\"- several times    Objective:    ADL Status:  ADL  Feeding: NPO  Grooming: Moderate assistance  Grooming Skilled Clinical Factors: use green swab sticks to cleanse mouth and lips; pt did participate with min A to wipe mouth-face off with s/u  UE Bathing: Dependent/Total;Other (Comment);Verbal cueing  UE Bathing Skilled Clinical Factors: Pt attempts to complete UB bathing but demo confusion with wires/IV line with attempting to pull them off  LE Bathing: Dependent/Total  UE Dressing Skilled Clinical Factors: gown  LE Dressing Skilled Clinical Factors: none  Putting On/Taking Off Footwear: Dependent/Total  Putting On/Taking Off Footwear Skilled Clinical Factors: doffed socks d/t noted wrinkles-indentations in skin from socks not being pulled up all the way- increased swelling above/below sock line  Toileting Skilled Clinical Factors: purewick-brief- no BM    Therapy key for assistance levels -   Independent/Mod I = Pt. is able to perform task with no assistance but may require a device   Stand by assistance = Pt. does not perform task at an independent level but does not need physical assistance, requires verbal cues  Minimal, Moderate, Maximal Assistance = Pt. requires physical assistance (25%, 50%, 75% assist from helper) for task but is able to actively participate in task   Dependent = Pt. requires total

## 2025-05-05 NOTE — PROGRESS NOTES
Extraocular movements intact.      Pupils: Pupils are equal, round, and reactive to light.   Cardiovascular:      Rate and Rhythm: Normal rate and regular rhythm.      Heart sounds: No murmur heard.  Pulmonary:      Effort: Pulmonary effort is normal. No respiratory distress.      Breath sounds: Normal breath sounds.   Abdominal:      General: Bowel sounds are normal.   Musculoskeletal:         General: Normal range of motion.      Cervical back: Normal range of motion.   Skin:     General: Skin is warm and dry.   Neurological:      Mental Status: He is disoriented.      Cranial Nerves: No cranial nerve deficit.      Sensory: No sensory deficit.      Motor: No tremor, abnormal muscle tone or seizure activity.      Coordination: Coordination normal.      Deep Tendon Reflexes: Reflexes are normal and symmetric. Babinski sign absent on the right side. Babinski sign absent on the left side.      Comments: Neuroexam limited.  Patient with severe dementia and encephalopathy.  Unable to follow commands.  Does move all extremities equally and spontaneously.  No obvious focal deficits.   Psychiatric:         Mood and Affect: Mood normal.       Sitting in the chair trying to communicate        Medications:  Reviewed    Infusion Medications:    sodium chloride       Scheduled Medications:    piperacillin-tazobactam  3,375 mg IntraVENous Q8H    hydroCHLOROthiazide  25 mg Per NG tube Daily    losartan  50 mg Per NG tube Daily    escitalopram  5 mg Per NG tube Daily    finasteride  5 mg Per NG tube Daily    [Held by provider] memantine  5 mg Oral BID    QUEtiapine  50 mg Oral Once    QUEtiapine  25 mg Oral BID    lidocaine   Topical Once    ipratropium 0.5 mg-albuterol 2.5 mg  1 Dose Inhalation BID RT    LORazepam  1 mg IntraVENous Once    levETIRAcetam  500 mg IntraVENous Q12H    sodium chloride flush  5-40 mL IntraVENous 2 times per day    enoxaparin  40 mg SubCUTAneous Daily    Vitamin D  2,000 Units Oral Daily    tamsulosin   0.4 mg Oral BID RT     PRN Meds: atropine, haloperidol lactate, labetalol, hydrALAZINE, sodium chloride flush, sodium chloride, acetaminophen **OR** acetaminophen    Labs:   Recent Labs     05/03/25 0510 05/04/25 0533 05/05/25  0531   WBC 9.8 11.3* 10.9*   HGB 10.5* 10.9* 10.1*   HCT 31.6* 31.8* 29.4*    149 161     Recent Labs     05/03/25  0510 05/04/25  0533 05/05/25  0531    146* 148*   K 3.3* 3.2* 3.3*    108* 109*   CO2 22 26 27   BUN 34* 37* 41*   CREATININE 1.14 1.08 1.36*   CALCIUM 9.2 9.6 9.2   PHOS 2.8 2.5 2.6     Recent Labs     05/03/25 0510 05/04/25 0533 05/05/25  0531   AST 29 30 23   ALT 29 26 21   BILITOT 0.5 0.5 0.7   ALKPHOS 101 103 87     No results for input(s): \"INR\" in the last 72 hours.    No results for input(s): \"CKTOTAL\", \"TROPONINI\" in the last 72 hours.    Urinalysis:   Lab Results   Component Value Date/Time    NITRU Negative 04/29/2025 07:44 PM    WBCUA 0-2 04/29/2025 07:44 PM    BACTERIA RARE 04/29/2025 07:44 PM    RBCUA 0-2 04/29/2025 07:44 PM    BLOODU SMALL 04/29/2025 07:44 PM    GLUCOSEU Negative 04/29/2025 07:44 PM       Radiology:   Most recent    EEG No valid procedures specified.    MRI of Brain No results found for this or any previous visit.  Results for orders placed during the hospital encounter of 02/20/21    MRI brain without contrast    Narrative  EXAM: MRI of the brain without contrast    History: Metabolic encephalopathy. Stroke.    Technique: Multiplanar multisequence MRI of the brain was performed without contrast.    Comparison: CT brain from February 19, 2021    Findings:    Examination is degraded by motion artifact.    Hyperintensity/FLAIR signal within the bilateral supratentorial white matter and patchy hyperintense T2 signal within the [place] are nonspecific findings most compatible with chronic small vessel ischemic changes in a patient of this age.    Prominence of the sulci and ventricles compatible with moderate generalized

## 2025-05-05 NOTE — CARE COORDINATION
I s/w SONJA Degroot, JULIANO my review of clinical.  Orientation level and cognition needs reassessed (last documentation 5/4/25), documented as disoriented x 4. Outstanding otolaryngologist consult and CT of chest.  We will continue to follow for SAC appropriateness, and medical stability.

## 2025-05-05 NOTE — PROGRESS NOTES
Physical Therapy Med Surg Daily Treatment Note  Facility/Department: [address] ORTHO TELE  Room: [facility]       NAME: Emerson Salguero  : 1928 (89 y.o.)  MRN: [id number]  CODE STATUS: Limited    Date of Service: 2025    Patient Diagnosis(es): Sepsis, unspecified organism (HCC) [A41.9]  Sepsis (HCC) [A41.9]   No chief complaint on file.    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    Gross hematuria     Acute encephalopathy     Closed head injury     Aphasia     Mild cognitive impairment     AMS (altered mental status) 2021    BPH (benign prostatic hyperplasia) 2021    CKD (chronic kidney disease) 2021    Leukocytosis 2021        Past Medical History:   Diagnosis Date    Back pain     Colitis, ulcerative (HCC)     Hypertension      Past Surgical History:   Procedure Laterality Date    LUMBAR PUNCTURE N/A 2025    12.25 ml clear colorless CSF obtained performed by Courtney PRAKASH CNP    TONSILLECTOMY         Chart Reviewed: Yes  Family/Caregiver Present: Yes (Son)  General  General Comments: Pt just had corepak placed.    Restrictions:  Restrictions/Precautions: Fall Risk    SUBJECTIVE:   Subjective: I'm doing terrible.    Pain  Pain  Pre-Pain: 0  Post-Pain: 0     OBJECTIVE:   Orientation  Orientation Level: Oriented to person  Cognition  Arousal/Alertness: Inconsistent responses to stimuli  Following Commands: Inconsistently follows    Time Out 1141   Minutes 24      Bm/Trsf - 15 mins  NMR - 9 mins       Jessica Arvizu PTA, 05/05/25 at 11:51 AM         Definitions for assistance levels  Independent = pt does not require any physical supervision or assistance from another person for activity completion. Device may be needed.  Stand by assistance = pt requires verbal cues or instructions from another person, close to but not touching, to perform the activity  Minimal assistance= pt performs 75% or more of the activity; assistance is required to complete the activity  Moderate assistance= pt performs 50% of the activity; assistance is required to complete the activity  Maximal assistance = pt performs 25% of the activity; assistance is required to complete the activity  Dependent = pt requires total physical assistance to accomplish the task

## 2025-05-05 NOTE — PROCEDURES
[facility]                   [address] [place] [address]                          ELECTROENCEPHALOGRAM      PATIENT NAME: XUAN AMES               : 1928  MED REC NO: [id number]                        ROOM: W282  ACCOUNT NO: [id number]                       ADMIT DATE: 2025  PROVIDER: Nils Robert MD      REFERRING PHYSICIAN:  DANIELITO MANN    MEDICATIONS:  Lexapro, Proscar, Cozaar, Rocephin, Namenda, ProAir, Proventil.    DESCRIPTION:  This is a spontaneous 20-channel EEG recording for this patient with significant encephalopathy.  The background rhythm of the EEG shows slowing in the range of 4-6 theta.  On the left, there is a focus of sharp waves intermittently seen throughout the EEG recording.  No definite other asymmetries or epileptiform discharge.  The sharp waves appear to be mostly unilateral.  Photic stimulation was performed without any photic responses.  Hyperventilation not performed.    IMPRESSION:  This is an abnormal EEG recording by the virtue of sharp waves seen in the left hemisphere with a focus.  This finding is most consistent with an underlying epileptiform discharge given the unilaterality.  Underlying metabolic dysfunction needs to be ruled out.  Clinical course recommended.          NILS ROBERT MD      D:  2025 09:22:22     T:  2025 10:24:11     REGLA/NATY  Job #:  [id number]     Doc#:  [id number]

## 2025-05-05 NOTE — PROGRESS NOTES
Comprehensive Nutrition Assessment    Type and Reason for Visit:  Reassess    Nutrition Recommendations/Plan:   Continue NPO  Re-start Tube Feeding: standard fiber formula (Jevity 1.5) @ 25ml/hr, and advance after 24hrs if well-tolerated,   to goal of  50ml/hr.   Suggest 100mls q 4hours H2O flush or as per MD for fluid management.     Malnutrition Assessment:  Malnutrition Status:  At risk for malnutrition (05/02/25 1415)    Context:  Acute Illness       Nutrition Assessment:    Tube feeding on hold since 5/3 d/t NGT displacement. Corpak placed today. When okay per Physician, recommend re-intiate standard fiber formula (Jevity 1.5) @ 25ml/hr, and advance after 24hrs if well-tolerated, to goal of  50ml/hr. Suggest 100mls q 4hours H2O flush or as per MD for fluid management.    Nutrition Related Findings:    PMH- CKD3, htn, dementia; admitted with 'Encephalopathy in the setting of baseline dementia'. Labs/meds reviewed. hyperglycemia/hyponatremia and elevated BUN/Cr noted. Pt given lasix 5/1 and HCTZ 5/3; takes HCTZ at home. +3 BLE edma noted. Wound Type: None       Current Nutrition Intake & Therapies:    Diet Order:NPO  Current Tube Feeding (TF) Orders:  Feeding Route: Nasogastric  Formula: Standard with Fiber (Jevity 1.5)  Schedule: Continuous  Feeding Regimen: 25ml/jb=703opx  Additives/Modulars: None  Water Flushes: 100mls qid  Current TF Provides: 900kcals/ 38gms pro/ 856mls H2O    Anthropometric Measures:  Height: 172.7 cm (5' 7.99\")  Ideal Body Weight (IBW): 154 lbs (70 kg)    Admission Body Weight: 84.8 kg (187 lb) (actual)  Current Body Weight: 80.2 kg (176 lb 12.9 oz) (5/1 *edema present),   Weight Source: Bed scale  Current BMI (kg/m2): 26.9  Usual Body Weight: 77.1 kg (170 lb) (per son *with decreased edema)     % Weight Change (Calculated): 4                    BMI Categories: Overweight (BMI 25.0-29.9)    Estimated Daily Nutrient Needs:  Energy Requirements Based On: Kcal/kg  Weight Used for Energy  Requirements: Usual  Energy (kcal/day): 4777-2137 (kg x 22-24)  Weight Used for Protein Requirements: Ideal  Protein (g/day): 70-84 (kg x 1.0-1.2)  Method Used for Fluid Requirements: Defer to provider    Nutrition Diagnosis:   Inadequate oral intake related to cognitive or neurological impairment as evidenced by NPO or clear liquid status due to medical condition, nutrition support - enteral nutrition    Nutrition Interventions:   Food and/or Nutrient Delivery: Continue NPO, Start Tube Feeding  Nutrition Education/Counseling: No recommendation at this time  Coordination of Nutrition Care: Continue to monitor while inpatient       Goals:  Goals: Initiate nutrition support  Type of Goal: Continue current goal       Nutrition Monitoring and Evaluation:      Food/Nutrient Intake Outcomes: Enteral Nutrition Intake/Tolerance  Physical Signs/Symptoms Outcomes: Weight, Fluid Status or Edema, Biochemical Data    Discharge Planning:    Too soon to determine     Barbie Nuñez, RD, LD

## 2025-05-05 NOTE — PROGRESS NOTES
Infectious Disease     Patient Name: Emerson Salguero  Date: 5/5/2025  YOB: 1928  Medical Record Number: [id number]                    Sepsis    Patient presented fatigue loss of appetite was hypothermic bradycardic initially on admission treated and sepsis placed on vancomycin and Zosyn    Cough shortness of breath some confusion      Cultures remain no growth         Patient underwent lumbar puncture 5/2/2025  Cultures negative West Nile negative  Cell count was not elevated          Patient aspirated tube feeding 5/3/2025  Chest x-ray from 5 3/2/2025 5 4/2/2025 show increase density in the right lung  CT scan of the lung done today shows a size fusion air bronchograms in the right lung                    Review of Systems   Constitutional:  Negative for chills, diaphoresis, fatigue and fever.   Respiratory: Negative.     Cardiovascular: Negative.    Gastrointestinal: Negative.        Review of Systems: All 14 review of systems negative other than as stated above    Social History     Tobacco Use    Smoking status: Never    Smokeless tobacco: Never   Vaping Use    Vaping status: Never Used   Substance Use Topics    Alcohol use: Not Currently    Drug use: Not Currently         Past Medical History:   Diagnosis Date    Back pain     Colitis, ulcerative (HCC)     Hypertension            Past Surgical History:   Procedure Laterality Date    LUMBAR PUNCTURE N/A 05/02/2025    12.25 ml clear colorless CSF obtained performed by Courtney PRAKASH CNP    TONSILLECTOMY           No current facility-administered medications on file prior to encounter.     Current Outpatient Medications on File Prior to Encounter   Medication Sig Dispense Refill    hydroCHLOROthiazide (HYDRODIURIL) 25 MG tablet Take 1 tablet by mouth daily      escitalopram (LEXAPRO) 5 MG tablet Take 1 tablet by mouth daily      losartan (COZAAR) 50 MG tablet Take 1 tablet by mouth daily      Glycerin-Polysorbate 80 (REFRESH DRY EYE THERAPY OP) Apply 1

## 2025-05-05 NOTE — PROGRESS NOTES
[facility]  Facility/Department: [facility] ORTHO TELE  Speech Language Pathology   Treatment Note      Emerson Salguero  1928  W282/W282-01  [x]   confirmed      Date: 2025    Sepsis, unspecified organism (HCC) [A41.9]  Sepsis (HCC) [A41.9]    Restrictions/Precautions: Fall Risk    ADULT TUBE FEEDING; Nasogastric; Standard with Fiber; Continuous; 25; Yes; 25; Q 24 hours; 50; 100; Q 6 hours     Respiratory Status:O2 Flow Rate (L/min): 2 L/min (25 0729)   No active isolations      Subjective:  Cooperative        Interventions used this date:  Dysphagia Treatment, Instruction in Compensatory Strategies, and Caregiver education      Objective/Assessment:  Patient progressing towards goals:  Short-term Goals  Timeframe for Short-term Goals: 1 week  Goal 1: Pt will tolerate the recommended diet level with no s/s of aspiration to assess diet tolerance prior to d/c  Goal 2: Pt and family will be educated on importance of oral care, swallow strategies, and s/s of aspiration prior to d/c to reduce risk of dysphagia related re-admission.  Son present for treatment and was educated on current status and continuing NPO status until further instrumental swallow assessment can be performed due to fluctuating s/s of aspiration at bedside. SLP educated son on process of MBS and son verbalized understanding.   Goal 3: Pt will tolerate trials of soft and bite sized solids with adequate mastication, timely A-P, with no evidence of pocketing or s/s of aspiration.  Pt trialed ice chips x2 with x1 delayed cough, lemon ice x3 with x1 delayed cough and lemon glycerin swabs x3 with patient triggering a swallow in 3/3 trials.     Updated goals:  Goal 1: Patient will tolerate PO trials deemed appropriate by treating SLP with adequate oral clearance of bolus and no overt s/s of aspiration in all opportunities.  Goal 2: Patient will tolerate thermal gustatory stimulation to anterior faucial pillars to stimulate the swallow and

## 2025-05-05 NOTE — FLOWSHEET NOTE
Pt in special procedures for post pyloric Corpak placement. Corpack inserted into right nare by BRICERN.     1010 Dr. Roberto in to adjust tube. Hand injected 50/50 mix of 370 contrast and water. Dr. Roberto confirmed tube placement in jejunum using fluoro.    1015 Corkpak secured at 90cm in right nare. Pt tolerated it fairly well.    1030 3 attempts to bridle, unsuccessful.    1056 Report called to Telma RN by LOERNA Anne. Pt back to inpt room via cart with transport.

## 2025-05-05 NOTE — PROGRESS NOTES
05/05/25 0700   RT Protocol   History Pulmonary Disease 0   Respiratory pattern 0   Breath sounds 6   Cough 0   Indications for Bronchodilator Therapy Decreased or absent breath sounds   Bronchodilator Assessment Score 6

## 2025-05-05 NOTE — FLOWSHEET NOTE
Spoke to Telma RN to follow up on patient after having a lumbar puncture. No ill finding were reported regarding site.

## 2025-05-05 NOTE — PROGRESS NOTES
Hospitalist Progress Note      PCP: Misty Vela MD    Date of Admission: 4/30/2025    Chief Complaint:  no acute events, is afebrile, stable HD, on 2 liters of O2, u/o-1000 ml, had Corpak placed today with unsuccessful attempts to bridle per IR     Medications:  Reviewed    Infusion Medications    sodium chloride       Scheduled Medications    [START ON 5/6/2025] QUEtiapine  25 mg Oral Nightly    piperacillin-tazobactam  3,375 mg IntraVENous Q8H    hydroCHLOROthiazide  25 mg Per NG tube Daily    losartan  50 mg Per NG tube Daily    escitalopram  5 mg Per NG tube Daily    finasteride  5 mg Per NG tube Daily    [Held by provider] memantine  5 mg Oral BID    QUEtiapine  50 mg Oral Once    lidocaine   Topical Once    ipratropium 0.5 mg-albuterol 2.5 mg  1 Dose Inhalation BID RT    LORazepam  1 mg IntraVENous Once    levETIRAcetam  500 mg IntraVENous Q12H    sodium chloride flush  5-40 mL IntraVENous 2 times per day    enoxaparin  40 mg SubCUTAneous Daily    Vitamin D  2,000 Units Oral Daily    tamsulosin  0.4 mg Oral BID RT     PRN Meds: atropine, haloperidol lactate, labetalol, hydrALAZINE, sodium chloride flush, sodium chloride, acetaminophen **OR** acetaminophen      Intake/Output Summary (Last 24 hours) at 5/5/2025 1440  Last data filed at 5/4/2025 1924  Gross per 24 hour   Intake 129.22 ml   Output 1000 ml   Net -870.78 ml       Exam:    /81   Pulse 75   Temp 98.2 °F (36.8 °C) (Axillary)   Resp 18   Ht 1.727 m (5' 7.99\")   Wt 79.5 kg (175 lb 4.8 oz)   SpO2 92%   BMI 26.66 kg/m²     General appearance: somnolent  Respiratory:  diminished R>L  Cardiovascular: Regular rate and rhythm, S1/S2.  Abdomen: Soft, active bowel sounds.  Musculoskeletal: No edema bilaterally.         Labs:   Recent Labs     05/03/25  0510 05/04/25  0533 05/05/25  0531   WBC 9.8 11.3* 10.9*   HGB 10.5* 10.9* 10.1*   HCT 31.6* 31.8* 29.4*    149 161     Recent Labs     05/03/25  0510 05/04/25  0533 05/05/25  0531   NA  and   draped in usual sterile fashion.  The area between the interspinous process was   marked.  This was at the L3/L4 level. Using the usual sterile conditions, 1%   lidocaine (5 mL) and fluoroscopy guidance, a 20 gauge needle was inserted into   the spinal canal.  After confirmation of intra-thecal location of the needle tip   by CSF leakage through the needle.  Approximately 12.25 cc of CSF were collected   in 4 separate containers.  Following that the needle was withdrawn from the   back.  The patient tolerated the procedure well without complications. The   patient was monitored in recovery for 4 hours prior to discharge.                 Electronically signed by Rocael Roberto      MRI BRAIN WO CONTRAST   Final Result   1. No acute intracranial abnormality. No acute infarct.   2. Stable prominent age-appropriate atrophy and small vessel ischemia.         XR CHEST PORTABLE   Final Result   Cardiomegaly with small effusions and atelectasis.         IR PLACE NG TUBE BY DR MAHSA YARBROUGH    (Results Pending)   IR TUBE/CATH CHANGE W CONTRAST    (Results Pending)           Assessment/Plan:    89 y/o man with history of HTN, CKD3, BPH, dementia, who presented with:     Acute encephalopathy with hallucinations  - in the setting of baseline dementia suspect secondary to underlying aspiration pneumonia (dysphagia, secretions, cough, increasing right lower lobe infiltrate)  - EEG was suggestive of underlying encephalitis however MRI and LP were negative   - on IV Zosyn, Keppra, Seroquel, holding Namenda  - NG tube removed yesterday  - surgical service consulted for PEG tube placement  - Corpak placed today with unsuccessful attempts to bridle per IR   - consult ENT  - repeat CXR showed persistent right sided opacity,   - will obtain CT chest  - followed by ID, neurology, palliative care     Hypernatremia   - continue IVFs  - monitor    CKD3  - monitor renal function    Anemia   - follow H/H    HTN  - on HCTZ, Losartan    Diet:

## 2025-05-05 NOTE — PLAN OF CARE
Problem: Discharge Planning  Goal: Discharge to home or other facility with appropriate resources  5/4/2025 2113 by Dorita Zamora RN  Outcome: Progressing  5/4/2025 1819 by Katlin Bender RN  Outcome: Progressing     Problem: ABCDS Injury Assessment  Goal: Absence of physical injury  5/4/2025 2113 by Dorita Zamora RN  Outcome: Progressing  5/4/2025 1819 by Katlin Bender RN  Outcome: Progressing     Problem: Skin/Tissue Integrity  Goal: Skin integrity remains intact  Description: 1.  Monitor for areas of redness and/or skin breakdown2.  Assess vascular access sites hourly3.  Every 4-6 hours minimum:  Change oxygen saturation probe site4.  Every 4-6 hours:  If on nasal continuous positive airway pressure, respiratory therapy assess nares and determine need for appliance change or resting period  5/4/2025 2113 by Dorita Zamora RN  Outcome: Progressing  5/4/2025 1819 by Katlin Bender RN  Outcome: Progressing     Problem: Confusion  Goal: Confusion, delirium, dementia, or psychosis is improved or at baseline  Description: INTERVENTIONS:1. Assess for possible contributors to thought disturbance, including medications, impaired vision or hearing, underlying metabolic abnormalities, dehydration, psychiatric diagnoses, and notify attending LIP2. [facility] high risk fall precautions, as indicated3. Provide frequent short contacts to provide reality reorientation, refocusing and direction4. Decrease environmental stimuli, including noise as appropriate5. Monitor and intervene to maintain adequate nutrition, hydration, elimination, sleep and activity6. If unable to ensure safety without constant attention obtain sitter and review sitter guidelines with assigned personnel7. Initiate Psychosocial CNS and Spiritual Care consult, as indicated  INTERVENTIONS:1. Assess for possible contributors to thought disturbance, including medications, impaired vision or hearing, underlying metabolic abnormalities,

## 2025-05-05 NOTE — CARE COORDINATION
LSW SPOKE WITH MARTY CAREY TO GIVE HER THE UPDATED CONDITION FOR THE PT.  PT HAD A CORPAC PLACED TODAY.  PLAN IS FOR THE PT TO TRANSFER TO [facility] WHEN HE IS MEDICALLY STABLE.      MARTY CAREY FROM [facility] CALLED [facility] AND THEIR NURSING SAID THAT THEY CANNOT DO A CORPAK SO THEY CANNOT ACCEPT THE PT AS LONG AS HE HAS THAT.  LSW CALLED THE REFERRAL TO NASE[facility] KAY FOR REVIEW.      LSW EXPLAINED TO SON ABOUT DENIAL FROM [facility] AND REVIEW FROM NASEEM KAY.  SNF LIST WAS PROVIDED FOR HIM TO GIVE OTHER CHOICES IF NEEDED.

## 2025-05-05 NOTE — PROGRESS NOTES
Pt received from Telma RN, assumed care of the pt. Call received from ENT regarding recent consult.  Dr Ndiaye on unit and spoke to Dr Dupree with pt update.  Pt currently off the floor for ordered CT.  Pts son at the bedside.

## 2025-05-06 ENCOUNTER — APPOINTMENT (OUTPATIENT)
Dept: GENERAL RADIOLOGY | Age: 89
DRG: 871 | End: 2025-05-06
Attending: INTERNAL MEDICINE
Payer: MEDICARE

## 2025-05-06 ENCOUNTER — HOSPITAL ENCOUNTER (INPATIENT)
Dept: INTERVENTIONAL RADIOLOGY/VASCULAR | Age: 89
Discharge: HOME | DRG: 871 | End: 2025-05-08
Attending: INTERNAL MEDICINE
Payer: MEDICARE

## 2025-05-06 VITALS — DIASTOLIC BLOOD PRESSURE: 83 MMHG | OXYGEN SATURATION: 95 % | HEART RATE: 63 BPM | SYSTOLIC BLOOD PRESSURE: 141 MMHG

## 2025-05-06 LAB
ALBUMIN SERPL-MCNC: 3.1 G/DL (ref 3.5–4.6)
ALP SERPL-CCNC: 90 U/L (ref 35–104)
ALT SERPL-CCNC: 22 U/L (ref 0–41)
ANION GAP SERPL CALCULATED.3IONS-SCNC: 12 MEQ/L (ref 9–15)
APPEARANCE CSF: CLEAR
APPEARANCE FLUID: NORMAL
AST SERPL-CCNC: 24 U/L (ref 0–40)
BASOPHILS # BLD: 0.1 K/UL (ref 0–0.2)
BASOPHILS NFR BLD: 0.5 %
BILIRUB SERPL-MCNC: 0.6 MG/DL (ref 0.2–0.7)
BUN SERPL-MCNC: 38 MG/DL (ref 8–23)
CALCIUM SERPL-MCNC: 9.4 MG/DL (ref 8.5–9.9)
CELL COUNT FLUID TYPE: NORMAL
CHLORIDE SERPL-SCNC: 111 MEQ/L (ref 95–107)
CLOT EVALUATION CSF: NORMAL
CLOT EVALUATION: NORMAL
CO2 SERPL-SCNC: 28 MEQ/L (ref 20–31)
COLOR CSF: COLORLESS
COLOR FLUID: NORMAL
CREAT SERPL-MCNC: 1.19 MG/DL (ref 0.7–1.2)
CRP SERPL HS-MCNC: 167.1 MG/L (ref 0–5)
EOSINOPHIL # BLD: 0.1 K/UL (ref 0–0.7)
EOSINOPHIL NFR BLD: 0.9 %
ERYTHROCYTE [DISTWIDTH] IN BLOOD BY AUTOMATED COUNT: 14.3 % (ref 11.5–14.5)
FLUID PATH CONSULT: YES
FLUID TYPE: NORMAL
FLUID TYPE: NORMAL
GLOBULIN SER CALC-MCNC: 3.9 G/DL (ref 2.3–3.5)
GLUCOSE BLD-MCNC: 96 MG/DL (ref 70–99)
GLUCOSE FLD-MCNC: 103.7 MG/DL
GLUCOSE SERPL-MCNC: 97 MG/DL (ref 70–99)
HCT VFR BLD AUTO: 31.5 % (ref 42–52)
HGB BLD-MCNC: 10.4 G/DL (ref 14–18)
LYMPHOCYTES # BLD: 1 K/UL (ref 1–4.8)
LYMPHOCYTES NFR BLD: 9.3 %
LYMPHOCYTES, BODY FLUID: 8 %
MAGNESIUM SERPL-MCNC: 2.2 MG/DL (ref 1.7–2.4)
MCH RBC QN AUTO: 33.9 PG (ref 27–31.3)
MCHC RBC AUTO-ENTMCNC: 33 % (ref 33–37)
MCV RBC AUTO: 102.6 FL (ref 79–92.2)
MONOCYTE, FLUID: 12 %
MONOCYTES # BLD: 1 K/UL (ref 0.2–0.8)
MONOCYTES NFR BLD: 8.9 %
NEUTROPHIL, FLUID: 80 %
NEUTROPHILS # BLD: 8.2 K/UL (ref 1.4–6.5)
NEUTS SEG NFR BLD: 77 %
NO DIFFERENTIAL CSF: NORMAL
NUCLEATED CELLS FLUID: 2977 /CUMM
NUMBER OF CELLS COUNTED FLUID: 100
PATH CONSULT CSF: YES
PERFORMED ON: NORMAL
PH, BODY FLUID: 7
PLATELET # BLD AUTO: 230 K/UL (ref 130–400)
POTASSIUM SERPL-SCNC: 3.1 MEQ/L (ref 3.4–4.9)
PROCALCITONIN SERPL IA-MCNC: 0.68 NG/ML (ref 0–0.15)
PROT FLD-MCNC: 4 G/DL
PROT SERPL-MCNC: 7 G/DL (ref 6.3–8)
RBC # BLD AUTO: 3.07 M/UL (ref 4.7–6.1)
RBC CSF: NORMAL K/UL
RBC FLUID: NORMAL /CUMM
SODIUM SERPL-SCNC: 151 MEQ/L (ref 135–144)
TUBE NUMBER CSF: NORMAL
VOLUME CSF: 9.5 ML
VZV IGG SER IA-ACNC: 30.9 S/CO
VZV IGM SER IA-ACNC: 0.65 ISR
WBC # BLD AUTO: 10.7 K/UL (ref 4.8–10.8)
WBC CSF: <3 K/UL (ref 0–8)

## 2025-05-06 PROCEDURE — 6360000002 HC RX W HCPCS: Performed by: NURSE PRACTITIONER

## 2025-05-06 PROCEDURE — 94761 N-INVAS EAR/PLS OXIMETRY MLT: CPT

## 2025-05-06 PROCEDURE — 2580000003 HC RX 258: Performed by: INTERNAL MEDICINE

## 2025-05-06 PROCEDURE — 82945 GLUCOSE OTHER FLUID: CPT

## 2025-05-06 PROCEDURE — 6370000000 HC RX 637 (ALT 250 FOR IP): Performed by: NURSE PRACTITIONER

## 2025-05-06 PROCEDURE — 83986 ASSAY PH BODY FLUID NOS: CPT

## 2025-05-06 PROCEDURE — C1729 CATH, DRAINAGE: HCPCS

## 2025-05-06 PROCEDURE — 89051 BODY FLUID CELL COUNT: CPT

## 2025-05-06 PROCEDURE — 99232 SBSQ HOSP IP/OBS MODERATE 35: CPT | Performed by: INTERNAL MEDICINE

## 2025-05-06 PROCEDURE — 2500000003 HC RX 250 WO HCPCS: Performed by: INTERNAL MEDICINE

## 2025-05-06 PROCEDURE — 84157 ASSAY OF PROTEIN OTHER: CPT

## 2025-05-06 PROCEDURE — 87205 SMEAR GRAM STAIN: CPT

## 2025-05-06 PROCEDURE — 88112 CYTOPATH CELL ENHANCE TECH: CPT

## 2025-05-06 PROCEDURE — 36415 COLL VENOUS BLD VENIPUNCTURE: CPT

## 2025-05-06 PROCEDURE — 2700000000 HC OXYGEN THERAPY PER DAY

## 2025-05-06 PROCEDURE — 87102 FUNGUS ISOLATION CULTURE: CPT

## 2025-05-06 PROCEDURE — 32555 ASPIRATE PLEURA W/ IMAGING: CPT

## 2025-05-06 PROCEDURE — 88305 TISSUE EXAM BY PATHOLOGIST: CPT

## 2025-05-06 PROCEDURE — 83735 ASSAY OF MAGNESIUM: CPT

## 2025-05-06 PROCEDURE — 71045 X-RAY EXAM CHEST 1 VIEW: CPT

## 2025-05-06 PROCEDURE — 83615 LACTATE (LD) (LDH) ENZYME: CPT

## 2025-05-06 PROCEDURE — 88342 IMHCHEM/IMCYTCHM 1ST ANTB: CPT

## 2025-05-06 PROCEDURE — 92611 MOTION FLUOROSCOPY/SWALLOW: CPT

## 2025-05-06 PROCEDURE — 32555 ASPIRATE PLEURA W/ IMAGING: CPT | Performed by: RADIOLOGY

## 2025-05-06 PROCEDURE — 6370000000 HC RX 637 (ALT 250 FOR IP): Performed by: INTERNAL MEDICINE

## 2025-05-06 PROCEDURE — 80053 COMPREHEN METABOLIC PANEL: CPT

## 2025-05-06 PROCEDURE — 84145 PROCALCITONIN (PCT): CPT

## 2025-05-06 PROCEDURE — APPSS15 APP SPLIT SHARED TIME 0-15 MINUTES: Performed by: NURSE PRACTITIONER

## 2025-05-06 PROCEDURE — 87116 MYCOBACTERIA CULTURE: CPT

## 2025-05-06 PROCEDURE — 74230 X-RAY XM SWLNG FUNCJ C+: CPT

## 2025-05-06 PROCEDURE — 99232 SBSQ HOSP IP/OBS MODERATE 35: CPT | Performed by: PSYCHIATRY & NEUROLOGY

## 2025-05-06 PROCEDURE — 86140 C-REACTIVE PROTEIN: CPT

## 2025-05-06 PROCEDURE — 1210000000 HC MED SURG R&B

## 2025-05-06 PROCEDURE — 99222 1ST HOSP IP/OBS MODERATE 55: CPT | Performed by: RADIOLOGY

## 2025-05-06 PROCEDURE — 97535 SELF CARE MNGMENT TRAINING: CPT

## 2025-05-06 PROCEDURE — 87070 CULTURE OTHR SPECIMN AEROBIC: CPT

## 2025-05-06 PROCEDURE — 0W993ZZ DRAINAGE OF RIGHT PLEURAL CAVITY, PERCUTANEOUS APPROACH: ICD-10-PCS | Performed by: RADIOLOGY

## 2025-05-06 PROCEDURE — APPSS45 APP SPLIT SHARED TIME 31-45 MINUTES: Performed by: NURSE PRACTITIONER

## 2025-05-06 PROCEDURE — 6360000002 HC RX W HCPCS: Performed by: INTERNAL MEDICINE

## 2025-05-06 PROCEDURE — 85025 COMPLETE CBC W/AUTO DIFF WBC: CPT

## 2025-05-06 PROCEDURE — 94640 AIRWAY INHALATION TREATMENT: CPT

## 2025-05-06 PROCEDURE — 88341 IMHCHEM/IMCYTCHM EA ADD ANTB: CPT

## 2025-05-06 PROCEDURE — 2500000003 HC RX 250 WO HCPCS: Performed by: NURSE PRACTITIONER

## 2025-05-06 RX ORDER — LIDOCAINE HYDROCHLORIDE 20 MG/ML
INJECTION, SOLUTION INFILTRATION; PERINEURAL PRN
Status: COMPLETED | OUTPATIENT
Start: 2025-05-06 | End: 2025-05-06

## 2025-05-06 RX ORDER — DEXTROSE MONOHYDRATE, SODIUM CHLORIDE, SODIUM LACTATE, POTASSIUM CHLORIDE, CALCIUM CHLORIDE 5; 600; 310; 179; 20 G/100ML; MG/100ML; MG/100ML; MG/100ML; MG/100ML
INJECTION, SOLUTION INTRAVENOUS CONTINUOUS
Status: DISCONTINUED | OUTPATIENT
Start: 2025-05-06 | End: 2025-05-07

## 2025-05-06 RX ADMIN — IPRATROPIUM BROMIDE AND ALBUTEROL SULFATE 1 DOSE: 2.5; .5 SOLUTION RESPIRATORY (INHALATION) at 21:28

## 2025-05-06 RX ADMIN — SODIUM CHLORIDE, PRESERVATIVE FREE 10 ML: 5 INJECTION INTRAVENOUS at 20:48

## 2025-05-06 RX ADMIN — DEXTROSE MONOHYDRATE, SODIUM CHLORIDE, SODIUM LACTATE, POTASSIUM CHLORIDE, CALCIUM CHLORIDE: 5; 600; 310; 179; 20 INJECTION, SOLUTION INTRAVENOUS at 09:59

## 2025-05-06 RX ADMIN — BARIUM SULFATE 80 ML: 400 SUSPENSION ORAL at 14:24

## 2025-05-06 RX ADMIN — PIPERACILLIN AND TAZOBACTAM 3375 MG: 3; .375 INJECTION, POWDER, LYOPHILIZED, FOR SOLUTION INTRAVENOUS at 20:57

## 2025-05-06 RX ADMIN — LABETALOL HYDROCHLORIDE 10 MG: 5 INJECTION, SOLUTION INTRAVENOUS at 16:04

## 2025-05-06 RX ADMIN — IPRATROPIUM BROMIDE AND ALBUTEROL SULFATE 1 DOSE: 2.5; .5 SOLUTION RESPIRATORY (INHALATION) at 07:04

## 2025-05-06 RX ADMIN — LABETALOL HYDROCHLORIDE 10 MG: 5 INJECTION, SOLUTION INTRAVENOUS at 03:32

## 2025-05-06 RX ADMIN — QUETIAPINE FUMARATE 25 MG: 25 TABLET ORAL at 20:48

## 2025-05-06 RX ADMIN — LEVETIRACETAM 500 MG: 100 INJECTION INTRAVENOUS at 03:20

## 2025-05-06 RX ADMIN — PIPERACILLIN AND TAZOBACTAM 3375 MG: 3; .375 INJECTION, POWDER, LYOPHILIZED, FOR SOLUTION INTRAVENOUS at 11:50

## 2025-05-06 RX ADMIN — HYDRALAZINE HYDROCHLORIDE 10 MG: 20 INJECTION INTRAMUSCULAR; INTRAVENOUS at 01:18

## 2025-05-06 RX ADMIN — TAMSULOSIN HYDROCHLORIDE 0.4 MG: 0.4 CAPSULE ORAL at 20:48

## 2025-05-06 RX ADMIN — LIDOCAINE HYDROCHLORIDE 10 ML: 20 INJECTION, SOLUTION INFILTRATION; PERINEURAL at 13:36

## 2025-05-06 RX ADMIN — BARIUM SULFATE 50 G: 0.81 POWDER, FOR SUSPENSION ORAL at 14:24

## 2025-05-06 RX ADMIN — LEVETIRACETAM 500 MG: 100 INJECTION INTRAVENOUS at 16:03

## 2025-05-06 RX ADMIN — PIPERACILLIN AND TAZOBACTAM 3375 MG: 3; .375 INJECTION, POWDER, LYOPHILIZED, FOR SOLUTION INTRAVENOUS at 05:06

## 2025-05-06 ASSESSMENT — ENCOUNTER SYMPTOMS
VOMITING: 0
WHEEZING: 0
COUGH: 0
GASTROINTESTINAL NEGATIVE: 1
TROUBLE SWALLOWING: 1
RESPIRATORY NEGATIVE: 1
COLOR CHANGE: 0

## 2025-05-06 ASSESSMENT — PAIN SCALES - PAIN ASSESSMENT IN ADVANCED DEMENTIA (PAINAD)
FACIALEXPRESSION: SMILING OR INEXPRESSIVE
CONSOLABILITY: NO NEED TO CONSOLE
BREATHING: NORMAL
TOTALSCORE: 0
BODYLANGUAGE: RELAXED

## 2025-05-06 NOTE — OR NURSING
NO SEDATION    Ultrasound Guided Thoracentesis      1315 Pt arrived to room 6 on cart from inpt room. Procedure explained to son by EARL MAXWELL, consent signed. Extensive explanation provided.     1332 Pt unable to sit up. Pt laying with left side down and right side up.  R posterior lung field scanned with ultrasound by HA CNP.  Images reviewed by EARL MAXWELL.  Time out completed for Right thoracentesis.  Site marked by HA CNP, then procedure site prepped with chloraprep. VSS.     1333 After 3 minute dry time, draped with sterile drape and towels.    1336 Right  Posterior chest site then numbed with lidocaine 2% by Courtney Lawrence CNP. Dr. Roberto assisting.      1337 [id number] Centesis 5F catheter placed into pleural space using US guidance.  Fluid draining appears hazy cherry.      1338 Sample of fluid obtained and placed into specimen containers to be sent for testing as ordered.Catheter attached to Admaxim machine to remove fluid.    1346 Specimens taken to lab by BRICE CARRILLO.       1353 Total 600 ml removed.  Catheter removed.  Bandaid applied.  VSS. Pt tolerated well.  Verbal and tactile reassurance given throughout.     1355 HA CNP to update pts son in lobby, pts son brougth back to speak with patient prior to going to XR per sons request.     1401 Patient taken for CXR. Pt to remain in XR for MBS.     Call to Cierra CARRILLO to give report post thoracentesis

## 2025-05-06 NOTE — PROGRESS NOTES
TF resumed per order at 25ml/hr with 100ml water flush q6H.  Pts son remains at the bedside.  Shift change report given to Marilyn CARRILLO.

## 2025-05-06 NOTE — PLAN OF CARE
Problem: Discharge Planning  Goal: Discharge to home or other facility with appropriate resources  Outcome: Progressing     Problem: ABCDS Injury Assessment  Goal: Absence of physical injury  Outcome: Progressing     Problem: Skin/Tissue Integrity  Goal: Skin integrity remains intact  Description: 1.  Monitor for areas of redness and/or skin breakdown2.  Assess vascular access sites hourly3.  Every 4-6 hours minimum:  Change oxygen saturation probe site4.  Every 4-6 hours:  If on nasal continuous positive airway pressure, respiratory therapy assess nares and determine need for appliance change or resting period  Outcome: Progressing     Problem: Confusion  Goal: Confusion, delirium, dementia, or psychosis is improved or at baseline  Description: INTERVENTIONS:1. Assess for possible contributors to thought disturbance, including medications, impaired vision or hearing, underlying metabolic abnormalities, dehydration, psychiatric diagnoses, and notify attending LIP2. [place] high risk fall precautions, as indicated3. Provide frequent short contacts to provide reality reorientation, refocusing and direction4. Decrease environmental stimuli, including noise as appropriate5. Monitor and intervene to maintain adequate nutrition, hydration, elimination, sleep and activity6. If unable to ensure safety without constant attention obtain sitter and review sitter guidelines with assigned personnel7. Initiate Psychosocial CNS and Spiritual Care consult, as indicated  INTERVENTIONS:1. Assess for possible contributors to thought disturbance, including medications, impaired vision or hearing, underlying metabolic abnormalities, dehydration, psychiatric diagnoses, and notify attending LIP2. [place] high risk fall precautions, as indicated3. Provide frequent short contacts to provide reality reorientation, refocusing and direction4. Decrease environmental stimuli, including noise as appropriate5. Monitor and intervene to

## 2025-05-06 NOTE — PROCEDURES
[facility]   Facility/Department: [address] TELE  Speech Language Pathology  Modified Barium Swallow (MBS)    NAME:Emerson Salguero  : 1928 (89 y.o.)   [x]   confirmed    MRN: [id number]  ROOM: [place] [address]  ADMISSION DATE: 2025  PATIENT DIAGNOSIS(ES): Sepsis, unspecified organism (HCC) [A41.9]  Sepsis (HCC) [A41.9]  No chief complaint on file.    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    Gross hematuria     Acute encephalopathy     Closed head injury     Aphasia     Mild cognitive impairment     AMS (altered mental status) 2021    BPH (benign prostatic hyperplasia) 2021    CKD (chronic kidney disease) 2021    Leukocytosis 2021     Past Medical History:   Diagnosis Date    Back pain     Colitis, ulcerative (HCC)     Hypertension      Past Surgical History:   Procedure Laterality Date    LUMBAR PUNCTURE N/A 2025    12.25 ml clear colorless CSF obtained performed by Courtney Lawrence IR CNP    THORACENTESIS Right 2025    600 ml removed by HA CNP - diagnostics sent    TONSILLECTOMY       Allergies   Allergen Reactions    Sulfa Antibiotics        Date of Onset:  2025      Date of Evaluation: 2025   Evaluating Therapist: Estefania North SLP      DYSPHAGIA DIAGNOSIS:  Moderate oral and pharyngeal dysphagia    Do not feed patient unless he is awake and alert.     DIET RECOMMENDATIONS:  Solid consistency: Pureed  Liquid consistency: Mildly  to:  Assess tolerance of recommended diet  Improve oral motor strength and range of motion  Improve tongue base retraction  Improve laryngeal strength and range of motion      Nursing notified: Deana      PLAN OF CARE:   Long Term Goals:    Patient will tolerate least restrictive diet with no overt s/s of difficulty or aspiration.      Short Term Goals:   Pt to be seen 2-3x/week during LOS or until goals met    1.  Pt will complete oral motor ROM and strengthening exercises (labial/buccal) with 90% accuracy, in order to strengthen lingual/labial/buccal musculature to promote safety and efficiency of oral phase of swallow and decrease risk for pocketing.   2.  Pt will complete lingual ROM and strengthening exercises (lingual press, lingual pull backs) with 90% accuracy, in order to promote anterior to posterior propulsion of bolus and improve tongue base retraction.   3.   Pt will improve hyolaryngeal elevation by performing laryngeal exercises (effortful swallow, Mendelsohn maneuver, falsetto & effortful pitch glide) with 90% accuracy in order to strengthen and establish a more effective swallow.  4.  Pt will tolerate therapeutic trials of Minced, moist solids with adequate mastication and oral clearance of bolus with no overt s/s of aspiration on 100% trials.  5.  Pt will demonstrate liquids via spoon, double swallow strategies for safe and efficient swallow of recommended diet in all given opportunities.  6.  Pt will tolerate the recommended diet level with no s/s of aspiration.        Prognosis for improvements is: Fair, Age and Participation Level      Evaluation results and recommendations were discussed with the:   Patient., who gives decreased understanding of recommendations.  Family member. , who gives verbal understanding of recommendations.      Pain Assessment:  Patient does not c/o pain.    Pain Re-assessment:  Patient does not c/o pain.    Safety Devices:  All fall risk precautions in  place      Radiology Tech present  Radiologist available for consultation, as needed      Thank you for your referral.  Please direct any questions or concerns to Speech Pathology.    Images are available through PACS. Please see radiologist report for additional details.      Therapy Time  SLP Individual Minutes  Time In: 1410  Time Out: 1440  Minutes: 30              Signature:  Electronically signed by VIKA Ruff on 5/6/2025 at 3:42 PM

## 2025-05-06 NOTE — FLOWSHEET NOTE
Spoke to dr eli regrding mbs results, received order for puree diet with nectar thick liquids and dietician consult

## 2025-05-06 NOTE — PROGRESS NOTES
Physical Therapy Med Surg Daily Treatment Note  Facility/Department: [address] ORTHO TELE  Room: [facility]/W282-       NAME: Emerson Salguero  : 1928 (89 y.o.)  MRN: [id number]  CODE STATUS: Limited    Date of Service: 2025    Patient Diagnosis(es): Sepsis, unspecified organism (HCC) [A41.9]  Sepsis (HCC) [A41.9]   No chief complaint on file.    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    Gross hematuria     Acute encephalopathy     Closed head injury     Aphasia     Mild cognitive impairment     AMS (altered mental status) 2021    BPH (benign prostatic hyperplasia) 2021    CKD (chronic kidney disease) 2021    Leukocytosis 2021        Past Medical History:   Diagnosis Date    Back pain     Colitis, ulcerative (HCC)     Hypertension      Past Surgical History:   Procedure Laterality Date    LUMBAR PUNCTURE N/A 2025    12.25 ml clear colorless CSF obtained performed by Courtney PRAKASH CNP    THORACENTESIS Right 2025    600 ml removed by HA CNP - diagnostics sent    TONSILLECTOMY         Chart Reviewed: Yes  Family/Caregiver Present: No    Restrictions:  Restrictions/Precautions: Fall Risk    SUBJECTIVE:   Subjective: \"I\"m wonderful\"    Pain   Denies pain. No signs of pain throughout tx.     OBJECTIVE:   Orientation  Overall Orientation Status: Impaired  Orientation Level: Oriented to person;Oriented to situation  Cognition  Overall Cognitive Status:

## 2025-05-06 NOTE — PROGRESS NOTES
Infectious Disease     Patient Name: Emerson Salguero  Date: 5/6/2025  YOB: 1928  Medical Record Number: [id number]                    Sepsis    Patient presented fatigue loss of appetite was hypothermic bradycardic initially on admission treated and sepsis placed on vancomycin and Zosyn    Cough shortness of breath some confusion      Cultures remain no growth         Patient underwent lumbar puncture 5/2/2025  Cultures negative West Nile negative  Cell count was not elevated          Patient aspirated tube feeding 5/3/2025  Chest x-ray from 5 3/2/2025 5 4/2/2025 show increase density in the right lung  CT scan of the lung done today shows a size fusion air bronchograms in the right lung          Thoracentesis performed today results pending          Review of Systems   Constitutional:  Negative for chills, diaphoresis, fatigue and fever.   Respiratory: Negative.     Cardiovascular: Negative.    Gastrointestinal: Negative.       Physical Exam  Constitutional:       Appearance: He is not ill-appearing.   Cardiovascular:      Heart sounds: Normal heart sounds. No murmur heard.  Pulmonary:      Effort: Pulmonary effort is normal. No respiratory distress.      Breath sounds: No wheezing, rhonchi or rales.   Abdominal:      General: There is no distension.      Palpations: There is no mass.      Tenderness: There is no guarding.         Blood pressure (!) 166/79, pulse 76, temperature 98.2 °F (36.8 °C), temperature source Oral, resp. rate 16, height 1.727 m (5' 7.99\"), weight 76.2 kg (168 lb), SpO2 93%.      .   Lab Results   Component Value Date    WBC 10.7 05/06/2025    HGB 10.4 (L) 05/06/2025    HCT 31.5 (L) 05/06/2025    .6 (H) 05/06/2025     05/06/2025     Lab Results   Component Value Date/Time     05/06/2025 05:13 AM    K 3.1 05/06/2025 05:13 AM    K 4.7 04/29/2025 07:44 PM     05/06/2025 05:13 AM    CO2 28 05/06/2025 05:13 AM    BUN 38 05/06/2025 05:13 AM    CREATININE 1.19

## 2025-05-06 NOTE — PROGRESS NOTES
05/05/25 2100   RT Protocol   History Pulmonary Disease 0   Respiratory pattern 0   Breath sounds 4   Cough 0   Indications for Bronchodilator Therapy Decreased or absent breath sounds   Bronchodilator Assessment Score 4

## 2025-05-06 NOTE — CARE COORDINATION
AWAITING MBS THIS AFTERNOON TO SEE IF THE PT CAN DC TO [facility] OR IF HE WILL NEED TO GO TO [facility] WITH THE CORPAK.

## 2025-05-06 NOTE — PROGRESS NOTES
[facility] Neurology Daily Progress Note  Name: Emerson Salguero  Age: 89 y.o.  Gender: male  CodeStatus: Limited  Allergies: Sulfa Antibiotics    Chief Complaint:No chief complaint on file.    Primary Care Provider: Misty Vela MD  InpatientTreatment Team: Treatment Team:   Farideh Ram MD Abbud, Rita A, MD Dadas, Lauren A, APRN - CNP  David Vegas MD Ramos, Mariah Hipp, Lisa, RN Steele, Jena R, RN Diaz Raices, Adalberto Archer, Heidi, APRN - CNP  Rocael Roberto MD  Admission Date: 4/30/2025      HPI   Pt seen and examined for neuro follow up.  Answering some questions.  No seizure activity reported.  Afebrile.  Generalized weakness noted.  Nonfocal.  Corpak in place.    Patient much more awake and actually communicative    Vitals:    05/06/25 0801   BP: (!) 166/79   Pulse: 76   Resp: 16   Temp: 98.2 °F (36.8 °C)   SpO2: 93%        Review of Systems   Unable to perform ROS: Dementia   Constitutional:  Negative for fever.   HENT:  Positive for trouble swallowing. Negative for hearing loss.    Respiratory:  Negative for cough and wheezing.    Cardiovascular:  Negative for chest pain, palpitations and leg swelling.   Gastrointestinal:  Negative for vomiting.   Skin:  Negative for color change and rash.   Neurological:  Positive for speech difficulty. Negative for tremors, seizures, syncope and facial asymmetry.   Psychiatric/Behavioral:  Positive for behavioral problems, confusion and hallucinations. Negative for agitation. The patient is not nervous/anxious.          Physical Exam  Vitals and nursing note reviewed.   Constitutional:       General: He is sleeping. He is not in acute distress.     Appearance: He is not diaphoretic.   HENT:      Head: Normocephalic and atraumatic.   Eyes:      Extraocular Movements: Extraocular movements intact.      Pupils: Pupils are equal, round, and reactive to light.   Cardiovascular:      Rate and Rhythm: Normal rate and regular rhythm.      Heart sounds: No murmur

## 2025-05-06 NOTE — PROGRESS NOTES
Unable to flush Corepak despite efforts to unclog it. Son notified when he called for update. Tube feed held at this time. Son is hopeful that pt will do well with his MBS and will be able to advance his diet.     Message to Dr Fernandes requesting IV fluids d/t not receiving tube feeding. Awaiting response.

## 2025-05-06 NOTE — PROGRESS NOTES
Hospitalist Progress Note      PCP: Misty Vela MD    Date of Admission: 4/30/2025    Chief Complaint:  no acute events, is afebrile, stable HD, on 2 liters of O2, u/o-1600 ml, Corpak is clogged per nursing staff    Medications:  Reviewed    Infusion Medications    dextrose 5% lactated ringers with KCl 20 mEq 100 mL/hr at 05/06/25 0959    sodium chloride       Scheduled Medications    QUEtiapine  25 mg Oral Nightly    piperacillin-tazobactam  3,375 mg IntraVENous Q8H    hydroCHLOROthiazide  25 mg Per NG tube Daily    losartan  50 mg Per NG tube Daily    escitalopram  5 mg Per NG tube Daily    finasteride  5 mg Per NG tube Daily    [Held by provider] memantine  5 mg Oral BID    QUEtiapine  50 mg Oral Once    lidocaine   Topical Once    ipratropium 0.5 mg-albuterol 2.5 mg  1 Dose Inhalation BID RT    LORazepam  1 mg IntraVENous Once    levETIRAcetam  500 mg IntraVENous Q12H    sodium chloride flush  5-40 mL IntraVENous 2 times per day    enoxaparin  40 mg SubCUTAneous Daily    Vitamin D  2,000 Units Oral Daily    tamsulosin  0.4 mg Oral BID RT     PRN Meds: ipratropium 0.5 mg-albuterol 2.5 mg, atropine, haloperidol lactate, labetalol, hydrALAZINE, sodium chloride flush, sodium chloride, acetaminophen **OR** acetaminophen      Intake/Output Summary (Last 24 hours) at 5/6/2025 1309  Last data filed at 5/6/2025 0300  Gross per 24 hour   Intake 903.67 ml   Output 1600 ml   Net -696.33 ml       Exam:    BP (!) 166/79   Pulse 76   Temp 98.2 °F (36.8 °C) (Oral)   Resp 16   Ht 1.727 m (5' 7.99\")   Wt 76.2 kg (168 lb)   SpO2 93%   BMI 25.55 kg/m²     General appearance: awake  Respiratory:  diminished R>L  Cardiovascular: Regular rate and rhythm, S1/S2.  Abdomen: Soft, active bowel sounds.  Musculoskeletal: No edema bilaterally.         Labs:   Recent Labs     05/04/25  0533 05/05/25  0531 05/06/25  0513   WBC 11.3* 10.9* 10.7   HGB 10.9* 10.1* 10.4*   HCT 31.8* 29.4* 31.5*    161 230     Recent Labs

## 2025-05-06 NOTE — BRIEF OP NOTE
Brief Postoperative Note      Patient: Emerson Salguero  YOB: 1928  MRN: [id number]    Date of Procedure: 5/6/2025    Right pleural effusion    Post-Op Diagnosis: Same       Ultrasound guided thoracentesis    Surgeon(s):  Rocael Roberto MD Heidi Archer, APRN - CNP    Assistant:  Radha Hu RN    Anesthesia: Local only: 2% lidocaine    Estimated Blood Loss (mL): Minimal    Complications: None    Specimens:   * No specimens in log *    Implants:  * No implants in log *      Drains:   NG/OG/NJ/NE Tube Nasoenteric feeding tube 8 fr Right nostril (Active)   Surrounding Skin Clean, dry & intact 05/06/25 1020   Securement device Bridle 05/06/25 1020   Status Continuous feeding 05/06/25 1020   Tube Feeding Standard with Fiber 05/06/25 1020   Tube Feeding Intake (mL) 45 ml 05/05/25 2130       External Urinary Catheter (Active)   Site Assessment Clean,dry & intact 05/04/25 1017   Placement Replaced 05/06/25 0235   Catheter Care Catheter/Wick replaced;Suction Canister/Tubing changed 05/06/25 0235   Perineal Care Yes 05/06/25 0235   Suction 40 mmgHg continuous 05/06/25 0235   Urine Color [place] 05/06/25 0235   Urine Appearance Clear 05/06/25 0235   Urine Odor Malodorous 05/04/25 1017   Output (mL) 800 mL 05/06/25 0235       [REMOVED] NG/OG/NJ/NE Tube Nasogastric 18 fr Right nostril (Removed)   Surrounding Skin Clean, dry & intact 05/04/25 1017   Securement device Adhesive based coronado 05/04/25 1017   Placement Verified X-Ray (Initial) 05/02/25 2007   NG/OG/NJ/NE External Measurement (cm) 67 cm 05/03/25 2115   Drainage Appearance Brown 05/02/25 2007   Tube feeding/verify rate (mL/hr) 0 mL/hr 05/04/25 1017   Tube Feeding Intake (mL) 350 ml 05/03/25 1853   Free Water/Flush (mL) 75 mL 05/03/25 2115   Residual Volume (ml) 50 ml 05/03/25 2115       Findings:  600 mL of cherry colored fluid drained    Electronically signed by TAYE Sanon CNP on 5/6/2025 at 2:30 PM

## 2025-05-07 LAB
ALBUMIN SERPL-MCNC: 2.5 G/DL (ref 3.5–4.6)
ANION GAP SERPL CALCULATED.3IONS-SCNC: 11 MEQ/L (ref 9–15)
BASOPHILS # BLD: 0 K/UL (ref 0–0.2)
BASOPHILS NFR BLD: 0.2 %
BUN SERPL-MCNC: 35 MG/DL (ref 8–23)
CALCIUM SERPL-MCNC: 9.1 MG/DL (ref 8.5–9.9)
CHLORIDE SERPL-SCNC: 115 MEQ/L (ref 95–107)
CO2 SERPL-SCNC: 24 MEQ/L (ref 20–31)
CREAT SERPL-MCNC: 1.15 MG/DL (ref 0.7–1.2)
CRP SERPL HS-MCNC: 97.1 MG/L (ref 0–5)
EOSINOPHIL # BLD: 0.2 K/UL (ref 0–0.7)
EOSINOPHIL NFR BLD: 1.5 %
ERYTHROCYTE [DISTWIDTH] IN BLOOD BY AUTOMATED COUNT: 14.3 % (ref 11.5–14.5)
GLUCOSE SERPL-MCNC: 101 MG/DL (ref 70–99)
HCT VFR BLD AUTO: 29 % (ref 42–52)
HGB BLD-MCNC: 9.8 G/DL (ref 14–18)
LACTATE DEHYDROGENASE, FLUID: 373 U/L
LDH SERPL-CCNC: 264 U/L (ref 135–225)
LYMPHOCYTES # BLD: 1 K/UL (ref 1–4.8)
LYMPHOCYTES NFR BLD: 8.5 %
MAGNESIUM SERPL-MCNC: 2.4 MG/DL (ref 1.7–2.4)
MCH RBC QN AUTO: 34.6 PG (ref 27–31.3)
MCHC RBC AUTO-ENTMCNC: 33.8 % (ref 33–37)
MCV RBC AUTO: 102.5 FL (ref 79–92.2)
MONOCYTES # BLD: 0.9 K/UL (ref 0.2–0.8)
MONOCYTES NFR BLD: 7.6 %
NEUTROPHILS # BLD: 9.8 K/UL (ref 1.4–6.5)
NEUTS SEG NFR BLD: 80.4 %
PHOSPHATE SERPL-MCNC: 2.1 MG/DL (ref 2.3–4.8)
PLATELET # BLD AUTO: 326 K/UL (ref 130–400)
POTASSIUM SERPL-SCNC: 3.9 MEQ/L (ref 3.4–4.9)
PROCALCITONIN SERPL IA-MCNC: 0.37 NG/ML (ref 0–0.15)
RBC # BLD AUTO: 2.83 M/UL (ref 4.7–6.1)
REASON FOR REJECTION: NORMAL
REJECTED TEST: NORMAL
SODIUM SERPL-SCNC: 150 MEQ/L (ref 135–144)
SPECIMEN TYPE: NORMAL
WBC # BLD AUTO: 12.2 K/UL (ref 4.8–10.8)

## 2025-05-07 PROCEDURE — 2500000003 HC RX 250 WO HCPCS: Performed by: INTERNAL MEDICINE

## 2025-05-07 PROCEDURE — 86140 C-REACTIVE PROTEIN: CPT

## 2025-05-07 PROCEDURE — 97535 SELF CARE MNGMENT TRAINING: CPT

## 2025-05-07 PROCEDURE — 2700000000 HC OXYGEN THERAPY PER DAY

## 2025-05-07 PROCEDURE — 6370000000 HC RX 637 (ALT 250 FOR IP): Performed by: INTERNAL MEDICINE

## 2025-05-07 PROCEDURE — 2580000003 HC RX 258: Performed by: INTERNAL MEDICINE

## 2025-05-07 PROCEDURE — 92526 ORAL FUNCTION THERAPY: CPT

## 2025-05-07 PROCEDURE — 94640 AIRWAY INHALATION TREATMENT: CPT

## 2025-05-07 PROCEDURE — 99232 SBSQ HOSP IP/OBS MODERATE 35: CPT | Performed by: PSYCHIATRY & NEUROLOGY

## 2025-05-07 PROCEDURE — 80069 RENAL FUNCTION PANEL: CPT

## 2025-05-07 PROCEDURE — 6360000002 HC RX W HCPCS: Performed by: INTERNAL MEDICINE

## 2025-05-07 PROCEDURE — APPSS15 APP SPLIT SHARED TIME 0-15 MINUTES: Performed by: NURSE PRACTITIONER

## 2025-05-07 PROCEDURE — 83615 LACTATE (LD) (LDH) ENZYME: CPT

## 2025-05-07 PROCEDURE — 6360000002 HC RX W HCPCS: Performed by: NURSE PRACTITIONER

## 2025-05-07 PROCEDURE — 97110 THERAPEUTIC EXERCISES: CPT

## 2025-05-07 PROCEDURE — 99232 SBSQ HOSP IP/OBS MODERATE 35: CPT | Performed by: INTERNAL MEDICINE

## 2025-05-07 PROCEDURE — 36415 COLL VENOUS BLD VENIPUNCTURE: CPT

## 2025-05-07 PROCEDURE — 6370000000 HC RX 637 (ALT 250 FOR IP): Performed by: NURSE PRACTITIONER

## 2025-05-07 PROCEDURE — 85025 COMPLETE CBC W/AUTO DIFF WBC: CPT

## 2025-05-07 PROCEDURE — 94761 N-INVAS EAR/PLS OXIMETRY MLT: CPT

## 2025-05-07 PROCEDURE — 1210000000 HC MED SURG R&B

## 2025-05-07 PROCEDURE — 2500000003 HC RX 250 WO HCPCS: Performed by: NURSE PRACTITIONER

## 2025-05-07 PROCEDURE — 83735 ASSAY OF MAGNESIUM: CPT

## 2025-05-07 PROCEDURE — 84145 PROCALCITONIN (PCT): CPT

## 2025-05-07 RX ORDER — LIDOCAINE HYDROCHLORIDE 10 MG/ML
50 INJECTION, SOLUTION EPIDURAL; INFILTRATION; INTRACAUDAL; PERINEURAL ONCE
Status: COMPLETED | OUTPATIENT
Start: 2025-05-07 | End: 2025-05-08

## 2025-05-07 RX ORDER — DEXTROSE MONOHYDRATE 50 MG/ML
INJECTION, SOLUTION INTRAVENOUS CONTINUOUS
Status: DISPENSED | OUTPATIENT
Start: 2025-05-07 | End: 2025-05-08

## 2025-05-07 RX ORDER — SODIUM CHLORIDE 0.9 % (FLUSH) 0.9 %
5-40 SYRINGE (ML) INJECTION PRN
Status: DISCONTINUED | OUTPATIENT
Start: 2025-05-07 | End: 2025-05-08 | Stop reason: HOSPADM

## 2025-05-07 RX ORDER — SODIUM CHLORIDE 9 MG/ML
INJECTION, SOLUTION INTRAVENOUS PRN
Status: DISCONTINUED | OUTPATIENT
Start: 2025-05-07 | End: 2025-05-08 | Stop reason: HOSPADM

## 2025-05-07 RX ORDER — SODIUM CHLORIDE 0.9 % (FLUSH) 0.9 %
5-40 SYRINGE (ML) INJECTION EVERY 12 HOURS SCHEDULED
Status: DISCONTINUED | OUTPATIENT
Start: 2025-05-07 | End: 2025-05-08 | Stop reason: HOSPADM

## 2025-05-07 RX ADMIN — SODIUM CHLORIDE, PRESERVATIVE FREE 10 ML: 5 INJECTION INTRAVENOUS at 08:00

## 2025-05-07 RX ADMIN — PIPERACILLIN AND TAZOBACTAM 3375 MG: 3; .375 INJECTION, POWDER, LYOPHILIZED, FOR SOLUTION INTRAVENOUS at 12:14

## 2025-05-07 RX ADMIN — DEXTROSE MONOHYDRATE: 50 INJECTION, SOLUTION INTRAVENOUS at 15:48

## 2025-05-07 RX ADMIN — Medication 2000 UNITS: at 07:59

## 2025-05-07 RX ADMIN — QUETIAPINE FUMARATE 25 MG: 25 TABLET ORAL at 20:52

## 2025-05-07 RX ADMIN — DEXTROSE MONOHYDRATE, SODIUM CHLORIDE, SODIUM LACTATE, POTASSIUM CHLORIDE, CALCIUM CHLORIDE: 5; 600; 310; 179; 20 INJECTION, SOLUTION INTRAVENOUS at 08:49

## 2025-05-07 RX ADMIN — ENOXAPARIN SODIUM 40 MG: 100 INJECTION SUBCUTANEOUS at 07:58

## 2025-05-07 RX ADMIN — LEVETIRACETAM 500 MG: 100 INJECTION INTRAVENOUS at 15:23

## 2025-05-07 RX ADMIN — LOSARTAN POTASSIUM 50 MG: 50 TABLET, FILM COATED ORAL at 07:59

## 2025-05-07 RX ADMIN — TAMSULOSIN HYDROCHLORIDE 0.4 MG: 0.4 CAPSULE ORAL at 20:52

## 2025-05-07 RX ADMIN — IPRATROPIUM BROMIDE AND ALBUTEROL SULFATE 1 DOSE: 2.5; .5 SOLUTION RESPIRATORY (INHALATION) at 07:00

## 2025-05-07 RX ADMIN — PIPERACILLIN AND TAZOBACTAM 3375 MG: 3; .375 INJECTION, POWDER, LYOPHILIZED, FOR SOLUTION INTRAVENOUS at 03:52

## 2025-05-07 RX ADMIN — TAMSULOSIN HYDROCHLORIDE 0.4 MG: 0.4 CAPSULE ORAL at 07:59

## 2025-05-07 RX ADMIN — SODIUM CHLORIDE, PRESERVATIVE FREE 10 ML: 5 INJECTION INTRAVENOUS at 20:52

## 2025-05-07 RX ADMIN — HYDROCHLOROTHIAZIDE 25 MG: 25 TABLET ORAL at 07:59

## 2025-05-07 RX ADMIN — PIPERACILLIN AND TAZOBACTAM 3375 MG: 3; .375 INJECTION, POWDER, LYOPHILIZED, FOR SOLUTION INTRAVENOUS at 20:44

## 2025-05-07 RX ADMIN — IPRATROPIUM BROMIDE AND ALBUTEROL SULFATE 1 DOSE: 2.5; .5 SOLUTION RESPIRATORY (INHALATION) at 20:04

## 2025-05-07 RX ADMIN — ESCITALOPRAM OXALATE 5 MG: 10 TABLET ORAL at 07:59

## 2025-05-07 RX ADMIN — LEVETIRACETAM 500 MG: 100 INJECTION INTRAVENOUS at 03:53

## 2025-05-07 RX ADMIN — FINASTERIDE 5 MG: 5 TABLET, FILM COATED ORAL at 07:59

## 2025-05-07 ASSESSMENT — ENCOUNTER SYMPTOMS
COLOR CHANGE: 0
COUGH: 0
WHEEZING: 0
VOMITING: 0
TROUBLE SWALLOWING: 1

## 2025-05-07 NOTE — PROGRESS NOTES
Physical Therapy Med Surg Daily Treatment Note  Facility/Department: [address] ORTHO TELE  Room: [facility]/W282-       NAME: Emerson Salguero  : 1928 (89 y.o.)  MRN: [id number]  CODE STATUS: Limited    Date of Service: 2025    Patient Diagnosis(es): Sepsis, unspecified organism (HCC) [A41.9]  Sepsis (HCC) [A41.9]   No chief complaint on file.    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    Gross hematuria     Acute encephalopathy     Closed head injury     Aphasia     Mild cognitive impairment     AMS (altered mental status) 2021    BPH (benign prostatic hyperplasia) 2021    CKD (chronic kidney disease) 2021    Leukocytosis 2021        Past Medical History:   Diagnosis Date    Back pain     Colitis, ulcerative (HCC)     Hypertension      Past Surgical History:   Procedure Laterality Date    LUMBAR PUNCTURE N/A 2025    12.25 ml clear colorless CSF obtained performed by Courtney PRAKASH CNP    THORACENTESIS Right 2025    600 ml removed by HA CNP - diagnostics sent    TONSILLECTOMY              Restrictions:  Restrictions/Precautions: Fall Risk    SUBJECTIVE:   Subjective: \"I'm doing okay\"    Pain   No signs of pain. Denies     OBJECTIVE:   Orientation  Overall Orientation Status: Within Functional Limits  Cognition  Overall Cognitive Status: Exceptions  Arousal/Alertness: Delayed responses to stimuli  Following Commands: Follows one step commands consistently  Attention

## 2025-05-07 NOTE — PROGRESS NOTES
[facility]  Facility/Department: [facility] ORTHO TELE  Speech Language Pathology   Treatment Note      Emerson Salguero  1928  W282/W282-01  [x]   confirmed      Date: 2025    Sepsis, unspecified organism (HCC) [A41.9]  Sepsis (HCC) [A41.9]    Restrictions/Precautions: Fall Risk    ADULT DIET; Dysphagia - Pureed; Mildly Thick (Nectar); Liquids by spoon only  ADULT ORAL NUTRITION SUPPLEMENT; Breakfast, Lunch, Dinner; Frozen Oral Supplement     Respiratory Status:O2 Flow Rate (L/min): 2 L/min (25 0700)   No active isolations      Subjective:  Cooperative and Lethargic      Pt upright in chair upon SLP arrival. Increasing fatigue as session progressed. Min cues needed for pt to remain alert during meal monitor.   Son present for tx. SLP reviewed MBS, recommendations, rationale, and POC. All questions answered and handouts given.     Interventions used this date:  Therapeutic Meal Monitor and Instruction in Compensatory Strategies      Objective/Assessment:  Patient progressing towards goals:  Short-term Goals  Timeframe for Short-term Goals: 1 week  Goal 1: Pt will complete oral motor ROM and strengthening exercises (labial/buccal) with 90% accuracy, in order to strengthen lingual/labial/buccal musculature to promote safety and efficiency of oral phase of swallow and decrease risk for pocketing.  Not addressed   Goal 2: Pt will complete lingual ROM and strengthening exercises (lingual press, lingual pull backs) with 90% accuracy, in order to promote anterior to posterior propulsion of bolus and improve tongue base retraction.  Not addressed   Goal 3: Pt will improve hyolaryngeal elevation by performing laryngeal exercises (effortful swallow, Mendelsohn maneuver, falsetto & effortful pitch glide) with 90% accuracy in order to strengthen and establish a more effective swallow.  Not addressed   Goal 4: Pt will tolerate therapeutic trials of Minced, moist solids with adequate mastication and oral clearance

## 2025-05-07 NOTE — PROGRESS NOTES
Spoke to Flor CARRILLO to follow up after yesterday's right thoracentesis. Pt is doing good. Sitting up in chair. Band-janae to right back Clean, dry, and intact.

## 2025-05-07 NOTE — PROGRESS NOTES
[facility] Neurology Daily Progress Note  Name: Emerson Salguero  Age: 89 y.o.  Gender: male  CodeStatus: Limited  Allergies: Sulfa Antibiotics    Chief Complaint:No chief complaint on file.    Primary Care Provider: Misty Vela MD  InpatientTreatment Team: Treatment Team:   Farideh Ram MD Abbud, Rita A, MD Dadas, Lauren A, APRN - CNP  David Vegas MD Archer, Heidi, APRN - CNP  Sierra Vasquez Jacquelyn A, Hailey Gracia RN Pichola, Elise, RN Hipp, Lisa, RN King, Mariah M, RN  Admission Date: 4/30/2025      HPI   Pt seen and examined for neuro follow up.  Patient sitting up in the chair alert.  Answering some questions.  No seizure activity reported.  Afebrile.  Generalized weakness noted.  Nonfocal.  Patient seen and examined events noted.  Vitals:    05/07/25 0730   BP: (!) 150/76   Pulse: 76   Resp: 18   Temp: 98.4 °F (36.9 °C)   SpO2: 96%        Review of Systems   Unable to perform ROS: Dementia   Constitutional:  Negative for fever.   HENT:  Positive for trouble swallowing. Negative for hearing loss.    Respiratory:  Negative for cough and wheezing.    Cardiovascular:  Negative for chest pain, palpitations and leg swelling.   Gastrointestinal:  Negative for vomiting.   Skin:  Negative for color change and rash.   Neurological:  Positive for speech difficulty. Negative for tremors, seizures, syncope and facial asymmetry.   Psychiatric/Behavioral:  Positive for behavioral problems, confusion and hallucinations. Negative for agitation. The patient is not nervous/anxious.          Physical Exam  Vitals and nursing note reviewed.   Constitutional:       General: He is sleeping. He is not in acute distress.     Appearance: He is not diaphoretic.   HENT:      Head: Normocephalic and atraumatic.   Eyes:      Extraocular Movements: Extraocular movements intact.      Pupils: Pupils are equal, round, and reactive to light.   Cardiovascular:      Rate and Rhythm: Normal rate and regular  sodium chloride, acetaminophen **OR** acetaminophen    Labs:   Recent Labs     05/05/25  0531 05/06/25  0513 05/07/25  0638   WBC 10.9* 10.7 12.2*   HGB 10.1* 10.4* 9.8*   HCT 29.4* 31.5* 29.0*    230 326     Recent Labs     05/05/25  0531 05/06/25  0513 05/07/25  0511   * 151* 150*   K 3.3* 3.1* 3.9   * 111* 115*   CO2 27 28 24   BUN 41* 38* 35*   CREATININE 1.36* 1.19 1.15   CALCIUM 9.2 9.4 9.1   PHOS 2.6  --  2.1*     Recent Labs     05/05/25  0531 05/06/25  0513   AST 23 24   ALT 21 22   BILITOT 0.7 0.6   ALKPHOS 87 90     No results for input(s): \"INR\" in the last 72 hours.    No results for input(s): \"CKTOTAL\", \"TROPONINI\" in the last 72 hours.    Urinalysis:   Lab Results   Component Value Date/Time    NITRU Negative 04/29/2025 07:44 PM    WBCUA 0-2 04/29/2025 07:44 PM    BACTERIA RARE 04/29/2025 07:44 PM    RBCUA 0-2 04/29/2025 07:44 PM    BLOODU SMALL 04/29/2025 07:44 PM    GLUCOSEU Negative 04/29/2025 07:44 PM       Radiology:   Most recent    EEG No valid procedures specified.    MRI of Brain No results found for this or any previous visit.  Results for orders placed during the hospital encounter of 02/20/21    MRI brain without contrast    Narrative  EXAM: MRI of the brain without contrast    History: Metabolic encephalopathy. Stroke.    Technique: Multiplanar multisequence MRI of the brain was performed without contrast.    Comparison: CT brain from February 19, 2021    Findings:    Examination is degraded by motion artifact.    Hyperintensity/FLAIR signal within the bilateral supratentorial white matter and patchy hyperintense T2 signal within the [place] are nonspecific findings most compatible with chronic small vessel ischemic changes in a patient of this age.    Prominence of the sulci and ventricles compatible with moderate generalized parenchymal volume loss. No edema, hemorrhage, mass, mass effect, midline shift, or abnormal extra-axial fluid collection.  Midline structures are  abnormal EEG and left temporal sharp waves.  Lumbar puncture therefore is obtained and does not show signs of encephalitis at least at this time.  Will discontinue the acyclovir as this may cause further confusion.  MRI of the brain does not anything of concern.  The etiology of the sharp waves are being defined and occasionally just seen as partial seizures we will continue the Keppra for now.  60% time spent 28 patient and lengthy discussion with the son at the bedside with multiple questions and there for the time taken with the clinical time of 50-minute    5/3  Metabolic and infective encephalopathy with dementia  Patient more awake though still confused and actually has hallucinations and talking to his wife and asking for her.  He recognized his son and appears to be awake at times with some agitation.  We will consider low-dose Namenda now to see if that would help his agitation and continue to follow.  Had a lengthy discussion with the son that this is a common scenario in patients who are older and have underlying illness.  All his investigations have been negative except for the EEG which had shown some focus in the left and no other causes found patient's lumbar puncture and MRI did not show anything of concern.  We will repeat his EEG at some point..  Will continue Keppra for now    5/4  Metabolic and infective encephalopathy with dementia.  Patient is not hallucinating today.  We had given him Seroquel as he began very agitated yesterday.  Son was in agreement with the plan.  NG tube came out as reinserted.  This is likely not the best for him he will require PEG tube further and son would like to continue with this plan as he feels that all his other organs are working well and does not want to let him go at this time.  I saw some improvement today and will repeat his EEG as he had some focal findings on the left of unclear etiology.  He is still on low-dose of Keppra    5/5/2025:  Acute metabolic

## 2025-05-07 NOTE — PROGRESS NOTES
Hospitalist Progress Note      PCP: Misty Vela MD    Date of Admission: 4/30/2025    Chief Complaint:  no acute events, is afebrile, stable HD, on 2 liters of O2, u/o-1000 ml, is tolerating diet    Medications:  Reviewed    Infusion Medications    dextrose      sodium chloride       Scheduled Medications    QUEtiapine  25 mg Oral Nightly    piperacillin-tazobactam  3,375 mg IntraVENous Q8H    hydroCHLOROthiazide  25 mg Per NG tube Daily    losartan  50 mg Per NG tube Daily    escitalopram  5 mg Per NG tube Daily    finasteride  5 mg Per NG tube Daily    [Held by provider] memantine  5 mg Oral BID    QUEtiapine  50 mg Oral Once    lidocaine   Topical Once    ipratropium 0.5 mg-albuterol 2.5 mg  1 Dose Inhalation BID RT    LORazepam  1 mg IntraVENous Once    levETIRAcetam  500 mg IntraVENous Q12H    sodium chloride flush  5-40 mL IntraVENous 2 times per day    enoxaparin  40 mg SubCUTAneous Daily    Vitamin D  2,000 Units Oral Daily    tamsulosin  0.4 mg Oral BID RT     PRN Meds: ipratropium 0.5 mg-albuterol 2.5 mg, atropine, haloperidol lactate, labetalol, hydrALAZINE, sodium chloride flush, sodium chloride, acetaminophen **OR** acetaminophen      Intake/Output Summary (Last 24 hours) at 5/7/2025 1257  Last data filed at 5/7/2025 0600  Gross per 24 hour   Intake 150 ml   Output 1000 ml   Net -850 ml       Exam:    BP (!) 150/76   Pulse 76   Temp 98.4 °F (36.9 °C) (Oral)   Resp 18   Ht 1.727 m (5' 7.99\")   Wt 76.2 kg (168 lb)   SpO2 96%   BMI 25.55 kg/m²     General appearance: awake, cooperative  Respiratory:  diminished R>L  Cardiovascular: Regular rate and rhythm, S1/S2.  Abdomen: Soft, active bowel sounds.  Musculoskeletal: No edema bilaterally.         Labs:   Recent Labs     05/05/25  0531 05/06/25  0513 05/07/25  0638   WBC 10.9* 10.7 12.2*   HGB 10.1* 10.4* 9.8*   HCT 29.4* 31.5* 29.0*    230 326     Recent Labs     05/05/25  0531 05/06/25  0513 05/07/25  0511   * 151* 150*   K 3.3*  Losartan    Diet: ADULT DIET; Dysphagia - Pureed; Mildly Thick (Nectar); Liquids by spoon only  ADULT ORAL NUTRITION SUPPLEMENT; Breakfast, Lunch, Dinner; Frozen Oral Supplement  - passed swallow eval  - followed by speech therapy    Code Status: Limited      Disposition - SNF when medically ready          Electronically signed by JESSICA EPPERSON MD on 5/7/2025 at 12:57 PM

## 2025-05-07 NOTE — PROGRESS NOTES
05/07/25 0800   RT Protocol   History Pulmonary Disease 0   Respiratory pattern 0   Breath sounds 2   Cough 0   Indications for Bronchodilator Therapy Decreased or absent breath sounds   Bronchodilator Assessment Score 2

## 2025-05-07 NOTE — PROGRESS NOTES
Infectious Disease     Patient Name: Emerson Salguero  Date: 5/7/2025  YOB: 1928  Medical Record Number: [id number]                    Sepsis    Patient presented fatigue loss of appetite was hypothermic bradycardic initially on admission treated and sepsis placed on vancomycin and Zosyn    Cough shortness of breath some confusion      Cultures remain no growth         Patient underwent lumbar puncture 5/2/2025  Cultures negative West Nile negative  Cell count was not elevated          Patient aspirated tube feeding 5/3/2025  Chest x-ray from 5 3/2/2025 5 4/2/2025 show increase density in the right lung  CT scan of the lung done today shows a size fusion air bronchograms in the right lung          Thoracentesis performed today results pending    Micro results (current encounter only)    Procedure Component Value Units Date/Time   Culture, Body Fluid (with Gram Stain) [[id number]] Collected: 05/06/25 1337   Order Status: Completed Specimen: Thoracentesis Updated: 05/07/25 1048    Body Fluid Culture, Sterile --    Direct Exam:  MANY NEUTROPHILS  Direct Exam:  NO ORGANISMS SEEN  Direct Exam:  Gram stain made from cytocentrifuged specimen.  Organisms  Direct Exam:  and cells will be concentrated.  Cult,Fluid:  NO GROWTH 10 HOURS  Performed at [facility] [address] [place] [address] [phone] ([phone]           Cell Count with Differential, Body Fluid [[id number]] Collected: 05/06/25 1337      Specimen: Thoracentesis Updated: 05/06/25 1654      Cell Count Fluid Type Thoracentesis      Color, Fluid Red      Appearance, Fluid Bloody      Clot Eval. see below      Nucl Cell, Fluid 2,977 /cumm       RBC, Fluid 103,000 /cumm       Neutrophil Count, Fluid 80 %       Lymphocytes, Body Fluid 8 %       Monocyte Count, Fluid 12 %       Number of Cells Counted Fluid 100      Path Consult + CELL CT/DIFF-BF Yes           C-Reactive Protein [[id number]] (Abnormal) Collected: 05/07/25 0511      Specimen: Blood

## 2025-05-07 NOTE — FLOWSHEET NOTE
Perfect serve to Dr. Malcolm to clarify picc line vs midline consult. Electronically signed by Radha Oro RN on 5/7/2025 at 4:08 PM     Spoke to Rohini CARRILLO who confirms pt not to discharge today. Possible discharge tomorrow to SNF. Pt currently has functioning IV access. Will await clarification on picc vs midline and proceed with insertion in AM on 5/8/25. Electronically signed by Radha Oro RN on 5/7/2025 at 4:19 PM     After discussing with Dr. Malcolm will proceed with midline insertion in AM on 5/8/25.  Electronically signed by Radha Oro RN on 5/7/2025 at 4:32 PM

## 2025-05-07 NOTE — PROGRESS NOTES
[facility]  Occupational Therapy        NAME:  Emerson Salguero  ROOM: W282/W282-01  :  1928  DATE: 2025    Attempted to see Emerson Salguero at 5:15 (3:15 pm) on this date for:   []  Initial Evaluation   [x]  Treatment       Patient was unable to be seen due to:   [] Off unit for testing/procedure    [] Patient refused, stating \"    [] Therapy on hold due to     [] Nursing deferred due to    [x] Other:  with medical doctor at this point      Electronically signed by ROBERT Barkley on 25 at 3:20 PM EDT

## 2025-05-07 NOTE — PLAN OF CARE
Problem: Discharge Planning  Goal: Discharge to home or other facility with appropriate resources  Outcome: Progressing     Problem: ABCDS Injury Assessment  Goal: Absence of physical injury  Outcome: Progressing     Problem: Skin/Tissue Integrity  Goal: Skin integrity remains intact  Description: 1.  Monitor for areas of redness and/or skin breakdown2.  Assess vascular access sites hourly3.  Every 4-6 hours minimum:  Change oxygen saturation probe site4.  Every 4-6 hours:  If on nasal continuous positive airway pressure, respiratory therapy assess nares and determine need for appliance change or resting period  Outcome: Progressing     Problem: Confusion  Goal: Confusion, delirium, dementia, or psychosis is improved or at baseline  Description: INTERVENTIONS:1. Assess for possible contributors to thought disturbance, including medications, impaired vision or hearing, underlying metabolic abnormalities, dehydration, psychiatric diagnoses, and notify attending LIP2. [place] high risk fall precautions, as indicated3. Provide frequent short contacts to provide reality reorientation, refocusing and direction4. Decrease environmental stimuli, including noise as appropriate5. Monitor and intervene to maintain adequate nutrition, hydration, elimination, sleep and activity6. If unable to ensure safety without constant attention obtain sitter and review sitter guidelines with assigned personnel7. Initiate Psychosocial CNS and Spiritual Care consult, as indicated  INTERVENTIONS:1. Assess for possible contributors to thought disturbance, including medications, impaired vision or hearing, underlying metabolic abnormalities, dehydration, psychiatric diagnoses, and notify attending LIP2. [place] high risk fall precautions, as indicated3. Provide frequent short contacts to provide reality reorientation, refocusing and direction4. Decrease environmental stimuli, including noise as appropriate5. Monitor and intervene to

## 2025-05-07 NOTE — PROGRESS NOTES
05/06/25 2200   RT Protocol   History Pulmonary Disease 0   Respiratory pattern 0   Breath sounds 6   Cough 0   Indications for Bronchodilator Therapy Decreased or absent breath sounds   Bronchodilator Assessment Score 6

## 2025-05-07 NOTE — CARE COORDINATION
Itzel (RN) from [facility] was here to complete an onsite evaluation to see if they can accept the pt medically.  Joy has reviewed the labs and they have concerns about these labs.  LSW awaiting official approval for acceptance to [facility].

## 2025-05-08 ENCOUNTER — APPOINTMENT (OUTPATIENT)
Dept: CT IMAGING | Age: 89
DRG: 871 | End: 2025-05-08
Attending: INTERNAL MEDICINE
Payer: MEDICARE

## 2025-05-08 ENCOUNTER — APPOINTMENT (OUTPATIENT)
Dept: INTERVENTIONAL RADIOLOGY/VASCULAR | Age: 89
DRG: 871 | End: 2025-05-08
Attending: INTERNAL MEDICINE
Payer: MEDICARE

## 2025-05-08 VITALS
RESPIRATION RATE: 18 BRPM | SYSTOLIC BLOOD PRESSURE: 146 MMHG | HEART RATE: 84 BPM | DIASTOLIC BLOOD PRESSURE: 67 MMHG | HEIGHT: 68 IN | BODY MASS INDEX: 25.46 KG/M2 | WEIGHT: 168 LBS | TEMPERATURE: 98.2 F | OXYGEN SATURATION: 92 %

## 2025-05-08 LAB
AFB STAIN: NORMAL
ALBUMIN SERPL-MCNC: 2.9 G/DL (ref 3.5–4.6)
ANION GAP SERPL CALCULATED.3IONS-SCNC: 8 MEQ/L (ref 9–15)
BASOPHILS # BLD: 0.1 K/UL (ref 0–0.2)
BASOPHILS NFR BLD: 0.7 %
BUN SERPL-MCNC: 34 MG/DL (ref 8–23)
CALCIUM SERPL-MCNC: 9 MG/DL (ref 8.5–9.9)
CHLORIDE SERPL-SCNC: 111 MEQ/L (ref 95–107)
CO2 SERPL-SCNC: 30 MEQ/L (ref 20–31)
CREAT SERPL-MCNC: 1.14 MG/DL (ref 0.7–1.2)
CRP SERPL HS-MCNC: 56.8 MG/L (ref 0–5)
EOSINOPHIL # BLD: 0.4 K/UL (ref 0–0.7)
EOSINOPHIL NFR BLD: 3.5 %
ERYTHROCYTE [DISTWIDTH] IN BLOOD BY AUTOMATED COUNT: 14.2 % (ref 11.5–14.5)
GLUCOSE SERPL-MCNC: 95 MG/DL (ref 70–99)
HCT VFR BLD AUTO: 29.4 % (ref 42–52)
HGB BLD-MCNC: 9.8 G/DL (ref 14–18)
LYMPHOCYTES # BLD: 1.3 K/UL (ref 1–4.8)
LYMPHOCYTES NFR BLD: 13.3 %
MAGNESIUM SERPL-MCNC: 2.2 MG/DL (ref 1.7–2.4)
MCH RBC QN AUTO: 34.5 PG (ref 27–31.3)
MCHC RBC AUTO-ENTMCNC: 33.3 % (ref 33–37)
MCV RBC AUTO: 103.5 FL (ref 79–92.2)
MONOCYTES # BLD: 1 K/UL (ref 0.2–0.8)
MONOCYTES NFR BLD: 9.7 %
NEUTROPHILS # BLD: 7 K/UL (ref 1.4–6.5)
NEUTS SEG NFR BLD: 70.2 %
PATH CONSULT FLUID: NORMAL
PHOSPHATE SERPL-MCNC: 2.8 MG/DL (ref 2.3–4.8)
PLATELET # BLD AUTO: 407 K/UL (ref 130–400)
POTASSIUM SERPL-SCNC: 3.3 MEQ/L (ref 3.4–4.9)
PRELIMINARY: NORMAL
PRELIMINARY: NORMAL
PROCALCITONIN SERPL IA-MCNC: 0.26 NG/ML (ref 0–0.15)
RBC # BLD AUTO: 2.84 M/UL (ref 4.7–6.1)
SODIUM SERPL-SCNC: 149 MEQ/L (ref 135–144)
WBC # BLD AUTO: 9.9 K/UL (ref 4.8–10.8)

## 2025-05-08 PROCEDURE — 99232 SBSQ HOSP IP/OBS MODERATE 35: CPT | Performed by: INTERNAL MEDICINE

## 2025-05-08 PROCEDURE — 85025 COMPLETE CBC W/AUTO DIFF WBC: CPT

## 2025-05-08 PROCEDURE — 36410 VNPNXR 3YR/> PHY/QHP DX/THER: CPT

## 2025-05-08 PROCEDURE — 6360000002 HC RX W HCPCS: Performed by: INTERNAL MEDICINE

## 2025-05-08 PROCEDURE — 94761 N-INVAS EAR/PLS OXIMETRY MLT: CPT

## 2025-05-08 PROCEDURE — 6370000000 HC RX 637 (ALT 250 FOR IP): Performed by: NURSE PRACTITIONER

## 2025-05-08 PROCEDURE — 36410 VNPNXR 3YR/> PHY/QHP DX/THER: CPT | Performed by: RADIOLOGY

## 2025-05-08 PROCEDURE — 99232 SBSQ HOSP IP/OBS MODERATE 35: CPT | Performed by: PSYCHIATRY & NEUROLOGY

## 2025-05-08 PROCEDURE — 6360000002 HC RX W HCPCS: Performed by: NURSE PRACTITIONER

## 2025-05-08 PROCEDURE — 6370000000 HC RX 637 (ALT 250 FOR IP): Performed by: INTERNAL MEDICINE

## 2025-05-08 PROCEDURE — 80069 RENAL FUNCTION PANEL: CPT

## 2025-05-08 PROCEDURE — 36415 COLL VENOUS BLD VENIPUNCTURE: CPT

## 2025-05-08 PROCEDURE — 2700000000 HC OXYGEN THERAPY PER DAY

## 2025-05-08 PROCEDURE — 83735 ASSAY OF MAGNESIUM: CPT

## 2025-05-08 PROCEDURE — 94640 AIRWAY INHALATION TREATMENT: CPT

## 2025-05-08 PROCEDURE — 2580000003 HC RX 258: Performed by: INTERNAL MEDICINE

## 2025-05-08 PROCEDURE — 2500000003 HC RX 250 WO HCPCS: Performed by: INTERNAL MEDICINE

## 2025-05-08 PROCEDURE — 86140 C-REACTIVE PROTEIN: CPT

## 2025-05-08 PROCEDURE — 2709999900 IR MIDLINE CATH

## 2025-05-08 PROCEDURE — 05HD33Z INSERTION OF INFUSION DEVICE INTO RIGHT CEPHALIC VEIN, PERCUTANEOUS APPROACH: ICD-10-PCS | Performed by: RADIOLOGY

## 2025-05-08 PROCEDURE — 70450 CT HEAD/BRAIN W/O DYE: CPT

## 2025-05-08 PROCEDURE — 84145 PROCALCITONIN (PCT): CPT

## 2025-05-08 PROCEDURE — 76937 US GUIDE VASCULAR ACCESS: CPT

## 2025-05-08 PROCEDURE — 97535 SELF CARE MNGMENT TRAINING: CPT

## 2025-05-08 PROCEDURE — APPSS15 APP SPLIT SHARED TIME 0-15 MINUTES: Performed by: NURSE PRACTITIONER

## 2025-05-08 RX ORDER — LOSARTAN POTASSIUM 50 MG/1
50 TABLET ORAL DAILY
Status: DISCONTINUED | OUTPATIENT
Start: 2025-05-09 | End: 2025-05-08 | Stop reason: HOSPADM

## 2025-05-08 RX ORDER — ESCITALOPRAM OXALATE 10 MG/1
5 TABLET ORAL DAILY
Status: DISCONTINUED | OUTPATIENT
Start: 2025-05-09 | End: 2025-05-08 | Stop reason: HOSPADM

## 2025-05-08 RX ORDER — LEVETIRACETAM 500 MG/1
500 TABLET ORAL 2 TIMES DAILY
DISCHARGE
Start: 2025-05-08

## 2025-05-08 RX ORDER — QUETIAPINE FUMARATE 25 MG/1
25 TABLET, FILM COATED ORAL NIGHTLY
Status: ON HOLD | DISCHARGE
Start: 2025-05-08 | End: 2025-05-28 | Stop reason: HOSPADM

## 2025-05-08 RX ORDER — HYDROCHLOROTHIAZIDE 25 MG/1
25 TABLET ORAL DAILY
Status: DISCONTINUED | OUTPATIENT
Start: 2025-05-09 | End: 2025-05-08 | Stop reason: HOSPADM

## 2025-05-08 RX ORDER — FINASTERIDE 5 MG/1
5 TABLET, FILM COATED ORAL DAILY
Status: DISCONTINUED | OUTPATIENT
Start: 2025-05-09 | End: 2025-05-08 | Stop reason: HOSPADM

## 2025-05-08 RX ADMIN — IPRATROPIUM BROMIDE AND ALBUTEROL SULFATE 1 DOSE: 2.5; .5 SOLUTION RESPIRATORY (INHALATION) at 06:57

## 2025-05-08 RX ADMIN — LEVETIRACETAM 500 MG: 100 INJECTION INTRAVENOUS at 18:00

## 2025-05-08 RX ADMIN — DEXTROSE MONOHYDRATE: 50 INJECTION, SOLUTION INTRAVENOUS at 03:10

## 2025-05-08 RX ADMIN — LEVETIRACETAM 500 MG: 100 INJECTION INTRAVENOUS at 03:00

## 2025-05-08 RX ADMIN — ENOXAPARIN SODIUM 40 MG: 100 INJECTION SUBCUTANEOUS at 10:46

## 2025-05-08 RX ADMIN — Medication 2000 UNITS: at 10:45

## 2025-05-08 RX ADMIN — PIPERACILLIN AND TAZOBACTAM 3375 MG: 3; .375 INJECTION, POWDER, LYOPHILIZED, FOR SOLUTION INTRAVENOUS at 03:11

## 2025-05-08 RX ADMIN — TAMSULOSIN HYDROCHLORIDE 0.4 MG: 0.4 CAPSULE ORAL at 10:46

## 2025-05-08 RX ADMIN — LIDOCAINE HYDROCHLORIDE 50 MG: 10 SOLUTION INTRAVENOUS at 08:36

## 2025-05-08 RX ADMIN — PIPERACILLIN AND TAZOBACTAM 3375 MG: 3; .375 INJECTION, POWDER, LYOPHILIZED, FOR SOLUTION INTRAVENOUS at 12:24

## 2025-05-08 RX ADMIN — SODIUM CHLORIDE, PRESERVATIVE FREE 10 ML: 5 INJECTION INTRAVENOUS at 10:46

## 2025-05-08 RX ADMIN — POTASSIUM BICARBONATE 40 MEQ: 782 TABLET, EFFERVESCENT ORAL at 14:18

## 2025-05-08 ASSESSMENT — ENCOUNTER SYMPTOMS
VOMITING: 0
TROUBLE SWALLOWING: 1
COUGH: 0
COLOR CHANGE: 0
WHEEZING: 0

## 2025-05-08 NOTE — PROGRESS NOTES
Spiritual Health History and Assessment/Progress Note  [facility]    (P) Follow-up,  , Adjustment to illness, Life Adjustments, Grief and loss, Anticipatory Grief,      Name: Emerson Salguero MRN: [id number]    Age: 89 y.o.     Sex: male   Language: English   Mu-ism: Roman Catholic   Sepsis (HCC)     Date: 5/8/2025            Total Time Calculated: (P) 15 min              Spiritual Assessment continued in [facility] 2W ORTHO TELE        Referral/Consult From: (P) Rounding   Encounter Overview/Reason: (P) Follow-up  Service Provided For: (P) Patient and family together    Pt, resting, alert, aware and oriented to chaplains voice and presence. Receptive, responsive and reflective.     Pt, reflects and shares with  about life and journey as a Roman Catholic young man being raised after the holocaust. Pt, shares with  about times of discrimination against Jews being accepted into college, Pt, graduated with ;as degree from [employer] with a law degree. Pt, shows signs of determination, pride, and dignity.     Pt, shows continuance of progress, celebrated his his birthday on [birth year] 4[birth year], turning [89]. Pt, expresses desire to continuing being avid reader or philosophy.      Pt, thankful, and gracious towards  persistent care visits, concern, words of prayer, blessings, affirmations of healing, wellness, peace, and strength.      reports, follow up care encounter visit completed.     Phoebe, Belief, Meaning:   Patient identifies as spiritual, is connected with a phoebe tradition or spiritual practice, and has beliefs or practices that help with coping during difficult times  Family/Friends identify as spiritual, are connected with a phoebe tradition or spiritual practice, and have beliefs or practices that help with coping during difficult times      Importance and Influence:  Patient has spiritual/personal beliefs that influence decisions regarding their health  Family/Friends have

## 2025-05-08 NOTE — PROGRESS NOTES
Morning labs abnormal K 3.3 Na 149 Cl 11. MD made aware, there are no standing orders for K replacement at this time.RN will wait for response and pass information on to next shift as well.

## 2025-05-08 NOTE — PROGRESS NOTES
Comprehensive Nutrition Assessment    Type and Reason for Visit:  Reassess    Nutrition Recommendations/Plan:   Continue Current Diet, Start Oral Nutrition Supplement     Malnutrition Assessment:  Malnutrition Status:  At risk for malnutrition (05/02/25 1415)    Context:  Acute Illness       Nutrition Assessment:    Pt now able to receive po diet with assistance per MBS. PO intake noted fair to good thus far. To provide thickened-appropriate oral nutritional supplements and high calorie/protein foods tid with meals (frozen supplement, greek yogurt, pudding). Discusse nutritional goals with son following discharge.    Nutrition Related Findings:    PMH- CKD3, htn, dementia; admitted with 'Encephalopathy in the setting of baseline dementia'. Labs/meds reviewed. Hypernatremia and hypokalemia noted. Pt receiving HCTZ; takes HCTZ at home. Trace BUE and +1 BLE edma noted. 5/6 MBS-Pureed; Mildly Thick (Nectar), Liquids by spoon only. Pt is a Feed. Wound Type: None       Current Nutrition Intake & Therapies:    Average Meal Intake: 26-50%, 51-75%     ADULT DIET; Dysphagia - Pureed; Mildly Thick (Nectar); Liquids by spoon only  ADULT ORAL NUTRITION SUPPLEMENT; Breakfast, Lunch, Dinner; Frozen Oral Supplement, Greek Yogurt    Anthropometric Measures:  Height: 172.7 cm (5' 7.99\")  Ideal Body Weight (IBW): 154 lbs (70 kg)    Admission Body Weight: 84.8 kg (187 lb) (actual)  Current Body Weight: 76.2 kg (167 lb 15.9 oz) (5/7),  Weight Source: Bed scale  Current BMI (kg/m2): 25.5  Usual Body Weight: 77.1 kg (170 lb) (per son *with decreased edema)     % Weight Change (Calculated): 4                    BMI Categories: Overweight (BMI 25.0-29.9)    Estimated Daily Nutrient Needs:  Energy Requirements Based On: Kcal/kg  Weight Used for Energy Requirements: Usual  Energy (kcal/day): 0737-5178 (kg x 22-24)  Weight Used for Protein Requirements: Ideal  Protein (g/day): 70-84 (kg x 1.0-1.2)  Method Used for Fluid Requirements: Defer to  provider    Nutrition Diagnosis:   Inadequate oral intake related to cognitive or neurological impairment as evidenced by  (Previously NPO), modified consistency diet    Nutrition Interventions:   Food and/or Nutrient Delivery: Continue Current Diet, Start Oral Nutrition Supplement  Nutrition Education/Counseling:  (Discussed nutritional goals with son after discharge, including encouraging main entree (meat, starch) at meals & thickened- appropriate supplement (ie., frozen supplement if available and/or thickened high calorie/high protein supplement, greek yogurt)  Coordination of Nutrition Care: Continue to monitor while inpatient       Goals:  Goals: PO intake 75% or greater (weight gain)  Type of Goal: New goal       Nutrition Monitoring and Evaluation:      Food/Nutrient Intake Outcomes: Food and Nutrient Intake, Supplement Intake  Physical Signs/Symptoms Outcomes: Chewing or Swallowing, Weight, Biochemical Data, Fluid Status or Edema    Discharge Planning:    Continue Oral Nutrition Supplement     Barbie Nuñez RD, LD

## 2025-05-08 NOTE — DISCHARGE SUMMARY
Hospital Medicine Discharge Summary    Emerson Salguero  :  1928  MRN:  [id number]    Admit date:  2025  Discharge date:  2025    Admitting Physician:  Bandar Baum MD  Primary Care Physician:  Misty Vela MD      Discharge Diagnoses:    Principal Problem:    Sepsis (HCC)  Active Problems:    Acute encephalopathy    Shortness of breath    Palliative care encounter    Goals of care, counseling/discussion    Advanced care planning/counseling discussion    Community acquired pneumonia of left lower lobe of lung    Oropharyngeal dysphagia    Anorexia  Resolved Problems:    * No resolved hospital problems. *      Hospital Course:   Emerson Salguero is a 89 y.o. male that was admitted and treated at [facility] for the following medical issues:     Acute encephalopathy with hallucinations  - in the setting of baseline dementia due to right sided aspiration pneumonia with parapneumonic effusion  - EEG was suggestive of underlying encephalitis however MRI and LP were negative   - treated with IV Zosyn, IV Keppra, Seroquel  - CT chest showed a large, right sided pleural effusion and LLL nodule  - s/p thoracentesis with drainage of 600 ml per IR  - fluid studies were consistent with exudate  - follow fluid culture and cytology results  - will continue Zosyn x 7 more days via midline on d/c  - noted to have right sided gaze,  CT head was negative   - followed by neurology, palliative care      Hypernatremia   - slowly improving   - encourage patient to increase water intake      CKD3  - monitor renal function     Anemia   - follow H/H     HTN  - on HCTZ, Losartan     Diet: ADULT DIET; Dysphagia - Pureed; Mildly Thick (Nectar); Liquids by spoon only  ADULT ORAL NUTRITION SUPPLEMENT; Breakfast, Lunch, Dinner; Frozen Oral Supplement  - passed swallow eval  - is tolerating diet  - followed by speech therapy     Code Status: Limited        Disposition - SNF      Patient was seen by the  hospitalizations.**    Your medical team at [facility] appreciates the opportunity to work with you to get well!    Sincerely,  JESSICA EPPERSON MD

## 2025-05-08 NOTE — PROGRESS NOTES
Infectious Disease     Patient Name: Emerson Salguero  Date: 5/8/2025  YOB: 1928  Medical Record Number: [id number]                    Sepsis    Patient presented fatigue loss of appetite was hypothermic bradycardic initially on admission treated and sepsis placed on vancomycin and Zosyn    Cough shortness of breath some confusion      Cultures remain no growth         Patient underwent lumbar puncture 5/2/2025  Cultures negative West Nile negative  Cell count was not elevated          Patient aspirated tube feeding 5/3/2025  Chest x-ray from 5 3/2/2025 5 4/2/2025 show increase density in the right lung  CT scan of the lung done today shows a size fusion air bronchograms in the right lung          Thoracentesis performed today results pending    Micro results (current encounter only)    Procedure Component Value Units Date/Time   Culture, Body Fluid (with Gram Stain) [[id number]] Collected: 05/06/25 1337   Order Status: Completed Specimen: Thoracentesis Updated: 05/07/25 1048    Body Fluid Culture, Sterile --    Direct Exam:  MANY NEUTROPHILS  Direct Exam:  NO ORGANISMS SEEN  Direct Exam:  Gram stain made from cytocentrifuged specimen.  Organisms  Direct Exam:  and cells will be concentrated.  Cult,Fluid:  NO GROWTH 10 HOURS  Performed at [facility] [address] [place] [address] [phone] ([phone]           Cell Count with Differential, Body Fluid [[id number]] Collected: 05/06/25 1337      Specimen: Thoracentesis Updated: 05/06/25 1654      Cell Count Fluid Type Thoracentesis      Color, Fluid Red      Appearance, Fluid Bloody      Clot Eval. see below      Nucl Cell, Fluid 2,977 /cumm       RBC, Fluid 103,000 /cumm       Neutrophil Count, Fluid 80 %       Lymphocytes, Body Fluid 8 %       Monocyte Count, Fluid 12 %       Number of Cells Counted Fluid 100      Path Consult + CELL CT/DIFF-BF Yes             Culture, Body Fluid (with Gram Stain) [[id number]] Collected: 05/06/25 1337      Specimen:

## 2025-05-08 NOTE — PROGRESS NOTES
[facility]  Occupational Therapy        NAME:  Emerson Salguero  ROOM: W282/W282-01  :  1928  DATE: 2025    Attempted to see Emerson Salguero at 0844 on this date for:   []  Initial Evaluation   [x]  Treatment       Patient was unable to be seen due to:   [x] Off unit for testing/procedure    [] Patient refused, stating \"    [] Therapy on hold due to     [] Nursing deferred due to    [] Other:      Will attempt again as able.    Electronically signed by ROBERT Lake on 25 at 9:08 AM EDT

## 2025-05-08 NOTE — PLAN OF CARE
Problem: Discharge Planning  Goal: Discharge to home or other facility with appropriate resources  Outcome: Progressing     Problem: ABCDS Injury Assessment  Goal: Absence of physical injury  Outcome: Progressing     Problem: Skin/Tissue Integrity  Goal: Skin integrity remains intact  Description: 1.  Monitor for areas of redness and/or skin breakdown2.  Assess vascular access sites hourly3.  Every 4-6 hours minimum:  Change oxygen saturation probe site4.  Every 4-6 hours:  If on nasal continuous positive airway pressure, respiratory therapy assess nares and determine need for appliance change or resting period  Outcome: Progressing     Problem: Confusion  Goal: Confusion, delirium, dementia, or psychosis is improved or at baseline  Description: INTERVENTIONS:1. Assess for possible contributors to thought disturbance, including medications, impaired vision or hearing, underlying metabolic abnormalities, dehydration, psychiatric diagnoses, and notify attending LIP2. [place] high risk fall precautions, as indicated3. Provide frequent short contacts to provide reality reorientation, refocusing and direction4. Decrease environmental stimuli, including noise as appropriate5. Monitor and intervene to maintain adequate nutrition, hydration, elimination, sleep and activity6. If unable to ensure safety without constant attention obtain sitter and review sitter guidelines with assigned personnel7. Initiate Psychosocial CNS and Spiritual Care consult, as indicated  INTERVENTIONS:1. Assess for possible contributors to thought disturbance, including medications, impaired vision or hearing, underlying metabolic abnormalities, dehydration, psychiatric diagnoses, and notify attending LIP2. [place] high risk fall precautions, as indicated3. Provide frequent short contacts to provide reality reorientation, refocusing and direction4. Decrease environmental stimuli, including noise as appropriate5. Monitor and intervene to  -- DO NOT REPLY / DO NOT REPLY ALL --  -- Message is from the [facility]--    COVID-19 Universal Screening: N/A - Not about scheduling    General Patient Message      Reason for Call: looking to get info on medication for dementia.        Alternative phone number: none    Turnaround time given to caller:   \"This message will be sent to [state Provider's name]. The clinical team will fulfill your request as soon as they review your message.\"

## 2025-05-08 NOTE — PROGRESS NOTES
05/08/25 0702   RT Protocol   History Pulmonary Disease 0   Respiratory pattern 0   Breath sounds 2   Cough 0   Indications for Bronchodilator Therapy Decreased or absent breath sounds   Bronchodilator Assessment Score 2

## 2025-05-08 NOTE — CONSULTS
Inpatient consult to Pulmonology  Consult performed by: Pop Hope MD  Consult ordered by: Farideh Ram MD            Admit Date: 4/30/2025    PCP:  Misty Vela MD    CHIEF COMPLAINT: Fatigue and weakness    HPI:  The patient is a 89 y.o. male with significant past medical history of ulcerative colitis who was admitted on 4/30/2025 with sepsis.  Patient is a limited code.  Pulmonary service has been consulted due to pleural effusion and lung nodule.      CT chest from 5/5/25 reviewed personally and results interpreted  independently.  There is a large right pleural effusion.  There is evidence of compressive atelectasis.  Cannot rule out endobronchial lesions.  There is consolidation and small effusion in the left lung.              Past Medical History:      Diagnosis Date    Back pain     Colitis, ulcerative (HCC)     Hypertension         Past Surgical History:        Procedure Laterality Date    LUMBAR PUNCTURE N/A 05/02/2025    12.25 ml clear colorless CSF obtained performed by Courtney Lawrence  CNP    THORACENTESIS Right 05/06/2025    600 ml removed by HA CNP - diagnostics sent    TONSILLECTOMY         Current Medications:     sodium chloride flush  5-40 mL IntraVENous 2 times per day    lidocaine 1 % injection  50 mg IntraDERmal Once    QUEtiapine  25 mg Oral Nightly    piperacillin-tazobactam  3,375 mg IntraVENous Q8H    hydroCHLOROthiazide  25 mg Per NG tube Daily    losartan  50 mg Per NG tube Daily    escitalopram  5 mg Per NG tube Daily    finasteride  5 mg Per NG tube Daily    QUEtiapine  50 mg Oral Once    lidocaine   Topical Once    LORazepam  1 mg IntraVENous Once    levETIRAcetam  500 mg IntraVENous Q12H    sodium chloride flush  5-40 mL IntraVENous 2 times per day    enoxaparin  40 mg SubCUTAneous Daily    Vitamin D  2,000 Units Oral Daily    tamsulosin  0.4 mg Oral BID RT     Home Meds:  Prior to Admission medications    Medication Sig Start Date End Date Taking? Authorizing Provider

## 2025-05-08 NOTE — PROGRESS NOTES
05/07/25 2100   RT Protocol   History Pulmonary Disease 0   Respiratory pattern 0   Breath sounds 2   Cough 2   Indications for Bronchodilator Therapy Decreased or absent breath sounds   Bronchodilator Assessment Score 4

## 2025-05-08 NOTE — PROGRESS NOTES
Physical Therapy Med Surg Daily Treatment Note  Facility/Department: [address] ORTHO TELE  Room: [facility]       NAME: Emerson Salguero  : 1928 (89 y.o.)  MRN: [id number]  CODE STATUS: Limited    Date of Service: 2025    Patient Diagnosis(es): Sepsis, unspecified organism (HCC) [A41.9]  Sepsis (HCC) [A41.9]   No chief complaint on file.    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    Gross hematuria     Acute encephalopathy     Closed head injury     Aphasia     Mild cognitive impairment     AMS (altered mental status) 2021    BPH (benign prostatic hyperplasia) 2021    CKD (chronic kidney disease) 2021    Leukocytosis 2021        Past Medical History:   Diagnosis Date    Back pain     Colitis, ulcerative (HCC)     Hypertension      Past Surgical History:   Procedure Laterality Date    IR MIDLINE CATH  2025    IR MIDLINE CATH 2025 MLOZ SPECIAL PROCEDURE    LUMBAR PUNCTURE N/A 2025    12.25 ml clear colorless CSF obtained performed by Courtney Lawrence IR CNP    THORACENTESIS Right 2025    600 ml removed by HA CNP - diagnostics sent    TONSILLECTOMY              Restrictions:  Restrictions/Precautions: Fall Risk    SUBJECTIVE:        Pain   Denies pre and post     OBJECTIVE:        Bed mobility  Rolling to Left: Partial/Moderate assistance  Rolling to Right: Unable to assess  Supine to Sit: Substantial/Maximal assistance;Partial/Moderate assistance (Pt able to  assist with movement)  Sit to Supine: Unable to assess  Scooting: Partial/Moderate assistance    Transfers  Sit to Stand: Partial/Moderate assistance;2 Person Assistance (Posterior lean upon standing.)  Stand to Sit: Partial/Moderate assistance;2 Person Assistance  Bed to Chair: Partial/Moderate assistance;2 Person Assistance    Ambulation  Surface: Level tile  Device: Rolling Walker  Assistance: Partial/Moderate assistance;2 Person assistance  Quality of Gait: Gait initially with posterior leaning, flexed posture, looking at floor,  Gait Deviations: Slow Gaby;Shuffles;Decreased step length;Decreased step height  Distance: 3' bed to chair.                                         ASSESSMENT   Body Structures, Functions, Activity Limitations Requiring Skilled Therapeutic Intervention: Decreased functional mobility ;Decreased ADL status;Decreased ROM;Decreased body mechanics;Decreased strength;Decreased safe awareness;Decreased cognition;Decreased endurance;Decreased balance;Decreased coordination  Assessment: Pt able to transfer to chair again this date.  Poor balance and still requiring assist of 2 for safety.  Pt needing reminders to open eyes.  Son present.  HEP given to son for later.     Discharge Recommendations:  Continue to assess pending progress         Goals  Long Term Goals  Long Term Goal 1: Pt to complete rolling L<>R in supine with Lina  Long Term Goal 2: Pt to complete transfers with Lina  Long Term Goal 3: Pt to maintain midline seated EOB with SBA X 5min    PLAN    General Plan: 1 time a day 3-6 times a week        [facility] (6 CLICK) BASIC MOBILITY  AM-PAC Inpatient Mobility Raw Score : 8     Therapy Time   Individual   Time In 1340   Time Out 1355   Minutes 15     Minutes: 15  Gait: 3  Transfers: 12          Precious Ramirez PTA, 05/08/25 at 3:25 PM         Definitions for assistance levels  Independent = pt does not require any physical supervision or assistance from another person for activity

## 2025-05-08 NOTE — CONSULTS
Interventional Radiology Vascular Access Team   Consult Note    MRN: [id number]   Patient: Emerson Salguero   : 1928   Age: 89 y.o.  Sulfa antibiotics        Patient is a 89 y.o. male admitted on 2025.   Vascular access team consulted due to IV antibiotics x 7 days and successfully placed Midline Catheter on 25.    Peripherally Inserted Central Catheter/ Midline Assessment:   Midline 25 Right Cephalic (Active)   Criteria for Appropriate Use Long term IV/antibiotic administration 25 0849   Site Assessment Clean, dry & intact 25 0849   Phlebitis Assessment No symptoms 25 0849   Infiltration Assessment 0 25 0849   Extremity Circumference (cm) 28 cm 25 0849   External Catheter Length (cm) 0 cm 25 0849   Lumen Color/Status Pink;Brisk blood return;Normal saline locked;Flushed;Alcohol cap applied 25 0849   Alcohol Cap Used Yes 25 0849   Date of Last Dressing Change 05/15/25 05/08/25 0849   Dressing Type Antimicrobial;Transparent w/CHG gel;Securing device 25 0849   Dressing Status Clean, dry & intact;New dressing applied 25 0849   Dressing Intervention New 25 08             Electronically signed by Dayana Abdi RN on 25 at 8:58 AM EDT

## 2025-05-08 NOTE — DISCHARGE INSTR - DIET
Good nutrition is important when healing from an illness, injury, or surgery.  Follow any nutrition recommendations given to you during your hospital stay.   If you were given an oral nutrition supplement while in the hospital, continue to take this supplement at home.  You can take it with meals, in-between meals, and/or before bedtime. These supplements can be purchased at most local grocery stores, pharmacies, and chain [employer]-stores.   If you have any questions about your diet or nutrition, call the hospital and ask for the dietitian.      Pureed and nectar thick liquids

## 2025-05-08 NOTE — PROGRESS NOTES
Hospitalist Progress Note      PCP: Misty Vela MD    Date of Admission: 4/30/2025    Chief Complaint:  no acute events, is afebrile, stable HD, on RA    Medications:  Reviewed    Infusion Medications    sodium chloride      sodium chloride       Scheduled Medications    [START ON 5/9/2025] escitalopram  5 mg Oral Daily    [START ON 5/9/2025] finasteride  5 mg Oral Daily    [START ON 5/9/2025] hydroCHLOROthiazide  25 mg Oral Daily    [START ON 5/9/2025] losartan  50 mg Oral Daily    potassium bicarb-citric acid  40 mEq Oral Once    sodium chloride flush  5-40 mL IntraVENous 2 times per day    QUEtiapine  25 mg Oral Nightly    piperacillin-tazobactam  3,375 mg IntraVENous Q8H    levETIRAcetam  500 mg IntraVENous Q12H    sodium chloride flush  5-40 mL IntraVENous 2 times per day    enoxaparin  40 mg SubCUTAneous Daily    Vitamin D  2,000 Units Oral Daily    tamsulosin  0.4 mg Oral BID RT     PRN Meds: sodium chloride flush, sodium chloride, ipratropium 0.5 mg-albuterol 2.5 mg, atropine, haloperidol lactate, labetalol, hydrALAZINE, sodium chloride flush, sodium chloride, acetaminophen **OR** acetaminophen      Intake/Output Summary (Last 24 hours) at 5/8/2025 1207  Last data filed at 5/7/2025 2030  Gross per 24 hour   Intake 200 ml   Output 300 ml   Net -100 ml       Exam:    BP (!) 146/67   Pulse 84   Temp 98.2 °F (36.8 °C) (Oral)   Resp 18   Ht 1.727 m (5' 7.99\")   Wt 76.2 kg (168 lb)   SpO2 92%   BMI 25.55 kg/m²     General appearance: awake, cooperative  Respiratory:  diminished bilaterally  Cardiovascular: Regular rate and rhythm, S1/S2.  Abdomen: Soft, active bowel sounds.  Musculoskeletal: No edema bilaterally.         Labs:   Recent Labs     05/06/25 0513 05/07/25  0638 05/08/25  0448   WBC 10.7 12.2* 9.9   HGB 10.4* 9.8* 9.8*   HCT 31.5* 29.0* 29.4*    326 407*     Recent Labs     05/06/25  0513 05/07/25  0511 05/08/25  0448   * 150* 149*   K 3.1* 3.9 3.3*   * 115* 111*   CO2 28  follow H/H    HTN  - on HCTZ, Losartan    Diet: ADULT DIET; Dysphagia - Pureed; Mildly Thick (Nectar); Liquids by spoon only  ADULT ORAL NUTRITION SUPPLEMENT; Breakfast, Lunch, Dinner; Frozen Oral Supplement  - passed swallow eval  - is tolerating diet  - followed by speech therapy    Code Status: Limited      Disposition - SNF today if CT head is negative per neurology          Electronically signed by JESSICA EPPERSON MD on 5/8/2025 at 12:07 PM

## 2025-05-08 NOTE — PROGRESS NOTES
[facility] Neurology Daily Progress Note  Name: Emerson Salguero  Age: 89 y.o.  Gender: male  CodeStatus: Limited  Allergies: Sulfa Antibiotics    Chief Complaint:No chief complaint on file.    Primary Care Provider: Misty Vela MD  InpatientTreatment Team: Treatment Team:   Farideh Ram MD Abbud, Rita A, MD Dadas, Lauren A, APRN - CNP  David Vegas MD Archer, Heidi, APRN - CNP  Owen Huerta MD Perez, Jacquelyn A, Rohini Hinojosa RN Hipp, Lisa, RN  Admission Date: 4/30/2025      HPI   Pt seen and examined for neuro follow up.  Patient alert.  Son at bedside. Answering some questions.  No seizure activity reported.  Afebrile.  Generalized weakness noted.  Nonfocal.  Patient with gaze to the right.  Patient much improved though he is having some difficulty focusing at this time.  Vitals:    05/08/25 0804   BP: (!) 146/67   Pulse: 84   Resp: 18   Temp: 98.2 °F (36.8 °C)   SpO2: 92%        Review of Systems   Unable to perform ROS: Dementia   Constitutional:  Negative for fever.   HENT:  Positive for trouble swallowing. Negative for hearing loss.    Respiratory:  Negative for cough and wheezing.    Cardiovascular:  Negative for chest pain, palpitations and leg swelling.   Gastrointestinal:  Negative for vomiting.   Skin:  Negative for color change and rash.   Neurological:  Positive for speech difficulty. Negative for tremors, seizures, syncope and facial asymmetry.   Psychiatric/Behavioral:  Positive for behavioral problems, confusion and hallucinations. Negative for agitation. The patient is not nervous/anxious.          Physical Exam  Vitals and nursing note reviewed.   Constitutional:       General: He is sleeping. He is not in acute distress.     Appearance: He is not diaphoretic.   HENT:      Head: Normocephalic and atraumatic.   Eyes:      Extraocular Movements: Extraocular movements intact.      Pupils: Pupils are equal, round, and reactive to light.   Cardiovascular:      Rate and Rhythm:  Keppra    5/5/2025:  Acute metabolic encephalopathy with severe underlying dementia  Hallucinations, improving.  Will continue Seroquel at at bedtime only.  Hypothermia, resolved  CT of the head negative for acute intracranial finding  EEG with left temporal sharp waves.  Repeat EEG pending.  Lumbar puncture negative.  HSV and [place] Nile negative.  MRI of the brain negative for acute findings  Patient has been initiated on Keppra for seizure prophylaxis given abnormal EEG.  Dysphagia, Corpak in place  Plan of care discussed with son at length.  Discharge plan is for skilled nursing facility.    I have personally performed a face to face diagnostic evaluation on this patient, reviewed all data and investigations, and am the sole provider of all clinical decisions on the neurological status of this patient.  Acute metabolic encephalopathy improving.  Patient will continue on Seroquel at bedtime.  EEG pending for follow-up.  HSV and zoster are negative all his investigations so far are negative.  Will see what the EEG shows.  60% time spent on evaluating patient and extensive discussion with the  son    5/6/2025:  Acute metabolic encephalopathy with severe underlying dementia  Right lower lobe aspiration pneumonia, continues on Zosyn  Hallucinations, improving.  Will continue Seroquel at at bedtime only.  Hypothermia, resolved  CT of the head negative for acute intracranial finding  EEG with left temporal sharp waves.  Repeat EEG normal  Lumbar puncture negative.  HSV and [place] Nile negative.  MRI of the brain negative for acute findings  Patient has been initiated on Keppra for seizure prophylaxis given abnormal initial EEG.  Dysphagia, Corpak in place  Hypernatremia with sodium level of 151.  Continues on IV fluids.  Patient undergoing thoracentesis today for large right pleural effusion.    I have personally performed a face to face diagnostic evaluation on this patient, reviewed all data and investigations, and am

## 2025-05-09 LAB
EV RNA SPEC QL NAA+PROBE: NOT DETECTED
SPECIMEN SOURCE: NORMAL

## 2025-05-09 NOTE — PROGRESS NOTES
Physical Therapy  Facility/Department: [facility] MED SURG W282/W282-01  Physical Therapy Discharge      NAME: Emerson Salguero    : 1928 (89 y.o.)  MRN: [id number]    Account: [id number]  Gender: male      Patient has been discharged from acute care hospital. DC patient from current PT program.      Electronically signed by Veena Burgess PT on 25 at 1:43 PM EDT

## 2025-05-10 LAB
SPECIMEN SOURCE: NORMAL
SPECIMEN SOURCE: NORMAL
VZV DNA SPEC QL NAA+PROBE: NOT DETECTED
VZV DNA SPEC QL NAA+PROBE: NOT DETECTED

## 2025-05-11 LAB — BACTERIA FLD AEROBE CULT: NORMAL

## 2025-05-12 ENCOUNTER — TELEPHONE (OUTPATIENT)
Age: 89
End: 2025-05-12

## 2025-05-12 PROCEDURE — 95816 EEG AWAKE AND DROWSY: CPT | Performed by: PSYCHIATRY & NEUROLOGY

## 2025-05-12 NOTE — TELEPHONE ENCOUNTER
YOU CONSULTED PT IN HOSP. HE WAS DISCHARGED BUT WHILE HE WAS ADMITTED HE HAD AN ABNORMAL CT SCAN. PATIENTS' SON JUST WANTS TO BE SURE THAT YOU WANT TO SEE HIM BECAUSE YOUR NAME IS NOT ON THE DISCHARGE. I SCHEDULED PT APPT ON 5/22/25 WITH YOU.  PLEASE ADVISE.

## 2025-05-12 NOTE — PROCEDURES
[facility]                   [address] [place] [address]                          ELECTROENCEPHALOGRAM      PATIENT NAME: XUAN AMES               : 1928  MED REC NO: [id number]                        ROOM: W282  ACCOUNT NO: [id number]                       ADMIT DATE: 2025  PROVIDER: Nils Robert MD      REFERRING PHYSICIAN:  DANIELITO MANN    MEDICATIONS:  Seroquel, Zosyn, Cozaar, Lexapro, Proscar, Namenda, Haldol, labetalol, Ativan, Keppra.    DESCRIPTION:  This is a spontaneous 20-channel EEG recording for this patient with an encephalopathy.  The patient had a previous EEG showing sharp waves in the left hemisphere.  At this time, the patient has a low-voltage tracing with significant drowsy pattern seen throughout the EEG recording.  No definite sharp wave or asymmetry noted during this EEG.  The patient does not have any suggestion of sleep patterns.    IMPRESSION:  This is a mildly abnormal EEG recording by the virtue of mild diffuse slowing with significant drowsy patterns seen throughout the EEG recording.  The sharp wave activity seen in the previous EEG recording is not noted on this EEG.  Clinical correlation recommended.          NILS ROBERT MD      D:  2025 10:34:59     T:  2025 11:03:24     REGLA/NATY  Job #:  [id number]     Doc#:  [id number]

## 2025-05-13 ENCOUNTER — TELEPHONE (OUTPATIENT)
Age: 89
End: 2025-05-13

## 2025-05-13 LAB — BACTERIA FLD AEROBE CULT: NORMAL

## 2025-05-13 NOTE — TELEPHONE ENCOUNTER
Patient's son called worried about the path report on patient's thoracentesis. Has appointment on 05/22/2025 and wants to know if he should be seen earlier.

## 2025-05-14 NOTE — TELEPHONE ENCOUNTER
Called patients son to see if can be seen at 1:15 or 2:30 on 5/15/2025, son is calling nursing fac and finding out

## 2025-05-15 ENCOUNTER — OFFICE VISIT (OUTPATIENT)
Age: 89
End: 2025-05-15
Payer: MEDICARE

## 2025-05-15 VITALS
RESPIRATION RATE: 16 BRPM | TEMPERATURE: 97 F | OXYGEN SATURATION: 99 % | SYSTOLIC BLOOD PRESSURE: 108 MMHG | HEART RATE: 49 BPM | DIASTOLIC BLOOD PRESSURE: 59 MMHG

## 2025-05-15 DIAGNOSIS — J18.9 PNEUMONIA OF RIGHT LOWER LOBE DUE TO INFECTIOUS ORGANISM: Primary | ICD-10-CM

## 2025-05-15 PROCEDURE — 99213 OFFICE O/P EST LOW 20 MIN: CPT | Performed by: INTERNAL MEDICINE

## 2025-05-15 PROCEDURE — G8427 DOCREV CUR MEDS BY ELIG CLIN: HCPCS | Performed by: INTERNAL MEDICINE

## 2025-05-15 PROCEDURE — G8419 CALC BMI OUT NRM PARAM NOF/U: HCPCS | Performed by: INTERNAL MEDICINE

## 2025-05-15 PROCEDURE — 1159F MED LIST DOCD IN RCRD: CPT | Performed by: INTERNAL MEDICINE

## 2025-05-15 PROCEDURE — 99212 OFFICE O/P EST SF 10 MIN: CPT | Performed by: INTERNAL MEDICINE

## 2025-05-15 PROCEDURE — 1111F DSCHRG MED/CURRENT MED MERGE: CPT | Performed by: INTERNAL MEDICINE

## 2025-05-15 PROCEDURE — 1036F TOBACCO NON-USER: CPT | Performed by: INTERNAL MEDICINE

## 2025-05-15 PROCEDURE — 1123F ACP DISCUSS/DSCN MKR DOCD: CPT | Performed by: INTERNAL MEDICINE

## 2025-05-15 ASSESSMENT — ENCOUNTER SYMPTOMS
RESPIRATORY NEGATIVE: 1
GASTROINTESTINAL NEGATIVE: 1

## 2025-05-15 NOTE — TELEPHONE ENCOUNTER
PT HAD 2 APPTS SCHEDULED FOR NEXT WEEK. SNF [facility] CANCELED APPT FOR MONDAY FOR NOW. SHE WILL CALL BACK TO LET US KNOW IF HE CAN COME ON THURSDAY

## 2025-05-15 NOTE — PROGRESS NOTES
Infectious Disease     Patient Name: Emerson Salguero  Date: 5/15/2025  YOB: 1928  Medical Record Number: [id number]        Patient presented fatigue loss of appetite was hypothermic bradycardic initially on admission treated and sepsis placed on vancomycin and Zosyn    Cough shortness of breath some confusion      Cultures remain no growth         Patient underwent lumbar puncture 5/2/2025  Cultures negative West Nile negative  Cell count was not elevated          Patient aspirated tube feeding 5/3/2025  Chest x-ray from 5 3/2/2025 5 4/2/2025 show increase density in the right lung  CT scan of the lung done today shows a size fusion air bronchograms in the right lung          Thoracentesis performed today results pending    Micro results (current encounter only)    Procedure Component Value Units Date/Time   Culture, Body Fluid (with Gram Stain) [[id number]] Collected: 05/06/25 1337   Order Status: Completed Specimen: Thoracentesis Updated: 05/07/25 1048    Body Fluid Culture, Sterile --    Direct Exam:  MANY NEUTROPHILS  Direct Exam:  NO ORGANISMS SEEN  Direct Exam:  Gram stain made from cytocentrifuged specimen.  Organisms  Direct Exam:  and cells will be concentrated.  Cult,Fluid:  NO GROWTH 10 HOURS  Performed at [facility] [address] [place] [address] [phone] ([phone]           Cell Count with Differential, Body Fluid [[id number]] Collected: 05/06/25 1337      Specimen: Thoracentesis Updated: 05/06/25 1654      Cell Count Fluid Type Thoracentesis      Color, Fluid Red      Appearance, Fluid Bloody      Clot Eval. see below      Nucl Cell, Fluid 2,977 /cumm       RBC, Fluid 103,000 /cumm       Neutrophil Count, Fluid 80 %       Lymphocytes, Body Fluid 8 %       Monocyte Count, Fluid 12 %       Number of Cells Counted Fluid 100      Path Consult + CELL CT/DIFF-BF Yes             Culture, Body Fluid (with Gram Stain) [[id number]] Collected: 05/06/25 1337      Specimen: Thoracentesis Updated:

## 2025-05-15 NOTE — TELEPHONE ENCOUNTER
I can see the patient on a sooner basis.  Available today or tomorrow if needed.  He will probably need CT chest as well.

## 2025-05-17 LAB — PRELIMINARY: NORMAL

## 2025-05-22 ENCOUNTER — HOSPITAL ENCOUNTER (OUTPATIENT)
Dept: CT IMAGING | Age: 89
Discharge: HOME | End: 2025-05-24
Attending: INTERNAL MEDICINE
Payer: MEDICARE

## 2025-05-22 ENCOUNTER — OFFICE VISIT (OUTPATIENT)
Age: 89
DRG: 871 | End: 2025-05-22
Payer: MEDICARE

## 2025-05-22 VITALS
HEIGHT: 68 IN | TEMPERATURE: 97.6 F | SYSTOLIC BLOOD PRESSURE: 102 MMHG | DIASTOLIC BLOOD PRESSURE: 64 MMHG | BODY MASS INDEX: 25.46 KG/M2 | WEIGHT: 168 LBS | RESPIRATION RATE: 18 BRPM | OXYGEN SATURATION: 96 % | HEART RATE: 85 BPM

## 2025-05-22 DIAGNOSIS — J90 PLEURAL EFFUSION: Primary | ICD-10-CM

## 2025-05-22 DIAGNOSIS — J90 PLEURAL EFFUSION: ICD-10-CM

## 2025-05-22 DIAGNOSIS — R06.89 RESPIRATORY INSUFFICIENCY: ICD-10-CM

## 2025-05-22 PROCEDURE — G9691 PT HOSP DUR MSMT PERIOD: HCPCS | Performed by: INTERNAL MEDICINE

## 2025-05-22 PROCEDURE — G9692 HOSP RECD BY PT DUR MSMT PER: HCPCS | Performed by: INTERNAL MEDICINE

## 2025-05-22 PROCEDURE — G8419 CALC BMI OUT NRM PARAM NOF/U: HCPCS | Performed by: INTERNAL MEDICINE

## 2025-05-22 PROCEDURE — 1036F TOBACCO NON-USER: CPT | Performed by: INTERNAL MEDICINE

## 2025-05-22 PROCEDURE — 99202 OFFICE O/P NEW SF 15 MIN: CPT | Performed by: INTERNAL MEDICINE

## 2025-05-22 PROCEDURE — 99203 OFFICE O/P NEW LOW 30 MIN: CPT | Performed by: INTERNAL MEDICINE

## 2025-05-22 PROCEDURE — G8427 DOCREV CUR MEDS BY ELIG CLIN: HCPCS | Performed by: INTERNAL MEDICINE

## 2025-05-22 PROCEDURE — 71250 CT THORAX DX C-: CPT

## 2025-05-22 NOTE — PROGRESS NOTES
NEW PATIENT VISIT-PULMONARY/SLEEP    5/23/2025     REFERRING PHYSICIAN:  Misty Vela MD     REASON FOR REFERRAL: Pleural effusion    HPI:     Emerson Salguero is a 89 y.o. male who was referred to pulmonary clinic for evaluation.     He was recently hospitalized due to large pleural effusion.  Underwent thoracentesis.  Fluid appears to be exudative.  Cytology is suggestive of atypical cells .  But no definite malignant cells were found.  Has been feeling better after thoracentesis.  Total 600 fluid has been removed before.  He finished antibiotics.  He is currently on room air.  She is accompanied by his son.  He is in a wheelchair.  He denies any respiratory distress.  There is no swelling in lower extremities.         Past Medical History   Past Medical History:   Diagnosis Date    Back pain     Colitis, ulcerative (HCC)     Hypertension        Past Surgical History  Past Surgical History:   Procedure Laterality Date    IR MIDLINE CATH  5/8/2025    IR MIDLINE CATH 5/8/2025 MLOZ SPECIAL PROCEDURE    LUMBAR PUNCTURE N/A 05/02/2025    12.25 ml clear colorless CSF obtained performed by Courtney Lawrence IR CNP    THORACENTESIS Right 05/06/2025    600 ml removed by HA CNP - diagnostics sent    TONSILLECTOMY         Allergies  Allergies   Allergen Reactions    Sulfa Antibiotics        Medications  Current Outpatient Medications   Medication Sig Dispense Refill    QUEtiapine (SEROQUEL) 25 MG tablet Take 1 tablet by mouth nightly      levETIRAcetam (KEPPRA) 500 MG tablet Take 1 tablet by mouth 2 times daily      hydroCHLOROthiazide (HYDRODIURIL) 25 MG tablet Take 1 tablet by mouth daily      escitalopram (LEXAPRO) 5 MG tablet Take 1 tablet by mouth daily      losartan (COZAAR) 50 MG tablet Take 1 tablet by mouth daily      Glycerin-Polysorbate 80 (REFRESH DRY EYE THERAPY OP) Apply 1 drop to eye as needed (dry eye) Bilat eyes      albuterol (PROVENTIL) (2.5 MG/3ML) 0.083% nebulizer solution Take 3 mLs by  nerves grossly intact. No weakness. Sensation normal   Psych: Normal Mood  Musculoskeletal: No joint abnormalities.               Radiology:  CT-scan of the chest  reviewed and interpreted personally and independently.     chestX-ray  (personally reviewed)         Impression:   Diagnosis Orders   1. Pleural effusion  CT CHEST WO CONTRAST      2. Respiratory insufficiency              Orders Placed This Encounter   Procedures    CT CHEST WO CONTRAST     Standing Status:   Future     Number of Occurrences:   1     Expected Date:   5/22/2025     Expiration Date:   5/23/2026        Recommendations:     - I went over results of CT chest on cytology results with patient and his son in details and answered all their questions  -Since she is negative for malignancy.  Will need repeat CT chest to assess lung parenchyma and recurrence of effusion.  -There was a nodule/consolidation in the left lower lobe on previous CT chest in addition to trace pleural effusion.  Will be able to assess today with CT chest.  -I will send the patient for stat CT.  -He does not seem to be in respiratory distress.     Return in about 6 weeks (around 7/3/2025).       Electronically signed by Pop Hope MD on 5/23/2025 at 9:11 AM

## 2025-05-24 ENCOUNTER — HOSPITAL ENCOUNTER (INPATIENT)
Age: 89
LOS: 4 days | Discharge: NURSING, REHAB OR LONG-TERM CARE | DRG: 871 | End: 2025-05-28
Attending: EMERGENCY MEDICINE
Payer: MEDICARE

## 2025-05-24 ENCOUNTER — APPOINTMENT (OUTPATIENT)
Dept: GENERAL RADIOLOGY | Age: 89
DRG: 871 | End: 2025-05-24
Payer: MEDICARE

## 2025-05-24 ENCOUNTER — APPOINTMENT (OUTPATIENT)
Dept: CT IMAGING | Age: 89
DRG: 871 | End: 2025-05-24
Attending: EMERGENCY MEDICINE
Payer: MEDICARE

## 2025-05-24 DIAGNOSIS — R79.89 ELEVATED TROPONIN: ICD-10-CM

## 2025-05-24 DIAGNOSIS — R41.82 ALTERED MENTAL STATUS, UNSPECIFIED ALTERED MENTAL STATUS TYPE: Primary | ICD-10-CM

## 2025-05-24 DIAGNOSIS — T68.XXXA HYPOTHERMIA, INITIAL ENCOUNTER: ICD-10-CM

## 2025-05-24 DIAGNOSIS — I95.9 HYPOTENSION, UNSPECIFIED HYPOTENSION TYPE: ICD-10-CM

## 2025-05-24 PROBLEM — R41.89 UNRESPONSIVE: Status: ACTIVE | Noted: 2025-05-24

## 2025-05-24 LAB
ALBUMIN SERPL-MCNC: 3.3 G/DL (ref 3.5–4.6)
ALP SERPL-CCNC: 95 U/L (ref 35–104)
ALT SERPL-CCNC: 18 U/L (ref 0–41)
ANION GAP SERPL CALCULATED.3IONS-SCNC: 10 MEQ/L (ref 9–15)
APTT PPP: 25.9 SEC (ref 24.4–36.8)
AST SERPL-CCNC: 32 U/L (ref 0–40)
BACTERIA URNS QL MICRO: NEGATIVE /HPF
BASOPHILS # BLD: 0.1 K/UL (ref 0–0.2)
BASOPHILS NFR BLD: 0.6 %
BILIRUB SERPL-MCNC: 0.4 MG/DL (ref 0.2–0.7)
BILIRUB UR QL STRIP: NEGATIVE
BUN SERPL-MCNC: 35 MG/DL (ref 8–23)
CALCIUM SERPL-MCNC: 9.1 MG/DL (ref 8.5–9.9)
CHLORIDE SERPL-SCNC: 99 MEQ/L (ref 95–107)
CLARITY UR: CLEAR
CO2 SERPL-SCNC: 26 MEQ/L (ref 20–31)
COLOR UR: YELLOW
CREAT SERPL-MCNC: 1.36 MG/DL (ref 0.7–1.2)
EOSINOPHIL # BLD: 0.1 K/UL (ref 0–0.7)
EOSINOPHIL NFR BLD: 0.5 %
EPI CELLS #/AREA URNS AUTO: ABNORMAL /HPF (ref 0–5)
ERYTHROCYTE [DISTWIDTH] IN BLOOD BY AUTOMATED COUNT: 15.3 % (ref 11.5–14.5)
GLOBULIN SER CALC-MCNC: 3.3 G/DL (ref 2.3–3.5)
GLUCOSE BLD-MCNC: 163 MG/DL (ref 70–99)
GLUCOSE SERPL-MCNC: 162 MG/DL (ref 70–99)
GLUCOSE UR STRIP-MCNC: NEGATIVE MG/DL
HCT VFR BLD AUTO: 30.5 % (ref 42–52)
HGB BLD-MCNC: 10 G/DL (ref 14–18)
HGB UR QL STRIP: ABNORMAL
HYALINE CASTS #/AREA URNS AUTO: ABNORMAL /HPF (ref 0–5)
INR PPP: 1
KETONES UR STRIP-MCNC: NEGATIVE MG/DL
LACTATE BLDV-SCNC: 1.1 MMOL/L (ref 0.5–2.2)
LACTATE BLDV-SCNC: 2.3 MMOL/L (ref 0.5–2.2)
LEUKOCYTE ESTERASE UR QL STRIP: NEGATIVE
LYMPHOCYTES # BLD: 1.5 K/UL (ref 1–4.8)
LYMPHOCYTES NFR BLD: 14.2 %
MCH RBC QN AUTO: 33.9 PG (ref 27–31.3)
MCHC RBC AUTO-ENTMCNC: 32.8 % (ref 33–37)
MCV RBC AUTO: 103.4 FL (ref 79–92.2)
MONOCYTES # BLD: 1 K/UL (ref 0.2–0.8)
MONOCYTES NFR BLD: 9.3 %
NEUTROPHILS # BLD: 7.7 K/UL (ref 1.4–6.5)
NEUTS SEG NFR BLD: 74.4 %
NITRITE UR QL STRIP: NEGATIVE
PERFORMED ON: ABNORMAL
PH UR STRIP: 6.5 [PH] (ref 5–9)
PLATELET # BLD AUTO: 310 K/UL (ref 130–400)
POTASSIUM SERPL-SCNC: 4.6 MEQ/L (ref 3.4–4.9)
POTASSIUM SERPL-SCNC: 5.8 MEQ/L (ref 3.4–4.9)
PROCALCITONIN SERPL IA-MCNC: 0.05 NG/ML (ref 0–0.15)
PROT SERPL-MCNC: 6.6 G/DL (ref 6.3–8)
PROT UR STRIP-MCNC: NEGATIVE MG/DL
PROTHROMBIN TIME: 13.3 SEC (ref 12.3–14.9)
RBC # BLD AUTO: 2.95 M/UL (ref 4.7–6.1)
RBC #/AREA URNS AUTO: ABNORMAL /HPF (ref 0–5)
SODIUM SERPL-SCNC: 135 MEQ/L (ref 135–144)
SP GR UR STRIP: 1.01 (ref 1–1.03)
SPECIMEN DESCRIPTION: NORMAL
SPECIMEN DESCRIPTION: NORMAL
TROPONIN, HIGH SENSITIVITY: 85 NG/L (ref 0–19)
UROBILINOGEN UR STRIP-ACNC: 0.2 E.U./DL
WBC # BLD AUTO: 10.3 K/UL (ref 4.8–10.8)
WBC #/AREA URNS AUTO: ABNORMAL /HPF (ref 0–5)

## 2025-05-24 PROCEDURE — 2000000000 HC ICU R&B

## 2025-05-24 PROCEDURE — 99285 EMERGENCY DEPT VISIT HI MDM: CPT

## 2025-05-24 PROCEDURE — 84132 ASSAY OF SERUM POTASSIUM: CPT

## 2025-05-24 PROCEDURE — 87040 BLOOD CULTURE FOR BACTERIA: CPT

## 2025-05-24 PROCEDURE — 70450 CT HEAD/BRAIN W/O DYE: CPT

## 2025-05-24 PROCEDURE — 6360000004 HC RX CONTRAST MEDICATION: Performed by: EMERGENCY MEDICINE

## 2025-05-24 PROCEDURE — 93005 ELECTROCARDIOGRAM TRACING: CPT

## 2025-05-24 PROCEDURE — 96361 HYDRATE IV INFUSION ADD-ON: CPT

## 2025-05-24 PROCEDURE — 6360000002 HC RX W HCPCS: Performed by: EMERGENCY MEDICINE

## 2025-05-24 PROCEDURE — 83605 ASSAY OF LACTIC ACID: CPT

## 2025-05-24 PROCEDURE — 6360000002 HC RX W HCPCS

## 2025-05-24 PROCEDURE — 36415 COLL VENOUS BLD VENIPUNCTURE: CPT

## 2025-05-24 PROCEDURE — 84484 ASSAY OF TROPONIN QUANT: CPT

## 2025-05-24 PROCEDURE — 85025 COMPLETE CBC W/AUTO DIFF WBC: CPT

## 2025-05-24 PROCEDURE — 81001 URINALYSIS AUTO W/SCOPE: CPT

## 2025-05-24 PROCEDURE — 2500000003 HC RX 250 WO HCPCS

## 2025-05-24 PROCEDURE — 84145 PROCALCITONIN (PCT): CPT

## 2025-05-24 PROCEDURE — 2580000003 HC RX 258

## 2025-05-24 PROCEDURE — 71045 X-RAY EXAM CHEST 1 VIEW: CPT

## 2025-05-24 PROCEDURE — 80053 COMPREHEN METABOLIC PANEL: CPT

## 2025-05-24 PROCEDURE — 96365 THER/PROPH/DIAG IV INF INIT: CPT

## 2025-05-24 PROCEDURE — 71275 CT ANGIOGRAPHY CHEST: CPT

## 2025-05-24 PROCEDURE — 2580000003 HC RX 258: Performed by: EMERGENCY MEDICINE

## 2025-05-24 PROCEDURE — 2580000003 HC RX 258: Performed by: PHYSICIAN ASSISTANT

## 2025-05-24 PROCEDURE — 85730 THROMBOPLASTIN TIME PARTIAL: CPT

## 2025-05-24 PROCEDURE — 85610 PROTHROMBIN TIME: CPT

## 2025-05-24 RX ORDER — IOPAMIDOL 755 MG/ML
75 INJECTION, SOLUTION INTRAVASCULAR
Status: COMPLETED | OUTPATIENT
Start: 2025-05-24 | End: 2025-05-24

## 2025-05-24 RX ORDER — ONDANSETRON 4 MG/1
4 TABLET, ORALLY DISINTEGRATING ORAL EVERY 8 HOURS PRN
Status: DISCONTINUED | OUTPATIENT
Start: 2025-05-24 | End: 2025-05-28 | Stop reason: HOSPADM

## 2025-05-24 RX ORDER — POLYETHYLENE GLYCOL 3350 17 G/17G
17 POWDER, FOR SOLUTION ORAL DAILY PRN
Status: DISCONTINUED | OUTPATIENT
Start: 2025-05-24 | End: 2025-05-28 | Stop reason: HOSPADM

## 2025-05-24 RX ORDER — 0.9 % SODIUM CHLORIDE 0.9 %
30 INTRAVENOUS SOLUTION INTRAVENOUS ONCE
Status: COMPLETED | OUTPATIENT
Start: 2025-05-24 | End: 2025-05-24

## 2025-05-24 RX ORDER — 0.9 % SODIUM CHLORIDE 0.9 %
1000 INTRAVENOUS SOLUTION INTRAVENOUS ONCE
Status: COMPLETED | OUTPATIENT
Start: 2025-05-24 | End: 2025-05-24

## 2025-05-24 RX ORDER — POTASSIUM CHLORIDE 7.45 MG/ML
10 INJECTION INTRAVENOUS PRN
Status: DISCONTINUED | OUTPATIENT
Start: 2025-05-24 | End: 2025-05-28 | Stop reason: HOSPADM

## 2025-05-24 RX ORDER — SODIUM CHLORIDE 9 MG/ML
INJECTION, SOLUTION INTRAVENOUS PRN
Status: DISCONTINUED | OUTPATIENT
Start: 2025-05-24 | End: 2025-05-28 | Stop reason: HOSPADM

## 2025-05-24 RX ORDER — ONDANSETRON 2 MG/ML
4 INJECTION INTRAMUSCULAR; INTRAVENOUS EVERY 6 HOURS PRN
Status: DISCONTINUED | OUTPATIENT
Start: 2025-05-24 | End: 2025-05-28 | Stop reason: HOSPADM

## 2025-05-24 RX ORDER — ENOXAPARIN SODIUM 100 MG/ML
40 INJECTION SUBCUTANEOUS DAILY
Status: DISCONTINUED | OUTPATIENT
Start: 2025-05-24 | End: 2025-05-28 | Stop reason: HOSPADM

## 2025-05-24 RX ORDER — ACETAMINOPHEN 325 MG/1
650 TABLET ORAL EVERY 6 HOURS PRN
Status: DISCONTINUED | OUTPATIENT
Start: 2025-05-24 | End: 2025-05-28 | Stop reason: HOSPADM

## 2025-05-24 RX ORDER — POTASSIUM CHLORIDE 1500 MG/1
40 TABLET, EXTENDED RELEASE ORAL PRN
Status: DISCONTINUED | OUTPATIENT
Start: 2025-05-24 | End: 2025-05-28 | Stop reason: HOSPADM

## 2025-05-24 RX ORDER — VANCOMYCIN 1.5 G/300ML
1500 INJECTION, SOLUTION INTRAVENOUS ONCE
Status: COMPLETED | OUTPATIENT
Start: 2025-05-24 | End: 2025-05-24

## 2025-05-24 RX ORDER — SODIUM CHLORIDE 0.9 % (FLUSH) 0.9 %
5-40 SYRINGE (ML) INJECTION EVERY 12 HOURS SCHEDULED
Status: DISCONTINUED | OUTPATIENT
Start: 2025-05-24 | End: 2025-05-28 | Stop reason: HOSPADM

## 2025-05-24 RX ORDER — SODIUM CHLORIDE 0.9 % (FLUSH) 0.9 %
5-40 SYRINGE (ML) INJECTION PRN
Status: DISCONTINUED | OUTPATIENT
Start: 2025-05-24 | End: 2025-05-28 | Stop reason: HOSPADM

## 2025-05-24 RX ORDER — ACETAMINOPHEN 650 MG/1
650 SUPPOSITORY RECTAL EVERY 6 HOURS PRN
Status: DISCONTINUED | OUTPATIENT
Start: 2025-05-24 | End: 2025-05-28 | Stop reason: HOSPADM

## 2025-05-24 RX ORDER — MAGNESIUM SULFATE IN WATER 40 MG/ML
2000 INJECTION, SOLUTION INTRAVENOUS PRN
Status: DISCONTINUED | OUTPATIENT
Start: 2025-05-24 | End: 2025-05-28 | Stop reason: HOSPADM

## 2025-05-24 RX ADMIN — SODIUM CHLORIDE 1000 ML: 0.9 INJECTION, SOLUTION INTRAVENOUS at 13:19

## 2025-05-24 RX ADMIN — SODIUM CHLORIDE 2286 ML: 0.9 INJECTION, SOLUTION INTRAVENOUS at 14:55

## 2025-05-24 RX ADMIN — IOPAMIDOL 75 ML: 755 INJECTION, SOLUTION INTRAVENOUS at 14:51

## 2025-05-24 RX ADMIN — PIPERACILLIN AND TAZOBACTAM 3375 MG: 3; .375 INJECTION, POWDER, LYOPHILIZED, FOR SOLUTION INTRAVENOUS at 15:27

## 2025-05-24 RX ADMIN — Medication 10 ML: at 22:00

## 2025-05-24 RX ADMIN — VANCOMYCIN 1500 MG: 1.5 INJECTION, SOLUTION INTRAVENOUS at 21:38

## 2025-05-24 RX ADMIN — CEFEPIME 2000 MG: 2 INJECTION, POWDER, FOR SOLUTION INTRAVENOUS at 21:28

## 2025-05-24 RX ADMIN — SODIUM CHLORIDE 1000 ML: 0.9 INJECTION, SOLUTION INTRAVENOUS at 18:18

## 2025-05-24 ASSESSMENT — PAIN - FUNCTIONAL ASSESSMENT
PAIN_FUNCTIONAL_ASSESSMENT: NONE - DENIES PAIN

## 2025-05-24 NOTE — ED TRIAGE NOTES
Pt came in via EMS for unresponsiveness. Pt was at [place] in dinning room. PT was then found unresponsive. EMS states pt was low 70% on RA with BP of 80/40. EMS gave 2 push dose epi and 2mg of narcan. Pt son also states that he had a syncopal episode earlier this morning.

## 2025-05-24 NOTE — PROGRESS NOTES
[facility]   Pharmacy Dose Adjustment Per Protocol:  Cefepime Extended Interval Interchange    Emerson Salguero is a 89 y.o. male.     The following ordered dose of Cefepime has been changed to optimize its pharmacodynamic profile per [facility] pharmacy policy approved by [employer].    Recent Labs     05/24/25  1307   CREATININE 1.36*   BUN 35*   WBC 10.3     .   ,  , There is no height or weight on file to calculate BMI.  Estimated Creatinine Clearance: 30 mL/min (A) (based on SCr of 1.36 mg/dL (H)).    Ordered Dose    x 2 gm  IV every 12 hrs (30 minute infusion)    New Dose    Cefepime - Extended Infusion (4-hour infusion) - Preferred Dosing Strategy    Renal Function (CrCl mL/min) >= 60 30 - 59 11 - 29 <= 10, HD PD CRRT   All indications - Loading dose of 2000 milligrams x 1 over 30 minutes or via IV push. Maintenance dose  should begin at the next regularly scheduled dosing interval based on indication/renal function.    Intra-abdominal infections, Skin and soft tissue infections, Urinary tract infections  2000mg q12h [] 2000mg q24h [] 1000mg q24h [] 500mg q24h []  1000mg q24h [] 2000mg q24h^ []    Bacteremia, CNS infections, Cystic fibrosis, Diabetic foot infections, Endocarditis, Febrile neutropenia, Healthcare associated infections, Osteomyelitis/joint infections, Pneumonia, Sepsis, BMI > 40*  2000mg q8h [] 2000mg q12h [x] 1000mg q12h [] 1000mg q24h []  1000mg q24h [] 2000mg q12h† []     Pharmacists should be contacted for issues concerning drug compatibility with multiple IV medications.  All doses will be prepared using 100ml bag to be infused over 4-hours at a rate of 25ml/hr.    Thank You,  Yris Yousif, [facility]  5/24/2025 7:44 PM

## 2025-05-24 NOTE — PROGRESS NOTES
[facility]   Pharmacy Pharmacokinetic Monitoring Service - Vancomycin     Emerson Salguero is a 89 y.o. male starting on vancomycin therapy for PNA CAP. Pharmacy consulted by Dr Kelsey for monitoring and adjustment.    Target Concentration: Dosing based on anticipated concentration <15 mg/L due to renal impairment/insufficiency    Additional Antimicrobials: Cefepime    Pertinent Laboratory Values:   Wt Readings from Last 1 Encounters:   05/22/25 76.2 kg (168 lb)     Temp Readings from Last 1 Encounters:   05/24/25 (!) 94.4 °F (34.7 °C)     Estimated Creatinine Clearance: 30 mL/min (A) (based on SCr of 1.36 mg/dL (H)).  Recent Labs     05/24/25  1307   CREATININE 1.36*   BUN 35*   WBC 10.3     Procalcitonin: 0.05    Pertinent Cultures:  Culture Date Source Results   05/24 blood In process   MRSA Nasal Swab: not ordered. Order placed by pharmacy.    Plan:  Concentration-guided dosing due to renal impairment/insufficiency  Start Vancomycin 1500mg IV now    Renal labs as indicated   Vancomycin concentration ordered for 05/25/25 @ 1900   Pharmacy will continue to monitor patient and adjust therapy as indicated    Thank you for the consult,  Yris Yousif [facility]  5/24/2025 7:26 PM

## 2025-05-24 NOTE — ED NOTES
15min @ 1950/ICU TO PULL  
ICU TO PULL  
LAB CALLED TROP 84.20  
Page to hosp @ 7430  Dr Kelsey returned call @ [phone]  
Tele [phone] @ [phone]  
Verified patient name, date of birth, with the patient ID band with the patient and the monitor room tech on the phone. Verified rythm and rate with monitor tech. SR  Telebox #:[phone]  Monitor Tech Name: S.GODFREY  Electronically signed by Johnnie Ferrara RN on 5/24/2025 at 6:57 PM    
Yes

## 2025-05-24 NOTE — H&P
DEPARTMENT OF HOSPITAL MEDICINE    HISTORY AND PHYSICAL EXAM    PATIENT NAME:  Emerson Salguero    MRN:  [id number]  SERVICE DATE:  5/24/2025   SERVICE TIME:  7:23 PM    Primary Care Physician: Misty Vela MD     SUBJECTIVE  CHIEF COMPLAINT: Unresponsive    HPI:  Emerson Salguero is a 89 y.o. male with past medical history of recently discharged for acute encephalopathy secondary to hallucination, hyponatremia, acute hypoxic respiratory failure secondary to pneumonia with right-sided pleural effusion is brought to the ED after an unresponsive episode.  Per patient's nurse patient was fine until this afternoon after he went for activity.  When his nurse aide went to take him back to his room patient was drooling and unresponsive.  Upon EMS arrival patient was hypotensive requiring a round of epi and was unresponsive        PAST MEDICAL HISTORY:    Past Medical History:   Diagnosis Date    Back pain     Colitis, ulcerative (HCC)     Hypertension      PAST SURGICAL HISTORY:    Past Surgical History:   Procedure Laterality Date    IR MIDLINE CATH  5/8/2025    IR MIDLINE CATH 5/8/2025 [facility]LOZ SPECIAL PROCEDURE    LUMBAR PUNCTURE N/A 05/02/2025    12.25 ml clear colorless CSF obtained performed by Courtney Lawrence IR CNP    THORACENTESIS Right 05/06/2025    600 ml removed by HA CNP - diagnostics sent    TONSILLECTOMY       FAMILY HISTORY:  No family history on file.  SOCIAL HISTORY:    Social History     Socioeconomic History    Marital status:      Spouse name: Not on file    Number of children: Not on file    Years of education: Not on file    Highest education level: Not on file   Occupational History    Not on file   Tobacco Use    Smoking status: Never    Smokeless tobacco: Never   Vaping Use    Vaping status: Never Used   Substance and Sexual Activity    Alcohol use: Not Currently    Drug use: Not Currently    Sexual activity: Not Currently     Partners: Female   Other Topics Concern    Not on file   Social History

## 2025-05-24 NOTE — ED PROVIDER NOTES
[facility] EMERGENCY DEPARTMENT  eMERGENCY dEPARTMENT eNCOUnter      Pt Name: Emerson Salguero  MRN: [id number]  Birthdate 5/4/1928  Date of evaluation: 5/24/2025  Provider: Jaden Pineda MD    CHIEF COMPLAINT       Chief Complaint   Patient presents with    Altered Mental Status         HISTORY OF PRESENT ILLNESS   (Location/Symptom, Timing/Onset,Context/Setting, Quality, Duration, Modifying Factors, Severity)  Note limiting factors.   Emerson Salguero is a 89 y.o. male who presents to the emergency department with complaint of altered mental status.  Patient was at local [facility].  Patient was found to be unresponsive at the lunch table.  Patient had not eaten any food.  Patient had a recent admission for possible sepsis and possible aspiration.     At this time family at bedside notes that patient has been feeling well and doing well over the last few days.  Patient was on long-term Zosyn, that was recently discontinued.    Into this event there is been no fevers or chills, shortness of breath, nausea or vomiting, but there has been a general decline over the previous period of time associated with dementia.  It should be noted patient's spouse did pass the end of March.    Patient arrives to Emergency Department largely unresponsive and hypotensive.    Patient was approximately 60/30 at the local skilled nursing facility, and patient was provided with epinephrine and route.    HPI    NursingNotes were reviewed.    REVIEW OF SYSTEMS    (2-9 systems for level 4, 10 or more for level 5)     Review of Systems   Unable to perform ROS: Mental status change       Except as noted above the remainder of the review of systems was reviewed and negative.       PAST MEDICAL HISTORY     Past Medical History:   Diagnosis Date    Back pain     Colitis, ulcerative (HCC)     Hypertension          SURGICALHISTORY       Past Surgical History:   Procedure Laterality Date    IR MIDLINE CATH  5/8/2025    IR MIDLINE  Unstable           PATIENT REFERRED TO:  No follow-up provider specified.    DISCHARGE MEDICATIONS:  New Prescriptions    No medications on file          (Please note that portions of this note were completed with a voice recognitionprogram.  Efforts were made to edit the dictations but occasionally words are mis-transcribed.)    Jaden Pineda MD (electronically signed)  Attending Emergency Physician          Jaden Pineda MD  05/24/25 [id number]

## 2025-05-25 PROBLEM — J69.0 ASPIRATION PNEUMONIA OF LEFT LOWER LOBE (HCC): Status: ACTIVE | Noted: 2025-05-25

## 2025-05-25 LAB
ALBUMIN SERPL-MCNC: 3 G/DL (ref 3.5–4.6)
ALP SERPL-CCNC: 94 U/L (ref 35–104)
ALT SERPL-CCNC: 21 U/L (ref 0–41)
ANION GAP SERPL CALCULATED.3IONS-SCNC: 9 MEQ/L (ref 9–15)
AST SERPL-CCNC: 25 U/L (ref 0–40)
BASOPHILS # BLD: 0.1 K/UL (ref 0–0.2)
BASOPHILS NFR BLD: 1.9 %
BILIRUB SERPL-MCNC: 0.5 MG/DL (ref 0.2–0.7)
BUN SERPL-MCNC: 28 MG/DL (ref 8–23)
CALCIUM SERPL-MCNC: 8.2 MG/DL (ref 8.5–9.9)
CHLORIDE SERPL-SCNC: 108 MEQ/L (ref 95–107)
CO2 SERPL-SCNC: 22 MEQ/L (ref 20–31)
CREAT SERPL-MCNC: 1.06 MG/DL (ref 0.7–1.2)
EOSINOPHIL # BLD: 0.2 K/UL (ref 0–0.7)
EOSINOPHIL NFR BLD: 2.5 %
ERYTHROCYTE [DISTWIDTH] IN BLOOD BY AUTOMATED COUNT: 15.2 % (ref 11.5–14.5)
GLOBULIN SER CALC-MCNC: 2.8 G/DL (ref 2.3–3.5)
GLUCOSE SERPL-MCNC: 88 MG/DL (ref 70–99)
HCT VFR BLD AUTO: 28.6 % (ref 42–52)
HGB BLD-MCNC: 9.8 G/DL (ref 14–18)
LYMPHOCYTES # BLD: 1.2 K/UL (ref 1–4.8)
LYMPHOCYTES NFR BLD: 17.7 %
MCH RBC QN AUTO: 35 PG (ref 27–31.3)
MCHC RBC AUTO-ENTMCNC: 34.3 % (ref 33–37)
MCV RBC AUTO: 102.1 FL (ref 79–92.2)
MONOCYTES # BLD: 0.8 K/UL (ref 0.2–0.8)
MONOCYTES NFR BLD: 12.1 %
NEUTROPHILS # BLD: 4.5 K/UL (ref 1.4–6.5)
NEUTS SEG NFR BLD: 65.2 %
PLATELET # BLD AUTO: 274 K/UL (ref 130–400)
POTASSIUM SERPL-SCNC: 4 MEQ/L (ref 3.4–4.9)
PROCALCITONIN SERPL IA-MCNC: 0.05 NG/ML (ref 0–0.15)
PROT SERPL-MCNC: 5.8 G/DL (ref 6.3–8)
RBC # BLD AUTO: 2.8 M/UL (ref 4.7–6.1)
SODIUM SERPL-SCNC: 139 MEQ/L (ref 135–144)
TROPONIN, HIGH SENSITIVITY: 101 NG/L (ref 0–19)
WBC # BLD AUTO: 6.8 K/UL (ref 4.8–10.8)

## 2025-05-25 PROCEDURE — 84484 ASSAY OF TROPONIN QUANT: CPT

## 2025-05-25 PROCEDURE — 85025 COMPLETE CBC W/AUTO DIFF WBC: CPT

## 2025-05-25 PROCEDURE — 99222 1ST HOSP IP/OBS MODERATE 55: CPT | Performed by: INTERNAL MEDICINE

## 2025-05-25 PROCEDURE — 2580000003 HC RX 258

## 2025-05-25 PROCEDURE — 99223 1ST HOSP IP/OBS HIGH 75: CPT | Performed by: INTERNAL MEDICINE

## 2025-05-25 PROCEDURE — 80053 COMPREHEN METABOLIC PANEL: CPT

## 2025-05-25 PROCEDURE — 6360000002 HC RX W HCPCS

## 2025-05-25 PROCEDURE — 1210000000 HC MED SURG R&B

## 2025-05-25 PROCEDURE — 6370000000 HC RX 637 (ALT 250 FOR IP)

## 2025-05-25 PROCEDURE — 36415 COLL VENOUS BLD VENIPUNCTURE: CPT

## 2025-05-25 PROCEDURE — 2500000003 HC RX 250 WO HCPCS

## 2025-05-25 PROCEDURE — 84145 PROCALCITONIN (PCT): CPT

## 2025-05-25 PROCEDURE — 99222 1ST HOSP IP/OBS MODERATE 55: CPT | Performed by: THORACIC SURGERY (CARDIOTHORACIC VASCULAR SURGERY)

## 2025-05-25 PROCEDURE — 92610 EVALUATE SWALLOWING FUNCTION: CPT

## 2025-05-25 PROCEDURE — 99291 CRITICAL CARE FIRST HOUR: CPT | Performed by: INTERNAL MEDICINE

## 2025-05-25 RX ORDER — TAMSULOSIN HYDROCHLORIDE 0.4 MG/1
0.4 CAPSULE ORAL
Status: DISCONTINUED | OUTPATIENT
Start: 2025-05-25 | End: 2025-05-28 | Stop reason: HOSPADM

## 2025-05-25 RX ORDER — HYDRALAZINE HYDROCHLORIDE 20 MG/ML
10 INJECTION INTRAMUSCULAR; INTRAVENOUS EVERY 6 HOURS PRN
Status: DISCONTINUED | OUTPATIENT
Start: 2025-05-25 | End: 2025-05-26

## 2025-05-25 RX ORDER — DEXTROSE, SODIUM CHLORIDE, SODIUM LACTATE, POTASSIUM CHLORIDE, AND CALCIUM CHLORIDE 5; .6; .31; .03; .02 G/100ML; G/100ML; G/100ML; G/100ML; G/100ML
INJECTION, SOLUTION INTRAVENOUS CONTINUOUS
Status: DISCONTINUED | OUTPATIENT
Start: 2025-05-25 | End: 2025-05-28

## 2025-05-25 RX ORDER — LABETALOL HYDROCHLORIDE 5 MG/ML
10 INJECTION, SOLUTION INTRAVENOUS ONCE
Status: COMPLETED | OUTPATIENT
Start: 2025-05-25 | End: 2025-05-25

## 2025-05-25 RX ORDER — GABAPENTIN 300 MG/1
300 CAPSULE ORAL NIGHTLY
Status: DISCONTINUED | OUTPATIENT
Start: 2025-05-25 | End: 2025-05-28 | Stop reason: HOSPADM

## 2025-05-25 RX ORDER — LEVETIRACETAM 500 MG/5ML
500 INJECTION, SOLUTION, CONCENTRATE INTRAVENOUS EVERY 12 HOURS
Status: DISCONTINUED | OUTPATIENT
Start: 2025-05-25 | End: 2025-05-28 | Stop reason: HOSPADM

## 2025-05-25 RX ORDER — ESCITALOPRAM OXALATE 10 MG/1
5 TABLET ORAL DAILY
Status: DISCONTINUED | OUTPATIENT
Start: 2025-05-25 | End: 2025-05-28 | Stop reason: HOSPADM

## 2025-05-25 RX ORDER — FINASTERIDE 5 MG/1
5 TABLET, FILM COATED ORAL DAILY
Status: DISCONTINUED | OUTPATIENT
Start: 2025-05-25 | End: 2025-05-28 | Stop reason: HOSPADM

## 2025-05-25 RX ORDER — HYDROCHLOROTHIAZIDE 12.5 MG/1
25 TABLET ORAL DAILY
Status: DISCONTINUED | OUTPATIENT
Start: 2025-05-25 | End: 2025-05-25

## 2025-05-25 RX ORDER — LEVETIRACETAM 250 MG/1
500 TABLET ORAL 2 TIMES DAILY
Status: DISCONTINUED | OUTPATIENT
Start: 2025-05-25 | End: 2025-05-25

## 2025-05-25 RX ORDER — LOSARTAN POTASSIUM 25 MG/1
50 TABLET ORAL DAILY
Status: DISCONTINUED | OUTPATIENT
Start: 2025-05-25 | End: 2025-05-28 | Stop reason: HOSPADM

## 2025-05-25 RX ADMIN — CEFEPIME 2000 MG: 2 INJECTION, POWDER, FOR SOLUTION INTRAVENOUS at 22:32

## 2025-05-25 RX ADMIN — LEVETIRACETAM 500 MG: 100 INJECTION INTRAVENOUS at 21:24

## 2025-05-25 RX ADMIN — LOSARTAN POTASSIUM 50 MG: 50 TABLET, FILM COATED ORAL at 08:25

## 2025-05-25 RX ADMIN — ENOXAPARIN SODIUM 40 MG: 100 INJECTION SUBCUTANEOUS at 08:51

## 2025-05-25 RX ADMIN — ESCITALOPRAM OXALATE 5 MG: 10 TABLET ORAL at 08:25

## 2025-05-25 RX ADMIN — LEVETIRACETAM 500 MG: 250 TABLET, FILM COATED ORAL at 08:25

## 2025-05-25 RX ADMIN — TAMSULOSIN HYDROCHLORIDE 0.4 MG: 0.4 CAPSULE ORAL at 08:24

## 2025-05-25 RX ADMIN — Medication 10 ML: at 08:42

## 2025-05-25 RX ADMIN — LABETALOL HYDROCHLORIDE 10 MG: 5 INJECTION, SOLUTION INTRAVENOUS at 14:39

## 2025-05-25 RX ADMIN — HYDROCHLOROTHIAZIDE 25 MG: 12.5 TABLET ORAL at 08:24

## 2025-05-25 RX ADMIN — DEXTROSE, SODIUM CHLORIDE, SODIUM LACTATE, POTASSIUM CHLORIDE, AND CALCIUM CHLORIDE: 5; .6; .31; .03; .02 INJECTION, SOLUTION INTRAVENOUS at 22:32

## 2025-05-25 RX ADMIN — Medication 10 ML: at 21:25

## 2025-05-25 RX ADMIN — CEFEPIME 2000 MG: 2 INJECTION, POWDER, FOR SOLUTION INTRAVENOUS at 08:40

## 2025-05-25 RX ADMIN — DEXTROSE, SODIUM CHLORIDE, SODIUM LACTATE, POTASSIUM CHLORIDE, AND CALCIUM CHLORIDE: 5; .6; .31; .03; .02 INJECTION, SOLUTION INTRAVENOUS at 11:51

## 2025-05-25 RX ADMIN — FINASTERIDE 5 MG: 5 TABLET, FILM COATED ORAL at 08:25

## 2025-05-25 RX ADMIN — VANCOMYCIN HYDROCHLORIDE 1000 MG: 1 INJECTION, POWDER, LYOPHILIZED, FOR SOLUTION INTRAVENOUS at 21:29

## 2025-05-25 ASSESSMENT — PAIN SCALES - PAIN ASSESSMENT IN ADVANCED DEMENTIA (PAINAD)
BREATHING: NORMAL
CONSOLABILITY: NO NEED TO CONSOLE
FACIALEXPRESSION: SMILING OR INEXPRESSIVE
BODYLANGUAGE: RELAXED
CONSOLABILITY: NO NEED TO CONSOLE
CONSOLABILITY: NO NEED TO CONSOLE
FACIALEXPRESSION: SMILING OR INEXPRESSIVE
CONSOLABILITY: NO NEED TO CONSOLE
CONSOLABILITY: NO NEED TO CONSOLE
FACIALEXPRESSION: SMILING OR INEXPRESSIVE
BODYLANGUAGE: RELAXED
BODYLANGUAGE: RELAXED
TOTALSCORE: 0
FACIALEXPRESSION: SMILING OR INEXPRESSIVE
CONSOLABILITY: NO NEED TO CONSOLE
BREATHING: NORMAL
BODYLANGUAGE: RELAXED
BREATHING: NORMAL
CONSOLABILITY: NO NEED TO CONSOLE
FACIALEXPRESSION: SMILING OR INEXPRESSIVE
BODYLANGUAGE: RELAXED
BODYLANGUAGE: RELAXED
BREATHING: NORMAL
TOTALSCORE: 0
FACIALEXPRESSION: SMILING OR INEXPRESSIVE
BODYLANGUAGE: RELAXED
TOTALSCORE: 0
BREATHING: NORMAL
TOTALSCORE: 0
FACIALEXPRESSION: SMILING OR INEXPRESSIVE
TOTALSCORE: 0

## 2025-05-25 ASSESSMENT — PAIN SCALES - GENERAL
PAINLEVEL_OUTOF10: 0
PAINLEVEL_OUTOF10: 0

## 2025-05-25 NOTE — CONSULTS
(FLOMAX) 0.4 MG capsule Take 1 capsule by mouth daily  Patient taking differently: Take 1 capsule by mouth in the morning and 1 capsule in the evening. 3/9/21  Yes Ra Duff MD   finasteride (PROSCAR) 5 MG tablet Take 1 tablet by mouth daily 2/24/21  Yes Miguel Alarcon DO   Cholecalciferol (VITAMIN D) 50 MCG (2000 UT) CAPS capsule Take by mouth daily    Yes Ana Haq MD   gabapentin (NEURONTIN) 600 MG tablet Take 100 mg by mouth nightly.   Yes Ana Haq MD   Glycerin-Polysorbate 80 (REFRESH DRY EYE THERAPY OP) Apply 1 drop to eye as needed (dry eye) Bilat eyes    Ana Haq MD   albuterol (PROVENTIL) (2.5 MG/3ML) 0.083% nebulizer solution Take 3 mLs by nebulization every 6 hours as needed for Wheezing    Ana Haq MD   Cod Liver Oil (V-R COD LIVER) CAPS Take by mouth    Ana Haq MD        Allergies:    Allergies   Allergen Reactions    Sulfa Antibiotics         Social History:    Social History     Socioeconomic History    Marital status:      Spouse name: Not on file    Number of children: Not on file    Years of education: Not on file    Highest education level: Not on file   Occupational History    Not on file   Tobacco Use    Smoking status: Never    Smokeless tobacco: Never   Vaping Use    Vaping status: Never Used   Substance and Sexual Activity    Alcohol use: Not Currently    Drug use: Not Currently    Sexual activity: Not Currently     Partners: Female   Other Topics Concern    Not on file   Social History Narrative    Not on file     Social Drivers of Health     Financial Resource Strain: Not on file   Food Insecurity: No Food Insecurity (5/25/2025)    Hunger Vital Sign     Worried About Running Out of Food in the Last Year: Never true     Ran Out of Food in the Last Year: Never true   Transportation Needs: No Transportation Needs (5/25/2025)    PRAPARE - Transportation     Lack of Transportation (Medical): No     Lack of  achievable. COMPARISON: CT chest of May 5. HISTORY: ORDERING SYSTEM PROVIDED HISTORY: Pleural effusion TECHNOLOGIST PROVIDED HISTORY: What reading provider will be dictating this exam?->CRC FINDINGS: There is resolved most of the right-sided pleural effusion with significant re-expansion of the right lower lobe and right middle lobe towards the base. There still areas of subpleural peribronchial sent consolidation in the right lower lobe and areas of subsegmental atelectasis in the posterior segment of the right middle. There is further progression however of left-sided pleural effusion compressing the left lower lung base.  There is also eventration of left diaphragma more centrally in but posterior to the heart which compresses the left lower lobe anterior basal segments with peripheral areas of consolidation and air bronchogram. Discrete areas of minimal ground-glass densities seen in the dependent portion of the right posterior segmental area, cannot exclude minimal areas right upper lobe pneumonitis. There are discrete chronic areas of atelectasis in the lingular segment of the left upper lobe.  Diameter for the ascending aorta is 3.7 cm.  Diameter for the main artery is 2.8 cm.  The heart has borderline size with relative prominence for the left atrium.  Calcifications are seen in the coronary arteries. There are several small size lymph nodes in the mediastinum at but presently they are nonspecific. This is a non IV contrast study. Upper abdominal structures are not fully covered this study.  More likely small size gallstones are seen towards the infundibulum region the gallbladder which is a normal distended. Degenerative changes are seen throughout the thoracic spine. IMPRESSION: 1.  Significant regression but not full resolution of the right-sided pleural effusions since the May 5 study. 2.  Residual areas of a multi subsegmental atelectasis are seen in the right lower lobe/right middle lobe.  Significant  Kerma mGy, 2 COMPARISON: None HISTORY: ORDERING SYSTEM PROVIDED HISTORY: dysphagia TECHNOLOGIST PROVIDED HISTORY: Reason for exam:->dysphagia What reading provider will be dictating this exam?->CRC FINDINGS: Moderate pharyngeal dysphagia was noted.  Vestibular penetration with thin and nectar consistency barium.  No evidence of aspiration were identified.     Dysphagia as described above. Please see separate speech pathology report for full discussion of findings and recommendations.     XR CHEST (SINGLE VIEW FRONTAL)  1.  Interval decrease in the size of the right pleural effusion with a small residual right pleural effusion. 2.  There remains elevation of the right hemidiaphragm as was seen on CT dating May 5, 2025. 3.  There is increased pulmonary markings consistent with pulmonary edema. 4.  The cardiac silhouette is enlarged. 5.  The known nasojejunal tube is seen overlying the stomach and extending into the jejunum. COMPARISON: No prior studies available for comparison. DIAGNOSIS: post right thora - Felisa COMMENTS: Shortness of breath / Reason for exam:->post right thora - Felisa TECHNIQUE: XR CHEST (SINGLE VIEW FRONTAL) FINDINGS: There are increased pulmonary markings consistent with pulmonary edema. No evidence of pneumothorax. There is decrease in the size of the right pleural fluid with residual small pleural effusion. There is elevation of the right hemidiaphragm. The cardiac silhouette is enlarged. Nasojejunal tube is seen traversing the stomach and entering the jejunum. Electronically signed by Rocael Roberto    CT CHEST WO CONTRAST  Result Date: 5/5/2025  EXAMINATION: CT OF THE CHEST WITHOUT CONTRAST 5/5/2025 4:50 pm TECHNIQUE: CT of the chest was performed without the administration of intravenous contrast. Multiplanar reformatted images are provided for review. Automated exposure control, iterative reconstruction, and/or weight based adjustment of the mA/kV was utilized to reduce the radiation dose to

## 2025-05-25 NOTE — CONSULTS
Inpatient consult to GI  Consult performed by: Peña Dawn MD  Consult ordered by: Shanelle Roberts MD          Patient Name: Emerson Salguero  Admit Date: 2025 12:43 PM  MR #: [id number]  : 1928    Attending Physician: Laron Kelsey MD  Reason for consult: dilated esophagus, hernia    History of Presenting Illness:      Emerson Salguero is a 89 y.o. male on hospital day 1 with a history of hypertension, ulcerative colitis, chronic back pain.  Past surgical history of tonsillectomy, lumbar puncture, thoracentesis.  Family history is negative for GI malignancy.  Social history no nicotine, EtOH or illicit drug use.  History Obtained From:  patient, electronic medical record  GI consult for dilated esophagus and hernia-patient was sent by EMS from [facility] for unresponsive episode, on arrival he was noted to be hypotensive and he was admitted to the intensive care unit.  Clinically he is improved, he is alert oriented to self, blood pressure is improved after IV fluids, did not require vasopressors. Pt had CT chest with incidental finding of dilated esophagus with fluid-filled and distended from the proximal third distally with fixed hiatal hernia.  Patient has no history of food impaction, no history of endoscopic evaluation.  Of note patient was hospitalized recently for acute encephalopathy, hyponatremia and acute hypoxic respiratory failure and has been on a modified diet of puréed foods and nectar thickened liquids and has been following with speech-language pathology.  At baseline he is not on anticoagulation.    History:      Past Medical History:   Diagnosis Date    Back pain     Colitis, ulcerative (HCC)     Hypertension      Past Surgical History:   Procedure Laterality Date    IR MIDLINE CATH  2025    IR MIDLINE CATH 2025 MLOZ SPECIAL PROCEDURE    LUMBAR PUNCTURE N/A 2025    12.25 ml clear colorless CSF obtained performed by Courtney Lawrence IR CNP    THORACENTESIS Right  increased pulmonary markings consistent with pulmonary edema. No evidence of pneumothorax. There is decrease in the size of the right pleural fluid with residual small pleural effusion. There is elevation of the right hemidiaphragm. The cardiac silhouette is enlarged. Nasojejunal tube is seen traversing the stomach and entering the jejunum. Electronically signed by Rocael Roberto    CT CHEST WO CONTRAST  Result Date: 5/5/2025  EXAMINATION: CT OF THE CHEST WITHOUT CONTRAST 5/5/2025 4:50 pm TECHNIQUE: CT of the chest was performed without the administration of intravenous contrast. Multiplanar reformatted images are provided for review. Automated exposure control, iterative reconstruction, and/or weight based adjustment of the mA/kV was utilized to reduce the radiation dose to as low as reasonably achievable. COMPARISON: None. HISTORY: ORDERING SYSTEM PROVIDED HISTORY: right pleural effusion TECHNOLOGIST PROVIDED HISTORY: Reason for exam:->right pleural effusion What reading provider will be dictating this exam?->CRC FINDINGS: Mediastinum: Large hiatal hernia.  NG/OG tube is into the distal stomach although incompletely visualized.  Cardiomegaly.  Suspicious for tracheal bronchomalacia Lungs/pleura: Large right in small left pleural effusions.  Consolidating opacification of the lower lobes in conjunction with airway occlusions suggesting atelectasis.  Aspiration may be considered.  Difficult to specifically characterized are.  Component of edema likely is present in the right upper lobe.  Soft tissue nodular opacification superior segment left lower lobe such as image 48 is present measuring 1.6 by 1.1 cm.  Per Fleischner society guidelines, consider a noncontrast chest CT at 3 months, a PET-CT, or tissue sampling.  Focus of air along the lung-pleura junction of right-side image 82 that may be procedural if correlates clinically. Upper Abdomen: Limited evaluation.  Suggestion of cholelithiasis. Soft Tissues/Bones:  time given the potential anesthesia related complications. Patient also had discussion with anesthesia team.  Patient also has been under significant stress lately with the loss of his mother.  -Endoscopic timing dependent on POA consent and clinical course  -IV PPI twice daily  -Further recommendations to follow  Comments:     Thank you for allowing us to participate in the care of this patient.     Will continue to follow.    Please call if questions or concerns arise.    Electronically signed by Peña Dawn MD on 5/25/2025 at 12:06 PM    Please note this report has been partially produced using speech recognition software and may cause contain errors related to that system including grammar, punctuation and spelling as well as words and phrases that may seem inappropriate. If there are questions or concerns please feel free to contact me to clarify.

## 2025-05-25 NOTE — FLOWSHEET NOTE
Shift summary    0715 report at bedside. Pt resting. BP elevated.     1350-5500 Am assessment complete see flowsheet. Awake, alert to self only, speech clear, denies pain, follows commands. MP SR 1st deg avb. Generalized edema noted. LS dim, faint rhonchi noted to bases. Attempted to give pt pills, pt was trying to chew them, able to swallow them crushed in applesauce without difficulty. With a sip of water, he did have some moist coughing afterwards. Order received for ST and NPO at this time.     1000 BP coming down after PO meds.     1130 pt son Shayne here, updated. Questions answered. Awaiting decision on possible procedure today. No change to AM assessment.     1400 no plan for procedure today, okay to transfer out of ICU. New IV placed to RFA, LAC IV has good blood return however appears swollen around it.  Message to Dr. Kelsey re: HTN. Order received.     1430 report to LORENA Nova on 2W.     Gave one time dose of labetalol. Did drop BP nicely however pt  did experience some bradycardia. Dr. Kelsey notified about this and need for keppra to be switched to IV. Pt son at bedside, updated. Electronically signed by Diamond Mejia RN on 5/25/2025 at 3:05 PM    1540 pt to 2W via bed. Able to visualize on monitor. Electronically signed by Diamond Mejia RN on 5/25/2025 at 3:46 PM

## 2025-05-25 NOTE — PROGRESS NOTES
[facility]   [place] [facility] [place]  Speech Language Pathology  Clinical Bedside Swallow Evaluation    NAME:Emerson Salguero  : 1928 (89 y.o.)   [x]   confirmed    MRN: [id number]  ROOM: [place] [address]  ADMISSION DATE: 2025  PATIENT DIAGNOSIS(ES): Elevated troponin [R79.89]  Unresponsive [R41.89]  Hypothermia, initial encounter [T68.XXXA]  Hypotension, unspecified hypotension type [I95.9]  Altered mental status, unspecified altered mental status type [R41.82]  Chief Complaint   Patient presents with    Altered Mental Status     Patient Active Problem List    Diagnosis Date Noted    Unresponsive 2025    Anorexia 2025    Pneumonia of right lower lobe due to infectious organism 2025    Oropharyngeal dysphagia 2025    Palliative care encounter 2025    Goals of care, counseling/discussion 2025    Advanced care planning/counseling discussion 2025    Sepsis (HCC) 2025    Shortness of breath 2025    Mild cognitive disorder     Syncope and collapse 2021    Metabolic encephalopathy 2021    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    Gross hematuria     Acute encephalopathy     Closed head injury     Aphasia     Mild cognitive impairment     AMS (altered mental status) 2021    BPH (benign prostatic hyperplasia) 2021    CKD (chronic kidney disease) 2021    Leukocytosis 2021     Past Medical History:   Diagnosis Date    Back pain     Colitis, ulcerative (HCC)     Hypertension      Past Surgical History:   Procedure Laterality Date    IR MIDLINE CATH  2025    IR MIDLINE CATH 2025 MLOZ SPECIAL PROCEDURE    LUMBAR PUNCTURE N/A 2025    12.25 ml clear colorless CSF obtained performed by Courtney Lawrence IR CNP    THORACENTESIS Right 2025    600 ml removed by HA CNP - diagnostics sent    TONSILLECTOMY       Allergies   Allergen Reactions    Sulfa Antibiotics        DATE ONSET:  Trials 3  Consistency Presented: Mildly Thick  How Presented: Self-fed/presented;Cup/sip  How much presented: 1  Bolus Acceptance: No impairment  Bolus Formation/Control: No impairment  Propulsion: No impairment  Oral Residue: None  Initiation of Swallow: No impairment  Aspiration Signs/Symptoms: Change of vocal quality;Weak cough  Pharyngeal Phase Characteristics: Altered vocal quality;Suspected pharyngeal residue;Easily fatigued        Dysphagia Diagnosis  Dysphagia Diagnosis: Suspected needs further assessment;Severe pharyngeal stage dysphagia;Severe oral stage dysphagia  Oral Phase - Comment: Pt presents with severe oral phase of swallow at bedside this date characterized by impaired mastication, delayed A-P transfer, fatigue with no observed pocketing on trials of ice chips, pureed textures and mildly thick liquids. Premature spillage suspected but cannot be assessed at bedside.  Patient previously had MBS on 5/6/2025 recommending pureed textures and mildly thick liquids.  Pharyngeal Phase Comment: Patient presents with severe suspected pharyngeal phase of swallow characterized by delayed wet cough for mildly thick liquids and pureed textures. Patient was easily fatigued with all trials of ice chips, pureed textures and mildly thick liquids. Patient previously had MBS on 5/6/2025 recommending pureed textures and mildly thick liquids.  Dysphagia Outcome Severity Scale: Level 1: Severe dysphagia- NPO. Unable to tolerate any PO safely     Recommendations  Requires SLP Intervention: Yes  Therapeutic Interventions: Therapeutic PO trials with SLP;Patient/Family education;Diet tolerance monitoring  Duration of Treatment: 1-2 weeks  Frequency of Treatment: 2-4x/week    Prognosis  Speech Therapy Prognosis  Prognosis: Fair  Prognosis Considerations: Age;Participation Level;Previous Level of Function    Education  Patient Education: Patient educated on results of clinical bedside swallow evaluation.  Patient Education  Response: No evidence of learning  Individuals consulted  Consulted and agree with results and recommendations: Patient;RN  RN Name: Diamond    Treatment/Goals  Short-term Goals  Timeframe for Short-term Goals: 1-2 weeks  Goal 1: Pt will tolerate PO trials deemed appropriate by treating SLP with adequate oral clearance of bolus and no overt s/s of aspiration in all opportunities.  Goal 2: Pt will complete oral motor ROM and strengthening exercises with 80% accuracy, given cues as needed, in order to strengthen lingual/labial/buccal musculature to promote safety and efficiency of oral phase of swallow and decrease risk for pocketing.  Goal 3: SLP to continue to assess need for MBS study in order to more objectively assess pharyngeal phase of swallow and determine least restrictive diet consistency.  Long-term Goals  Timeframe for Long-term Goals: 1-2 weeks  Goal 1: Pt will tolerate the recommended diet level without clinical s/s of aspiration.  Dysphagia Goals: The patient will tolerate recommended diet without observed clinical signs of aspiration    Safety Devices  Safety Devices  Safety Devices in place: Yes  Type of devices: All fall risk precautions in place    Pain Assessment  Patient does not c/o pain.    Pain Re-assessment  Patient does not c/o pain.    Dysphagia Outcomes Severity Scale  Dysphagia Outcome Severity Scale: Level 1: Severe dysphagia- NPO. Unable to tolerate any PO safely    Therapy Time  SLP Individual Minutes  Time In: 1028  Time Out: 1040  Minutes: 12              Signature: Electronically signed by VIKA Delgado on 5/25/2025 at 11:12 AM

## 2025-05-25 NOTE — PROGRESS NOTES
Pharmacy Vancomycin Consult     Vancomycin Day: 2  Current Dosing: post 1500mg dose 5/24      Recent Labs     05/24/25  1307 05/25/25  0454   BUN 35* 28*   CREATININE 1.36* 1.06   WBC 10.3 6.8       Intake/Output Summary (Last 24 hours) at 5/25/2025 1006  Last data filed at 5/24/2025 1818  Gross per 24 hour   Intake 4100 ml   Output --   Net 4100 ml     Cultures  Recent Labs     04/30/25  0306 04/29/25 2038 04/29/25 2029   BC  --   --  No growth after 5 days of incubation.   BLOODCULT2  --   --  No growth after 5 days of incubation.   COVID19 Not Detected   < >  --     < > = values in this interval not displayed.     Height: 172.7 cm (5' 7.99\"), Weight - Scale: 82.6 kg (182 lb 1.6 oz), Body mass index is 27.69 kg/m².    Estimated Creatinine Clearance: 42 mL/min (based on SCr of 1.06 mg/dL).  .    Trough:  No results for input(s): \"VANCOTROUGH\", \"VANCORANDOM\" in the last 72 hours.   Assessment/Plan:  Patient improved renal function, will enter into [employer] platform for continued dosing. Post 1500mg dose 5/24. Will begin dosing at Vanco 1000mg IV q24h, predicted therapeutic . Level adjusted 24-48 hours from maintenance dose to assess continued dosing. Timing of future trough levels may be adjusted based on culture results, renal function, and clinical response.    Thank you,  Gladis Boles, [facility] PharmD

## 2025-05-25 NOTE — CONSULTS
Infectious Disease     Patient Name: Emerson Salguero  Date: 5/25/2025  YOB: 1928  Medical Record Number: [id number]              History of Present Illness:   hypertension, ulcerative colitis, chronic back pain.         Patient hospitalized early May with pneumonia right lung initially admitted with sepsis picture was initially confusing as to source repeat exams a week into his hospital stay showed evidence of significant effusion and infiltrates right lower lung  effusion was exudative cultures negative  Patient's was evaluated by speech he had some aspiration risk but was able to continue to eat with modification of diet          sent [facility] EMS from [facility] for unresponsive episode   Hypotensive on arrival into ED required pressors fluids  Temperature was as low as 94.4 in the ER        CT scan of chest found dilated esophagus with fluid-filled distention proximal third and fixed hiatal hernia        CTA CHEST W WO CONTRAST PE Eval  Order: [id number]   Status: Final result       Next appt: 08/28/2025 at 02:30 PM in Neurology (Barbie Reis, APRN - CNP)    Test Result Released: Yes (seen)    0 Result Notes  Details    Reading Physician Reading Date Result Priority   Victor Manuel, Chip An MD  [phone]     5/24/2025      Narrative & Impression  EXAMINATION:  CTA OF THE CHEST WITH AND WITHOUT CONTRAST 5/24/2025 2:43 pm     TECHNIQUE:  CTA of the chest was performed before and after the administration of  intravenous contrast.  Multiplanar reformatted images are provided for  review.  MIP images are provided for review. Automated exposure control,  iterative reconstruction, and/or weight based adjustment of the mA/kV was  utilized to reduce the radiation dose to as low as reasonably achievable.     COMPARISON:  05/22/2025     HISTORY:  ORDERING SYSTEM PROVIDED HISTORY: PE  TECHNOLOGIST PROVIDED HISTORY:  Reason for exam:->PE  Decision Support Exception - unselect if not a suspected or  05/25/2025    .1 (H) 05/25/2025     05/25/2025     Lab Results   Component Value Date/Time     05/25/2025 04:54 AM    K 4.0 05/25/2025 04:54 AM     05/25/2025 04:54 AM    CO2 22 05/25/2025 04:54 AM    BUN 28 05/25/2025 04:54 AM    CREATININE 1.06 05/25/2025 04:54 AM    GLUCOSE 88 05/25/2025 04:54 AM    CALCIUM 8.2 05/25/2025 04:54 AM    LABGLOM 63.8 05/25/2025 04:54 AM    LABGLOM 59.7 09/21/2023 10:44 AM                ASSESSMENT:  Patient Active Problem List   Diagnosis    AMS (altered mental status)    BPH (benign prostatic hyperplasia)    CKD (chronic kidney disease)    Leukocytosis    Gross hematuria    Acute encephalopathy    Closed head injury    Aphasia    Mild cognitive impairment    Metabolic encephalopathy    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    Syncope and collapse    Mild cognitive disorder    Sepsis (HCC)    Shortness of breath    Palliative care encounter    Goals of care, counseling/discussion    Advanced care planning/counseling discussion    Pneumonia of right lower lobe due to infectious organism    Oropharyngeal dysphagia    Anorexia    Unresponsive         PLAN:  Sepsis  Hypotension hypothermia    Continue cefepime vancomycin    CT scan shows evidence of dilatation of the esophagus  Question as if this is achalasia and causes or stricture    Presentation this time is very similar to patient's last presentation where he presented as sepsis but did not manifest evidence of pneumonia on scans for several days    CT scan shows complete resolution of right-sided pneumonia but patient now has pleural effusion on the left    Source of sepsis may  be aspiration pneumonia secondary to patient's esophageal issue

## 2025-05-25 NOTE — PROGRESS NOTES
Spiritual Health History and Assessment/Progress Note  [facility]    (P) Follow-up,  ,  ,      Name: Emerson Salguero MRN: [id number]    Age: 89 y.o.     Sex: male   Language: English   Mosque: Evangelical   Unresponsive     Date: 5/25/2025            Total Time Calculated: (P) 15 min              Spiritual Assessment continued in [facility] [place] [facility]        Referral/Consult From: (P) Rounding   Encounter Overview/Reason: (P) Follow-up  Service Provided For: (P) Patient     reports, resting and not responsive to chaplains care encounter visit.     Pt, not oriented, to chaplains presence, guided words of encouragement, phoebe, hope, and peace. Pt stares and gazes at  with response or communication. Pt affect, resting, still, clam, peaceful, comfortable [showing no visible signs of discomfort.]     offered words of reflection as reminder to trigger patients memory of previous care encounter with patient and son. Offered words of compassion, peace, rest, strength and hope, prayers of healing and wellness.     Follow up care encounter with patient complete.     Phoebe, Belief, Meaning:   Patient identifies as spiritual, is connected with a phoebe tradition or spiritual practice, and has beliefs or practices that help with coping during difficult times  Family/Friends No family/friends present      Importance and Influence:  Patient has spiritual/personal beliefs that influence decisions regarding their health  Family/Friends No family/friends present    Community:  Patient is connected with a spiritual community and feels well-supported. Support system includes: Children  Family/Friends No family/friends present    Assessment and Plan of Care:     Patient Interventions include: Facilitated expression of thoughts and feelings and Other: sustained ministry of presence and prayer of wellness, wholeness of mind and strength.   Family/Friends Interventions include: No family/friends present    Patient Plan  of Care: Spiritual Care available upon further referral  Family/Friends Plan of Care: No family/friends present    Electronically signed by Chaplain Sajan on 5/25/2025 at 2:26 PM

## 2025-05-25 NOTE — PLAN OF CARE
Problem: Safety - Adult  Goal: Free from fall injury  5/25/2025 1711 by Rohini Knight, RN  Outcome: Progressing  5/25/2025 0523 by Juvencio Dennis RN  Outcome: Progressing     Problem: Discharge Planning  Goal: Discharge to home or other facility with appropriate resources  5/25/2025 1711 by Rohini Knight, RN  Outcome: Progressing  Flowsheets (Taken 5/25/2025 0800 by Diamond Mejia, RN)  Discharge to home or other facility with appropriate resources:   Identify barriers to discharge with patient and caregiver   Arrange for needed discharge resources and transportation as appropriate   Identify discharge learning needs (meds, wound care, etc)  5/25/2025 0523 by Juvencio Dennis RN  Outcome: Progressing     Problem: Skin/Tissue Integrity  Goal: Skin integrity remains intact  Description: 1.  Monitor for areas of redness and/or skin breakdown2.  Assess vascular access sites hourly3.  Every 4-6 hours minimum:  Change oxygen saturation probe site4.  Every 4-6 hours:  If on nasal continuous positive airway pressure, respiratory therapy assess nares and determine need for appliance change or resting period  5/25/2025 1711 by Rohini Knight RN  Outcome: Progressing  Flowsheets (Taken 5/25/2025 0800 by Diamond Mejia, RN)  Skin Integrity Remains Intact:   Monitor for areas of redness and/or skin breakdown   Assess vascular access sites hourly  5/25/2025 0523 by Juvencio Dennis RN  Outcome: Progressing     Problem: ABCDS Injury Assessment  Goal: Absence of physical injury  5/25/2025 1711 by Rohini Knight RN  Outcome: Progressing  5/25/2025 0523 by Juvencio Dennis RN  Outcome: Progressing

## 2025-05-25 NOTE — PROGRESS NOTES
[facility] Hospitalist Progress Note    Admitting Date and Time: 5/24/2025 12:43 PM  Admit Dx: Elevated troponin [R79.89]  Unresponsive [R41.89]  Hypothermia, initial encounter [T68.XXXA]  Hypotension, unspecified hypotension type [I95.9]  Altered mental status, unspecified altered mental status type [R41.82]    Subjective:    Pt was seen and examined today.  Patient is more awake today compared to yesterday..  Patient denies fever, chest pain, abdominal pain, leg pain with leg swelling  Per RN: No acute events overnight    ROS: denies fever, chills, cp, sob, n/v, HA unless stated above.      escitalopram  5 mg Oral Daily    finasteride  5 mg Oral Daily    gabapentin  300 mg Oral Nightly    levETIRAcetam  500 mg Oral BID    losartan  50 mg Oral Daily    melatonin  3 mg Oral Daily    tamsulosin  0.4 mg Oral BID RT    vancomycin  1,000 mg IntraVENous Q24H    sodium chloride flush  5-40 mL IntraVENous 2 times per day    enoxaparin  40 mg SubCUTAneous Daily    cefepime  2,000 mg IntraVENous Q12H    vancomycin (VANCOCIN) intermittent dosing (placeholder)   Other RX Placeholder     sodium chloride flush, 5-40 mL, PRN  sodium chloride, , PRN  potassium chloride, 40 mEq, PRN   Or  potassium alternative oral replacement, 40 mEq, PRN   Or  potassium chloride, 10 mEq, PRN  magnesium sulfate, 2,000 mg, PRN  ondansetron, 4 mg, Q8H PRN   Or  ondansetron, 4 mg, Q6H PRN  polyethylene glycol, 17 g, Daily PRN  acetaminophen, 650 mg, Q6H PRN   Or  acetaminophen, 650 mg, Q6H PRN         Objective:    BP (!) 204/68   Pulse 79   Temp 97.8 °F (36.6 °C) (Oral)   Resp 11   Ht 1.727 m (5' 7.99\")   Wt 82.6 kg (182 lb 1.6 oz)   SpO2 93%   BMI 27.69 kg/m²     General Appearance: alert and oriented to person, place and time and in no acute distress  Skin: warm and dry  Head: normocephalic and atraumatic  Eyes: pupils equal, round, and reactive to light, extraocular eye movements intact, conjunctivae normal  Neck: neck supple and

## 2025-05-25 NOTE — CONSULTS
Pulmonary and Critical Care Medicine  Consult Note  Encounter Date: 2025 8:35 AM    Mr. Emerson Salguero is a 89 y.o. male  : 1928  Requesting Provider: Laron Kelsey MD    Reason for request: ICU management            HISTORY OF PRESENT ILLNESS:    Patient is 89 y.o. presents with altered mental status, patient currently back to his baseline, he has no complaint, per note he was at [facility], he was found to be unresponsive at the lunch table  .  Chest pain, no difficulty breathing, no coughing, no reported fever or chills, shortness of breath, nausea or vomiting he was hypotensive, blood pressure 60/30.  And hypothermic.  O2 sat at some point was 70% on room air  His blood pressure on arrival to ED was 80/40 requiring 2 pushes of epi  Reported syncopal episode        Past Medical History:        Diagnosis Date    Back pain     Colitis, ulcerative (HCC)     Hypertension        Past Surgical History:        Procedure Laterality Date    IR MIDLINE CATH  2025    IR MIDLINE CATH 2025 MLOZ SPECIAL PROCEDURE    LUMBAR PUNCTURE N/A 2025    12.25 ml clear colorless CSF obtained performed by Courtney Lawrence IR CNP    THORACENTESIS Right 2025    600 ml removed by HA CNP - diagnostics sent    TONSILLECTOMY         Social History:     reports that he has never smoked. He has never used smokeless tobacco. He reports that he does not currently use alcohol. He reports that he does not currently use drugs.    Family History:   No family history on file.    Allergies:  Sulfa antibiotics        MEDICATIONS during current hospitalization:    Continuous Infusions:   sodium chloride         Scheduled Meds:   escitalopram  5 mg Oral Daily    finasteride  5 mg Oral Daily    gabapentin  300 mg Oral Nightly    hydroCHLOROthiazide  25 mg Oral Daily    levETIRAcetam  500 mg Oral BID    losartan  50 mg Oral Daily    melatonin  3 mg Oral Daily    tamsulosin  0.4 mg Oral BID RT    sodium  chloride flush  5-40 mL IntraVENous 2 times per day    enoxaparin  40 mg SubCUTAneous Daily    cefepime  2,000 mg IntraVENous Q12H    vancomycin (VANCOCIN) intermittent dosing (placeholder)   Other RX Placeholder       PRN Meds:sodium chloride flush, sodium chloride, potassium chloride **OR** potassium alternative oral replacement **OR** potassium chloride, magnesium sulfate, ondansetron **OR** ondansetron, polyethylene glycol, acetaminophen **OR** acetaminophen        REVIEW OF SYSTEMS:  ROS: 10 organs review of system is done including general, psychological, ENT, hematological, endocrine, respiratory, cardiovascular, gastrointestinal, musculoskeletal, neurological,  allergy and Immunology is done and is otherwise negative.    PHYSICAL EXAM:    Vitals:  BP (!) 204/68   Pulse 79   Temp 97.8 °F (36.6 °C) (Oral)   Resp 11   Ht 1.727 m (5' 7.99\")   Wt 82.6 kg (182 lb 1.6 oz)   SpO2 93%   BMI 27.69 kg/m²     General: alert, cooperative, no distress  Head: normocephalic, atraumatic  Eyes:No gross abnormalities.  ENT:  MMM no lesions  Neck:  supple and no masses  Chest : clear to auscultation bilaterally- no wheezes, rales or rhonchi, normal air movement, no respiratory distress  Heart:: Heart sounds are normal.  Regular rate and rhythm without murmur, gallop or rub.  ABD:  symmetric, soft, non-tender  Musculoskeletal : no cyanosis, no clubbing, and no edema  Neuro:   Hard of hearing, moves all 4 extremities  Skin: No rashes or nodules noted.  Lymph node:  no cervical nodes  Urology: No Pittman   Psychiatric: Calm        Data Review  Recent Labs     05/24/25  1307 05/25/25  0454   WBC 10.3 6.8   HGB 10.0* 9.8*   HCT 30.5* 28.6*    274      Recent Labs     05/24/25  1307 05/24/25  1540 05/25/25  0454     --  139   K 5.8* 4.6 4.0   CL 99  --  108*   CO2 26  --  22   BUN 35*  --  28*   CREATININE 1.36*  --  1.06   GLUCOSE 162*  --  88       MV Settings:          ABGs: No results for input(s): \"PHART\",  \"NGT1ZGS\", \"PO2ART\", \"OKJ8VZH\", \"BEART\", \"N6EPABAR\", \"DIZ0ABM\" in the last 72 hours.  O2 Device: None (Room air)  Lab Results   Component Value Date/Time    LACTA 1.1 05/24/2025 03:40 PM    LACTA 2.3 05/24/2025 01:07 PM    LACTA 0.9 04/30/2025 12:59 AM       Radiology  I personally reviewed imaging studies films and CT chest shows pleural effusion, dilated esophagus and hiatal hernia        Assessment, plan:   This is a critically ill patient at risk of deterioration / death , needing close ICU monitoring and intervention due to below noted problems       Shock, currently shock physiology resolved, etiology is not clear, sepsis is less likely, rule out cardiac etiology or dehydration.  Acute encephalopathy, resolved back it is based  MIGUE, improved  Pleural effusion, previous thoracentesis shows exudative with neutrophil predominant and negative for malignancy  Hiatal hernia and dilated esophagus      Recommendation  Continue current antibiotic  Chest x-ray tomorrow, consider repeat thoracentesis  DC HCTZ, as needed Lasix and avoid volume overload  DVT prophylaxis  Target blood sugar 140-180  Monitor and replace electrolyte as needed  Will consider transferring to floor later today if no BP issues.  Repeat troponin    Thank you for consultation    Due to the immediate potential for life-threatening deterioration due to shock, I spent 35 minutes providing critical care.  This time is excluding time spent performing procedures.      Electronically signed by Shanelle Roberts MD, FCCP,  on 5/25/2025 at 8:35 AM

## 2025-05-26 PROBLEM — K44.9 HIATAL HERNIA: Status: ACTIVE | Noted: 2025-05-26

## 2025-05-26 PROBLEM — K22.89 DILATION OF ESOPHAGUS: Status: ACTIVE | Noted: 2025-05-26

## 2025-05-26 PROBLEM — J90 PLEURAL EFFUSION ON LEFT: Status: ACTIVE | Noted: 2025-05-26

## 2025-05-26 PROBLEM — T18.108A ESOPHAGEAL FOREIGN BODY: Status: ACTIVE | Noted: 2025-05-24

## 2025-05-26 PROBLEM — R57.9 SHOCK (HCC): Status: ACTIVE | Noted: 2025-05-26

## 2025-05-26 LAB
ALBUMIN SERPL-MCNC: 2.9 G/DL (ref 3.5–4.6)
ANION GAP SERPL CALCULATED.3IONS-SCNC: 10 MEQ/L (ref 9–15)
BUN SERPL-MCNC: 22 MG/DL (ref 8–23)
CALCIUM SERPL-MCNC: 8.7 MG/DL (ref 8.5–9.9)
CHLORIDE SERPL-SCNC: 106 MEQ/L (ref 95–107)
CO2 SERPL-SCNC: 23 MEQ/L (ref 20–31)
CREAT SERPL-MCNC: 0.9 MG/DL (ref 0.7–1.2)
ERYTHROCYTE [DISTWIDTH] IN BLOOD BY AUTOMATED COUNT: 15.1 % (ref 11.5–14.5)
GLUCOSE SERPL-MCNC: 99 MG/DL (ref 70–99)
HCT VFR BLD AUTO: 27.8 % (ref 42–52)
HGB BLD-MCNC: 9.5 G/DL (ref 14–18)
MAGNESIUM SERPL-MCNC: 1.8 MG/DL (ref 1.7–2.4)
MCH RBC QN AUTO: 34.2 PG (ref 27–31.3)
MCHC RBC AUTO-ENTMCNC: 34.2 % (ref 33–37)
MCV RBC AUTO: 100 FL (ref 79–92.2)
PHOSPHATE SERPL-MCNC: 2.3 MG/DL (ref 2.3–4.8)
PLATELET # BLD AUTO: 228 K/UL (ref 130–400)
POTASSIUM SERPL-SCNC: 4 MEQ/L (ref 3.4–4.9)
RBC # BLD AUTO: 2.78 M/UL (ref 4.7–6.1)
SODIUM SERPL-SCNC: 139 MEQ/L (ref 135–144)
WBC # BLD AUTO: 5.9 K/UL (ref 4.8–10.8)

## 2025-05-26 PROCEDURE — 36415 COLL VENOUS BLD VENIPUNCTURE: CPT

## 2025-05-26 PROCEDURE — 99232 SBSQ HOSP IP/OBS MODERATE 35: CPT | Performed by: NURSE PRACTITIONER

## 2025-05-26 PROCEDURE — 99232 SBSQ HOSP IP/OBS MODERATE 35: CPT | Performed by: INTERNAL MEDICINE

## 2025-05-26 PROCEDURE — 6360000002 HC RX W HCPCS: Performed by: INTERNAL MEDICINE

## 2025-05-26 PROCEDURE — 92526 ORAL FUNCTION THERAPY: CPT

## 2025-05-26 PROCEDURE — 83735 ASSAY OF MAGNESIUM: CPT

## 2025-05-26 PROCEDURE — 80069 RENAL FUNCTION PANEL: CPT

## 2025-05-26 PROCEDURE — 2580000003 HC RX 258

## 2025-05-26 PROCEDURE — 1210000000 HC MED SURG R&B

## 2025-05-26 PROCEDURE — 6360000002 HC RX W HCPCS

## 2025-05-26 PROCEDURE — 85027 COMPLETE CBC AUTOMATED: CPT

## 2025-05-26 RX ORDER — HYDRALAZINE HYDROCHLORIDE 20 MG/ML
10 INJECTION INTRAMUSCULAR; INTRAVENOUS EVERY 6 HOURS PRN
Status: DISCONTINUED | OUTPATIENT
Start: 2025-05-26 | End: 2025-05-28 | Stop reason: HOSPADM

## 2025-05-26 RX ADMIN — CEFEPIME 2000 MG: 2 INJECTION, POWDER, FOR SOLUTION INTRAVENOUS at 20:24

## 2025-05-26 RX ADMIN — ENOXAPARIN SODIUM 40 MG: 100 INJECTION SUBCUTANEOUS at 08:32

## 2025-05-26 RX ADMIN — DEXTROSE, SODIUM CHLORIDE, SODIUM LACTATE, POTASSIUM CHLORIDE, AND CALCIUM CHLORIDE: 5; .6; .31; .03; .02 INJECTION, SOLUTION INTRAVENOUS at 18:57

## 2025-05-26 RX ADMIN — HYDRALAZINE HYDROCHLORIDE 10 MG: 20 INJECTION INTRAMUSCULAR; INTRAVENOUS at 08:32

## 2025-05-26 RX ADMIN — CEFEPIME 2000 MG: 2 INJECTION, POWDER, FOR SOLUTION INTRAVENOUS at 08:37

## 2025-05-26 RX ADMIN — HYDRALAZINE HYDROCHLORIDE 10 MG: 20 INJECTION INTRAMUSCULAR; INTRAVENOUS at 14:19

## 2025-05-26 RX ADMIN — VANCOMYCIN HYDROCHLORIDE 1000 MG: 1 INJECTION, POWDER, LYOPHILIZED, FOR SOLUTION INTRAVENOUS at 21:15

## 2025-05-26 RX ADMIN — LEVETIRACETAM 500 MG: 100 INJECTION INTRAVENOUS at 18:51

## 2025-05-26 RX ADMIN — DEXTROSE, SODIUM CHLORIDE, SODIUM LACTATE, POTASSIUM CHLORIDE, AND CALCIUM CHLORIDE: 5; .6; .31; .03; .02 INJECTION, SOLUTION INTRAVENOUS at 08:33

## 2025-05-26 RX ADMIN — LEVETIRACETAM 500 MG: 100 INJECTION INTRAVENOUS at 06:00

## 2025-05-26 NOTE — PROGRESS NOTES
[facility]  Facility/Department: [facility] [place] 2W ORTHO TELE  Speech Language Pathology   Treatment Note      Emerson Salguero  1928  [id number]  [x]   confirmed      Date: 2025    Elevated troponin [R79.89]  Unresponsive [R41.89]  Hypothermia, initial encounter [T68.XXXA]  Hypotension, unspecified hypotension type [I95.9]  Altered mental status, unspecified altered mental status type [R41.82]         ADULT DIET; Dysphagia - Pureed; Mildly Thick (Nectar); No Drinking Straws     Respiratory Status:O2 Flow Rate (L/min): 0 L/min (25 0803)   No active isolations      Subjective:  Alert and Cooperative        Interventions used this date:  Dysphagia Treatment      Objective/Assessment:  Patient progressing towards goals:  Short-term Goals  Timeframe for Short-term Goals: 1-2 weeks  Goal 1: Pt will tolerate PO trials deemed appropriate by treating SLP with adequate oral clearance of bolus and no overt s/s of aspiration in all opportunities.  Pt tolerated ice chips x2 with mild delayed initiation of swallow, no overt s/s of aspiration  Puree trials x2 with no overt s/s of aspiration and adequate bolus clearance  Mildly thick liquids by spoon x3 with labial closure around spoon, no overt s/s of aspiration    Pt had delayed throat clearing after all trials were complete.     Recommend upgrade to puree/mildly thick liquids, liquids by spoon, total assist with all PO  Goal 2: Pt will complete oral motor ROM and strengthening exercises with 80% accuracy, given cues as needed, in order to strengthen lingual/labial/buccal musculature to promote safety and efficiency of oral phase of swallow and decrease risk for pocketing.  Goal 3: SLP to continue to assess need for MBS study in order to more objectively assess pharyngeal phase of swallow and determine least restrictive diet consistency.  Continue to assess, not recommended this date    Update goals:  Goal 1: Patient will tolerate a puree diet with mildly thick liquids

## 2025-05-26 NOTE — PROGRESS NOTES
Gastroenterology Progress Note    Emerson Salguero is a 89 y.o. male patient.  Hospitalization Day:2    Chief C/O: Abnormal CT, dilated esophagus    SUBJECTIVE: Seen and examined, patient was transferred out of intensive care yesterday, he is alert and oriented, no chest pain, no shortness of breath, has been n.p.o. for CT findings of dilated esophagus and fluid-filled esophagus, patient's son has declined to proceed with upper endoscopy.    ROS:  Gastrointestinal ROS: no abdominal pain, change in bowel habits, or black or bloody stools    Physical    VITALS:  BP (!) 182/76   Pulse 60   Temp 97.6 °F (36.4 °C) (Oral)   Resp 18   Ht 1.727 m (5' 7.99\")   Wt 82.6 kg (182 lb 1.6 oz)   SpO2 96%   BMI 27.69 kg/m²   TEMPERATURE:  Current - Temp: 97.6 °F (36.4 °C); Max - Temp  Av.6 °F (36.4 °C)  Min: 97.4 °F (36.3 °C)  Max: 97.9 °F (36.6 °C)    General:  Alert and oriented,  No apparent distress  Skin- without jaundice  Eyes: anicteric sclera  Cardiac: RRR, Nl s1s2, without murmurs  Lungs CTA Bilaterally, normal effort  Abdomen soft, ND, NT, no HSM, Bowel sounds normal  Ext: without edema  Neuro: no asterixis     Data    Data Review:    Recent Labs     25  1307 25  0454 25  0517   WBC 10.3 6.8 5.9   HGB 10.0* 9.8* 9.5*   HCT 30.5* 28.6* 27.8*   .4* 102.1* 100.0*    274 228     Recent Labs     25  1307 25  1540 25  0454 25  0517     --  139 139   K 5.8* 4.6 4.0 4.0   CL 99  --  108* 106   CO2 26  --  22 23   PHOS  --   --   --  2.3   BUN 35*  --  28* 22   CREATININE 1.36*  --  1.06 0.90     Recent Labs     25  1307 25  0454   AST 32 25   ALT 18 21   BILITOT 0.4 0.5   ALKPHOS 95 94     No results for input(s): \"LIPASE\", \"AMYLASE\" in the last 72 hours.  Recent Labs     25  1307   PROTIME 13.3   INR 1.0           ASSESSMENT:  89 y/o male sent by EMS from [facility] for unresponsive episode, on arrival he was noted to be hypotensive  and he was admitted to the intensive care unit.  Clinically he is improved, he is alert oriented to self, blood pressure is improved after IV fluids, did not require vasopressors. Pt had CT chest with incidental finding of dilated esophagus with fluid-filled and distended from the proximal third distally with fixed hiatal hernia.  The radiological findings with no clear significance.?  Achalasia?.  Had full meal 24 hours prior to arrival modified regimen and tolerated meal.  Patient has no history of food impaction, no history of endoscopic evaluation.  Of note patient was hospitalized recently for acute encephalopathy, hyponatremia and acute hypoxic respiratory failure and has been on a modified diet of puréed foods and nectar thickened liquids and has been following with speech-language pathology there was also discussion regarding PEG placement.  At baseline he is not on anticoagulation.  Ddx abnormal CT chest: CT imaging showed dilated esophagus with fluid-filled in the context of hiatal hernia.  No clear clinical correlation.  No chest pain, vomiting or hypersalivation noted on current exam though food impaction/partial is possibility  Recommended proceeding with  upper endoscopy, had a lengthy discussion with the patient's son Shayne regarding indications outcomes risks and benefits, he is undecided about proceeding and in fact seems reluctant proceeding at this time given the potential anesthesia related complications. Patient also had discussion with anesthesia team.  Patient son also has been under significant stress lately with the loss of his mother.  5/6/25 patient was transferred out of intensive care yesterday, he is alert and oriented, no chest pain, no shortness of breath, has been n.p.o. for CT findings of dilated esophagus and fluid-filled esophagus, patient's son has declined to proceed with upper endoscopy.    PLAN :  -keep NPO, plan for repeat CT imaging tomorrow  -no planned endoscopic

## 2025-05-26 NOTE — PLAN OF CARE
Problem: Safety - Adult  Goal: Free from fall injury  5/25/2025 2358 by Minoo Valle, RN  Outcome: Progressing     Problem: Discharge Planning  Goal: Discharge to home or other facility with appropriate resources  5/25/2025 2358 by Minoo Valle RN  Outcome: Progressing     Problem: Skin/Tissue Integrity  Goal: Skin integrity remains intact  Description: 1.  Monitor for areas of redness and/or skin breakdown2.  Assess vascular access sites hourly3.  Every 4-6 hours minimum:  Change oxygen saturation probe site4.  Every 4-6 hours:  If on nasal continuous positive airway pressure, respiratory therapy assess nares and determine need for appliance change or resting period  5/25/2025 2358 by Minoo Valle, RN  Outcome: Progressing     Problem: ABCDS Injury Assessment  Goal: Absence of physical injury  5/25/2025 2358 by Minoo Valle, RN  Outcome: Progressing

## 2025-05-26 NOTE — PROGRESS NOTES
INPATIENT PROGRESS NOTES    PATIENT NAME: Emerson Salguero  MRN: [id number]  SERVICE DATE:  May 26, 2025   SERVICE TIME:  9:46 AM      PRIMARY SERVICE: Pulmonary Disease    CHIEF COMPLAIN: Altered mental status      INTERVAL HPI: Patient seen and examined at bedside, Interval Notes, orders reviewed. Nursing notes noted  Patient is from [facility].  He was found unresponsive.  At this time he is alert awake.  Denies having short of breath.  No chest pain.  No cough.  No fever or chills.  No nausea vomiting or diarrhea.  He was hypotensive and hypothermic both resolved.         OBJECTIVE   I/O:24HR INTAKE/OUTPUT:    Intake/Output Summary (Last 24 hours) at 5/26/2025 0946  Last data filed at 5/26/2025 0601  Gross per 24 hour   Intake 799.28 ml   Output 2150 ml   Net -1350.72 ml     05/25 0701 - 05/26 0700  In: 799.3 [I.V.:314.6]  Out: 2150 [Urine:2150]  Body mass index is 27.69 kg/m².    PHYSICAL EXAM:  Vitals:  BP (!) 182/76   Pulse 60   Temp 97.6 °F (36.4 °C) (Oral)   Resp 18   Ht 1.727 m (5' 7.99\")   Wt 82.6 kg (182 lb 1.6 oz)   SpO2 96%   BMI 27.69 kg/m²     General: Alert, awake .comfortable in bed, No distress.  Head: Atraumatic , Normocephalic   Eyes: PERRL. No sclera icterus. No conjunctival injection. No discharge   ENT: No nasal  discharge. Pharynx clear.  Neck:  Trachea midline. No thyromegaly, no JVD, No cervical adenopathy.  Chest : Bilaterally symmetrical ,Normal effort,  No accessory muscle use  Lung : Diminished breath sound bilaterally No Rales. No wheezing. No rhonchi.   Heart:: Normal rate. Regular rhythm. No mumur ,  Rub or gallop  ABD: Non-tender. Non-distended. No masses. No organmegaly. Normal bowel sounds. No hernia.  Ext : No Pitting both leg , No Cyanosis No clubbing  Neuro: no focal weakness    Labs:  CBC:   Recent Labs     05/24/25  1307 05/25/25  0454 05/26/25  0517   WBC 10.3 6.8 5.9   HGB 10.0* 9.8* 9.5*   HCT 30.5* 28.6* 27.8*    274 228     BMP:    Recent  Expiration Date (Month Year): 2023-12-28 SYSTEM PROVIDED HISTORY: dysphagia TECHNOLOGIST PROVIDED HISTORY: Reason for exam:->dysphagia What reading provider will be dictating this exam?->CRC FINDINGS: Moderate pharyngeal dysphagia was noted.  Vestibular penetration with thin and nectar consistency barium.  No evidence of aspiration were identified.     Dysphagia as described above. Please see separate speech pathology report for full discussion of findings and recommendations.     XR CHEST (SINGLE VIEW FRONTAL)  1.  Interval decrease in the size of the right pleural effusion with a small residual right pleural effusion. 2.  There remains elevation of the right hemidiaphragm as was seen on CT dating May 5, 2025. 3.  There is increased pulmonary markings consistent with pulmonary edema. 4.  The cardiac silhouette is enlarged. 5.  The known nasojejunal tube is seen overlying the stomach and extending into the jejunum. COMPARISON: No prior studies available for comparison. DIAGNOSIS: post right thora - Felisa COMMENTS: Shortness of breath / Reason for exam:->post right thora - Felisa TECHNIQUE: XR CHEST (SINGLE VIEW FRONTAL) FINDINGS: There are increased pulmonary markings consistent with pulmonary edema. No evidence of pneumothorax. There is decrease in the size of the right pleural fluid with residual small pleural effusion. There is elevation of the right hemidiaphragm. The cardiac silhouette is enlarged. Nasojejunal tube is seen traversing the stomach and entering the jejunum. Electronically signed by Rocael Roberto    CT CHEST WO CONTRAST  Result Date: 5/5/2025  EXAMINATION: CT OF THE CHEST WITHOUT CONTRAST 5/5/2025 4:50 pm TECHNIQUE: CT of the chest was performed without the administration of intravenous contrast. Multiplanar reformatted images are provided for review. Automated exposure control, iterative reconstruction, and/or weight based adjustment of the mA/kV was utilized to reduce the radiation dose to as low as reasonably achievable. COMPARISON:  a 20 gauge needle was inserted into the spinal canal.  After confirmation of intra-thecal location of the needle tip by CSF leakage through the needle.  Approximately 12.25 cc of CSF were collected in 4 separate containers.  Following that the needle was withdrawn from the back.  The patient tolerated the procedure well without complications. The patient was monitored in recovery for 4 hours prior to discharge.  Electronically signed by Rocael Roberto    MRI BRAIN WO CONTRAST  Result Date: 5/1/2025  EXAMINATION: MRI OF THE BRAIN WITHOUT CONTRAST  5/1/2025 4:18 pm TECHNIQUE: Multiplanar multisequence MRI of the brain was performed without the administration of intravenous contrast. COMPARISON: CT of the head from 04/29/2025.  MRI of the brain from 02/20/2021. HISTORY: ORDERING SYSTEM PROVIDED HISTORY: encephalopathic, dysphagia, hallucinating. r/o CVA,stroke TECHNOLOGIST PROVIDED HISTORY: Reason for exam:->encephalopathic, dysphagia, hallucinating. r/o CVA,stroke What is the sedation requirement?->None What reading provider will be dictating this exam?->CRC FINDINGS: INTRACRANIAL STRUCTURES/VENTRICLES: There is no sign of acute infarct. No mass effect or midline shift. No evidence of an acute intracranial hemorrhage. Total microhemorrhages: None. Superficial siderosis: None. There is stable prominent dilatation of the ventricles and sulci representing age-appropriate atrophy. There is stable mild periventricular and subcortical white matter T2 signal abnormality representing age-appropriate small vessel ischemia. The sellar/suprasellar regions appear unremarkable. The normal signal voids within the major intracranial vessels appear maintained. ORBITS: The visualized portion of the orbits demonstrate no acute abnormality. SINUSES: The visualized paranasal sinuses and mastoid air cells demonstrate no acute abnormality. BONES/SOFT TISSUES: The bone marrow signal intensity appears normal. The soft tissues demonstrate no

## 2025-05-26 NOTE — PROGRESS NOTES
Progress Note  Date:2025       Room:[place] [address]  Patient Name:Emerson Salguero     YOB: 1928     Age:89 y.o.        Subjective    Subjective   Review of Systems  ROS : could not be obtained duet to acuity of medical conditon  Objective         Vitals Last 24 Hours:  TEMPERATURE:  Temp  Av.6 °F (36.4 °C)  Min: 97.4 °F (36.3 °C)  Max: 97.9 °F (36.6 °C)  RESPIRATIONS RANGE: Resp  Av.3  Min: 10  Max: 18  PULSE OXIMETRY RANGE: SpO2  Av.4 %  Min: 93 %  Max: 97 %  PULSE RANGE: Pulse  Av.1  Min: 50  Max: 73  BLOOD PRESSURE RANGE: Systolic (24hrs), Avg:160 , Min:130 , Max:197   ; Diastolic (24hrs), Av, Min:48, Max:88    I/O (24Hr):    Intake/Output Summary (Last 24 hours) at 2025 1108  Last data filed at 2025 0601  Gross per 24 hour   Intake 799.28 ml   Output 2150 ml   Net -1350.72 ml     Objective  Labs/Imaging/Diagnostics    Labs:  CBC:  Recent Labs     25  1307 25  0454 25  0517   WBC 10.3 6.8 5.9   RBC 2.95* 2.80* 2.78*   HGB 10.0* 9.8* 9.5*   HCT 30.5* 28.6* 27.8*   .4* 102.1* 100.0*   RDW 15.3* 15.2* 15.1*    274 228     CHEMISTRIES:  Recent Labs     25  1307 25  1540 25  0454 25  0517     --  139 139   K 5.8* 4.6 4.0 4.0   CL 99  --  108* 106   CO2 26  --  22 23   BUN 35*  --  28* 22   CREATININE 1.36*  --  1.06 0.90   GLUCOSE 162*  --  88 99   PHOS  --   --   --  2.3   MG  --   --   --  1.8     PT/INR:  Recent Labs     25  1307   PROTIME 13.3   INR 1.0     APTT:  Recent Labs     25  1307   APTT 25.9     LIVER PROFILE:  Recent Labs     25  1307 25  0454   AST 32 25   ALT 18 21   BILITOT 0.4 0.5   ALKPHOS 95 94       Imaging Last 24 Hours:  CTA CHEST W WO CONTRAST PE Eval  Result Date: 2025  EXAMINATION: CTA OF THE CHEST WITH AND WITHOUT CONTRAST 2025 2:43 pm TECHNIQUE: CTA of the chest was performed before and after the administration of intravenous contrast.  Multiplanar

## 2025-05-26 NOTE — PROGRESS NOTES
Infectious Diseases Inpatient Progress Note          HISTORY OF PRESENT ILLNESS:  Follow up shock on admission with acute kidney injury and acute encephalopathy, left pleural effusion, esophageal dilatation on IV vancomycin and cefepime for possible healthcare acquired pneumonia, well tolerated.  Patient is currently improved with resolved hypothermia and hypotension.  Currently on room air.  He is hard of hearing.  Positive cough reported.  Denies any shortness of breath.  Reports mild abdominal pain.   Has a pure wick with clear urine.     Current Medications:     escitalopram  5 mg Oral Daily    finasteride  5 mg Oral Daily    gabapentin  300 mg Oral Nightly    losartan  50 mg Oral Daily    melatonin  3 mg Oral Daily    tamsulosin  0.4 mg Oral BID RT    vancomycin  1,000 mg IntraVENous Q24H    levETIRAcetam  500 mg IntraVENous Q12H    sodium chloride flush  5-40 mL IntraVENous 2 times per day    enoxaparin  40 mg SubCUTAneous Daily    cefepime  2,000 mg IntraVENous Q12H    vancomycin (VANCOCIN) intermittent dosing (placeholder)   Other RX Placeholder       Allergies:  Sulfa antibiotics      Review of Systems  Limited review of system because of hard of hearing, decreased concentration.  Has difficulty completing a sentence and finding words.    Physical Exam  Vitals:    05/25/25 2053 05/26/25 0103 05/26/25 0231 05/26/25 0803   BP: 130/88  (!) 176/68 (!) 182/76   Pulse: 50 58 58 60   Resp: 16  16 18   Temp: 97.5 °F (36.4 °C)  97.9 °F (36.6 °C) 97.6 °F (36.4 °C)   TempSrc: Oral  Oral Oral   SpO2: 95%  97% 96%   Weight:       Height:         General Appearance: alert and oriented to self,  in no acute distress  On room air  Hard of hearing  Skin: warm and dry, no rash.   Head: normocephalic and atraumatic  Eyes: anicteric sclerae  ENT:  normal mucous membranes. No oral thrush  Lungs: normal respiratory effort, clear lungs, diminished breath sounds left base  Heart normal S1-S2, no murmur  Abdomen: soft, no  tenderness, hyperactive bowel sounds  No leg edema  No erythema, no tenderness      DATA:    Lab Results   Component Value Date    WBC 5.9 05/26/2025    HGB 9.5 (L) 05/26/2025    HCT 27.8 (L) 05/26/2025    .0 (H) 05/26/2025     05/26/2025     Lab Results   Component Value Date    CREATININE 0.90 05/26/2025    BUN 22 05/26/2025     05/26/2025    K 4.0 05/26/2025     05/26/2025    CO2 23 05/26/2025       Hepatic Function Panel:  Lab Results   Component Value Date/Time    ALKPHOS 94 05/25/2025 04:54 AM    ALT 21 05/25/2025 04:54 AM    AST 25 05/25/2025 04:54 AM    BILITOT 0.5 05/25/2025 04:54 AM       Microbiology:   Recent Labs     05/24/25  1307   BC No Growth to date.  Any change in status will be called.     Recent Labs     05/24/25  1351   BLOODCULT2 No Growth to date.  Any change in status will be called.       Procalcitonin level currently is at 0.05    IMPRESSION:    Shock on admission currently resolved  Acute encephalopathy and acute injury, improving  Left pleural effusion with questionable adjacent pneumonia  Hiatal hernia with dilated esophagus, questionable achalasia      PLAN:  Continue 1 week course of IV vancomycin and cefepime for healthcare acquired pneumonia  Follow-up with GI and thoracic surgery for additional evaluation of esophageal dilatation and hiatal hernia  Continue monitoring and ensure that the patient is not aspirating  CRP in a.m.    Discussed with patient    Kylah Kemp MD

## 2025-05-27 ENCOUNTER — ANESTHESIA EVENT (OUTPATIENT)
Dept: ENDOSCOPY | Age: 89
DRG: 871 | End: 2025-05-27
Payer: MEDICARE

## 2025-05-27 ENCOUNTER — ANESTHESIA (OUTPATIENT)
Dept: ENDOSCOPY | Age: 89
DRG: 871 | End: 2025-05-27
Payer: MEDICARE

## 2025-05-27 ENCOUNTER — TELEPHONE (OUTPATIENT)
Age: 89
End: 2025-05-27

## 2025-05-27 DIAGNOSIS — J90 PLEURAL EFFUSION: Primary | ICD-10-CM

## 2025-05-27 PROBLEM — Z71.89 ENCOUNTER FOR HOSPICE CARE DISCUSSION: Status: ACTIVE | Noted: 2025-05-27

## 2025-05-27 PROBLEM — K20.90 ESOPHAGITIS: Status: ACTIVE | Noted: 2025-05-27

## 2025-05-27 LAB
CRP SERPL HS-MCNC: 6.4 MG/L (ref 0–5)
VANCOMYCIN SERPL-MCNC: 17.6 UG/ML (ref 10–40)

## 2025-05-27 PROCEDURE — 6370000000 HC RX 637 (ALT 250 FOR IP): Performed by: NURSE PRACTITIONER

## 2025-05-27 PROCEDURE — 99223 1ST HOSP IP/OBS HIGH 75: CPT | Performed by: NURSE PRACTITIONER

## 2025-05-27 PROCEDURE — 7100000011 HC PHASE II RECOVERY - ADDTL 15 MIN: Performed by: INTERNAL MEDICINE

## 2025-05-27 PROCEDURE — 86140 C-REACTIVE PROTEIN: CPT

## 2025-05-27 PROCEDURE — 36415 COLL VENOUS BLD VENIPUNCTURE: CPT

## 2025-05-27 PROCEDURE — 6370000000 HC RX 637 (ALT 250 FOR IP)

## 2025-05-27 PROCEDURE — 2580000003 HC RX 258

## 2025-05-27 PROCEDURE — 6360000002 HC RX W HCPCS

## 2025-05-27 PROCEDURE — 1210000000 HC MED SURG R&B

## 2025-05-27 PROCEDURE — 7100000010 HC PHASE II RECOVERY - FIRST 15 MIN: Performed by: INTERNAL MEDICINE

## 2025-05-27 PROCEDURE — 6360000002 HC RX W HCPCS: Performed by: INTERNAL MEDICINE

## 2025-05-27 PROCEDURE — 43235 EGD DIAGNOSTIC BRUSH WASH: CPT | Performed by: INTERNAL MEDICINE

## 2025-05-27 PROCEDURE — 99232 SBSQ HOSP IP/OBS MODERATE 35: CPT | Performed by: INTERNAL MEDICINE

## 2025-05-27 PROCEDURE — 2709999900 HC NON-CHARGEABLE SUPPLY: Performed by: INTERNAL MEDICINE

## 2025-05-27 PROCEDURE — 80202 ASSAY OF VANCOMYCIN: CPT

## 2025-05-27 PROCEDURE — 99233 SBSQ HOSP IP/OBS HIGH 50: CPT | Performed by: INTERNAL MEDICINE

## 2025-05-27 PROCEDURE — 92526 ORAL FUNCTION THERAPY: CPT

## 2025-05-27 PROCEDURE — 2500000003 HC RX 250 WO HCPCS

## 2025-05-27 PROCEDURE — 3700000000 HC ANESTHESIA ATTENDED CARE: Performed by: INTERNAL MEDICINE

## 2025-05-27 PROCEDURE — 3609017100 HC EGD: Performed by: INTERNAL MEDICINE

## 2025-05-27 PROCEDURE — 2500000003 HC RX 250 WO HCPCS: Performed by: INTERNAL MEDICINE

## 2025-05-27 PROCEDURE — 6360000002 HC RX W HCPCS: Performed by: NURSE ANESTHETIST, CERTIFIED REGISTERED

## 2025-05-27 PROCEDURE — 0DJ08ZZ INSPECTION OF UPPER INTESTINAL TRACT, VIA NATURAL OR ARTIFICIAL OPENING ENDOSCOPIC: ICD-10-PCS | Performed by: INTERNAL MEDICINE

## 2025-05-27 RX ORDER — SODIUM CHLORIDE 0.9 % (FLUSH) 0.9 %
5-40 SYRINGE (ML) INJECTION EVERY 12 HOURS SCHEDULED
Status: DISCONTINUED | OUTPATIENT
Start: 2025-05-27 | End: 2025-05-27 | Stop reason: HOSPADM

## 2025-05-27 RX ORDER — SUCRALFATE 1 G/1
1 TABLET ORAL
Status: DISCONTINUED | OUTPATIENT
Start: 2025-05-27 | End: 2025-05-28 | Stop reason: HOSPADM

## 2025-05-27 RX ORDER — SODIUM CHLORIDE 9 MG/ML
25 INJECTION, SOLUTION INTRAVENOUS PRN
Status: DISCONTINUED | OUTPATIENT
Start: 2025-05-27 | End: 2025-05-27 | Stop reason: HOSPADM

## 2025-05-27 RX ORDER — PANTOPRAZOLE SODIUM 40 MG/1
40 TABLET, DELAYED RELEASE ORAL
Status: DISCONTINUED | OUTPATIENT
Start: 2025-05-27 | End: 2025-05-28 | Stop reason: HOSPADM

## 2025-05-27 RX ORDER — HYDRALAZINE HYDROCHLORIDE 20 MG/ML
INJECTION INTRAMUSCULAR; INTRAVENOUS
Status: DISCONTINUED | OUTPATIENT
Start: 2025-05-27 | End: 2025-05-27 | Stop reason: SDUPTHER

## 2025-05-27 RX ORDER — SODIUM CHLORIDE 0.9 % (FLUSH) 0.9 %
5-40 SYRINGE (ML) INJECTION PRN
Status: DISCONTINUED | OUTPATIENT
Start: 2025-05-27 | End: 2025-05-27 | Stop reason: HOSPADM

## 2025-05-27 RX ORDER — PROPOFOL 10 MG/ML
INJECTION, EMULSION INTRAVENOUS
Status: DISCONTINUED | OUTPATIENT
Start: 2025-05-27 | End: 2025-05-27 | Stop reason: SDUPTHER

## 2025-05-27 RX ORDER — LIDOCAINE HYDROCHLORIDE 20 MG/ML
INJECTION, SOLUTION EPIDURAL; INFILTRATION; INTRACAUDAL; PERINEURAL
Status: DISCONTINUED | OUTPATIENT
Start: 2025-05-27 | End: 2025-05-27 | Stop reason: SDUPTHER

## 2025-05-27 RX ADMIN — CEFEPIME 2000 MG: 2 INJECTION, POWDER, FOR SOLUTION INTRAVENOUS at 21:36

## 2025-05-27 RX ADMIN — Medication 10 ML: at 08:33

## 2025-05-27 RX ADMIN — CEFEPIME 2000 MG: 2 INJECTION, POWDER, FOR SOLUTION INTRAVENOUS at 09:53

## 2025-05-27 RX ADMIN — PANTOPRAZOLE SODIUM 40 MG: 40 TABLET, DELAYED RELEASE ORAL at 15:26

## 2025-05-27 RX ADMIN — TAMSULOSIN HYDROCHLORIDE 0.4 MG: 0.4 CAPSULE ORAL at 20:10

## 2025-05-27 RX ADMIN — SUCRALFATE 1 G: 1 TABLET ORAL at 15:25

## 2025-05-27 RX ADMIN — DEXTROSE, SODIUM CHLORIDE, SODIUM LACTATE, POTASSIUM CHLORIDE, AND CALCIUM CHLORIDE: 5; .6; .31; .03; .02 INJECTION, SOLUTION INTRAVENOUS at 21:35

## 2025-05-27 RX ADMIN — VANCOMYCIN HYDROCHLORIDE 1000 MG: 1 INJECTION, POWDER, LYOPHILIZED, FOR SOLUTION INTRAVENOUS at 20:09

## 2025-05-27 RX ADMIN — SUCRALFATE 1 G: 1 TABLET ORAL at 20:10

## 2025-05-27 RX ADMIN — LEVETIRACETAM 500 MG: 100 INJECTION INTRAVENOUS at 06:28

## 2025-05-27 RX ADMIN — DEXTROSE, SODIUM CHLORIDE, SODIUM LACTATE, POTASSIUM CHLORIDE, AND CALCIUM CHLORIDE: 5; .6; .31; .03; .02 INJECTION, SOLUTION INTRAVENOUS at 08:32

## 2025-05-27 RX ADMIN — HYDRALAZINE HYDROCHLORIDE 10 MG: 20 INJECTION INTRAMUSCULAR; INTRAVENOUS at 08:32

## 2025-05-27 RX ADMIN — HYDRALAZINE HYDROCHLORIDE 10 MG: 20 INJECTION INTRAMUSCULAR; INTRAVENOUS at 21:31

## 2025-05-27 RX ADMIN — LEVETIRACETAM 500 MG: 100 INJECTION INTRAVENOUS at 18:34

## 2025-05-27 RX ADMIN — PROPOFOL 100 MG: 10 INJECTION, EMULSION INTRAVENOUS at 11:34

## 2025-05-27 RX ADMIN — LIDOCAINE HYDROCHLORIDE 60 MG: 20 INJECTION, SOLUTION EPIDURAL; INFILTRATION; INTRACAUDAL; PERINEURAL at 11:34

## 2025-05-27 RX ADMIN — SUCRALFATE 1 G: 1 TABLET ORAL at 17:39

## 2025-05-27 RX ADMIN — Medication 10 ML: at 20:10

## 2025-05-27 RX ADMIN — GABAPENTIN 300 MG: 300 CAPSULE ORAL at 20:10

## 2025-05-27 RX ADMIN — HYDRALAZINE HYDROCHLORIDE 10 MG: 20 INJECTION INTRAMUSCULAR; INTRAVENOUS at 12:38

## 2025-05-27 ASSESSMENT — ENCOUNTER SYMPTOMS: SHORTNESS OF BREATH: 1

## 2025-05-27 ASSESSMENT — PAIN - FUNCTIONAL ASSESSMENT: PAIN_FUNCTIONAL_ASSESSMENT: 0-10

## 2025-05-27 ASSESSMENT — PAIN SCALES - GENERAL: PAINLEVEL_OUTOF10: 0

## 2025-05-27 NOTE — ANESTHESIA POSTPROCEDURE EVALUATION
Department of Anesthesiology  Postprocedure Note    Patient: Emerson Salguero  MRN: [id number]  YOB: 1928  Date of evaluation: 5/27/2025    Procedure Summary       Date: 05/27/25 Room / Location: [facility] OR 02 / [facility]    Anesthesia Start: 1131 Anesthesia Stop: 1143    Procedure: ESOPHAGOGASTRODUODENOSCOPY Diagnosis:       Esophageal foreign body      (Esophageal foreign body [T18.108A])    Surgeons: Peña Dawn MD Responsible Provider: Jennifer العلي APRN - CRNA    Anesthesia Type: MAC ASA Status: 3            Anesthesia Type: No value filed.    Armida Phase I:      Armida Phase II:      Anesthesia Post Evaluation    Patient location during evaluation: PACU  Level of consciousness: awake  Pain score: 0  Airway patency: patent  Nausea & Vomiting: no vomiting and no nausea  Cardiovascular status: hemodynamically stable  Respiratory status: acceptable  Hydration status: stable  Pain management: adequate and satisfactory to patient        No notable events documented.

## 2025-05-27 NOTE — PROGRESS NOTES
Infectious Diseases Inpatient Progress Note          HISTORY OF PRESENT ILLNESS:  Follow up shock on admission with acute kidney injury and acute encephalopathy, left pleural effusion, esophageal dilatation on IV vancomycin and cefepime for possible healthcare acquired pneumonia, well tolerated.  Patient continues to improve with improving mentation.  Currently on room air.  He is hard of hearing.  Positive occasional cough  reported.  Denies any shortness of breath.  Reports mild abdominal pain.   Has a pure wick with clear urine.  Persistent disorientation.  No agitation or hallucination noted    Current Medications:     escitalopram  5 mg Oral Daily    finasteride  5 mg Oral Daily    gabapentin  300 mg Oral Nightly    losartan  50 mg Oral Daily    melatonin  3 mg Oral Daily    tamsulosin  0.4 mg Oral BID RT    vancomycin  1,000 mg IntraVENous Q24H    levETIRAcetam  500 mg IntraVENous Q12H    sodium chloride flush  5-40 mL IntraVENous 2 times per day    enoxaparin  40 mg SubCUTAneous Daily    cefepime  2,000 mg IntraVENous Q12H    vancomycin (VANCOCIN) intermittent dosing (placeholder)   Other RX Placeholder       Allergies:  Sulfa antibiotics      Review of Systems  Limited review of system because of hard of hearing.      Physical Exam  Vitals:    05/26/25 1725 05/26/25 2020 05/27/25 0815 05/27/25 0944   BP: (!) 159/76 (!) 158/71 (!) 195/81 (!) 173/71   Pulse: 76 74 52 64   Resp: 18 16 16 18   Temp:  97.3 °F (36.3 °C) (!) 96 °F (35.6 °C)    TempSrc:  Oral Axillary    SpO2:  94% 97% 94%   Weight:       Height:         General Appearance: alert and oriented to self only,  in no acute distress  On room air  Hard of hearing  Skin: warm and dry, no rash.   Head: normocephalic and atraumatic  Eyes: anicteric sclerae  ENT:  normal mucous membranes. No oral thrush  Lungs: normal respiratory effort, clear lungs, diminished breath sounds left base  Heart normal S1-S2, no murmur  Abdomen: soft, no tenderness, normal bowel  sounds  No leg edema  No erythema, no tenderness  Clear urine pure wick with good output    DATA:    Lab Results   Component Value Date    WBC 5.9 05/26/2025    HGB 9.5 (L) 05/26/2025    HCT 27.8 (L) 05/26/2025    .0 (H) 05/26/2025     05/26/2025     Lab Results   Component Value Date    CREATININE 0.90 05/26/2025    BUN 22 05/26/2025     05/26/2025    K 4.0 05/26/2025     05/26/2025    CO2 23 05/26/2025       Hepatic Function Panel:  Lab Results   Component Value Date/Time    ALKPHOS 94 05/25/2025 04:54 AM    ALT 21 05/25/2025 04:54 AM    AST 25 05/25/2025 04:54 AM    BILITOT 0.5 05/25/2025 04:54 AM       Microbiology:   Recent Labs     05/24/25  1307   BC No Growth to date.  Any change in status will be called.     Recent Labs     05/24/25  1351   BLOODCULT2 No Growth to date.  Any change in status will be called.       Procalcitonin level currently is at 0.05  CRP is down to 6.4  IMPRESSION:    Shock on admission currently resolved  Acute encephalopathy and acute injury, improving  Moderate to large left pleural effusion with questionable adjacent pneumonia  Hiatal hernia with dilated esophagus, questionable achalasia      PLAN:  Continue 1 week course of IV vancomycin and cefepime for healthcare acquired pneumonia.  3 more days  Follow-up with GI and thoracic surgery for additional evaluation of esophageal dilatation and hiatal hernia  Continue monitoring and ensure that the patient is not aspirating  Chest x-ray in a.m.    Discussed with patient    Kylah Kemp MD

## 2025-05-27 NOTE — CARE COORDINATION
Case Management Assessment  Initial Evaluation    Date/Time of Evaluation: 5/27/2025 2:27 PM  Assessment Completed by: JULIANO Souza    If patient is discharged prior to next notation, then this note serves as note for discharge by case management.    Patient Name: Emerson Ames                   YOB: 1928  Diagnosis: Elevated troponin [R79.89]  Unresponsive [R41.89]  Hypothermia, initial encounter [T68.XXXA]  Hypotension, unspecified hypotension type [I95.9]  Altered mental status, unspecified altered mental status type [R41.82]                   Date / Time: 5/24/2025 12:43 PM    Patient Admission Status: Inpatient   Readmission Risk (Low < 19, Mod (19-27), High > 27): Readmission Risk Score: 21.5    Current PCP: Misty Vela MD  PCP verified by CM? Yes    Chart Reviewed: Yes      History Provided by: Patient, Child/Family  Patient Orientation: Alert and Oriented    Patient Cognition: Dementia / Early Alzheimer's    Hospitalization in the last 30 days (Readmission):  Yes    If yes, Readmission Assessment in  Navigator will be completed.    Advance Directives:      Code Status: Limited   Patient's Primary Decision Maker is: Legal Next of Kin    Primary Decision Maker: KELSIE AMES - Child - [phone]    Discharge Planning:    Patient lives with: Other (Comment) ([facility]) Type of Home: Assisted living  Primary Care Giver: Other (Comment) ([facility] AT [facility])  Patient Support Systems include: Children   Current Financial resources:    Current community resources:    Current services prior to admission: Skilled Nursing Facility            Current DME:              Type of Home Care services:  None    ADLS  Prior functional level: Assistance with the following:, Bathing, Cooking, Housework, Shopping, Mobility  Current functional level: Assistance with the following:, Bathing, Cooking, Housework, Shopping, Mobility    PT AM-PAC:   /24  OT AM-PAC:   /24    Family can

## 2025-05-27 NOTE — PROGRESS NOTES
INPATIENT PROGRESS NOTES    PATIENT NAME: Emerson Salguero  MRN: [id number]  SERVICE DATE:  May 27, 2025   SERVICE TIME:  8:36 AM      PRIMARY SERVICE: Pulmonary Disease    CHIEF COMPLAIN: Altered mental status      INTERVAL HPI: Patient seen and examined at bedside, Interval Notes, orders reviewed. Nursing notes noted  Patient is from [facility].  He was found unresponsive but now he is alert awake.  Denies having short of breath.  No chest pain.  No cough.  No fever or chills.  No nausea vomiting or diarrhea.  He was hypotensive and hypothermic both resolved at this time.         OBJECTIVE   I/O:24HR INTAKE/OUTPUT:    Intake/Output Summary (Last 24 hours) at 5/27/2025 0836  Last data filed at 5/27/2025 0833  Gross per 24 hour   Intake 10 ml   Output 1200 ml   Net -1190 ml     05/26 0701 - 05/27 0700  In: -   Out: 1200 [Urine:1200]  Body mass index is 27.69 kg/m².    PHYSICAL EXAM:  Vitals:  BP (!) 195/81   Pulse 52   Temp (!) 96 °F (35.6 °C) (Axillary)   Resp 16   Ht 1.727 m (5' 7.99\")   Wt 82.6 kg (182 lb 1.6 oz)   SpO2 97%   BMI 27.69 kg/m²     General: Alert, awake .comfortable in bed, No distress.  Head: Atraumatic , Normocephalic   Eyes: PERRL. No sclera icterus. No conjunctival injection. No discharge   ENT: No nasal  discharge. Pharynx clear.  Neck:  Trachea midline. No thyromegaly, no JVD, No cervical adenopathy.  Chest : Bilaterally symmetrical ,Normal effort,  No accessory muscle use  Lung : Diminished breath sound bilaterally No Rales. No wheezing. No rhonchi.   Heart:: Normal rate. Regular rhythm. No mumur ,  Rub or gallop  ABD: Non-tender. Non-distended. No masses. No organmegaly. Normal bowel sounds. No hernia.  Ext : No Pitting both leg , No Cyanosis No clubbing  Neuro: no focal weakness    Labs:  CBC:   Recent Labs     05/24/25  1307 05/25/25  0454 05/26/25  0517   WBC 10.3 6.8 5.9   HGB 10.0* 9.8* 9.5*   HCT 30.5* 28.6* 27.8*    274 228     BMP:    Recent Labs      a 20 gauge needle was inserted into the spinal canal.  After confirmation of intra-thecal location of the needle tip by CSF leakage through the needle.  Approximately 12.25 cc of CSF were collected in 4 separate containers.  Following that the needle was withdrawn from the back.  The patient tolerated the procedure well without complications. The patient was monitored in recovery for 4 hours prior to discharge.  Electronically signed by Rocael Roberto    MRI BRAIN WO CONTRAST  Result Date: 5/1/2025  EXAMINATION: MRI OF THE BRAIN WITHOUT CONTRAST  5/1/2025 4:18 pm TECHNIQUE: Multiplanar multisequence MRI of the brain was performed without the administration of intravenous contrast. COMPARISON: CT of the head from 04/29/2025.  MRI of the brain from 02/20/2021. HISTORY: ORDERING SYSTEM PROVIDED HISTORY: encephalopathic, dysphagia, hallucinating. r/o CVA,stroke TECHNOLOGIST PROVIDED HISTORY: Reason for exam:->encephalopathic, dysphagia, hallucinating. r/o CVA,stroke What is the sedation requirement?->None What reading provider will be dictating this exam?->CRC FINDINGS: INTRACRANIAL STRUCTURES/VENTRICLES: There is no sign of acute infarct. No mass effect or midline shift. No evidence of an acute intracranial hemorrhage. Total microhemorrhages: None. Superficial siderosis: None. There is stable prominent dilatation of the ventricles and sulci representing age-appropriate atrophy. There is stable mild periventricular and subcortical white matter T2 signal abnormality representing age-appropriate small vessel ischemia. The sellar/suprasellar regions appear unremarkable. The normal signal voids within the major intracranial vessels appear maintained. ORBITS: The visualized portion of the orbits demonstrate no acute abnormality. SINUSES: The visualized paranasal sinuses and mastoid air cells demonstrate no acute abnormality. BONES/SOFT TISSUES: The bone marrow signal intensity appears normal. The soft tissues demonstrate no  condition->Emergency Medical Condition (MA) What reading provider will be dictating this exam?->CRC FINDINGS: BRAIN/VENTRICLES: There is no acute intracranial hemorrhage, mass effect or midline shift.  No abnormal extra-axial fluid collection.  The gray-white differentiation is maintained without evidence of an acute infarct.  There is no evidence of hydrocephalus. There is prominent dilatation of the ventricles and sulci representing age-appropriate atrophy. There is mild periventricular and subcortical white matter hypodensity representing age-appropriate small vessel ischemia. ORBITS: Again seen are changes of bilateral cataract surgery.  The orbits are otherwise normal in appearance. SINUSES: The visualized paranasal sinuses and mastoid air cells demonstrate no acute abnormality. SOFT TISSUES/SKULL:  No acute abnormality of the visualized skull or soft tissues.     1. No acute intracranial abnormality. 2. Prominent age-appropriate atrophy and mild small vessel ischemia.     XR CHEST PORTABLE  Result Date: 4/29/2025  EXAMINATION: ONE XRAY VIEW OF THE CHEST 4/29/2025 7:46 pm COMPARISON: None. HISTORY: ORDERING SYSTEM PROVIDED HISTORY: altered mental status TECHNOLOGIST PROVIDED HISTORY: Reason for exam:->altered mental status What reading provider will be dictating this exam?->CRC FINDINGS: The lungs are without acute focal process.  There is no effusion or pneumothorax. The cardiomediastinal silhouette is without acute process. The osseous structures are without acute process.     No acute process.         IMPRESSION AND SUGGESTION:  Shock, etiology unclear, resolved  Acute encephalopathy, improved  MIGUE, improved  Pleural effusion, previous thoracentesis shows exudative with neutrophil predominant and negative for malignancy  Hiatal hernia and dilated esophagus    Continue current antibiotic.    Chest x-ray done 2 days ago shows small bilateral pleural effusion stable and unchanged.  Repeat chest x-ray today

## 2025-05-27 NOTE — DISCHARGE INSTR - COC
Continuity of Care Form    Patient Name: Emerson Ames   :  1928  MRN:  [id number]    Admit date:  2025  Discharge date:  2025    Code Status Order: Limited   Advance Directives:    Date/Time Healthcare Directive Type of Healthcare Directive Copy in Chart Healthcare Agent Appointed Healthcare Agent's Name Healthcare Agent's Phone Number    25 1035 Yes, patient has an advance directive for healthcare treatment  Living will;Durable power of  for health care  Yes, copy in chart  --  Kelsie Jose M  [phone]             Admitting Physician:  Laron Kelsey MD  PCP: Misty Vela MD    Discharging Nurse: Kori CARRILLO  Discharging Hospital Unit/Room#: W275/W275-01  Discharging Unit Phone Number: [phone]    Emergency Contact:   Extended Emergency Contact Information  Primary Emergency Contact: KELSIE AMES  Address: [address] [place] [address] [facility]  Home Phone: [phone]  Mobile Phone: [phone]  Relation: Child  Secondary Emergency [place] [address]  Home Phone: [phone]  Mobile Phone: [phone]  Relation: Other Relative    Past Surgical History:  Past Surgical History:   Procedure Laterality Date    IR MIDLINE CATH  2025    IR MIDLINE C[place] 20[place] [facility] [place]    LUMBAR PUNCTURE N/A 2025    12.25 ml clear colorless CSF obtained performed by Courtney Lawrence IR CNP    THORACENTESIS Right 2025    600 ml removed by HA CNP - diagnostics sent    TONSILLECTOMY      UPPER GASTROINTESTINAL ENDOSCOPY N/A 2025    ESOPHAGOGASTRODUODENOSCOPY performed by Peña Dawn MD at [facility]       Immunization History:   Immunization History   Administered Date(s) Administered    COVID-19, PFIZER Bivalent, DO NOT Dilute, (age 12y+), IM, 30 mcg/0.3 mL 2022, 2023    COVID-19, PFIZER GRAY top, DO NOT Dilute, (age 12 y+), IM, 30 mcg/0.3 mL 2022    COVID-19, PFIZER  on 5/27/25 at 2:16 PM EDT    PHYSICIAN SECTION    Prognosis: {Prognosis:6329682647}    Condition at Discharge: { Patient Condition:641523221}    Rehab Potential (if transferring to Rehab): {Prognosis:9691893284}    Recommended Labs or Other Treatments After Discharge: ***    Physician Certification: I certify the above information and transfer of Emerson Salguero  is necessary for the continuing treatment of the diagnosis listed and that he requires Skilled Nursing Facility for less 30 days.     Update Admission H&P: {CHP DME Changes in HandP:598611990}    PHYSICIAN SIGNATURE:  Electronically signed by Pennie Boone MD on 5/28/25 at 2:06 PM EDT

## 2025-05-27 NOTE — CONSULTS
Spiritual Health History and Assessment/Progress Note  [facility]    Spiritual/Emotional Needs, Pre-Procedural,  ,  ,      Name: Emerson Salguero MRN: [id number]    Age: 89 y.o.     Sex: male   Language: English   Mandaeism: Latter day   Unresponsive     Date: 2025            Total Time Calculated: 60 min              Spiritual Assessment continued in [facility] 2W ORTHO TELE        Referral/Consult From: Family   Encounter Overview/Reason: Spiritual/Emotional Needs, Pre-Procedural  Service Provided For: Patient and family together     consulted to provide support for patient and son when patient had a procedure in GI. Patient's wife  recently and patient's son anxious about his father. Chaplaion provided prayer and a supportive presence to the patient during the procedure.    Phoebe, Belief, Meaning:   Patient identifies as spiritual  Family/Friends identify as spiritual      Importance and Influence:  Patient has spiritual/personal beliefs that influence decisions regarding their health  Family/Friends have spiritual/personal beliefs that influence decisions regarding the patient's health    Community:  Patient feels well-supported. Support system includes: Children and Extended family  Family/Friends feel well-supported. Support system includes: Spouse/Partner, Children, and Extended family    Assessment and Plan of Care:     Patient Interventions include: Explored spiritual coping/struggle/distress and Provided sacramental/Scientology ritual  Family/Friends Interventions include: Explored spiritual coping/struggle/distress and Provided sacramental/Scientology ritual    Patient Plan of Care: Spiritual Care available upon further referral  Family/Friends Plan of Care: No spiritual needs identified for follow-up    Electronically signed by Chaplain Unruly on 2025 at 4:03 PM

## 2025-05-27 NOTE — PLAN OF CARE
Problem: Safety - Adult  Goal: Free from fall injury  Outcome: Progressing     Problem: Discharge Planning  Goal: Discharge to home or other facility with appropriate resources  Outcome: Progressing     Problem: Skin/Tissue Integrity  Goal: Skin integrity remains intact  Description: 1.  Monitor for areas of redness and/or skin breakdown2.  Assess vascular access sites hourly3.  Every 4-6 hours minimum:  Change oxygen saturation probe site4.  Every 4-6 hours:  If on nasal continuous positive airway pressure, respiratory therapy assess nares and determine need for appliance change or resting period  Outcome: Progressing     Problem: ABCDS Injury Assessment  Goal: Absence of physical injury  Outcome: Progressing

## 2025-05-27 NOTE — PROGRESS NOTES
Gastroenterology Progress Note    Emerson Salguero is a 89 y.o. male patient.  Hospitalization Day:3    Chief C/O: abnormal CT, dilated esophagus    SUBJECTIVE: no acute events overnight, Hgb 9.5, has been NPO, no CP, SOB, N/V, hypersalivation, or crepitus    ROS:  Gastrointestinal ROS: no abdominal pain, change in bowel habits, or black or bloody stools    Physical    VITALS:  BP (!) 173/71   Pulse 64   Temp (!) 96 °F (35.6 °C) (Axillary)   Resp 18   Ht 1.727 m (5' 7.99\")   Wt 82.6 kg (182 lb 1.6 oz)   SpO2 94%   BMI 27.69 kg/m²   TEMPERATURE:  Current - Temp: (!) 96 °F (35.6 °C); Max - Temp  Av.2 °F (36.2 °C)  Min: 96 °F (35.6 °C)  Max: 98.3 °F (36.8 °C)    General:  Alert and oriented,  No apparent distress  Skin- without jaundice  Eyes: anicteric sclera  Cardiac: RRR, Nl s1s2, without murmurs  Lungs CTA Bilaterally, normal effort  Abdomen soft, ND, NT, no HSM, Bowel sounds normal  Ext: without edema  Neuro: no asterixis     Data    Data Review:    Recent Labs     25  1307 25  0454 25  0517   WBC 10.3 6.8 5.9   HGB 10.0* 9.8* 9.5*   HCT 30.5* 28.6* 27.8*   .4* 102.1* 100.0*    274 228     Recent Labs     25  1307 25  1540 25  0454 25  0517     --  139 139   K 5.8* 4.6 4.0 4.0   CL 99  --  108* 106   CO2 26  --  22 23   PHOS  --   --   --  2.3   BUN 35*  --  28* 22   CREATININE 1.36*  --  1.06 0.90     Recent Labs     25  1307 25  0454   AST 32 25   ALT 18 21   BILITOT 0.4 0.5   ALKPHOS 95 94     No results for input(s): \"LIPASE\", \"AMYLASE\" in the last 72 hours.  Recent Labs     25  1307   PROTIME 13.3   INR 1.0           ASSESSMENT:  89 y/o male sent by EMS from [facility] for unresponsive episode, on arrival he was noted to be hypotensive and he was admitted to the intensive care unit.  Clinically he is improved, he is alert oriented to self, blood pressure is improved after IV fluids, did not require vasopressors.  verbalized understanding of the increased risks given his advanced age which include but are not limited to anesthesia complications, esophageal mucosal injury, perforation, hemorrhage, and or infection.     PLAN :  -NPO for EGD today  -continue IV PPI  -further recommendations to follow    Thank you for allowing me to participate in the care of your patient.  Please feel free to contact me with any concerns.    Peña Dawn MD

## 2025-05-27 NOTE — TELEPHONE ENCOUNTER
PATIENT'S SON IS CALLING, FATHER IN PATIENT AT HOSPITAL. HE IS WANTING DR PORTER TO CALL HIM. HE IS STATING HE NEVER SPOKE TO HIM ABOUT THE CT SCAN THAT WAS DONE ON 5/22/25. HIS FATHER IS BEING DISCHARGED TOMORROW AND WANTS TO SPEAK TO DR PORTER BEFORE THAT.

## 2025-05-27 NOTE — CARE COORDINATION
I spoke with the patient's son. Offer made for him to ask questions and they were answered to his satisfaction. Mr. Salguero was resting with his eyes closed. Pneumonia booklet and zone pamphlet provided. Pt to return to [facility]. Electronically signed by Lee Trivedi RN on 5/27/2025 at 3:09 PM

## 2025-05-27 NOTE — PROGRESS NOTES
[facility]  Facility/Department: [facility] 2W ORTHO TELE  Speech Language Pathology   Treatment Note      Emerson Salguero  1928  W275/W275-01  [x]   confirmed      Date: 2025    Elevated troponin [R79.89]  Unresponsive [R41.89]  Hypothermia, initial encounter [T68.XXXA]  Hypotension, unspecified hypotension type [I95.9]  Altered mental status, unspecified altered mental status type [R41.82]         ADULT DIET; Dysphagia - Pureed; Mildly Thick ([place])     Respiratory Status:O2 Flow Rate (L/min): 3 L/min (25 1210)   No active isolations      Subjective:  Alert and Cooperative        Interventions used this date:  Dysphagia Treatment      Objective/Assessment:  Patient progressing towards goals:  Goal 1: Patient will tolerate a puree diet with mildly thick liquids with liquids by spoon with adequate mastication and oral clearance with no overt s/s of difficulty or aspiration.  PO diet not started until EGD was completed.  Per son report, pt was on puree diet and mildly thick liquids at SNF, with recent advancement to minced and moist by SLP.  EGD revealed hiatal hernia and esophagitis.    Pt fed self applesauce, consumed 100% with occasional verbal cues to slow rate and mildly thick water from cup (3 oz) with no overt s/s of aspiration.  Pt exhibited hoarse vocal quality at baseline.  Throat clear occurred x 2 post swallow.  Hyolaryngeal elevation present.  Pt denies globus sensation.    Goal 2: Pt will complete oral motor ROM and strengthening exercises with 80% accuracy, given cues as needed, in order to strengthen lingual/labial/buccal musculature to promote safety and efficiency of oral phase of swallow and decrease risk for pocketing.  Goal 3: SLP to continue to assess need for MBS study in order to more objectively assess pharyngeal phase of swallow and determine least restrictive diet consistency.  MBS not indicated at this time.  Initial MBS completed 25.    Rec: continue with goals and

## 2025-05-27 NOTE — CONSULTS
Palliative Care Consult Note  Patient: Emerson Salguero  Gender: male  YOB: 1928  Unit/Bed: W275/W275-01  CodeStatus: Limited  Inpatient Treatment Team: Treatment Team:   Pennie Boone MD Murry, Paul J, MD Zaizafoun, Manaf, MD Khallafi, Hicham, MD Robke, Jason M, MD Dadas, Gladis NOBLE, APRN - CNP  Peña Dawn MD Wasily, Shannon, RCP Scali, Gayle, RN West, Robin M, RN  Admit Date:  5/24/2025    Chief Complaint: Unresponsive    History of Presenting Illness:      Emerson Salguero is a 89 y.o. male on hospital day 3 with a history of back pain, colitis, hypertension with recent admission for acute encephalopathy secondary to hallucinations, hyponatremia, acute hypoxic respiratory failure secondary pneumonia.    Patient was brought to the emergency room with unresponsive episode.  Per notes patient was at [facility]CF and was found unresponsive at lunch table.. Patient was admitted for shock likely septic in nature, aspiration pneumonia, acute encephalopathy, moderate to large pleural effusion. CT chest with incidental finding of dilated esophagus with fluid-filled and distended from the proximal third distally with fixed hiatal hernia     Palliative care was consulted for goals of care, CODE STATUS discussion, family support, and symptom management.      Patient  recently moved to [place].  Per son patient was previously up independently but since hospitalization in April patient has been a 1-2 assist.  Patient had issues with swallowing previous hospitalization causing him to be placed on a puréed and nectar thick diet.  Unsure of weight loss.    Upon entering room patient had just recently come back from EGD.  Per nurse EGD showed hiatal hernia and esophagitis.  Patient okay to eat once he works with speech therapy and diet is recommended.  Son at bedside and is grateful that patient will not need a PEG tube at this time.      Review of Systems:       Review of Systems   Unable to perform ROS: Dementia        Physical Examination:       BP (!) 173/71   Pulse 64   Temp (!) 96 °F (35.6 °C) (Axillary)   Resp 18   Ht 1.727 m (5' 7.99\")   Wt 82.6 kg (182 lb 1.6 oz)   SpO2 94%   BMI 27.69 kg/m²    Physical Exam  Cardiovascular:      Rate and Rhythm: Normal rate and regular rhythm.   Pulmonary:      Effort: Pulmonary effort is normal.      Breath sounds: Rhonchi present.   Abdominal:      General: Abdomen is flat. Bowel sounds are normal.      Palpations: Abdomen is soft.   Musculoskeletal:      Right lower leg: No edema.      Left lower leg: No edema.   Neurological:      Mental Status: He is alert. Mental status is at baseline. He is confused.   Psychiatric:         Attention and Perception: He is inattentive.         Cognition and Memory: Cognition is impaired. Memory is impaired.         Allergies:      Allergies   Allergen Reactions    Sulfa Antibiotics        Medications:      Current Facility-Administered Medications   Medication Dose Route Frequency Provider Last Rate Last Admin    hydrALAZINE (APRESOLINE) injection 10 mg  10 mg IntraVENous Q6H PRN Pennie Boone MD   10 mg at 05/27/25 0832    escitalopram (LEXAPRO) tablet 5 mg  5 mg Oral Daily Laron Kelsey MD   5 mg at 05/25/25 0825    finasteride (PROSCAR) tablet 5 mg  5 mg Oral Daily Laron Kelsey MD   5 mg at 05/25/25 0825    gabapentin (NEURONTIN) capsule 300 mg  300 mg Oral Nightly Laron Kelsey MD        losartan (COZAAR) tablet 50 mg  50 mg Oral Daily Laron Kelsey MD   50 mg at 05/25/25 0825    melatonin tablet 3 mg  3 mg Oral Daily Laron Kelsey MD        tamsulosin (FLOMAX) capsule 0.4 mg  0.4 mg Oral BID RT Laron Kelsey MD   0.4 mg at 05/25/25 0824    vancomycin (VANCOCIN) 1,000 mg in sodium chloride 0.9 % 250 mL (addEASE) IVPB  1,000 mg IntraVENous Q24H Laron Kelsey MD   Stopped at 05/26/25 2239    dextrose 5 % in lactated ringers infusion   IntraVENous Continuous Laron Kelsey  mL/hr at 05/27/25 0832 New Bag at

## 2025-05-27 NOTE — PROGRESS NOTES
before and after the administration of intravenous contrast.  Multiplanar reformatted images are provided for review.  MIP images are provided for review. Automated exposure control, iterative reconstruction, and/or weight based adjustment of the mA/kV was utilized to reduce the radiation dose to as low as reasonably achievable. COMPARISON: 05/22/2025 HISTORY: ORDERING SYSTEM PROVIDED HISTORY: PE TECHNOLOGIST PROVIDED HISTORY: Reason for exam:->PE Decision Support Exception - unselect if not a suspected or confirmed emergency medical condition->Emergency Medical Condition (MA) What reading provider will be dictating this exam?->CRC FINDINGS: Aorta: No evidence of thoracic aortic aneurysm or dissection.  No acute abnormality of the aorta.  There are moderate coronary artery calcifications present. Mediastinum: The patient is status post resection of the right submandibular gland.  There are no signs of abnormal supraclavicular axillary lymphadenopathy otherwise.  There is no evidence of significant mediastinal lymphadenopathy.  The heart and pericardium demonstrate no acute abnormality. There is trace pericardial fluid present. Lungs/Pleura: The lungs reveal a moderate to large left pleural effusion with adjacent atelectasis.  There is linear atelectasis at the base of each upper lung field.  There is eventration of right hemidiaphragm with adjacent compressive atelectasis.  No focal consolidation or pulmonary edema.  No evidence of pleural effusion or pneumothorax. Upper Abdomen: Limited images of the upper abdomen revealed marked dilation of the esophagus.  The esophagus appears fluid-filled and distended from the proximal 3rd distally.  There is a prominent fixed hiatus hernia on the left there is layering high density material within the gallbladder, could represent tiny stones. Soft Tissues/Bones: No acute bone or soft tissue abnormality.     1. No evidence of pulmonary embolism or acute pulmonary abnormality.  2. Moderate to large left pleural effusion with adjacent atelectasis. 3. Marked dilation of the esophagus with a prominent fixed hiatus hernia. 4. Cholelithiasis.     CT Head W/O Contrast  Result Date: 5/24/2025  EXAMINATION: CT OF THE HEAD WITHOUT CONTRAST  5/24/2025 2:43 pm TECHNIQUE: CT of the head was performed without the administration of intravenous contrast. Automated exposure control, iterative reconstruction, and/or weight based adjustment of the mA/kV was utilized to reduce the radiation dose to as low as reasonably achievable. COMPARISON: 05/08/2025 HISTORY: ORDERING SYSTEM PROVIDED HISTORY: ams TECHNOLOGIST PROVIDED HISTORY: Reason for exam:->ams Has a \"code stroke\" or \"stroke alert\" been called?->No Decision Support Exception - unselect if not a suspected or confirmed emergency medical condition->Emergency Medical Condition (MA) What reading provider will be dictating this exam?->CRC FINDINGS: BRAIN/VENTRICLES: There is no acute intracranial hemorrhage, mass effect or midline shift.  No abnormal extra-axial fluid collection.  The gray-white differentiation is maintained without evidence of an acute infarct. Unchanged post atrophic prominence of the lateral ventricles. ORBITS: There is an area of high density thickening of the posterior globe on the right measuring 2 mm in thickness by approximately 9 mm in width.  This could represent a previous retinal detachment.  Consider correlation with ophthalmological exam.  Similar findings were identified on 05/08/2025.  No other previous CT head exams are available for correlation. SINUSES: The visualized paranasal sinuses and mastoid air cells demonstrate no acute abnormality. SOFT TISSUES/SKULL:  No acute abnormality of the visualized skull or soft tissues.     1. No acute intracranial hemorrhage.  Unchanged atrophy and post atrophic enlargement of the lateral ventricles. 2. There is an area of high density thickening of the posterior right globe retina  measuring 2 mm in thickness by approximately 9 mm in width. This could represent a previous retinal detachment. Consider correlation with ophthalmological history.     XR CHEST PORTABLE  Result Date: 5/24/2025  EXAMINATION: ONE XRAY VIEW OF THE CHEST 5/24/2025 12:53 pm COMPARISON: 05/06/2025 HISTORY: ORDERING SYSTEM PROVIDED HISTORY: ams, aspiration TECHNOLOGIST PROVIDED HISTORY: Reason for exam:->ams, aspiration What reading provider will be dictating this exam?->CRC FINDINGS: Portable chest reveals heart to be enlarged.  Elevation seen of the right hemidiaphragm.  Mild increased markings in lung bases with small bilateral pleural effusions stable and unchanged.  Degenerative changes seen within the spine.  Vascular calcifications seen within the thoracic aorta.     Cardiomegaly with mild increased markings in lung bases with small bilateral pleural effusions stable and unchanged.     Assessment//Plan           Hospital Problems           Last Modified POA    * (Principal) Unresponsive 5/24/2025 Yes    Aspiration pneumonia of left lower lobe (HCC) 5/25/2025 Yes    Shock (HCC) 5/26/2025 Yes    Pleural effusion on left 5/26/2025 Yes    Hiatal hernia 5/26/2025 Yes    Dilation of esophagus 5/26/2025 Yes    Esophagitis 5/27/2025 Yes   Shock likely septic in nature  Aspiration pneumonia  Acute encephalopathy  Moderate to large left pleural effusion  Assessment & Plan  5/26: Palliative care, going for EGD.  Off pressors. C/w IV abx, FU ID, and GI, no overnight events, palliative care TCT 55 min  5/27: EGD today.  IV antibiotics for 3 more additional days.  Once cleared by GI patient can be discharged.  Needs to start diet prior to discharge.  Aspiration precautions. No overnight events, pt is form [place], recommend palliative and hospice care as outpt, spoke with son in great detail about the care TCT 55 min  Electronically signed by Pennie Boone MD on 5/26/25 at 11:08 AM EDT

## 2025-05-27 NOTE — ANESTHESIA PRE PROCEDURE
Department of Anesthesiology  Preprocedure Note       Name:  Emerson Salguero   Age:  89 y.o.  :  1928                                          MRN:  [id number]         Date:  2025      Surgeon: Surgeon(s):  Peña Dawn MD    Procedure: Procedure(s):  ESOPHAGOGASTRODUODENOSCOPY    Medications prior to admission:   Prior to Admission medications    Medication Sig Start Date End Date Taking? Authorizing Provider   QUEtiapine (SEROQUEL) 25 MG tablet Take 1 tablet by mouth nightly 25  Yes Farideh Ram MD   levETIRAcetam (KEPPRA) 500 MG tablet Take 1 tablet by mouth 2 times daily 25  Yes Farideh Ram MD   hydroCHLOROthiazide (HYDRODIURIL) 25 MG tablet Take 1 tablet by mouth daily   Yes Ana Haq MD   escitalopram (LEXAPRO) 5 MG tablet Take 1 tablet by mouth daily   Yes Ana Haq MD   losartan (COZAAR) 50 MG tablet Take 1 tablet by mouth daily   Yes Ana Haq MD   melatonin 3 MG TABS tablet Take 1 tablet by mouth daily   Yes Ana Haq MD   Acetaminophen (TYLENOL ARTHRITIS EXT RELIEF PO) Take 1,300 mg by mouth 2 times daily   Yes Ana Haq MD   tamsulosin (FLOMAX) 0.4 MG capsule Take 1 capsule by mouth daily  Patient taking differently: Take 1 capsule by mouth in the morning and 1 capsule in the evening. 3/9/21  Yes Ra Duff MD   finasteride (PROSCAR) 5 MG tablet Take 1 tablet by mouth daily 21  Yes Miguel Alarcon RDO   Cholecalciferol (VITAMIN D) 50 MCG ( UT) CAPS capsule Take by mouth daily    Yes Ana Haq MD   gabapentin (NEURONTIN) 600 MG tablet Take 100 mg by mouth nightly.   Yes Ana Haq MD   Glycerin-Polysorbate 80 (REFRESH DRY EYE THERAPY OP) Apply 1 drop to eye as needed (dry eye) Bilat eyes    Ana Haq MD   albuterol (PROVENTIL) (2.5 MG/3ML) 0.083% nebulizer solution Take 3 mLs by nebulization every 6 hours as needed for Wheezing    Ana Haq MD   [facility]

## 2025-05-27 NOTE — ADDENDUM NOTE
Addendum  created 05/27/25 1239 by Jennifer العلي APRN - CRNA    Intraprocedure Meds edited, Orders acknowledged in Narrator

## 2025-05-27 NOTE — PLAN OF CARE
Problem: Safety - Adult  Goal: Free from fall injury  Outcome: Progressing, safety precautions in place     Problem: Discharge Planning  Goal: Discharge to home or other facility with appropriate resources  Outcome: Progressing; possible d/c tomorrow     Problem: Skin/Tissue Integrity  Goal: Skin integrity remains intact  Description: 1.  Monitor for areas of redness and/or skin breakdown2.  Assess vascular access sites hourly3.  Every 4-6 hours minimum:  Change oxygen saturation probe site4.  Every 4-6 hours:  If on nasal continuous positive airway pressure, respiratory therapy assess nares and determine need for appliance change or resting period  Outcome: Progressing

## 2025-05-28 ENCOUNTER — APPOINTMENT (OUTPATIENT)
Dept: GENERAL RADIOLOGY | Age: 89
DRG: 871 | End: 2025-05-28
Payer: MEDICARE

## 2025-05-28 VITALS
RESPIRATION RATE: 18 BRPM | BODY MASS INDEX: 27.58 KG/M2 | HEIGHT: 68 IN | TEMPERATURE: 97.7 F | OXYGEN SATURATION: 95 % | DIASTOLIC BLOOD PRESSURE: 76 MMHG | HEART RATE: 57 BPM | WEIGHT: 182 LBS | SYSTOLIC BLOOD PRESSURE: 157 MMHG

## 2025-05-28 LAB
CREAT SERPL-MCNC: 0.81 MG/DL (ref 0.7–1.2)
EKG ATRIAL RATE: 59 BPM
EKG DIAGNOSIS: NORMAL
EKG P AXIS: 29 DEGREES
EKG P-R INTERVAL: 228 MS
EKG Q-T INTERVAL: 462 MS
EKG QRS DURATION: 102 MS
EKG QTC CALCULATION (BAZETT): 457 MS
EKG R AXIS: 199 DEGREES
EKG T AXIS: 105 DEGREES
EKG VENTRICULAR RATE: 59 BPM

## 2025-05-28 PROCEDURE — 2500000003 HC RX 250 WO HCPCS

## 2025-05-28 PROCEDURE — 2580000003 HC RX 258

## 2025-05-28 PROCEDURE — 6370000000 HC RX 637 (ALT 250 FOR IP)

## 2025-05-28 PROCEDURE — 6360000002 HC RX W HCPCS

## 2025-05-28 PROCEDURE — 6370000000 HC RX 637 (ALT 250 FOR IP): Performed by: NURSE PRACTITIONER

## 2025-05-28 PROCEDURE — 36415 COLL VENOUS BLD VENIPUNCTURE: CPT

## 2025-05-28 PROCEDURE — 92526 ORAL FUNCTION THERAPY: CPT

## 2025-05-28 PROCEDURE — 99231 SBSQ HOSP IP/OBS SF/LOW 25: CPT | Performed by: NURSE PRACTITIONER

## 2025-05-28 PROCEDURE — 82565 ASSAY OF CREATININE: CPT

## 2025-05-28 PROCEDURE — 2700000000 HC OXYGEN THERAPY PER DAY

## 2025-05-28 PROCEDURE — 99232 SBSQ HOSP IP/OBS MODERATE 35: CPT | Performed by: NURSE PRACTITIONER

## 2025-05-28 PROCEDURE — 99232 SBSQ HOSP IP/OBS MODERATE 35: CPT | Performed by: INTERNAL MEDICINE

## 2025-05-28 PROCEDURE — 71045 X-RAY EXAM CHEST 1 VIEW: CPT

## 2025-05-28 RX ORDER — PANTOPRAZOLE SODIUM 40 MG/1
TABLET, DELAYED RELEASE ORAL
Qty: 30 TABLET | Refills: 3 | Status: SHIPPED | OUTPATIENT
Start: 2025-05-28 | End: 2025-06-10

## 2025-05-28 RX ORDER — SUCRALFATE 1 G/1
1 TABLET ORAL
Qty: 120 TABLET | Refills: 3 | Status: SHIPPED | OUTPATIENT
Start: 2025-05-28 | End: 2025-06-10

## 2025-05-28 RX ADMIN — PANTOPRAZOLE SODIUM 40 MG: 40 TABLET, DELAYED RELEASE ORAL at 05:43

## 2025-05-28 RX ADMIN — TAMSULOSIN HYDROCHLORIDE 0.4 MG: 0.4 CAPSULE ORAL at 10:06

## 2025-05-28 RX ADMIN — FINASTERIDE 5 MG: 5 TABLET, FILM COATED ORAL at 10:23

## 2025-05-28 RX ADMIN — SUCRALFATE 1 G: 1 TABLET ORAL at 12:47

## 2025-05-28 RX ADMIN — SUCRALFATE 1 G: 1 TABLET ORAL at 05:42

## 2025-05-28 RX ADMIN — ESCITALOPRAM OXALATE 5 MG: 10 TABLET ORAL at 10:22

## 2025-05-28 RX ADMIN — CEFEPIME 2000 MG: 2 INJECTION, POWDER, FOR SOLUTION INTRAVENOUS at 10:17

## 2025-05-28 RX ADMIN — DEXTROSE, SODIUM CHLORIDE, SODIUM LACTATE, POTASSIUM CHLORIDE, AND CALCIUM CHLORIDE: 5; .6; .31; .03; .02 INJECTION, SOLUTION INTRAVENOUS at 07:33

## 2025-05-28 RX ADMIN — LEVETIRACETAM 500 MG: 100 INJECTION INTRAVENOUS at 05:43

## 2025-05-28 RX ADMIN — Medication 10 ML: at 10:18

## 2025-05-28 RX ADMIN — ENOXAPARIN SODIUM 40 MG: 100 INJECTION SUBCUTANEOUS at 10:06

## 2025-05-28 RX ADMIN — LOSARTAN POTASSIUM 50 MG: 50 TABLET, FILM COATED ORAL at 12:47

## 2025-05-28 NOTE — PROGRESS NOTES
Palliative Care Progress Note  Patient: Emerson Salguero  Gender: male  YOB: 1928  Unit/Bed: W275/W275-01  CodeStatus: Limited  Inpatient Treatment Team: Treatment Team:   Pennie Boone MD Murry, Paul J, MD Zaizafoun, Manaf, MD Khallafi, Hicham, MD Robke, Jason M, MD Dadas, Gladis NOBLE, APRN - CNP  López, MD Hong Pompa, LORENA Thurman Rami, MD Chaney, Joann Diaz, Kimberly, RN Jones, Thirkeshia, RN Hipp, Lisa, LORENA  Admit Date:  5/24/2025    Chief Complaint: Unresponsive    History of Presenting Illness:      Emerson Salguero is a 89 y.o. male on hospital day 4 with a history of back pain, colitis, hypertension with recent admission for acute encephalopathy secondary to hallucinations, hyponatremia, acute hypoxic respiratory failure secondary pneumonia.    Patient was brought to the emergency room with unresponsive episode.  Per notes patient was at [facility] and was found unresponsive at lunch table.. Patient was admitted for shock likely septic in nature, aspiration pneumonia, acute encephalopathy, moderate to large pleural effusion. CT chest with incidental finding of dilated esophagus with fluid-filled and distended from the proximal third distally with fixed hiatal hernia     Palliative care was consulted for goals of care, CODE STATUS discussion, family support, and symptom management.      Patient  recently moved to [place].  Per son patient was previously up independently but since hospitalization in April patient has been a 1-2 assist.  Patient had issues with swallowing previous hospitalization causing him to be placed on a puréed and nectar thick diet.  Unsure of weight loss.    Upon entering room patient had just recently come back from EGD.  Per nurse EGD showed hiatal hernia and esophagitis.  Patient okay to eat once he works with speech therapy and diet is recommended.  Son at bedside and is grateful that patient will not need a PEG tube at this time.    5/28/25    Patient laying  the system, including grammar, punctuation and spelling as well as words and phrases that may seem inaccurate.  For any questions or concerns feel free to contact me for clarification.    Thanks for the opportunity you have allowed us to provide palliative care to Emerson Jose M. We will be in touch as care progresses. Please feel free to reach out to us should you have any questions or requests.

## 2025-05-28 NOTE — PROGRESS NOTES
Infectious Diseases Inpatient Progress Note          HISTORY OF PRESENT ILLNESS:  Follow up shock on admission with acute kidney injury and acute encephalopathy, left pleural effusion, esophageal dilatation on IV vancomycin and cefepime for possible healthcare acquired pneumonia, well tolerated.  Patient continues to improve with improving mentation.  Currently on room air.  He is hard of hearing.  Positive occasional cough reported.  Denies any shortness of breath.  no pain.   Persistent disorientation.  No agitation or hallucination noted    Current Medications:     sucralfate  1 g Oral 4x Daily AC & HS    pantoprazole  40 mg Oral BID AC    escitalopram  5 mg Oral Daily    finasteride  5 mg Oral Daily    gabapentin  300 mg Oral Nightly    losartan  50 mg Oral Daily    melatonin  3 mg Oral Daily    tamsulosin  0.4 mg Oral BID RT    vancomycin  1,000 mg IntraVENous Q24H    levETIRAcetam  500 mg IntraVENous Q12H    sodium chloride flush  5-40 mL IntraVENous 2 times per day    enoxaparin  40 mg SubCUTAneous Daily    cefepime  2,000 mg IntraVENous Q12H    vancomycin (VANCOCIN) intermittent dosing (placeholder)   Other RX Placeholder       Allergies:  Sulfa antibiotics      Review of Systems  Limited review of system because of hard of hearing.      Physical Exam  Vitals:    05/27/25 1237 05/27/25 1243 05/27/25 1304 05/27/25 2117   BP: (!) 191/84 (!) 145/75 (!) 157/65 (!) 173/75   Pulse:  61 64 62   Resp:   18 16   Temp:  97.7 °F (36.5 °C)  97.7 °F (36.5 °C)   TempSrc:  Temporal Oral Oral   SpO2:  95% 94% 95%   Weight:       Height:         General Appearance: alert and oriented to self only,  in no acute distress  On room air  Hard of hearing  Skin: warm and dry, no rash.   Head: normocephalic and atraumatic  Eyes: anicteric sclerae  ENT:  normal mucous membranes. No oral thrush  Lungs: normal respiratory effort, clear lungs, diminished breath sounds left base  Heart normal S1-S2, no murmur  Abdomen: soft, no  SNF    Discussed with patient  I had a lengthy discussion with the son who is anxious about the dad especially after losing his mom recently.  He is concerned about recurrent pneumonia.     Kylah Kemp MD

## 2025-05-28 NOTE — DISCHARGE INSTR - DIET
Good nutrition is important when healing from an illness, injury, or surgery.  Follow any nutrition recommendations given to you during your hospital stay.   If you were given an oral nutrition supplement while in the hospital, continue to take this supplement at home.  You can take it with meals, in-between meals, and/or before bedtime. These supplements can be purchased at most local grocery stores, pharmacies, and chain [employer]-stores.   If you have any questions about your diet or nutrition, call the hospital and ask for the dietitian.  Pureed/[place] Thickened Liquids

## 2025-05-28 NOTE — CARE COORDINATION
Update were called to Itzel at [facility].  [facility] was set up for 2pm today.  Son updated.

## 2025-05-28 NOTE — PROGRESS NOTES
Patient has been lethargic but easily aroused this shift . He is oriented to person. Patient has tolerated PO diet. He denies pain. Patient and son has been updated on plan of care and discharge. This writer called report to Deanna Nurse manager at [facility]. Plan of care ongoing.

## 2025-05-28 NOTE — PLAN OF CARE
Problem: Safety - Adult  Goal: Free from fall injury  5/28/2025 1338 by Kori Jimenez RN  Outcome: Progressing  5/28/2025 0600 by Deana Cowan RN  Outcome: Progressing     Problem: Discharge Planning  Goal: Discharge to home or other facility with appropriate resources  5/28/2025 1338 by Kori Jimenez RN  Outcome: Progressing  5/28/2025 0600 by Deana Cowan RN  Outcome: Progressing     Problem: Skin/Tissue Integrity  Goal: Skin integrity remains intact  Description: 1.  Monitor for areas of redness and/or skin breakdown2.  Assess vascular access sites hourly3.  Every 4-6 hours minimum:  Change oxygen saturation probe site4.  Every 4-6 hours:  If on nasal continuous positive airway pressure, respiratory therapy assess nares and determine need for appliance change or resting period  5/28/2025 0600 by Deana Cowan, RN  Outcome: Progressing     Problem: ABCDS Injury Assessment  Goal: Absence of physical injury  5/28/2025 0600 by Deana Cowan RN  Outcome: Progressing

## 2025-05-28 NOTE — PROGRESS NOTES
Gastroenterology Progress Note    Emerson Salguero is a 89 y.o. male patient.  Hospitalization Day:4    Chief C/O: Dysphagia    SUBJECTIVE: Seen and examined, no acute events, status post EGD with LA grade D esophagitis and 5 cm PEH, has been tolerating a diet, no chest pain, no shortness of breath, no nausea or vomiting    ROS:  Gastrointestinal ROS: no abdominal pain, change in bowel habits, or black or bloody stools    Physical    VITALS:  BP (!) 173/75   Pulse 62   Temp 97.7 °F (36.5 °C) (Oral)   Resp 16   Ht 1.727 m (5' 8\")   Wt 82.6 kg (182 lb)   SpO2 95%   BMI 27.67 kg/m²   TEMPERATURE:  Current - Temp: 97.7 °F (36.5 °C); Max - Temp  Av.7 °F (36.5 °C)  Min: 97.7 °F (36.5 °C)  Max: 97.7 °F (36.5 °C)    General:  Alert and oriented,  No apparent distress  Skin- without jaundice  Eyes: anicteric sclera  Cardiac: RRR, Nl s1s2, without murmurs  Lungs CTA Bilaterally, normal effort  Abdomen soft, ND, NT, no HSM, Bowel sounds normal  Ext: without edema  Neuro: no asterixis     Data    Data Review:    Recent Labs     25  0517   WBC 5.9   HGB 9.5*   HCT 27.8*   .0*        Recent Labs     25  0517 25  0931     --    K 4.0  --      --    CO2 23  --    PHOS 2.3  --    BUN 22  --    CREATININE 0.90 0.81     No results for input(s): \"AST\", \"ALT\", \"BILIDIR\", \"BILITOT\", \"ALKPHOS\" in the last 72 hours.    Invalid input(s): \"ALB\"  No results for input(s): \"LIPASE\", \"AMYLASE\" in the last 72 hours.  No results for input(s): \"PROTIME\", \"INR\" in the last 72 hours.    Endoscopic Hx:  EGD Dr Dawn 25  Impression:         - Lax lower esophageal sphincter on retroflexed views         - LA Grade D reflux esophagitis with no bleeding extending into mid            esophagus.         -  Circumferential ulcerations noted distal esophagus suggestive of            ulcerative esophagitis         -  5 cm paraesophageal hernia.         -  Esophagogastric landmarks identified.          esophagus and fluid-filled esophagus, patient's son has declined to proceed with upper endoscopy.  5/27/25 no acute events overnight, Hgb 9.5, has been NPO, no CP, SOB, N/V, hypersalivation, or crepitus. Yesterday PM after multiple lengthy conversations with multiple different providers the patients son decided ultimately to proceed with upper endoscopy, he verbalized understanding of the increased risks given his advanced age which include but are not limited to anesthesia complications, esophageal mucosal injury, perforation, hemorrhage, and or infection.  Theoretically, would need a repeat EGD in 8 to 12 weeks to assess healing and truly characterization of underlying distal esophageal mucosa however patient is high risk for any procedures given the advanced age .         PLAN :  - Continue PPI twice daily for 3 months then decrease to daily  - Continue Carafate 4 times daily x 4 weeks  - Okay to discharge from GI perspective, follow-up with GI as needed  Dysphagia precautions:  Set up required  Double swallow  Feed in upright position  Small bites/sips  Remain upright after meals  Eat/Feed at a slow rate   Thank you for allowing me to participate in the care of your patient.  Please feel free to contact me with any concerns.    Precious Russell, APRN - CNP

## 2025-05-28 NOTE — PLAN OF CARE
Problem: Safety - Adult  Goal: Free from fall injury  5/28/2025 0600 by Deana Cowan RN  Outcome: Progressing  5/27/2025 1850 by Lucy Avendaño RN  Outcome: Progressing     Problem: Discharge Planning  Goal: Discharge to home or other facility with appropriate resources  5/28/2025 0600 by Deana Cowan RN  Outcome: Progressing  5/27/2025 1850 by Lucy Avendaño RN  Outcome: Progressing     Problem: Skin/Tissue Integrity  Goal: Skin integrity remains intact  Description: 1.  Monitor for areas of redness and/or skin breakdown2.  Assess vascular access sites hourly3.  Every 4-6 hours minimum:  Change oxygen saturation probe site4.  Every 4-6 hours:  If on nasal continuous positive airway pressure, respiratory therapy assess nares and determine need for appliance change or resting period  5/28/2025 0600 by Deana Cowan RN  Outcome: Progressing  5/27/2025 1850 by Lucy Avendaño RN  Outcome: Progressing     Problem: ABCDS Injury Assessment  Goal: Absence of physical injury  Outcome: Progressing     Problem: Pain  Goal: Verbalizes/displays adequate comfort level or baseline comfort level  Outcome: Progressing

## 2025-05-28 NOTE — PROGRESS NOTES
[facility]  Facility/Department: [facility] 2W ORTHO TELE  Speech Language Pathology   Treatment Note      Emerson Salguero  1928  W275/W275-01  [x]   confirmed      Date: 2025    Elevated troponin [R79.89]  Unresponsive [R41.89]  Hypothermia, initial encounter [T68.XXXA]  Hypotension, unspecified hypotension type [I95.9]  Altered mental status, unspecified altered mental status type [R41.82]         ADULT DIET; Dysphagia - Pureed; Mildly Thick (Nectar)  ADULT ORAL NUTRITION SUPPLEMENT; Breakfast, Lunch, Dinner; Frozen Oral Supplement     Respiratory Status:O2 Flow Rate (L/min): 3 L/min (25 1210)   No active isolations      Subjective:  Alert and Cooperative        Interventions used this date:  Therapeutic Meal Monitor      Objective/Assessment:  Patient's son stopped this SLP in hallway earlier this date reporting patient was coughing for 5 minutes with last night's dinner.  Informed him I would be present during lunch this date.    When asked pt if he can feed himself, pt requested assistance.  Total feed at this time.  Pt tolerated puree textures with 1-2 swallows to clear and no overt s/s of aspiration.  Pt tolerated mildly thick water from spoon and cup with similar findings.  Pt consumed approx 50% and congested coughing was observed.  Pt reported no globus sensation.  Suspect related to recent EGD findings of hiatal hernia and esophagitis.  Educated son if patient appears he is getting fatigued during po intake and coughing is occurring, to stop and take a break and to stop meal completely if pt is exhibiting significant coughing, as he reported with last night's dinner.  Pt resumed puree and mildly thick but then appeared to fatigue with closing eyes and having difficulty keeping head upright.  Intermittent throat clear/cough occurred and meal was stopped.  Educated son on 3 pillars of aspiration, swallow strategies, and importance of oral hygiene.   Son verbalized understanding.    Rec:  continue with puree diet with mildly thick liquids  Rec: continue speech therapy at next level of care  Discussed with Kori CARRILLO      Treatment/Activity Tolerance:  Patient tolerated treatment well    Plan:  Continue per POC    Pain Assessment:  Patient does not c/o pain.    Pain Re-assessment:  Patient does not c/o pain.    Patient/Caregiver Education:  Caregiver education on session and progress towards goals.  Caregiver stated verbal understanding of directions.    Safety Devices:  Bed alarm in place and Call light within reach      Therapy Time  SLP Individual Minutes  Time In: 1245  Time Out: 1330  Minutes: 45            Signature: Electronically signed by VIKA Guardado on 5/28/2025 at 2:32 PM

## 2025-05-28 NOTE — PROGRESS NOTES
INPATIENT PROGRESS NOTES    PATIENT NAME: Emerson Salguero  MRN: [id number]  SERVICE DATE:  May 28, 2025   SERVICE TIME:  10:00 AM      PRIMARY SERVICE: Pulmonary Disease    CHIEF COMPLAINTS: Pleural effusion    INTERVAL HPI: Patient seen and examined at bedside, Interval Notes, orders reviewed. Nursing notes noted    Patient report no complaint, no pain, and no shortness of breath.  No fever, he is on room air saturation 95%.    New information updated in the note today, rest of the examination did not change compared to yesterday.    Review of system:     GI Abdominal pain No  Skin Rash No    Social History     Tobacco Use    Smoking status: Never    Smokeless tobacco: Never   Substance Use Topics    Alcohol use: Not Currently         Problem Relation Age of Onset    Colon Cancer Neg Hx          OBJECTIVE    Body mass index is 27.67 kg/m².    PHYSICAL EXAM:  Vitals:  BP (!) 173/75   Pulse 62   Temp 97.7 °F (36.5 °C) (Oral)   Resp 16   Ht 1.727 m (5' 8\")   Wt 82.6 kg (182 lb)   SpO2 95%   BMI 27.67 kg/m²     General: alert, cooperative, no distress  Head: normocephalic, atraumatic  Eyes:No gross abnormalities.  ENT:  MMM no lesions  Neck:  supple and no masses  Chest : Diminished air movement, no wheezing, no rales, nontender, tympanic  Heart:: Heart sounds are normal.  Regular rate and rhythm without murmur, gallop or rub.  ABD:  symmetric, soft, non-tender  Musculoskeletal : no cyanosis, no clubbing, and no edema  Neuro:  Grossly normal  Skin: No rashes or nodules noted.  Lymph node:  no cervical nodes  Urology: No [facility]man   Psychiatric: appropriate    DATA:   Recent Labs     05/26/25  0517   WBC 5.9   HGB 9.5*   HCT 27.8*   .0*        Recent Labs     05/26/25  0517 05/28/25  0931     --    K 4.0  --      --    CO2 23  --    BUN 22  --    CREATININE 0.90 0.81   GLUCOSE 99  --    CALCIUM 8.7  --    LABGLOM 77.6 80.2       MV Settings:          No results for input(s): \"PHART\", \"[id number]\",

## 2025-05-28 NOTE — PROGRESS NOTES
Catheter removed intact.   Patient discharged to [facility] via [facility]. Patient VSS and no acute distress. Telemetry removed. Peripheral IV remain in place per Deanna nurse manager request at [facility].

## 2025-05-28 NOTE — DISCHARGE SUMMARY
Discharge Summary    Date: 5/28/2025  Patient Name: Emerson Salguero    YOB: 1928     Age: 89 y.o.    Admit Date: 5/24/2025  Discharge Date: 5/28/2025  Discharge Condition: Fair    Admission Diagnosis  Elevated troponin [R79.89];Unresponsive [R41.89];Hypothermia, initial encounter [T68.XXXA];Hypotension, unspecified hypotension type [I95.9];Altered mental status, unspecified altered mental status type [R41.82]      Discharge Diagnosis  Principal Problem:    Unresponsive  Active Problems:    Healthcare-associated pneumonia    Aspiration pneumonia of left lower lobe (HCC)    Shock (HCC)    Pleural effusion on left    Hiatal hernia    Dilation of esophagus    Esophagitis    Encounter for hospice care discussion  Resolved Problems:    * No resolved hospital problems. *      Hospital Stay  Narrative of Hospital Course:  Patient was admitted and treated for the following conditions.  Patient has chronic pleural effusion and follows with pulmonary as outpatient on room air.  Patient had EGD showing erosive esophagitis on Protonix and Carafate.  Seroquel  was discontinued   Shock likely septic in nature  · Aspiration pneumonia  · Acute encephalopathy  · Moderate to large left pleural effusion  Assessment & Plan  5/26: Palliative care, going for EGD.  Off pressors. C/w IV abx, FU ID, and GI, no overnight events, palliative care TCT 55 min  5/27: EGD today.  IV antibiotics for 3 more additional days.  Once cleared by GI patient can be discharged.  Needs to start diet prior to discharge.  Aspiration precautions. No overnight events, pt is form [facility], recommend palliative and hospice care as outpt, spoke with son in great detail about the care TCT 55 min      Consultants:  PHARMACY TO DOSE VANCOMYCIN  IP CONSULT TO INFECTIOUS DISEASES  IP CONSULT TO CRITICAL CARE  IP CONSULT TO GI  IP CONSULT TO THORACIC SURGERY  IP CONSULT TO PALLIATIVE CARE  IP CONSULT TO SPIRITUAL SERVICES  IP CONSULT TO  Roche.  Value may be falsely decreased. Suggest  recollection if clinically indicated.    ALT                                           Date: 05/24/2025  Value: 18          Ref range: 0 - 41 U/L         Status: Final                Comment: Specimen hemolysis has exceeded the interference as defined  by Roche.  Result may be affected. Suggest recollection if  clinically indicated.    AST                                           Date: 05/24/2025  Value: 32          Ref range: 0 - 40 U/L         Status: Final                Comment: Specimen hemolysis has exceeded the interference as defined  by Roche.  Value may be falsely increased. Suggest  recollection if clinically indicated.    Globulin                                      Date: 05/24/2025  Value: 3.3         Ref range: 2.3 - 3.5 g/dL     Status: Final  Lactic Acid                                   Date: 05/24/2025  Value: 2.3 (H)     Ref range: 0.5 - 2.2 mmol/L   Status: Final  Troponin, High Sensitivity                    Date: 05/24/2025  Value: 85 (HH)     Ref range: 0 - 19 ng/L        Status: Final                Comment: High Sensitivity Troponin values cannot be compared with  other Troponin methodologies.    Color, UA                                     Date: 05/24/2025  Value: Yellow      Ref range: Straw/Yellow       Status: Final  Clarity, UA                                   Date: 05/24/2025  Value: Clear       Ref range: Clear              Status: Final  Glucose, Ur                                   Date: 05/24/2025  Value: Negative    Ref range: Negative mg/dL     Status: Final  Bilirubin, Urine                              Date: 05/24/2025  Value: Negative    Ref range: Negative           Status: Final  Ketones, Urine                                Date: 05/24/2025  Value: Negative    Ref range: Negative mg/dL     Status: Final  Specific Moore, UA                          Date: 05/24/2025  Value: 1.015       Ref range: 1.005 - 1.030

## 2025-05-28 NOTE — TELEPHONE ENCOUNTER
I called the son Shayne and discussed with him the CT images and the x-rays that his father had in the last week.  I answered all his questions.  I spent 20 minutes on the call.  His father has been admitted this past Saturday with hypotension, sepsis and increased work of breathing.  Apparently he is not on oxygen.  He has been treated for presumed pneumonia with antibiotics under the guidance of infectious disease.  He is potentially being discharged today or tomorrow.  In regard to the left-sided pleural effusion, I will repeat chest x-ray PA and lateral next week.  We can have a virtual visit after to go over the results.    Can you please schedule a virtual visit Tuesday or Wednesday.  Thanks

## 2025-05-28 NOTE — PROGRESS NOTES
[facility]   Pharmacy Pharmacokinetic Monitoring Service - Vancomycin     Consulting Provider: Dr. Kelsey  Indication: pneumonia  Vancomycin Day: 5  Target Concentration: Goal AUC/MICHEAL 400-600 mg*hr/L    Additional Antimicrobials: cefepime    Pertinent Laboratory Values:   Temp Readings from Last 1 Encounters:   05/27/25 97.7 °F (36.5 °C) (Oral)     Wt Readings from Last 2 Encounters:   05/27/25 82.6 kg (182 lb)   05/22/25 76.2 kg (168 lb)     Recent Labs     05/26/25  0517   BUN 22   WBC 5.9     Estimated Creatinine Clearance: 55 mL/min (based on SCr of 0.81 mg/dL).  Body mass index is 27.67 kg/m².    Cultures  Recent Labs     05/24/25  1351 05/24/25  1307 04/30/25  0306   BC  --  No Growth to date.  Any change in status will be called.  --    BLOODCULT2 No Growth to date.  Any change in status will be called.  --   --    COVID19  --   --  Not Detected   MRSA Nasal Swab: was ordered by provider, awaiting results.    Assessment:  Recent Labs     05/27/25  0509   VANCORANDOM 17.6   Note: Serum concentrations collected for AUC dosing may appear elevated if collected in close proximity to the dose administered, this is not necessarily an indication of toxicity    Recent vancomycin administrations                     vancomycin (VANCOCIN) 1,000 mg in sodium chloride 0.9 % 250 mL (addEASE) IVPB (mg) 1,000 mg New Bag 05/27/25 2009     1,000 mg New Bag 05/26/25 2115     1,000 mg New Bag 05/25/25 2129                  Plan:  Current dosing regimen is therapeutic with AUC 10.7 mg*hr/L  Continue current dose  Repeat vancomycin concentration ordered for 5/29 @ 0600  Pharmacy will continue to monitor patient and adjust therapy as indicated    Thank you,    Diamond Gonzales, PharmD, BCPS  5/28/2025 11:10 AM

## 2025-05-29 LAB
BACTERIA BLD CULT ORG #2: NORMAL
BACTERIA BLD CULT: NORMAL

## 2025-06-02 ENCOUNTER — OFFICE VISIT (OUTPATIENT)
Age: 89
End: 2025-06-02
Payer: MEDICARE

## 2025-06-02 ENCOUNTER — HOSPITAL ENCOUNTER (OUTPATIENT)
Dept: GENERAL RADIOLOGY | Age: 89
Discharge: HOME | End: 2025-06-04
Attending: INTERNAL MEDICINE
Payer: MEDICARE

## 2025-06-02 VITALS
HEIGHT: 68 IN | WEIGHT: 182 LBS | SYSTOLIC BLOOD PRESSURE: 125 MMHG | TEMPERATURE: 97 F | OXYGEN SATURATION: 97 % | HEART RATE: 44 BPM | DIASTOLIC BLOOD PRESSURE: 72 MMHG | BODY MASS INDEX: 27.58 KG/M2 | RESPIRATION RATE: 16 BRPM

## 2025-06-02 DIAGNOSIS — J90 PLEURAL EFFUSION: ICD-10-CM

## 2025-06-02 DIAGNOSIS — J69.0 RECURRENT ASPIRATION PNEUMONIA (HCC): ICD-10-CM

## 2025-06-02 DIAGNOSIS — R60.1 MODERATE GENERALIZED EDEMA: ICD-10-CM

## 2025-06-02 DIAGNOSIS — R40.1 OBTUNDATION: Primary | ICD-10-CM

## 2025-06-02 PROCEDURE — G9692 HOSP RECD BY PT DUR MSMT PER: HCPCS | Performed by: INTERNAL MEDICINE

## 2025-06-02 PROCEDURE — G8428 CUR MEDS NOT DOCUMENT: HCPCS | Performed by: INTERNAL MEDICINE

## 2025-06-02 PROCEDURE — 71046 X-RAY EXAM CHEST 2 VIEWS: CPT

## 2025-06-02 PROCEDURE — G8419 CALC BMI OUT NRM PARAM NOF/U: HCPCS | Performed by: INTERNAL MEDICINE

## 2025-06-02 PROCEDURE — 99214 OFFICE O/P EST MOD 30 MIN: CPT | Performed by: INTERNAL MEDICINE

## 2025-06-02 PROCEDURE — 99213 OFFICE O/P EST LOW 20 MIN: CPT | Performed by: INTERNAL MEDICINE

## 2025-06-02 PROCEDURE — 1036F TOBACCO NON-USER: CPT | Performed by: INTERNAL MEDICINE

## 2025-06-02 PROCEDURE — G9691 PT HOSP DUR MSMT PERIOD: HCPCS | Performed by: INTERNAL MEDICINE

## 2025-06-02 ASSESSMENT — PATIENT HEALTH QUESTIONNAIRE - PHQ9
1. LITTLE INTEREST OR PLEASURE IN DOING THINGS: NOT AT ALL
SUM OF ALL RESPONSES TO PHQ QUESTIONS 1-9: 0
2. FEELING DOWN, DEPRESSED OR HOPELESS: NOT AT ALL

## 2025-06-02 NOTE — PROGRESS NOTES
Emerson Salguero (:  1928) is a 89 y.o. male,Established patient, here for evaluation of the following chief complaint(s):  Follow-Up from Hospital and Pneumonia ([facility] f/u for possible healthcare acquired pna)         Assessment & Plan  Obtundation   New, not at goal (unstable), rule out underlying infection  Patient has chronic dementia  I had a lengthy discussion with her son explaining that patient may be having silent aspiration causing him to have recurrent severe obtundation everytime he comes off the antibiotics.  I discussed the case with Karlene who is his nurse at the [facility].   I sent a message to Barbie Reis, neurology nurse practitioner  Orders:    CBC; Future    Comprehensive Metabolic Panel; Future    C-Reactive Protein; Future    Urinalysis with Reflex to Culture; Future    Moderate generalized edema   New, not at goal (unstable), rule out urinary retention as caused of increased obtundation and worsening generalized edema.  Bladder scan was 72 ml       Recurrent aspiration pneumonia (HCC)    Recurrent hospitalization.  She has finished a course of IV vancomycin and cefepime on Saturday.   CRP on May 27 was 6.4 and procalcitonin of 0.05  Evaluate blood work and chest x-ray and accordingly make decision about restarting antibiotics at [facility].  May consider sending back to ER if lack of clinical improvement  Discussed with son         Subjective   HPI  Follow-up [facility] recent hospitalization for left-sided pneumonia with left pleural effusion, treated with IV vancomycin and cefepime.  Patient finished his course of antibiotics Saturday morning.  Saturday afternoon he became more obtunded.   He had poor appetite today.  No cough reported.   Currently in a wheelchair, sleeping.  Opens eyes briefly to verbal stimulation.  Says couple words.   On room air  Review of Systems   unable to provide ROS because of altered mentation  Patient is

## 2025-06-03 LAB
EKG ATRIAL RATE: 47 BPM
EKG ATRIAL RATE: 59 BPM
EKG DIAGNOSIS: NORMAL
EKG DIAGNOSIS: NORMAL
EKG P AXIS: 29 DEGREES
EKG P-R INTERVAL: 228 MS
EKG Q-T INTERVAL: 462 MS
EKG Q-T INTERVAL: 506 MS
EKG QRS DURATION: 102 MS
EKG QRS DURATION: 112 MS
EKG QTC CALCULATION (BAZETT): 447 MS
EKG QTC CALCULATION (BAZETT): 457 MS
EKG R AXIS: -9 DEGREES
EKG R AXIS: 199 DEGREES
EKG T AXIS: 105 DEGREES
EKG T AXIS: 66 DEGREES
EKG VENTRICULAR RATE: 47 BPM
EKG VENTRICULAR RATE: 59 BPM

## 2025-06-04 ENCOUNTER — PATIENT MESSAGE (OUTPATIENT)
Age: 89
End: 2025-06-04

## 2025-06-04 ENCOUNTER — TELEMEDICINE (OUTPATIENT)
Age: 89
End: 2025-06-04
Payer: MEDICARE

## 2025-06-04 DIAGNOSIS — R06.89 RESPIRATORY INSUFFICIENCY: ICD-10-CM

## 2025-06-04 DIAGNOSIS — J90 PLEURAL EFFUSION: Primary | ICD-10-CM

## 2025-06-04 PROCEDURE — 99214 OFFICE O/P EST MOD 30 MIN: CPT | Performed by: INTERNAL MEDICINE

## 2025-06-04 NOTE — PROGRESS NOTES
Emerson Salguero, was evaluated through a synchronous (real-time) audio-video encounter. The patient (or guardian if applicable) is aware that this is a billable service, which includes applicable co-pays. This Virtual Visit was conducted with patient's (and/or legal guardian's) consent. Patient identification was verified, and a caregiver was present when appropriate.   The patient was located at Home: [address] [place] [address] [address]  [facility] [id number]  Provider was located at Facility (Appt Dept): [address] [address]  Confirm you are appropriately licensed, registered, or certified to deliver care in the state where the patient is located as indicated above. If you are not or unsure, please re-schedule the visit: Yes, I confirm.     Emerson Salguero (:  1928) is a Established patient, presenting virtually for evaluation of the following:      Below is the assessment and plan developed based on review of pertinent history, physical exam, labs, studies, and medications.     Assessment & Plan  Pleural effusion    Small on repeated CXR.  Respiratory symptoms has been stable.  Consider repeating in 4 weeks or sooner if needed..     Orders:    XR CHEST STANDARD (2 VW); Future    Respiratory insufficiency    Multifactorial.  Has not been on oxygen.            No follow-ups on file.       Subjective  Results on CXR.      HPI    This is a virtual visit. Has been doing well every.  Son reported his vital signs from the nursing home today.  Looks good.  Was feeling good overall.  He is not using oxygen.  Had a chest x-ray which I reviewed personally and interpret the results independently.  Was a small left-sided pleural effusion.  Has elevated right Yariel diaphragm which is chronic.     Review of Systems      12 points review of systems  has been obtained and negative excepted to       Past Medical History:   Diagnosis Date    Back pain     Colitis, ulcerative (HCC)     Hypertension          Past

## 2025-06-05 ENCOUNTER — OFFICE VISIT (OUTPATIENT)
Age: 89
End: 2025-06-05
Payer: MEDICARE

## 2025-06-05 VITALS — OXYGEN SATURATION: 96 % | HEART RATE: 48 BPM | SYSTOLIC BLOOD PRESSURE: 132 MMHG | DIASTOLIC BLOOD PRESSURE: 86 MMHG

## 2025-06-05 DIAGNOSIS — R44.3 HALLUCINATION: ICD-10-CM

## 2025-06-05 DIAGNOSIS — R94.01 ABNORMAL EEG: ICD-10-CM

## 2025-06-05 DIAGNOSIS — F03.B18 MODERATE DEMENTIA WITH OTHER BEHAVIORAL DISTURBANCE, UNSPECIFIED DEMENTIA TYPE (HCC): ICD-10-CM

## 2025-06-05 DIAGNOSIS — Z09 HOSPITAL DISCHARGE FOLLOW-UP: Primary | ICD-10-CM

## 2025-06-05 DIAGNOSIS — R13.10 DYSPHAGIA, UNSPECIFIED TYPE: ICD-10-CM

## 2025-06-05 PROCEDURE — 1036F TOBACCO NON-USER: CPT | Performed by: NURSE PRACTITIONER

## 2025-06-05 PROCEDURE — G9691 PT HOSP DUR MSMT PERIOD: HCPCS | Performed by: NURSE PRACTITIONER

## 2025-06-05 PROCEDURE — G9692 HOSP RECD BY PT DUR MSMT PER: HCPCS | Performed by: NURSE PRACTITIONER

## 2025-06-05 PROCEDURE — 99213 OFFICE O/P EST LOW 20 MIN: CPT | Performed by: NURSE PRACTITIONER

## 2025-06-05 PROCEDURE — G8419 CALC BMI OUT NRM PARAM NOF/U: HCPCS | Performed by: NURSE PRACTITIONER

## 2025-06-05 PROCEDURE — 99214 OFFICE O/P EST MOD 30 MIN: CPT | Performed by: NURSE PRACTITIONER

## 2025-06-05 PROCEDURE — G8427 DOCREV CUR MEDS BY ELIG CLIN: HCPCS | Performed by: NURSE PRACTITIONER

## 2025-06-05 RX ORDER — ALBUTEROL SULFATE 90 UG/1
1 INHALANT RESPIRATORY (INHALATION) EVERY 6 HOURS PRN
COMMUNITY
Start: 2025-01-14

## 2025-06-05 RX ORDER — ESCITALOPRAM OXALATE 5 MG/1
5 TABLET ORAL DAILY
COMMUNITY
Start: 2025-01-14 | End: 2026-01-14

## 2025-06-05 RX ORDER — HYDROCHLOROTHIAZIDE 25 MG/1
25 TABLET ORAL DAILY
COMMUNITY
Start: 2025-04-11 | End: 2025-06-05

## 2025-06-05 RX ORDER — GABAPENTIN 100 MG/1
CAPSULE ORAL
COMMUNITY
Start: 2025-04-12

## 2025-06-05 RX ORDER — LOSARTAN POTASSIUM 50 MG/1
50 TABLET ORAL DAILY
COMMUNITY
Start: 2025-01-14

## 2025-06-05 NOTE — PROGRESS NOTES
Per Dr. Gan patient to go to the ER for evaluation and treatment if she is unable to move her arm as they can perform all the testing the patient would need there    Left voicemail for patient to call the office back with business hours.     Subjective:      Patient ID: Emerson Salguero is a 89 y.o. male who presents today for:  Chief Complaint   Patient presents with    Follow-Up from Hospital     Patients son states that pt has had episodes of staring off into space and not knowing who anyone was. Son states pt is verbal but not himself.        HPI  Pt seen and examined in the office for hospital follow up. Pt was admitted to [facility] from 4/30/2025 to 5/8/2025 for acute encephalopathy with hallucinations. Hospital records reviewed.   Patient initially came in encephalopathic.  CT of the head was negative for acute intracranial findings with prominent age-appropriate atrophy and mild small vessel ischemia.  Initial lab testing revealed an elevated white blood cell count of 11.2, hemoglobin 10.2, platelets 116, BUN 61, ALT 54, AST 48.  Urinalysis negative for UTI.  TSH normal.  Ammonia level normal.  Respiratory viral panel negative.  Patient was initiated on Namenda 5 mg daily for dementia and hallucinations.  EEG was obtained that revealed left temporal sharp waves.  There was concern for encephalitis.  Shaded on IV acyclovir prophylactically.  Lumbar puncture was obtained and negative.  MRI of the brain was negative for acute intracranial findings.  Patient was initiated on Keppra for seizure prophylaxis given abnormal EEG and showed improvement in encephalopathy.  No seizure activity was witnessed.  Had ongoing dysphagia throughout his stay requiring placement of Corpak.  Repeat EEG was eventually obtained and normal.  Patient was treated with Seroquel at at bedtime for sundowning.  Pt presents today accompanied from his son. Is residing at SNF. Son reports concern that pt has been more fatigued. I did speak with I.D. physician regarding her visit with him on 6/2/25. Labs were ordered that showed elevated creatinine of 1.38 on 6/2/25. PO intake has been poor.   Pt is alert, mostly nonverbal. Generalized 2+ edema noted. No seizure

## 2025-06-06 ENCOUNTER — RESULTS FOLLOW-UP (OUTPATIENT)
Dept: NEUROLOGY | Age: 89
End: 2025-06-06

## 2025-06-06 ASSESSMENT — ENCOUNTER SYMPTOMS
TROUBLE SWALLOWING: 0
SHORTNESS OF BREATH: 0
CHEST TIGHTNESS: 0
COUGH: 0
WHEEZING: 0
NAUSEA: 0
COLOR CHANGE: 0
VOMITING: 0

## 2025-06-06 NOTE — TELEPHONE ENCOUNTER
Called and spoke with charge nurse at [facility] to notify them of pt elevated creatinine of 1.84. I was notified pt passed away this afternoon.

## 2025-06-07 LAB
FINAL REPORT: NORMAL
PRELIMINARY: NORMAL

## 2025-06-09 NOTE — PROGRESS NOTES
Physician Progress Note      PATIENT:               XUAN AMES  CSN #:                  [id number]  :                       1928  ADMIT DATE:       2025 12:43 PM  DISCH DATE:        2025 3:25 PM  RESPONDING  PROVIDER #:        Pennie Boone MD          QUERY TEXT:    Shock is documented in the medical record: 25 Dr Roberts \"Shock,   currently shock physiology resolved, etiology is not clear, sepsis is less   likely, rule out cardiac etiology or dehydration\". and 25 DS Dr Boone   \"Shock likely septic in nature\"  Please specify the type:    The clinical indicators include:  -male age 89  -On admission WBC 10.3  T 94.4  R 22  -25 Dr Kemp: \"Shock on admission currently resolved  Acute   encephalopathy and acute injury, improving\"  Options provided:  -- The patient has shock of unknown etiology.  -- The patient has septic shock: Please provide supportive evidence., Please   provide supportive evidence.  -- Other - I will add my own diagnosis  -- Disagree - Not applicable / Not valid  -- Disagree - Clinically unable to determine / Unknown  -- Refer to Clinical Documentation Reviewer    PROVIDER RESPONSE TEXT:    The patient has shock of unknown etiology.    Query created by: Pavan Bagley on 2025 11:40 AM      QUERY TEXT:    \"Acute encephalopathy\" is documented in the 25 DCS.  Please specify type:    The clinical indicators include:  -male age 89  -25 ED Dr Pineda: \"Patient arrives to Emergency Department largely   unresponsive and hypotensive. Patient was approximately 60/30 at the local   skilled nursing facility, and patient was provided with epinephrine and   route.\"  \"Patient nearly unresponsive on arrival. Patient largely sedate and   nearly unresponsive on initial arrival.  Patient being bagged with nasal   trumpet.\" \"Patient findings at this time demonstrate rapid resolution of   alteration of mental status.  Patient patient's hypotension responded well to

## 2025-07-02 LAB
AFB STAIN: NORMAL
FINAL REPORT: NORMAL
PRELIMINARY: NORMAL

## (undated) DEVICE — TUBING, SUCTION, 1/4" X 10', STRAIGHT: Brand: MEDLINE

## (undated) DEVICE — ENDO CARRY-ON PROCEDURE KIT: Brand: ENDO CARRY-ON PROCEDURE KIT

## (undated) DEVICE — TUBE SET 96 MM 64 MM H2O PERISTALTIC STD AUX CHANNEL

## (undated) DEVICE — CONMED SCOPE SAVER BITE BLOCK, 20X27 MM: Brand: SCOPE SAVER

## (undated) DEVICE — GLOVE ORANGE PI 8 1/2   MSG9085

## (undated) DEVICE — TUBING IRRIGATION 140/160/180/190 SER GI ENDOSCP SMARTCAP

## (undated) DEVICE — BRUSH ENDO CLN L90.5IN SHTH DIA1.7MM BRIST DIA5-7MM 2-6MM

## (undated) DEVICE — SINGLE PORT MANIFOLD: Brand: NEPTUNE 2